# Patient Record
Sex: FEMALE | Race: WHITE | NOT HISPANIC OR LATINO | Employment: FULL TIME | URBAN - METROPOLITAN AREA
[De-identification: names, ages, dates, MRNs, and addresses within clinical notes are randomized per-mention and may not be internally consistent; named-entity substitution may affect disease eponyms.]

---

## 2017-12-15 ENCOUNTER — APPOINTMENT (EMERGENCY)
Dept: RADIOLOGY | Facility: HOSPITAL | Age: 27
End: 2017-12-15
Payer: COMMERCIAL

## 2017-12-15 ENCOUNTER — HOSPITAL ENCOUNTER (EMERGENCY)
Facility: HOSPITAL | Age: 27
Discharge: HOME | End: 2017-12-15
Payer: COMMERCIAL

## 2017-12-15 VITALS
BODY MASS INDEX: 40.48 KG/M2 | TEMPERATURE: 97.4 F | OXYGEN SATURATION: 99 % | HEIGHT: 62 IN | HEART RATE: 90 BPM | DIASTOLIC BLOOD PRESSURE: 70 MMHG | RESPIRATION RATE: 24 BRPM | SYSTOLIC BLOOD PRESSURE: 121 MMHG | WEIGHT: 220 LBS

## 2017-12-15 DIAGNOSIS — N39.0 UTI (URINARY TRACT INFECTION): ICD-10-CM

## 2017-12-15 DIAGNOSIS — N20.1 RIGHT URETERAL CALCULUS: Primary | ICD-10-CM

## 2017-12-15 LAB
ALBUMIN SERPL BCP-MCNC: 4.4 G/DL (ref 3.5–5)
ALP SERPL-CCNC: 65 U/L (ref 46–116)
ALT SERPL W P-5'-P-CCNC: 81 U/L (ref 12–78)
ANION GAP SERPL CALCULATED.3IONS-SCNC: 14 MMOL/L (ref 4–13)
AST SERPL W P-5'-P-CCNC: 41 U/L (ref 5–45)
BACTERIA UR QL AUTO: ABNORMAL /HPF
BASOPHILS # BLD AUTO: 0.1 THOUSANDS/ΜL (ref 0–0.1)
BASOPHILS NFR BLD AUTO: 1 % (ref 0–1)
BILIRUB SERPL-MCNC: 0.6 MG/DL (ref 0.2–1)
BILIRUB UR QL STRIP: ABNORMAL
BUN SERPL-MCNC: 12 MG/DL (ref 5–25)
CALCIUM SERPL-MCNC: 9.7 MG/DL (ref 8.3–10.1)
CHLORIDE SERPL-SCNC: 104 MMOL/L (ref 100–108)
CLARITY UR: ABNORMAL
CO2 SERPL-SCNC: 22 MMOL/L (ref 21–32)
COLOR UR: ABNORMAL
CREAT SERPL-MCNC: 1.08 MG/DL (ref 0.6–1.3)
EOSINOPHIL # BLD AUTO: 0 THOUSAND/ΜL (ref 0–0.61)
EOSINOPHIL NFR BLD AUTO: 0 % (ref 0–6)
ERYTHROCYTE [DISTWIDTH] IN BLOOD BY AUTOMATED COUNT: 12.6 % (ref 11.6–15.1)
EXT PREG TEST URINE: NEGATIVE
GFR SERPL CREATININE-BSD FRML MDRD: 71 ML/MIN/1.73SQ M
GLUCOSE SERPL-MCNC: 141 MG/DL (ref 65–140)
GLUCOSE UR STRIP-MCNC: NEGATIVE MG/DL
HCT VFR BLD AUTO: 44.5 % (ref 37–47)
HGB BLD-MCNC: 14.9 G/DL (ref 12–16)
HGB UR QL STRIP.AUTO: ABNORMAL
KETONES UR STRIP-MCNC: ABNORMAL MG/DL
LEUKOCYTE ESTERASE UR QL STRIP: ABNORMAL
LIPASE SERPL-CCNC: 139 U/L (ref 73–393)
LYMPHOCYTES # BLD AUTO: 2.6 THOUSANDS/ΜL (ref 0.6–4.47)
LYMPHOCYTES NFR BLD AUTO: 23 % (ref 14–44)
MCH RBC QN AUTO: 30.9 PG (ref 27–31)
MCHC RBC AUTO-ENTMCNC: 33.5 G/DL (ref 31.4–37.4)
MCV RBC AUTO: 92 FL (ref 82–98)
MONOCYTES # BLD AUTO: 0.3 THOUSAND/ΜL (ref 0.17–1.22)
MONOCYTES NFR BLD AUTO: 2 % (ref 4–12)
NEUTROPHILS # BLD AUTO: 8.7 THOUSANDS/ΜL (ref 1.85–7.62)
NEUTS SEG NFR BLD AUTO: 74 % (ref 43–75)
NITRITE UR QL STRIP: NEGATIVE
NON-SQ EPI CELLS URNS QL MICRO: ABNORMAL /HPF
NRBC BLD AUTO-RTO: 0 /100 WBCS
PH UR STRIP.AUTO: 7.5 [PH] (ref 5–9)
PLATELET # BLD AUTO: 375 THOUSANDS/UL (ref 130–400)
PMV BLD AUTO: 9.1 FL (ref 8.9–12.7)
POTASSIUM SERPL-SCNC: 3.8 MMOL/L (ref 3.5–5.3)
PROT SERPL-MCNC: 8.2 G/DL (ref 6.4–8.2)
PROT UR STRIP-MCNC: ABNORMAL MG/DL
RBC # BLD AUTO: 4.83 MILLION/UL (ref 4.2–5.4)
RBC #/AREA URNS AUTO: ABNORMAL /HPF
SODIUM SERPL-SCNC: 140 MMOL/L (ref 136–145)
SP GR UR STRIP.AUTO: 1.02 (ref 1–1.03)
UROBILINOGEN UR QL STRIP.AUTO: 0.2 E.U./DL
WBC # BLD AUTO: 11.7 THOUSAND/UL (ref 4.8–10.8)
WBC #/AREA URNS AUTO: ABNORMAL /HPF

## 2017-12-15 PROCEDURE — 87147 CULTURE TYPE IMMUNOLOGIC: CPT | Performed by: PHYSICIAN ASSISTANT

## 2017-12-15 PROCEDURE — 81001 URINALYSIS AUTO W/SCOPE: CPT | Performed by: PHYSICIAN ASSISTANT

## 2017-12-15 PROCEDURE — 36415 COLL VENOUS BLD VENIPUNCTURE: CPT | Performed by: PHYSICIAN ASSISTANT

## 2017-12-15 PROCEDURE — 96375 TX/PRO/DX INJ NEW DRUG ADDON: CPT

## 2017-12-15 PROCEDURE — 83690 ASSAY OF LIPASE: CPT | Performed by: PHYSICIAN ASSISTANT

## 2017-12-15 PROCEDURE — 96361 HYDRATE IV INFUSION ADD-ON: CPT

## 2017-12-15 PROCEDURE — 80053 COMPREHEN METABOLIC PANEL: CPT | Performed by: PHYSICIAN ASSISTANT

## 2017-12-15 PROCEDURE — 87086 URINE CULTURE/COLONY COUNT: CPT | Performed by: PHYSICIAN ASSISTANT

## 2017-12-15 PROCEDURE — 85025 COMPLETE CBC W/AUTO DIFF WBC: CPT | Performed by: PHYSICIAN ASSISTANT

## 2017-12-15 PROCEDURE — 81025 URINE PREGNANCY TEST: CPT | Performed by: PHYSICIAN ASSISTANT

## 2017-12-15 PROCEDURE — 96374 THER/PROPH/DIAG INJ IV PUSH: CPT

## 2017-12-15 PROCEDURE — 99284 EMERGENCY DEPT VISIT MOD MDM: CPT

## 2017-12-15 PROCEDURE — 74176 CT ABD & PELVIS W/O CONTRAST: CPT

## 2017-12-15 RX ORDER — ONDANSETRON 4 MG/1
4 TABLET, ORALLY DISINTEGRATING ORAL EVERY 6 HOURS PRN
COMMUNITY
End: 2018-03-14

## 2017-12-15 RX ORDER — KETOROLAC TROMETHAMINE 30 MG/ML
30 INJECTION, SOLUTION INTRAMUSCULAR; INTRAVENOUS ONCE
Status: COMPLETED | OUTPATIENT
Start: 2017-12-15 | End: 2017-12-15

## 2017-12-15 RX ORDER — CEPHALEXIN 250 MG/1
250 CAPSULE ORAL EVERY 6 HOURS SCHEDULED
Qty: 28 CAPSULE | Refills: 0 | Status: SHIPPED | OUTPATIENT
Start: 2017-12-15 | End: 2017-12-22

## 2017-12-15 RX ORDER — ONDANSETRON 4 MG/1
4 TABLET, ORALLY DISINTEGRATING ORAL EVERY 6 HOURS PRN
Qty: 20 TABLET | Refills: 0 | Status: SHIPPED | OUTPATIENT
Start: 2017-12-15 | End: 2018-07-07 | Stop reason: ALTCHOICE

## 2017-12-15 RX ORDER — KETOROLAC TROMETHAMINE 10 MG/1
10 TABLET, FILM COATED ORAL EVERY 6 HOURS PRN
Qty: 20 TABLET | Refills: 0 | Status: SHIPPED | OUTPATIENT
Start: 2017-12-15 | End: 2018-07-07 | Stop reason: ALTCHOICE

## 2017-12-15 RX ORDER — ONDANSETRON 2 MG/ML
4 INJECTION INTRAMUSCULAR; INTRAVENOUS ONCE
Status: COMPLETED | OUTPATIENT
Start: 2017-12-15 | End: 2017-12-15

## 2017-12-15 RX ORDER — CYCLOBENZAPRINE HCL 10 MG
10 TABLET ORAL 3 TIMES DAILY PRN
COMMUNITY
End: 2018-07-07 | Stop reason: ALTCHOICE

## 2017-12-15 RX ADMIN — KETOROLAC TROMETHAMINE 30 MG: 30 INJECTION, SOLUTION INTRAMUSCULAR at 09:52

## 2017-12-15 RX ADMIN — ONDANSETRON 4 MG: 2 INJECTION INTRAMUSCULAR; INTRAVENOUS at 09:52

## 2017-12-15 RX ADMIN — SODIUM CHLORIDE 1000 ML: 0.9 INJECTION, SOLUTION INTRAVENOUS at 09:49

## 2017-12-15 NOTE — ED PROVIDER NOTES
History  Chief Complaint   Patient presents with    Flank Pain     R, nausea/vom began early this morning       History provided by:  Patient   used: No    Flank Pain   Pain location:  R flank  Pain quality: aching and gnawing    Pain radiates to:  Does not radiate  Pain severity:  Severe  Onset quality:  Sudden  Duration:  3 hours  Timing:  Constant  Progression:  Worsening  Chronicity:  New  Context: not alcohol use, not awakening from sleep, not diet changes, not eating, not laxative use, not medication withdrawal, not previous surgeries, not recent illness, not recent sexual activity, not recent travel, not retching, not sick contacts, not suspicious food intake and not trauma    Associated symptoms: nausea and vomiting    Associated symptoms: no anorexia, no belching, no chest pain, no chills, no constipation, no cough, no diarrhea, no dysuria, no fatigue, no fever, no flatus, no hematemesis, no hematochezia, no hematuria, no melena, no shortness of breath, no sore throat, no vaginal bleeding and no vaginal discharge    Risk factors: obesity    Risk factors: no alcohol abuse, no aspirin use, not elderly, has not had multiple surgeries, no NSAID use, not pregnant and no recent hospitalization        Prior to Admission Medications   Prescriptions Last Dose Informant Patient Reported? Taking? cyclobenzaprine (FLEXERIL) 10 mg tablet   Yes Yes   Sig: Take 10 mg by mouth 3 (three) times a day as needed for muscle spasms   ondansetron (ZOFRAN-ODT) 4 mg disintegrating tablet   Yes Yes   Sig: Take 4 mg by mouth every 6 (six) hours as needed for nausea or vomiting      Facility-Administered Medications: None       History reviewed  No pertinent past medical history  History reviewed  No pertinent surgical history  History reviewed  No pertinent family history  I have reviewed and agree with the history as documented      Social History   Substance Use Topics    Smoking status: Current Every Day Smoker     Packs/day: 0 25     Types: Cigarettes    Smokeless tobacco: Former User     Quit date: 5/4/2016    Alcohol use Yes      Comment: occasional        Review of Systems   Constitutional: Positive for appetite change  Negative for chills, diaphoresis, fatigue and fever  HENT: Negative  Negative for sore throat  Eyes: Negative for photophobia and visual disturbance  Respiratory: Negative for cough, chest tightness, shortness of breath and wheezing  Cardiovascular: Negative for chest pain  Gastrointestinal: Positive for nausea and vomiting  Negative for anorexia, constipation, diarrhea, flatus, hematemesis, hematochezia and melena  Genitourinary: Positive for flank pain  Negative for dysuria, hematuria, vaginal bleeding and vaginal discharge  Musculoskeletal: Negative for myalgias and neck stiffness  Skin: Negative for color change, pallor and rash  Neurological: Negative for dizziness, weakness, light-headedness and headaches  Hematological: Negative for adenopathy  Physical Exam  ED Triage Vitals [12/15/17 0935]   Temperature Pulse Respirations Blood Pressure SpO2   97 5 °F (36 4 °C) 88 20 121/70 100 %      Temp Source Heart Rate Source Patient Position - Orthostatic VS BP Location FiO2 (%)   Oral Monitor Sitting Right arm --      Pain Score       Worst Possible Pain           Orthostatic Vital Signs  Vitals:    12/15/17 0935 12/15/17 0936   BP: 121/70 121/70   Pulse: 88 90   Patient Position - Orthostatic VS: Sitting        Physical Exam   Constitutional: She is oriented to person, place, and time  She appears well-developed and well-nourished  No distress  HENT:   Head: Normocephalic and atraumatic  Right Ear: External ear normal    Left Ear: External ear normal    Nose: Nose normal    Neck: Normal range of motion  Neck supple  Cardiovascular: Normal rate, regular rhythm, normal heart sounds and intact distal pulses  Exam reveals no friction rub      No murmur heard   Pulmonary/Chest: Effort normal and breath sounds normal  No respiratory distress  She has no wheezes  Abdominal: Soft  There is no tenderness  There is CVA tenderness (Mild right CVA tenderness)  There is no guarding  Lymphadenopathy:     She has no cervical adenopathy  Neurological: She is alert and oriented to person, place, and time  She exhibits normal muscle tone  Coordination normal    Skin: Skin is warm and dry  Capillary refill takes less than 2 seconds  No rash noted  She is not diaphoretic  No pallor  Nursing note and vitals reviewed  ED Medications  Medications   ondansetron (ZOFRAN) injection 4 mg (4 mg Intravenous Given 12/15/17 0952)   sodium chloride 0 9 % bolus 1,000 mL (0 mL Intravenous Stopped 12/15/17 1217)   ketorolac (TORADOL) injection 30 mg (30 mg Intravenous Given 12/15/17 0952)       Diagnostic Studies  Results Reviewed     Procedure Component Value Units Date/Time    Urine Microscopic [[id number]]  (Abnormal) Collected:  12/15/17 1044    Lab Status:  Final result Specimen:  Urine from Urine, Clean Catch Updated:  12/15/17 1132     RBC, UA Innumerable (A) /hpf      WBC, UA 20-30 (A) /hpf      Epithelial Cells Moderate (A) /hpf      Bacteria, UA Innumerable (A) /hpf     Urine culture [[id number]] Collected:  12/15/17 1044    Lab Status:   In process Specimen:  Urine from Urine, Clean Catch Updated:  12/15/17 1132    UA w Reflex to Microscopic w Reflex to Culture [[id number]]  (Abnormal) Collected:  12/15/17 1044    Lab Status:  Final result Specimen:  Urine from Urine, Clean Catch Updated:  12/15/17 1055     Color, UA Yelena     Clarity, UA Turbid     Specific [place], UA 1 020     pH, UA 7 5     Leukocytes, UA Moderate (A)     Nitrite, UA Negative     Protein, UA 30 (1+) (A) mg/dl      Glucose, UA Negative mg/dl      Ketones, UA 15 (1+) (A) mg/dl      Urobilinogen, UA 0 2 E U /dl      Bilirubin, UA Interference- unable to analyze (A)     Blood, UA Large (A)    POCT pregnancy, urine [[id number]]  (Normal) Resulted:  12/15/17 1052    Lab Status:  Final result Updated:  12/15/17 1052     EXT PREG TEST UR (Ref: Negative) negative    Comprehensive metabolic panel [[id number]]  (Abnormal) Collected:  12/15/17 0948    Lab Status:  Final result Specimen:  Blood from Arm, Right Updated:  12/15/17 1012     Sodium 140 mmol/L      Potassium 3 8 mmol/L      Chloride 104 mmol/L      CO2 22 mmol/L      Anion Gap 14 (H) mmol/L      BUN 12 mg/dL      Creatinine 1 08 mg/dL      Glucose 141 (H) mg/dL      Calcium 9 7 mg/dL      AST 41 U/L      ALT 81 (H) U/L      Alkaline Phosphatase 65 U/L      Total Protein 8 2 g/dL      Albumin 4 4 g/dL      Total Bilirubin 0 60 mg/dL      eGFR 71 ml/min/1 73sq m     Narrative:         National Kidney Disease Education Program recommendations are as follows:  GFR calculation is accurate only with a steady state creatinine  Chronic Kidney disease less than 60 ml/min/1 73 sq  meters  Kidney failure less than 15 ml/min/1 73 sq  meters      Lipase [[id number]]  (Normal) Collected:  12/15/17 0948    Lab Status:  Final result Specimen:  Blood from Arm, Right Updated:  12/15/17 1012     Lipase 139 u/L     CBC and differential [[id number]]  (Abnormal) Collected:  12/15/17 0948    Lab Status:  Final result Specimen:  Blood from Arm, Right Updated:  12/15/17 0957     WBC 11 70 (H) Thousand/uL      RBC 4 83 Million/uL      Hemoglobin 14 9 g/dL      Hematocrit 44 5 %      MCV 92 fL      MCH 30 9 pg      MCHC 33 5 g/dL      RDW 12 6 %      MPV 9 1 fL      Platelets 100 Thousands/uL      nRBC 0 /100 WBCs      Neutrophils Relative 74 %      Lymphocytes Relative 23 %      Monocytes Relative 2 (L) %      Eosinophils Relative 0 %      Basophils Relative 1 %      Neutrophils Absolute 8 70 (H) Thousands/µL      Lymphocytes Absolute 2 60 Thousands/µL      Monocytes Absolute 0 30 Thousand/µL      Eosinophils Absolute 0 00 Thousand/µL      Basophils Absolute 0 10 Thousands/µL                  CT renal stone study abdomen pelvis without contrast   Final Result by Kimberly Castano MD (12/15 1136)      There are bilateral kidney stones and there is also a 3-4 mm right ureter calculus in the midportion of the ureter producing mild hydroureteronephrosis  Extensive fatty infiltration of liver          Workstation performed: [id number]                    Procedures  Procedures       Phone Contacts  ED Phone Contact    ED Course  ED Course                                MDM  Number of Diagnoses or Management Options  Right ureteral calculus: new and requires workup  UTI (urinary tract infection): new and requires workup  Diagnosis management comments: CT stone study showed a 3 mm calculus in the right mid ureter with mild hydronephrosis  Her UA showed concurrent UTI  She was discharged in no acute distress with urine strainer, course of Keflex, and supportive therapy for the kidney stone  She was given referral to Dr Alea Chavez of Urology for further evaluation and/or treatment as necessary  She was additionally instructed to return to the ED immediately if worsening symptoms develop or inability to pass the stone  Amount and/or Complexity of Data Reviewed  Clinical lab tests: ordered and reviewed  Tests in the radiology section of CPT®: ordered and reviewed  Review and summarize past medical records: yes      CritCare Time    Disposition  Final diagnoses:   Right ureteral calculus   UTI (urinary tract infection)     Time reflects when diagnosis was documented in both MDM as applicable and the Disposition within this note     Time User Action Codes Description Comment    12/15/2017 12:04 PM William Demarco Add [N20 1] Right ureteral calculus     12/15/2017 12:04 PM William Demarco Add [N39 0] UTI (urinary tract infection)       ED Disposition     ED Disposition Condition Comment    Discharge  [facility] discharge to home/self care      Condition at discharge: Stable        Follow-up Information Follow up With Specialties Details Why Contact Info Additional [address] Emergency Department Emergency Medicine Go to If symptoms worsen [address] [facility]  [phone] [facility], [address] [place], [address]    Chuckie Shanks MD Urology Schedule an appointment as soon as possible for a visit For further evaluation and/or treatment as necessary  [address]  [phone]           Discharge Medication List as of 12/15/2017 12:07 PM      START taking these medications    Details   cephalexin (KEFLEX) 250 mg capsule Take 1 capsule by mouth every 6 (six) hours for 7 days, Starting Fri 12/15/2017, Until Fri 12/22/2017, Print      ketorolac (TORADOL) 10 mg tablet Take 1 tablet by mouth every 6 (six) hours as needed for moderate pain or severe pain for up to 5 days, Starting Fri 12/15/2017, Until Wed 12/20/2017, Print      !! ondansetron (ZOFRAN-ODT) 4 mg disintegrating tablet Take 1 tablet by mouth every 6 (six) hours as needed for nausea or vomiting for up to 5 days, Starting Fri 12/15/2017, Until Wed 12/20/2017, Print       !! - Potential duplicate medications found  Please discuss with provider  CONTINUE these medications which have NOT CHANGED    Details   cyclobenzaprine (FLEXERIL) 10 mg tablet Take 10 mg by mouth 3 (three) times a day as needed for muscle spasms, Historical Med      !! ondansetron (ZOFRAN-ODT) 4 mg disintegrating tablet Take 4 mg by mouth every 6 (six) hours as needed for nausea or vomiting, Historical Med       !! - Potential duplicate medications found  Please discuss with provider  No discharge procedures on file      ED Provider  Electronically Signed by           Moira Drake PA-C  12/15/17 4834

## 2017-12-15 NOTE — DISCHARGE INSTRUCTIONS
Ureteral Stones   WHAT YOU NEED TO KNOW:   A ureteral stone is a stone that forms in the kidney and moves down the ureter and gets stuck there  The ureter is the tube that takes urine from the kidney to the bladder  Stones can form in the urinary system when your urine has high levels of minerals and salts  Urinary stones can be made of uric acid, calcium, phosphate, or oxalate crystals  DISCHARGE INSTRUCTIONS:   Return to the emergency department if:   · You have severe pain that does not improve, even after you take medicine  · You have vomiting that is not relieved by medicine  · You develop a fever  Contact your healthcare provider if:   · You develop a fever  · You have any questions or concerns about your condition or care  Follow up with your healthcare provider as directed: You may need to return for more tests  Write down your questions so you remember to ask them during your visits  Medicines: You may need any of the following:  · NSAIDs , such as ibuprofen, help decrease swelling, pain, and fever  This medicine is available with or without a doctor's order  NSAIDs can cause stomach bleeding or kidney problems in certain people  If you take blood thinner medicine, always ask your healthcare provider if NSAIDs are safe for you  Always read the medicine label and follow directions  · Prescription pain medicine  may help decrease pain or help your ureteral stone pass  Do not wait until the pain is severe before you take pain medicine  · Nausea medicine  may help calm your stomach and prevent vomiting  · Take your medicine as directed  Contact your healthcare provider if you think your medicine is not helping or if you have side effects  Tell him or her if you are allergic to any medicine  Keep a list of the medicines, vitamins, and herbs you take  Include the amounts, and when and why you take them  Bring the list or the pill bottles to follow-up visits   Carry your medicine list with you in case of an emergency  Self-care:   · Drink plenty of liquids  Your healthcare provider may tell you to drink at least 8 to 12 (eight-ounce) cups of liquids each day  This helps flush out the ureteral stones when you urinate  Water is the best liquid to drink  · Strain your urine every time you go to the bathroom  Urinate through a strainer or a piece of thin cloth to catch the stones  Take the stones to your healthcare provider so they can be sent to the lab for tests  This will help your healthcare providers plan the best treatment for you  · Ask your healthcare provider about any nutrition changes you need to make  You may need to limit certain foods such as foods high in sodium (salt), certain protein foods, or foods high in oxalate  After you pass your ureteral stone: Once you have passed your ureteral stone, you may need to do a 24-hour urine test  You may need to save all of your urine for 24 hours  Each time you go to the bathroom, you will urinate into a container  Then you will pour your urine into a larger container that is kept cold  You may be told to write down the time and amount of urine you passed  At the end of 24 hours, the urine is sent to a lab for tests  Results from the test will help your healthcare provider plan ways to prevent more stones from forming  © 2017 [employer] Information is for End User's use only and may not be sold, redistributed or otherwise used for commercial purposes  All illustrations and images included in CareNotes® are the copyrighted property of A D A M [employer]  or Marv Cuellar  The above information is an  only  It is not intended as medical advice for individual conditions or treatments  Talk to your doctor, nurse or pharmacist before following any medical regimen to see if it is safe and effective for you        How to Strain Your Urine   WHAT YOU NEED TO KNOW:   Urinate into a strainer (funnel with a fine mesh on the bottom) or glass jar to collect kidney stones  DISCHARGE INSTRUCTIONS:   Medicines:   · Pain medicine: You may be given medicine to take away or decrease pain  Do not wait until the pain is severe before you take your medicine  · NSAIDs:  These medicines decrease swelling, pain, and fever  NSAIDs are available without a doctor's order  Ask which medicine is right for you  Ask how much to take and when to take it  Take as directed  NSAIDs can cause stomach bleeding and kidney problems if not taken correctly  · Nausea medicine: This medicine calms your stomach and prevents or controls vomiting  · Take your medicine as directed  Contact your healthcare provider if you think your medicine is not helping or if you have side effects  Tell him of her if you are allergic to any medicine  Keep a list of the medicines, vitamins, and herbs you take  Include the amounts, and when and why you take them  Bring the list or the pill bottles to follow-up visits  Carry your medicine list with you in case of an emergency  Drink liquids as directed:  Drink about 3 liters of liquids each day, or as directed  That equals about 12 glasses of water or fruit juice  Half of your total daily liquids should be water  Limit coffee, tea, and soda to 2 cups daily  Your urine will be pale and clear if you are drinking enough liquid  Self-care:   · Activity:  Exercise, such as walking, may help decrease your pain  · Avoid heat:  Heat may cause you to sweat, urinate less, and become dehydrated  Follow up with your healthcare provider or urologist as directed:  Write down your questions so you remember to ask them during your visits  Contact your healthcare provider or urologist if:   · You have a fever and chills  · Your urine looks cloudy or has a bad smell  · You have burning pain when you urinate  · You have trouble urinating      · You are vomiting and it does not get better, even after you take medicine  · You have questions or concerns about your condition or care  Return to the emergency department if:   · You are not able to urinate  · You have severe pain in your lower abdomen or side  · Your heart flutters or beats faster than usual   © 2017 [employer] Information is for End User's use only and may not be sold, redistributed or otherwise used for commercial purposes  All illustrations and images included in CareNotes® are the copyrighted property of [employer]  or Marv Cuellar  The above information is an  only  It is not intended as medical advice for individual conditions or treatments  Talk to your doctor, nurse or pharmacist before following any medical regimen to see if it is safe and effective for you

## 2017-12-17 LAB — BACTERIA UR CULT: ABNORMAL

## 2018-01-31 ENCOUNTER — TELEPHONE (OUTPATIENT)
Dept: OBGYN CLINIC | Facility: CLINIC | Age: 28
End: 2018-01-31

## 2018-02-06 ENCOUNTER — OFFICE VISIT (OUTPATIENT)
Dept: OBGYN CLINIC | Facility: CLINIC | Age: 28
End: 2018-02-06
Payer: COMMERCIAL

## 2018-02-06 VITALS
SYSTOLIC BLOOD PRESSURE: 118 MMHG | HEIGHT: 62 IN | DIASTOLIC BLOOD PRESSURE: 82 MMHG | WEIGHT: 226 LBS | BODY MASS INDEX: 41.59 KG/M2

## 2018-02-06 DIAGNOSIS — N92.6 IRREGULAR MENSES: ICD-10-CM

## 2018-02-06 DIAGNOSIS — R53.82 CHRONIC FATIGUE: Primary | ICD-10-CM

## 2018-02-06 PROCEDURE — 99203 OFFICE O/P NEW LOW 30 MIN: CPT | Performed by: NURSE PRACTITIONER

## 2018-02-06 RX ORDER — TRAMADOL HYDROCHLORIDE 50 MG/1
1 TABLET ORAL 4 TIMES DAILY
COMMUNITY
Start: 2015-07-15 | End: 2018-07-07 | Stop reason: ALTCHOICE

## 2018-02-06 NOTE — PROGRESS NOTES
Assessment/Plan:    Reviewed with patient it could be very normal to skip or have an irregular menstrual cycle for one month  Sometimes due to stress our cycle can become irregular  Reviewed those two days of bleeding could have been her menses just very short this month  Advised patient to continue to monitor menses  If she has gone 3 or more months with no menses to be reevaluated for amenorrhea but with only missing one or having one irregular cycle we cannot consider that at this time  Pt also spoke about trying to concieve for the past three years with no successful pregnancies  Reviewed with patient if she would like she can schedule a preconception counseling appointment with Jorge Sullivan  Advised patient to schedule yearly exam and have an up to date pap smear  Advised patient to see PCP for physical exam and routine blood work  Will send for Thyroid testing and qualitative HCG  Advised to take a prenatal vitamin with DHA since she is trying to conceive  RTO if misses menses for greater than 3 months for further follow-up/testing  RTO for annual exam and preconception counseling  Irregular menses  Pt presents with irregular bleeding over the last month  Pt states her menses are usually very regular, but this month she is 7 days late for her menses  She took many home pregnancy tests with a negative result  Pt states last week she did have light bleeding on Tuesday and regular bright red bleeding on Wednesday and stopped  - Will send for TSH, T4, and HCG blood work  - Pt to continue to monitor menses, return if it has been 3-6 months without a menses for further work up    Chronic fatigue  - Will send for TSH, T4, HCG blood work       Diagnoses and all orders for this visit:    Chronic fatigue  -     TSH, 3rd generation; Future  -     T4, free; Future  -     Pregnancy Test (HCG Qualitative); Future    Irregular menses    Other orders  -     traMADol (ULTRAM) 50 mg tablet;  Take 1 tablet by mouth 4 (four) times a day          Subjective:      Patient ID: Luciano Young is a 33 y o  female  Pt is a new patient to the office who presents today for missed/irregular menses  Pt states she is trying to conceive for three years so she is very aware of her menstrual cycle  Pt states she had one chemical pregnancy in the past but no other pregnancies to date  Her menses is usually every month lasts 5-7 days with first 2 days being very light then 3-5 days of heavier bleediningg with some clots, then her menses is gone  Denies any pain with menses  Pt denies ever having bleeding in between menses  Pt states this past month she missed her menses and is currently about 10 days late on her period  Pt states she took numerous pregnancy tests because they are trying to conceive, all of which were negative  Pt did state she had light pink spotting a week ago followed by a day of bright red bleeding, then nothing  Patients last menstrual period is recorded as January 14, 2018  Pt denies any recent change in medications, dietary habits, or stress levels within the last month or two  Pt in a mutually exclusive relationship and denies the need for STD testing today, no history of an STD in the past   Last pap is unknown, pt states many years ago  No history of abnormal paps in the past per patient  Pt not currently on birth control  The following portions of the patient's history were reviewed and updated as appropriate: allergies, current medications, past family history, past medical history, past social history, past surgical history and problem list     Review of Systems   Constitutional: Positive for fatigue  Genitourinary: Positive for menstrual problem  All other systems reviewed and are negative  Objective:     Physical Exam   Constitutional: She is oriented to person, place, and time  She appears well-developed and well-nourished  Neck: Normal range of motion  Neck supple   No thyromegaly present  Cardiovascular: Normal rate, regular rhythm and normal heart sounds  Pulmonary/Chest: Effort normal and breath sounds normal    Abdominal: Soft  Bowel sounds are normal    Musculoskeletal: Normal range of motion  Neurological: She is alert and oriented to person, place, and time  Skin: Skin is warm and dry  Psychiatric: She has a normal mood and affect   Her behavior is normal  Judgment and thought content normal

## 2018-02-06 NOTE — ASSESSMENT & PLAN NOTE
Pt presents with irregular bleeding over the last month  Pt states her menses are usually very regular, but this month she is 7 days late for her menses  She took many home pregnancy tests with a negative result  Pt states last week she did have light bleeding on Tuesday and regular bright red bleeding on Wednesday and stopped     - Will send for TSH, T4, and HCG blood work  - Pt to continue to monitor menses, return if it has been 3-6 months without a menses for further work up

## 2018-02-06 NOTE — PATIENT INSTRUCTIONS
Reviewed with patient it could be very normal to skip or have an irregular menstrual cycle for one month  Sometimes due to stress our cycle can become irregular  Reviewed those two days of bleeding could have been her menses just very short this month  Advised patient to continue to monitor menses  If she has gone 3 or more months with no menses to be reevaluated for amenorrhea but with only missing one or having one irregular cycle we cannot consider that at this time  Pt also spoke about trying to concieve for the past three years with no successful pregnancies  Reviewed with patient if she would like she can schedule a preconception counseling appointment with Rosa M Marte  Advised patient to schedule yearly exam and have an up to date pap smear  Advised patient to see PCP for physical exam and routine blood work  Will send for Thyroid testing and qualitative HCG  Advised to take a prenatal vitamin with DHA since she is trying to conceive  RTO if misses menses for greater than 3 months for further follow-up/testing  RTO for annual exam and preconception counseling

## 2018-02-11 LAB
B-HCG SERPL QL: NEGATIVE
T4 FREE SERPL-MCNC: 1.1 NG/DL (ref 0.8–1.8)
TSH SERPL-ACNC: 1.09 MIU/L

## 2018-02-12 ENCOUNTER — TELEPHONE (OUTPATIENT)
Dept: OBGYN CLINIC | Facility: CLINIC | Age: 28
End: 2018-02-12

## 2018-02-13 ENCOUNTER — TELEPHONE (OUTPATIENT)
Dept: OBGYN CLINIC | Facility: CLINIC | Age: 28
End: 2018-02-13

## 2018-03-14 ENCOUNTER — OFFICE VISIT (OUTPATIENT)
Dept: OBGYN CLINIC | Facility: CLINIC | Age: 28
End: 2018-03-14
Payer: COMMERCIAL

## 2018-03-14 VITALS
WEIGHT: 229 LBS | HEIGHT: 62 IN | BODY MASS INDEX: 42.14 KG/M2 | SYSTOLIC BLOOD PRESSURE: 116 MMHG | DIASTOLIC BLOOD PRESSURE: 78 MMHG

## 2018-03-14 DIAGNOSIS — N92.6 IRREGULAR MENSES: Primary | ICD-10-CM

## 2018-03-14 DIAGNOSIS — Z01.419 ENCOUNTER FOR CERVICAL PAP SMEAR WITH PELVIC EXAM: ICD-10-CM

## 2018-03-14 PROCEDURE — 99395 PREV VISIT EST AGE 18-39: CPT | Performed by: PHYSICIAN ASSISTANT

## 2018-03-14 NOTE — PROGRESS NOTES
Assessment/Plan   Diagnoses and all orders for this visit:    Irregular menses  -     Comprehensive metabolic panel; Future  -     DHEA-sulfate; Future  -     Insulin, fasting; Future  -     Testosterone, free, total; Future  -     Prolactin; Future  -     Antimullerian hormone (AMH); Future  -     Estradiol; Future  -     Progesterone; Future  -     Luteinizing hormone; Future  -     Follicle stimulating hormone; Future  -     US pelvis complete w transvaginal; Future  -     hCG, quantitative; Future    Encounter for cervical Pap smear with pelvic exam  -     GP PAP (RFLX HPV PLUS ASC-US or >)        Discussion  I have discussed the importance of monthly self-breast exams, exercise and healthy diet as well as adequate intake of calcium and vitamin D  Encourage MVI q day and r/milvia importance of folic acid; Encourage 30-40 min weight bearing exercise most days of week  Encourage safe sexual practices; STD testing -   Contraception -   The patient has/has not had the Gardasil vaccine series, which is recommended for patients from 526 years of age  Strongly encourage - handout given; pt to check with insurance for coverage and call if desires   Pap smears will start at age 25 per the ASCCP guidelines  All questions have been answered to her satisfaction  RTO for APE or sooner if needed    Subjective     Pt for annual GYN exam  Pt has also been  trying for pregnancy for approx 3 years  Had chemical pregnancy last year  Then this past Jan period was very light, only lasted 2 days then resolved  Then no menses in Feb at all, then got normal March  Pt has been following w edward for ovulation, has not used Opks at all  No BBT yet  Until recently w changes periods have been q 28 days, lasts 5 days, does have clotting and heaviness first few days, then tapers to more tolerable  Then last 3 mths have been irregular  Pt does c/o increased stress with family and work   Has not had any significant wt changes, no changes in diet or routine  Vicky Moore is a 33 y o  female who presents for annual well woman exam    Menarche -9 ; LMP -3/8/18 ; No vulvar itch/burn; No vaginal itch/burn; No abn discharge or odor; No urinary sx - burning/pain/frequency/hematuria  (+) SBEs - no breast masses, asymmetry, nipple discharge or bleeding, changes in skin of breast, or breast tenderness bilaterally  No abd/pelvic pain or HAs;   Pt is sexually active in a mutually monog sexual relationship; No issues with intercourse; She declines std/hiv/hep testing; Feels safe at home      Review of Systems   Constitutional: Negative  Respiratory: Negative  Gastrointestinal: Negative  Endocrine: Negative  Genitourinary: Negative  The following portions of the patient's history were reviewed and updated as appropriate: current medications, past family history, past medical history, past social history, past surgical history and problem list          Menstrual History:  OB History      Para Term  AB Living    1 0 0 0 1 0    SAB TAB Ectopic Multiple Live Births    0 0 0 0 0        Obstetric Comments    biochemical         Menarche age: 9  Patient's last menstrual period was 2018 (exact date)  Period Cycle (Days): 28  Period Duration (Days): 5  Menstrual Flow: Heavy    No past medical history on file  No past surgical history on file  Family History   Problem Relation Age of Onset    Cancer Mother     Diabetes Mother     Hypertension Mother     Thyroid disease Mother     Hyperlipidemia Father      High Triglycerides       Social History     Social History    Marital status: /Civil Union     Spouse name: N/A    Number of children: N/A    Years of education: N/A     Occupational History    Not on file       Social History Main Topics    Smoking status: Current Every Day Smoker     Packs/day: 0 25     Types: Cigarettes    Smokeless tobacco: Former User     Quit date: 2016    Alcohol use Yes Comment: occasional    Drug use: No    Sexual activity: Yes     Partners: Male     Other Topics Concern    Not on file     Social History Narrative    No narrative on file         No Known Allergies    Objective   There were no vitals filed for this visit  Physical Exam   Constitutional: She is oriented to person, place, and time  She appears well-developed and well-nourished  HENT:   Head: Normocephalic  Neck: Normal range of motion  Neck supple  No tracheal deviation present  No thyromegaly present  Cardiovascular: Normal rate, regular rhythm and normal heart sounds  Pulmonary/Chest: Effort normal  No stridor  No respiratory distress  She has no wheezes  She has no rales  She exhibits no tenderness  Abdominal: Soft  Bowel sounds are normal  She exhibits no distension and no mass  There is no rebound and no guarding  Genitourinary: Vagina normal and uterus normal    Musculoskeletal: She exhibits no edema  Neurological: She is alert and oriented to person, place, and time  Skin: Skin is warm  Psychiatric: She has a normal mood and affect  Her behavior is normal  Judgment and thought content normal      Patient Instructions   Will plan OPKs this month and then plan day 21 labs w PCOS labs 7 days after + ovulation  Will then plan day 3 labs  Partner to look into insurance coverage and plan Sa/count  He will need to call us and let us know where to send rx for him  Pt and partner will look into beenfits and decide how aggressive they plan to be  Will plan blood work and Sa  Will then plan HSG and consider options after that  Pap done today

## 2018-03-16 LAB — THIN PREP CVX: NORMAL

## 2018-07-07 ENCOUNTER — APPOINTMENT (EMERGENCY)
Dept: RADIOLOGY | Facility: HOSPITAL | Age: 28
End: 2018-07-07
Payer: COMMERCIAL

## 2018-07-07 ENCOUNTER — HOSPITAL ENCOUNTER (EMERGENCY)
Facility: HOSPITAL | Age: 28
Discharge: HOME | End: 2018-07-07
Attending: EMERGENCY MEDICINE | Admitting: EMERGENCY MEDICINE
Payer: COMMERCIAL

## 2018-07-07 VITALS
TEMPERATURE: 96.5 F | SYSTOLIC BLOOD PRESSURE: 124 MMHG | DIASTOLIC BLOOD PRESSURE: 76 MMHG | WEIGHT: 229.28 LBS | RESPIRATION RATE: 18 BRPM | OXYGEN SATURATION: 99 % | BODY MASS INDEX: 41.94 KG/M2 | HEART RATE: 75 BPM

## 2018-07-07 DIAGNOSIS — N20.0 KIDNEY STONE: Primary | ICD-10-CM

## 2018-07-07 LAB
ALBUMIN SERPL BCP-MCNC: 4 G/DL (ref 3.5–5)
ALP SERPL-CCNC: 62 U/L (ref 46–116)
ALT SERPL W P-5'-P-CCNC: 65 U/L (ref 12–78)
ANION GAP SERPL CALCULATED.3IONS-SCNC: 14 MMOL/L (ref 4–13)
APTT PPP: 25 SECONDS (ref 24–33)
AST SERPL W P-5'-P-CCNC: 39 U/L (ref 5–45)
B-HCG SERPL-ACNC: <2 MIU/ML
BACTERIA UR QL AUTO: ABNORMAL /HPF
BASOPHILS # BLD AUTO: 0.08 THOUSANDS/ΜL (ref 0–0.1)
BASOPHILS NFR BLD AUTO: 1 % (ref 0–1)
BILIRUB SERPL-MCNC: 0.4 MG/DL (ref 0.2–1)
BILIRUB UR QL STRIP: NEGATIVE
BUN SERPL-MCNC: 10 MG/DL (ref 5–25)
CALCIUM SERPL-MCNC: 9.6 MG/DL (ref 8.3–10.1)
CHLORIDE SERPL-SCNC: 104 MMOL/L (ref 100–108)
CLARITY UR: ABNORMAL
CO2 SERPL-SCNC: 21 MMOL/L (ref 21–32)
COLOR UR: ABNORMAL
CREAT SERPL-MCNC: 0.88 MG/DL (ref 0.6–1.3)
EOSINOPHIL # BLD AUTO: 0.16 THOUSAND/ΜL (ref 0–0.61)
EOSINOPHIL NFR BLD AUTO: 2 % (ref 0–6)
ERYTHROCYTE [DISTWIDTH] IN BLOOD BY AUTOMATED COUNT: 12.1 % (ref 11.6–15.1)
EXT PREG TEST URINE: NEGATIVE
GFR SERPL CREATININE-BSD FRML MDRD: 91 ML/MIN/1.73SQ M
GLUCOSE SERPL-MCNC: 144 MG/DL (ref 65–140)
GLUCOSE SERPL-MCNC: 148 MG/DL (ref 65–140)
GLUCOSE UR STRIP-MCNC: NEGATIVE MG/DL
HCT VFR BLD AUTO: 43.9 % (ref 34.8–46.1)
HGB BLD-MCNC: 14.8 G/DL (ref 11.5–15.4)
HGB UR QL STRIP.AUTO: ABNORMAL
IMM GRANULOCYTES # BLD AUTO: 0.04 THOUSAND/UL (ref 0–0.2)
IMM GRANULOCYTES NFR BLD AUTO: 0 % (ref 0–2)
INR PPP: 0.95 (ref 0.86–1.16)
KETONES UR STRIP-MCNC: ABNORMAL MG/DL
LEUKOCYTE ESTERASE UR QL STRIP: ABNORMAL
LIPASE SERPL-CCNC: 143 U/L (ref 73–393)
LYMPHOCYTES # BLD AUTO: 4.39 THOUSANDS/ΜL (ref 0.6–4.47)
LYMPHOCYTES NFR BLD AUTO: 43 % (ref 14–44)
MCH RBC QN AUTO: 30.7 PG (ref 26.8–34.3)
MCHC RBC AUTO-ENTMCNC: 33.7 G/DL (ref 31.4–37.4)
MCV RBC AUTO: 91 FL (ref 82–98)
MONOCYTES # BLD AUTO: 0.47 THOUSAND/ΜL (ref 0.17–1.22)
MONOCYTES NFR BLD AUTO: 5 % (ref 4–12)
MUCOUS THREADS UR QL AUTO: ABNORMAL
NEUTROPHILS # BLD AUTO: 5 THOUSANDS/ΜL (ref 1.85–7.62)
NEUTS SEG NFR BLD AUTO: 49 % (ref 43–75)
NITRITE UR QL STRIP: NEGATIVE
NON-SQ EPI CELLS URNS QL MICRO: ABNORMAL /HPF
NRBC BLD AUTO-RTO: 0 /100 WBCS
PH UR STRIP.AUTO: 6 [PH] (ref 5–9)
PLATELET # BLD AUTO: 399 THOUSANDS/UL (ref 149–390)
PMV BLD AUTO: 10.8 FL (ref 8.9–12.7)
POTASSIUM SERPL-SCNC: 4 MMOL/L (ref 3.5–5.3)
PROT SERPL-MCNC: 7.7 G/DL (ref 6.4–8.2)
PROT UR STRIP-MCNC: ABNORMAL MG/DL
PROTHROMBIN TIME: 10 SECONDS (ref 9.4–11.7)
RBC # BLD AUTO: 4.82 MILLION/UL (ref 3.81–5.12)
RBC #/AREA URNS AUTO: ABNORMAL /HPF
SODIUM SERPL-SCNC: 139 MMOL/L (ref 136–145)
SP GR UR STRIP.AUTO: 1.02 (ref 1–1.03)
UROBILINOGEN UR QL STRIP.AUTO: 0.2 E.U./DL
WBC # BLD AUTO: 10.14 THOUSAND/UL (ref 4.31–10.16)
WBC #/AREA URNS AUTO: ABNORMAL /HPF

## 2018-07-07 PROCEDURE — 81025 URINE PREGNANCY TEST: CPT | Performed by: EMERGENCY MEDICINE

## 2018-07-07 PROCEDURE — 80053 COMPREHEN METABOLIC PANEL: CPT | Performed by: EMERGENCY MEDICINE

## 2018-07-07 PROCEDURE — 74176 CT ABD & PELVIS W/O CONTRAST: CPT

## 2018-07-07 PROCEDURE — 83690 ASSAY OF LIPASE: CPT | Performed by: EMERGENCY MEDICINE

## 2018-07-07 PROCEDURE — 85025 COMPLETE CBC W/AUTO DIFF WBC: CPT | Performed by: EMERGENCY MEDICINE

## 2018-07-07 PROCEDURE — 84702 CHORIONIC GONADOTROPIN TEST: CPT | Performed by: EMERGENCY MEDICINE

## 2018-07-07 PROCEDURE — 96361 HYDRATE IV INFUSION ADD-ON: CPT

## 2018-07-07 PROCEDURE — 74018 RADEX ABDOMEN 1 VIEW: CPT

## 2018-07-07 PROCEDURE — 36415 COLL VENOUS BLD VENIPUNCTURE: CPT | Performed by: EMERGENCY MEDICINE

## 2018-07-07 PROCEDURE — 96375 TX/PRO/DX INJ NEW DRUG ADDON: CPT

## 2018-07-07 PROCEDURE — 96376 TX/PRO/DX INJ SAME DRUG ADON: CPT

## 2018-07-07 PROCEDURE — 82948 REAGENT STRIP/BLOOD GLUCOSE: CPT

## 2018-07-07 PROCEDURE — 99285 EMERGENCY DEPT VISIT HI MDM: CPT

## 2018-07-07 PROCEDURE — 85730 THROMBOPLASTIN TIME PARTIAL: CPT | Performed by: EMERGENCY MEDICINE

## 2018-07-07 PROCEDURE — 96374 THER/PROPH/DIAG INJ IV PUSH: CPT

## 2018-07-07 PROCEDURE — 85610 PROTHROMBIN TIME: CPT | Performed by: EMERGENCY MEDICINE

## 2018-07-07 PROCEDURE — 81001 URINALYSIS AUTO W/SCOPE: CPT | Performed by: EMERGENCY MEDICINE

## 2018-07-07 RX ORDER — NAPROXEN 500 MG/1
500 TABLET ORAL 2 TIMES DAILY WITH MEALS
Qty: 30 TABLET | Refills: 0 | Status: SHIPPED | OUTPATIENT
Start: 2018-07-07 | End: 2018-12-18 | Stop reason: HOSPADM

## 2018-07-07 RX ORDER — KETOROLAC TROMETHAMINE 30 MG/ML
30 INJECTION, SOLUTION INTRAMUSCULAR; INTRAVENOUS ONCE
Status: COMPLETED | OUTPATIENT
Start: 2018-07-07 | End: 2018-07-07

## 2018-07-07 RX ORDER — ONDANSETRON 4 MG/1
4 TABLET, FILM COATED ORAL EVERY 6 HOURS
Qty: 12 TABLET | Refills: 0 | Status: SHIPPED | OUTPATIENT
Start: 2018-07-07 | End: 2018-10-22

## 2018-07-07 RX ORDER — ONDANSETRON 2 MG/ML
4 INJECTION INTRAMUSCULAR; INTRAVENOUS ONCE
Status: COMPLETED | OUTPATIENT
Start: 2018-07-07 | End: 2018-07-07

## 2018-07-07 RX ORDER — TRAMADOL HYDROCHLORIDE 50 MG/1
50 TABLET ORAL EVERY 8 HOURS PRN
Qty: 30 TABLET | Refills: 0 | Status: SHIPPED | OUTPATIENT
Start: 2018-07-07 | End: 2018-07-19

## 2018-07-07 RX ORDER — CIPROFLOXACIN 500 MG/1
500 TABLET, FILM COATED ORAL 2 TIMES DAILY
Qty: 20 TABLET | Refills: 0 | Status: SHIPPED | OUTPATIENT
Start: 2018-07-07 | End: 2018-07-17

## 2018-07-07 RX ORDER — TRAMADOL HYDROCHLORIDE 50 MG/1
50 TABLET ORAL ONCE
Status: COMPLETED | OUTPATIENT
Start: 2018-07-07 | End: 2018-07-07

## 2018-07-07 RX ORDER — CIPROFLOXACIN 500 MG/1
500 TABLET, FILM COATED ORAL ONCE
Status: COMPLETED | OUTPATIENT
Start: 2018-07-07 | End: 2018-07-07

## 2018-07-07 RX ADMIN — KETOROLAC TROMETHAMINE 30 MG: 30 INJECTION, SOLUTION INTRAMUSCULAR at 09:11

## 2018-07-07 RX ADMIN — SODIUM CHLORIDE 1000 ML: 0.9 INJECTION, SOLUTION INTRAVENOUS at 08:39

## 2018-07-07 RX ADMIN — TRAMADOL HYDROCHLORIDE 50 MG: 50 TABLET, FILM COATED ORAL at 10:50

## 2018-07-07 RX ADMIN — CIPROFLOXACIN 500 MG: 500 TABLET, FILM COATED ORAL at 10:50

## 2018-07-07 RX ADMIN — ONDANSETRON 4 MG: 2 INJECTION INTRAMUSCULAR; INTRAVENOUS at 08:40

## 2018-07-07 RX ADMIN — ONDANSETRON 4 MG: 2 INJECTION INTRAMUSCULAR; INTRAVENOUS at 09:11

## 2018-07-07 NOTE — DISCHARGE INSTRUCTIONS
Kidney Stones   WHAT YOU NEED TO KNOW:   Kidney stones form in the urinary system when the water and waste in your urine are out of balance  When this happens, certain types of waste crystals separate from the urine  The crystals build up and form kidney stones  You may have 1 or more kidney stones  DISCHARGE INSTRUCTIONS:   Return to the emergency department if:   · You have vomiting that is not relieved by medicine  Contact your healthcare provider if:   · You have a fever  · You have trouble passing urine  · You see blood in your urine  · You have severe pain  · You have any questions or concerns about your condition or care  Medicines:   · NSAIDs , such as ibuprofen, help decrease swelling, pain, and fever  This medicine is available with or without a doctor's order  NSAIDs can cause stomach bleeding or kidney problems in certain people  If you take blood thinner medicine, always ask your healthcare provider if NSAIDs are safe for you  Always read the medicine label and follow directions  · Prescription medicine  may be given  Ask how to take this medicine safely  · Medicines  to balance your electrolytes may be needed  · Take your medicine as directed  Contact your healthcare provider if you think your medicine is not helping or if you have side effects  Tell him or her if you are allergic to any medicine  Keep a list of the medicines, vitamins, and herbs you take  Include the amounts, and when and why you take them  Bring the list or the pill bottles to follow-up visits  Carry your medicine list with you in case of an emergency  Follow up with your healthcare provider as directed: You may need to return for more tests  Write down your questions so you remember to ask them during your visits  Self-care:   · Drink plenty of liquids  Your healthcare provider may tell you to drink at least 8 to 12 (eight-ounce) cups of liquids each day   This helps flush out the kidney stones when you urinate  Water is the best liquid to drink  · Strain your urine every time you go to the bathroom  Urinate through a strainer or a piece of thin cloth to catch the stones  Take the stones to your healthcare provider so they can be sent to the lab for tests  This will help your healthcare providers plan the best treatment for you  · Eat a variety of healthy foods  Healthy foods include fruits, vegetables, whole-grain breads, low-fat dairy products, beans, and fish  You may need to limit how much sodium (salt) or protein you eat  Ask for information about the best foods for you  · Stay active  Your stones may pass more easily by if you stay active  Ask about the best activities for you  After you pass your kidney stones:  Once you have passed your kidney stones, your healthcare provider may  order a 24-hour urine test  Results from a 24-hour urine test will help your healthcare provider plan ways to prevent more stones from forming  If you are told to do a 24-hour test, your healthcare provider will give you more instructions  © 2017 [address] [facility] Information is for End User's use only and may not be sold, redistributed or otherwise used for commercial purposes  All illustrations and images included in CareNotes® are the copyrighted property of A D A M , Inc  or Marv Cuellar  The above information is an  only  It is not intended as medical advice for individual conditions or treatments  Talk to your doctor, nurse or pharmacist before following any medical regimen to see if it is safe and effective for you

## 2018-07-08 NOTE — ED PROVIDER NOTES
History  Chief Complaint   Patient presents with    Flank Pain     pt woke up with r flank pain around 0600 today  pt vomiting upon arrival        Flank Pain   Pain location:  R flank  Pain quality: aching, dull and sharp    Pain radiates to:  Does not radiate  Pain severity:  Severe  Onset quality:  Sudden  Duration:  2 hours  Timing:  Constant  Progression:  Worsening  Chronicity:  New  Context: retching    Context: not alcohol use, not awakening from sleep, not diet changes, not eating, not laxative use, not medication withdrawal, not previous surgeries, not recent illness, not recent sexual activity, not recent travel, not sick contacts, not suspicious food intake and not trauma    Context comment:  H/o kidney stones, feels like when she passed one 3 mm  some time ago, but this is worse  Relieved by:  Nothing  Worsened by:  Nothing  Ineffective treatments:  None tried  Associated symptoms: anorexia, nausea and vomiting    Associated symptoms: no belching, no chest pain, no chills, no constipation, no cough, no diarrhea, no dysuria, no fatigue, no fever, no flatus, no hematemesis, no hematochezia, no hematuria, no melena, no shortness of breath, no sore throat, no vaginal bleeding and no vaginal discharge    Risk factors: obesity    Risk factors: no alcohol abuse, no aspirin use, not elderly, has not had multiple surgeries, no NSAID use, not pregnant and no recent hospitalization    Risk factors comment:  H/o kidney stone      None       No past medical history on file  No past surgical history on file  Family History   Problem Relation Age of Onset    Cancer Mother     Diabetes Mother     Hypertension Mother     Thyroid disease Mother     Hyperlipidemia Father         High Triglycerides     I have reviewed and agree with the history as documented      Social History   Substance Use Topics    Smoking status: Former Smoker     Packs/day: 0 00     Types: Cigarettes    Smokeless tobacco: Former User Quit date: 5/4/2016    Alcohol use Yes      Comment: occasional        Review of Systems   Constitutional: Negative for chills, diaphoresis, fatigue and fever  HENT: Negative for congestion, sore throat and trouble swallowing  Eyes: Negative for pain and visual disturbance  Respiratory: Negative for cough, chest tightness and shortness of breath  Cardiovascular: Negative for chest pain  Gastrointestinal: Positive for anorexia, nausea and vomiting  Negative for abdominal distention, abdominal pain, constipation, diarrhea, flatus, hematemesis, hematochezia and melena  Endocrine: Negative for polydipsia, polyphagia and polyuria  Genitourinary: Positive for flank pain  Negative for difficulty urinating, dysuria, frequency, hematuria, menstrual problem, pelvic pain, vaginal bleeding and vaginal discharge  Musculoskeletal: Negative for back pain, neck pain and neck stiffness  Skin: Negative for pallor, rash and wound  Allergic/Immunologic: Negative for immunocompromised state  Neurological: Negative for dizziness, syncope, weakness, light-headedness and headaches  Psychiatric/Behavioral: The patient is nervous/anxious  Physical Exam  Physical Exam   Constitutional: She is oriented to person, place, and time  She appears well-developed and well-nourished  Obese F in mod distress s/t pain/vomiting   HENT:   Head: Normocephalic and atraumatic  Mouth/Throat: Oropharynx is clear and moist    Eyes: Conjunctivae and EOM are normal  Pupils are equal, round, and reactive to light  Neck: Normal range of motion  Neck supple  Cardiovascular: Normal rate, regular rhythm and intact distal pulses  Pulmonary/Chest: Effort normal and breath sounds normal    Abdominal: Soft  She exhibits no distension  There is no tenderness  Genitourinary:   Genitourinary Comments: RCVAT   Musculoskeletal: Normal range of motion  Neurological: She is alert and oriented to person, place, and time  Skin: Skin is warm and dry  She is not diaphoretic  Psychiatric: Her behavior is normal    Mod anxious   Nursing note and vitals reviewed        Vital Signs  ED Triage Vitals   Temperature Pulse Respirations Blood Pressure SpO2   07/07/18 0831 07/07/18 0831 07/07/18 0831 07/07/18 0831 07/07/18 0831   (!) 96 5 °F (35 8 °C) 60 18 117/68 99 %      Temp Source Heart Rate Source Patient Position - Orthostatic VS BP Location FiO2 (%)   07/07/18 0831 07/07/18 0831 07/07/18 0831 07/07/18 0831 --   Tympanic Monitor Sitting Right arm       Pain Score       07/07/18 1050       4           Vitals:    07/07/18 0831 07/07/18 1020   BP: 117/68 124/76   Pulse: 60 75   Patient Position - Orthostatic VS: Sitting Lying       Visual Acuity      ED Medications  Medications   sodium chloride 0 9 % bolus 1,000 mL (0 mL Intravenous Stopped 7/7/18 1000)   ondansetron (ZOFRAN) injection 4 mg (4 mg Intravenous Given 7/7/18 0840)   ketorolac (TORADOL) injection 30 mg (30 mg Intravenous Given 7/7/18 0911)   ondansetron (ZOFRAN) injection 4 mg (4 mg Intravenous Given 7/7/18 0911)   ciprofloxacin (CIPRO) tablet 500 mg (500 mg Oral Given 7/7/18 1050)   traMADol (ULTRAM) tablet 50 mg (50 mg Oral Given 7/7/18 1050)       Diagnostic Studies  Results Reviewed     Procedure Component Value Units Date/Time    Urine Microscopic [[id number]]  (Abnormal) Collected:  07/07/18 0942    Lab Status:  Final result Specimen:  Urine from Urine, Other Updated:  07/07/18 0957     RBC, UA 20-30 (A) /hpf      WBC, UA 4-10 (A) /hpf      Epithelial Cells Occasional /hpf      Bacteria, UA Moderate (A) /hpf      MUCOUS THREADS Occasional (A)    UA w Reflex to Microscopic w Reflex to Culture [[id number]]  (Abnormal) Collected:  07/07/18 0942    Lab Status:  Final result Specimen:  Urine from Urine, Other Updated:  07/07/18 0947     Color, UA Light Yellow     Clarity, UA Slightly Cloudy     Specific Gravity, UA 1 025     pH, UA 6 0     Leukocytes, UA Trace (A)     Nitrite, UA Negative     Protein, UA Trace (A) mg/dl      Glucose, UA Negative mg/dl      Ketones, UA Trace (A) mg/dl      Urobilinogen, UA 0 2 E U /dl      Bilirubin, UA Negative     Blood, UA Large (A)    POCT pregnancy, urine [[id number]]  (Normal) Resulted:  07/07/18 0910    Lab Status:  Final result Updated:  07/07/18 0944     EXT PREG TEST UR (Ref: Negative) Negative    hCG, quantitative [[id number]]  (Normal) Collected:  07/07/18 0838    Lab Status:  Final result Specimen:  Blood from Arm, Left Updated:  07/07/18 0922     HCG, Quant <2 mIU/mL     Narrative:          Expected Ranges:     Approximate               Approximate HCG  Gestation age          Concentration ( mIU/mL)  _____________          ______________________   Radha Valles                      HCG values  0 2-1                       5-50  1-2                           2-3                         100-5000  3-4                         500-50365  4-5                         1000-37765  5-6                         [id number]  6-8                         [id number]  8-12                        [id number]    Protime-INR [[id number]]  (Normal) Collected:  07/07/18 0838    Lab Status:  Final result Specimen:  Blood from Arm, Left Updated:  07/07/18 0918     Protime 10 0 seconds      INR 0 95    APTT [[id number]]  (Normal) Collected:  07/07/18 0838    Lab Status:  Final result Specimen:  Blood from Arm, Left Updated:  07/07/18 0918     PTT 25 seconds     Comprehensive metabolic panel [[id number]]  (Abnormal) Collected:  07/07/18 0838    Lab Status:  Final result Specimen:  Blood from Arm, Left Updated:  07/07/18 0909     Sodium 139 mmol/L      Potassium 4 0 mmol/L      Chloride 104 mmol/L      CO2 21 mmol/L      Anion Gap 14 (H) mmol/L      BUN 10 mg/dL      Creatinine 0 88 mg/dL      Glucose 148 (H) mg/dL      Calcium 9 6 mg/dL      AST 39 U/L      ALT 65 U/L      Alkaline Phosphatase 62 U/L      Total Protein 7 7 g/dL      Albumin 4 0 g/dL      Total Bilirubin 0 40 mg/dL      eGFR 91 ml/min/1 73sq m     Narrative:         National Kidney Disease Education Program recommendations are as follows:  GFR calculation is accurate only with a steady state creatinine  Chronic Kidney disease less than 60 ml/min/1 73 sq  meters  Kidney failure less than 15 ml/min/1 73 sq  meters  Lipase [[id number]]  (Normal) Collected:  07/07/18 0838    Lab Status:  Final result Specimen:  Blood from Arm, Left Updated:  07/07/18 0909     Lipase 143 u/L     CBC and differential [[id number]]  (Abnormal) Collected:  07/07/18 0838    Lab Status:  Final result Specimen:  Blood from Arm, Left Updated:  07/07/18 0853     WBC 10 14 Thousand/uL      RBC 4 82 Million/uL      Hemoglobin 14 8 g/dL      Hematocrit 43 9 %      MCV 91 fL      MCH 30 7 pg      MCHC 33 7 g/dL      RDW 12 1 %      MPV 10 8 fL      Platelets 526 (H) Thousands/uL      nRBC 0 /100 WBCs      Neutrophils Relative 49 %      Immat GRANS % 0 %      Lymphocytes Relative 43 %      Monocytes Relative 5 %      Eosinophils Relative 2 %      Basophils Relative 1 %      Neutrophils Absolute 5 00 Thousands/µL      Immature Grans Absolute 0 04 Thousand/uL      Lymphocytes Absolute 4 39 Thousands/µL      Monocytes Absolute 0 47 Thousand/µL      Eosinophils Absolute 0 16 Thousand/µL      Basophils Absolute 0 08 Thousands/µL     Fingerstick Glucose (POCT) [[id number]]  (Abnormal) Collected:  07/07/18 0842    Lab Status:  Final result Updated:  07/07/18 0843     POC Glucose 144 (H) mg/dl                  CT renal stone study abdomen pelvis wo contrast   Final Result by Claudia Cardenas MD (07/07 0926)      8 mm calculus at the right ureteropelvic junction causing mild to moderate hydronephrosis  Nonobstructing left intrarenal calculus  Fatty liver               Workstation performed: [id number]         XR abdomen 1 view kub    (Results Pending)              Procedures  Procedures       Phone Contacts  ED Phone Contact    ED Course MDM  Number of Diagnoses or Management Options  Kidney stone: new and requires workup  Diagnosis management comments: R/o obstructive uropathy/nephropathy, uti/cystitis, pyelo, infected stone, pregnancy/complication  - ucg  - UA, UCG  - Labs  - CT a/p  - IVF  - PRN analgesia, antiinflammatories  - Abx as needed  -  consult  - Re-eval, dispo       Amount and/or Complexity of Data Reviewed  Clinical lab tests: ordered and reviewed  Tests in the radiology section of CPT®: ordered and reviewed  Tests in the medicine section of CPT®: reviewed and ordered  Decide to obtain previous medical records or to obtain history from someone other than the patient: yes  Review and summarize past medical records: yes  Independent visualization of images, tracings, or specimens: yes    Risk of Complications, Morbidity, and/or Mortality  Presenting problems: moderate  Diagnostic procedures: low  Management options: moderate    Patient Progress  Patient progress: improved (Stable at discharge  Pain controlled  No signs of sepsis  Tolerating PO   - KUB  - f/u   - will start PO abx, prn analgesia  - urine strainer provided)    CritCare Time    Disposition  Final diagnoses:   Kidney stone - Right UPJ - 8 mm     Time reflects when diagnosis was documented in both MDM as applicable and the Disposition within this note     Time User Action Codes Description Comment    7/7/2018 10:39 AM Lacy Hiral Add [N20 0] Kidney stone     7/7/2018 10:39 AM Lacy Hiral Modify [N20 0] Kidney stone Right UPJ - 8 mm      ED Disposition     ED Disposition Condition Comment    Discharge  [facility] discharge to home/self care      Condition at discharge: Good        Follow-up Information     Follow up With Specialties Details Why Contact Lexy Diaz DO Family Medicine Schedule an appointment as soon as possible for a visit  [address] [place], [place] [address]  [phone]            Discharge Medication List as of 7/7/2018 10:40 AM      START taking these medications    Details   ciprofloxacin (CIPRO) 500 mg tablet Take 1 tablet (500 mg total) by mouth 2 (two) times a day for 10 days, Starting Sat 7/7/2018, Until Tue 7/17/2018, Print      naproxen (NAPROSYN) 500 mg tablet Take 1 tablet (500 mg total) by mouth 2 (two) times a day with meals, Starting Sat 7/7/2018, Print      ondansetron (ZOFRAN) 4 mg tablet Take 1 tablet (4 mg total) by mouth every 6 (six) hours, Starting Sat 7/7/2018, Print      traMADol (ULTRAM) 50 mg tablet Take 1 tablet (50 mg total) by mouth every 8 (eight) hours as needed for moderate pain for up to 12 days, Starting Sat 7/7/2018, Until Thu 7/19/2018, Print           No discharge procedures on file      ED Provider  Electronically Signed by           Emeterio Stern MD  07/07/18 6216

## 2018-07-09 ENCOUNTER — VBI (OUTPATIENT)
Dept: ADMINISTRATIVE | Facility: OTHER | Age: 28
End: 2018-07-09

## 2018-07-09 NOTE — TELEPHONE ENCOUNTER
I LMOM for patient to call; she needs to follow up with urology for [facility]  ED visit documented in ED log

## 2018-10-13 ENCOUNTER — APPOINTMENT (EMERGENCY)
Dept: RADIOLOGY | Facility: HOSPITAL | Age: 28
End: 2018-10-13
Payer: COMMERCIAL

## 2018-10-13 ENCOUNTER — HOSPITAL ENCOUNTER (EMERGENCY)
Facility: HOSPITAL | Age: 28
Discharge: HOME | End: 2018-10-13
Attending: EMERGENCY MEDICINE | Admitting: EMERGENCY MEDICINE
Payer: COMMERCIAL

## 2018-10-13 VITALS
SYSTOLIC BLOOD PRESSURE: 155 MMHG | HEART RATE: 100 BPM | HEIGHT: 62 IN | WEIGHT: 234 LBS | TEMPERATURE: 99.2 F | OXYGEN SATURATION: 100 % | DIASTOLIC BLOOD PRESSURE: 96 MMHG | BODY MASS INDEX: 43.06 KG/M2 | RESPIRATION RATE: 28 BRPM

## 2018-10-13 DIAGNOSIS — N39.0 UTI (URINARY TRACT INFECTION): ICD-10-CM

## 2018-10-13 DIAGNOSIS — N20.0 RENAL CALCULUS: Primary | ICD-10-CM

## 2018-10-13 LAB
ALBUMIN SERPL BCP-MCNC: 4.2 G/DL (ref 3.5–5)
ALP SERPL-CCNC: 63 U/L (ref 46–116)
ALT SERPL W P-5'-P-CCNC: 64 U/L (ref 12–78)
ANION GAP SERPL CALCULATED.3IONS-SCNC: 14 MMOL/L (ref 4–13)
AST SERPL W P-5'-P-CCNC: 45 U/L (ref 5–45)
B-HCG SERPL-ACNC: <2 MIU/ML
BACTERIA UR QL AUTO: ABNORMAL /HPF
BASOPHILS # BLD AUTO: 0.06 THOUSANDS/ΜL (ref 0–0.1)
BASOPHILS NFR BLD AUTO: 1 % (ref 0–1)
BILIRUB DIRECT SERPL-MCNC: 0.2 MG/DL (ref 0–0.2)
BILIRUB SERPL-MCNC: 0.6 MG/DL (ref 0.2–1)
BILIRUB UR QL STRIP: ABNORMAL
BUN SERPL-MCNC: 11 MG/DL (ref 5–25)
CALCIUM SERPL-MCNC: 9.3 MG/DL (ref 8.3–10.1)
CHLORIDE SERPL-SCNC: 100 MMOL/L (ref 100–108)
CLARITY UR: ABNORMAL
CO2 SERPL-SCNC: 25 MMOL/L (ref 21–32)
COLOR UR: YELLOW
CREAT SERPL-MCNC: 0.9 MG/DL (ref 0.6–1.3)
EOSINOPHIL # BLD AUTO: 0.18 THOUSAND/ΜL (ref 0–0.61)
EOSINOPHIL NFR BLD AUTO: 2 % (ref 0–6)
ERYTHROCYTE [DISTWIDTH] IN BLOOD BY AUTOMATED COUNT: 12.1 % (ref 11.6–15.1)
EXT PREG TEST URINE: NEGATIVE
GFR SERPL CREATININE-BSD FRML MDRD: 89 ML/MIN/1.73SQ M
GLUCOSE SERPL-MCNC: 108 MG/DL (ref 65–140)
GLUCOSE UR STRIP-MCNC: NEGATIVE MG/DL
HCT VFR BLD AUTO: 44.4 % (ref 34.8–46.1)
HGB BLD-MCNC: 14.8 G/DL (ref 11.5–15.4)
HGB UR QL STRIP.AUTO: ABNORMAL
IMM GRANULOCYTES # BLD AUTO: 0.03 THOUSAND/UL (ref 0–0.2)
IMM GRANULOCYTES NFR BLD AUTO: 0 % (ref 0–2)
KETONES UR STRIP-MCNC: ABNORMAL MG/DL
LEUKOCYTE ESTERASE UR QL STRIP: ABNORMAL
LIPASE SERPL-CCNC: 121 U/L (ref 73–393)
LYMPHOCYTES # BLD AUTO: 5.08 THOUSANDS/ΜL (ref 0.6–4.47)
LYMPHOCYTES NFR BLD AUTO: 43 % (ref 14–44)
MAGNESIUM SERPL-MCNC: 1.3 MG/DL (ref 1.6–2.6)
MCH RBC QN AUTO: 30.8 PG (ref 26.8–34.3)
MCHC RBC AUTO-ENTMCNC: 33.3 G/DL (ref 31.4–37.4)
MCV RBC AUTO: 93 FL (ref 82–98)
MONOCYTES # BLD AUTO: 0.55 THOUSAND/ΜL (ref 0.17–1.22)
MONOCYTES NFR BLD AUTO: 5 % (ref 4–12)
MUCOUS THREADS UR QL AUTO: ABNORMAL
NEUTROPHILS # BLD AUTO: 5.94 THOUSANDS/ΜL (ref 1.85–7.62)
NEUTS SEG NFR BLD AUTO: 49 % (ref 43–75)
NITRITE UR QL STRIP: NEGATIVE
NON-SQ EPI CELLS URNS QL MICRO: ABNORMAL /HPF
NRBC BLD AUTO-RTO: 0 /100 WBCS
PH UR STRIP.AUTO: 6.5 [PH] (ref 5–9)
PLATELET # BLD AUTO: 395 THOUSANDS/UL (ref 149–390)
PMV BLD AUTO: 10.6 FL (ref 8.9–12.7)
POTASSIUM SERPL-SCNC: 3.3 MMOL/L (ref 3.5–5.3)
PROT SERPL-MCNC: 7.8 G/DL (ref 6.4–8.2)
PROT UR STRIP-MCNC: ABNORMAL MG/DL
RBC # BLD AUTO: 4.8 MILLION/UL (ref 3.81–5.12)
RBC #/AREA URNS AUTO: ABNORMAL /HPF
SODIUM SERPL-SCNC: 139 MMOL/L (ref 136–145)
SP GR UR STRIP.AUTO: 1.02 (ref 1–1.03)
UROBILINOGEN UR QL STRIP.AUTO: 0.2 E.U./DL
WBC # BLD AUTO: 11.84 THOUSAND/UL (ref 4.31–10.16)
WBC #/AREA URNS AUTO: ABNORMAL /HPF

## 2018-10-13 PROCEDURE — 83735 ASSAY OF MAGNESIUM: CPT | Performed by: EMERGENCY MEDICINE

## 2018-10-13 PROCEDURE — 74176 CT ABD & PELVIS W/O CONTRAST: CPT

## 2018-10-13 PROCEDURE — 87086 URINE CULTURE/COLONY COUNT: CPT | Performed by: EMERGENCY MEDICINE

## 2018-10-13 PROCEDURE — 96374 THER/PROPH/DIAG INJ IV PUSH: CPT

## 2018-10-13 PROCEDURE — 81001 URINALYSIS AUTO W/SCOPE: CPT | Performed by: EMERGENCY MEDICINE

## 2018-10-13 PROCEDURE — 96361 HYDRATE IV INFUSION ADD-ON: CPT

## 2018-10-13 PROCEDURE — 81025 URINE PREGNANCY TEST: CPT | Performed by: EMERGENCY MEDICINE

## 2018-10-13 PROCEDURE — 36415 COLL VENOUS BLD VENIPUNCTURE: CPT | Performed by: EMERGENCY MEDICINE

## 2018-10-13 PROCEDURE — 80048 BASIC METABOLIC PNL TOTAL CA: CPT | Performed by: EMERGENCY MEDICINE

## 2018-10-13 PROCEDURE — 99284 EMERGENCY DEPT VISIT MOD MDM: CPT

## 2018-10-13 PROCEDURE — 85025 COMPLETE CBC W/AUTO DIFF WBC: CPT | Performed by: EMERGENCY MEDICINE

## 2018-10-13 PROCEDURE — 80076 HEPATIC FUNCTION PANEL: CPT | Performed by: EMERGENCY MEDICINE

## 2018-10-13 PROCEDURE — 96375 TX/PRO/DX INJ NEW DRUG ADDON: CPT

## 2018-10-13 PROCEDURE — 83690 ASSAY OF LIPASE: CPT | Performed by: EMERGENCY MEDICINE

## 2018-10-13 PROCEDURE — 74018 RADEX ABDOMEN 1 VIEW: CPT

## 2018-10-13 PROCEDURE — 84702 CHORIONIC GONADOTROPIN TEST: CPT | Performed by: EMERGENCY MEDICINE

## 2018-10-13 RX ORDER — MORPHINE SULFATE 4 MG/ML
4 INJECTION, SOLUTION INTRAMUSCULAR; INTRAVENOUS ONCE
Status: COMPLETED | OUTPATIENT
Start: 2018-10-13 | End: 2018-10-13

## 2018-10-13 RX ORDER — HYDROCODONE BITARTRATE AND ACETAMINOPHEN 5; 325 MG/1; MG/1
1 TABLET ORAL EVERY 8 HOURS PRN
Qty: 10 TABLET | Refills: 0 | Status: SHIPPED | OUTPATIENT
Start: 2018-10-13 | End: 2018-10-16

## 2018-10-13 RX ORDER — KETOROLAC TROMETHAMINE 30 MG/ML
15 INJECTION, SOLUTION INTRAMUSCULAR; INTRAVENOUS ONCE
Status: COMPLETED | OUTPATIENT
Start: 2018-10-13 | End: 2018-10-13

## 2018-10-13 RX ORDER — NAPROXEN 500 MG/1
500 TABLET ORAL 2 TIMES DAILY WITH MEALS
Qty: 10 TABLET | Refills: 0 | Status: SHIPPED | OUTPATIENT
Start: 2018-10-13 | End: 2018-10-24 | Stop reason: HOSPADM

## 2018-10-13 RX ORDER — ONDANSETRON 2 MG/ML
4 INJECTION INTRAMUSCULAR; INTRAVENOUS ONCE
Status: COMPLETED | OUTPATIENT
Start: 2018-10-13 | End: 2018-10-13

## 2018-10-13 RX ORDER — SULFAMETHOXAZOLE AND TRIMETHOPRIM 800; 160 MG/1; MG/1
1 TABLET ORAL 2 TIMES DAILY
Qty: 14 TABLET | Refills: 0 | Status: SHIPPED | OUTPATIENT
Start: 2018-10-13 | End: 2018-10-20

## 2018-10-13 RX ADMIN — SODIUM CHLORIDE 1000 ML: 0.9 INJECTION, SOLUTION INTRAVENOUS at 14:23

## 2018-10-13 RX ADMIN — MORPHINE SULFATE 4 MG: 4 INJECTION INTRAVENOUS at 14:20

## 2018-10-13 RX ADMIN — KETOROLAC TROMETHAMINE 15 MG: 30 INJECTION, SOLUTION INTRAMUSCULAR at 14:16

## 2018-10-13 RX ADMIN — ONDANSETRON 4 MG: 2 INJECTION INTRAMUSCULAR; INTRAVENOUS at 14:18

## 2018-10-13 NOTE — DISCHARGE INSTRUCTIONS
Kidney Stones   WHAT YOU NEED TO KNOW:   Kidney stones form in the urinary system when the water and waste in your urine are out of balance  When this happens, certain types of waste crystals separate from the urine  The crystals build up and form kidney stones  You may have 1 or more kidney stones  DISCHARGE INSTRUCTIONS:   Return to the emergency department if:   · You have vomiting that is not relieved by medicine  Contact your healthcare provider if:   · You have a fever  · You have trouble passing urine  · You see blood in your urine  · You have severe pain  · You have any questions or concerns about your condition or care  Medicines:   · NSAIDs , such as ibuprofen, help decrease swelling, pain, and fever  This medicine is available with or without a doctor's order  NSAIDs can cause stomach bleeding or kidney problems in certain people  If you take blood thinner medicine, always ask your healthcare provider if NSAIDs are safe for you  Always read the medicine label and follow directions  · Prescription medicine  may be given  Ask how to take this medicine safely  · Medicines  to balance your electrolytes may be needed  · Take your medicine as directed  Contact your healthcare provider if you think your medicine is not helping or if you have side effects  Tell him or her if you are allergic to any medicine  Keep a list of the medicines, vitamins, and herbs you take  Include the amounts, and when and why you take them  Bring the list or the pill bottles to follow-up visits  Carry your medicine list with you in case of an emergency  Follow up with your healthcare provider as directed: You may need to return for more tests  Write down your questions so you remember to ask them during your visits  Self-care:   · Drink plenty of liquids  Your healthcare provider may tell you to drink at least 8 to 12 (eight-ounce) cups of liquids each day   This helps flush out the kidney stones when you urinate  Water is the best liquid to drink  · Strain your urine every time you go to the bathroom  Urinate through a strainer or a piece of thin cloth to catch the stones  Take the stones to your healthcare provider so they can be sent to the lab for tests  This will help your healthcare providers plan the best treatment for you  · Eat a variety of healthy foods  Healthy foods include fruits, vegetables, whole-grain breads, low-fat dairy products, beans, and fish  You may need to limit how much sodium (salt) or protein you eat  Ask for information about the best foods for you  · Stay active  Your stones may pass more easily by if you stay active  Ask about the best activities for you  After you pass your kidney stones:  Once you have passed your kidney stones, your healthcare provider may  order a 24-hour urine test  Results from a 24-hour urine test will help your healthcare provider plan ways to prevent more stones from forming  If you are told to do a 24-hour test, your healthcare provider will give you more instructions  © 2017 [facility] Information is for End User's use only and may not be sold, redistributed or otherwise used for commercial purposes  All illustrations and images included in CareNotes® are the copyrighted property of A D A M , Inc  or Mavr Cuellar  The above information is an  only  It is not intended as medical advice for individual conditions or treatments  Talk to your doctor, nurse or pharmacist before following any medical regimen to see if it is safe and effective for you

## 2018-10-13 NOTE — ED PROVIDER NOTES
History  Chief Complaint   Patient presents with    Back Pain     rt abdomen and flank pain starting 1 1/2 hrs ago,took Naproxen and zofran, did not help,hx kidney stones     71-year-old female presents with acute onset right-sided flank pain radiating to her right lower abdomen that started 2 hours ago associated with nausea and 1 episode of nonbilious nonbloody emesis  Denies any dysuria urgency frequency hematuria diarrhea constipation fevers chills dyspareunia vaginal bleeding or any other symptoms  Her last menstrual cycle was 3 weeks ago  She has a history of kidney stones but never had any intervention surgically  The pain currently is 10/10 nothing makes it better or worse and it is sharp and continuous  History provided by:  Patient   used: No    Back Pain       Prior to Admission Medications   Prescriptions Last Dose Informant Patient Reported? Taking?   naproxen (NAPROSYN) 500 mg tablet   No No   Sig: Take 1 tablet (500 mg total) by mouth 2 (two) times a day with meals   ondansetron (ZOFRAN) 4 mg tablet   No No   Sig: Take 1 tablet (4 mg total) by mouth every 6 (six) hours      Facility-Administered Medications: None       Past Medical History:   Diagnosis Date    Renal disorder     kidney stones       History reviewed  No pertinent surgical history  Family History   Problem Relation Age of Onset    Cancer Mother     Diabetes Mother     Hypertension Mother     Thyroid disease Mother     Hyperlipidemia Father         High Triglycerides     I have reviewed and agree with the history as documented  Social History   Substance Use Topics    Smoking status: Former Smoker     Packs/day: 0 00     Types: Cigarettes    Smokeless tobacco: Former User     Quit date: 5/4/2016    Alcohol use Yes      Comment: occasional        Review of Systems   Musculoskeletal: Positive for back pain  All other systems reviewed and are negative        Physical Exam  Physical Exam Constitutional: She is oriented to person, place, and time  She appears well-developed and well-nourished  HENT:   Head: Normocephalic and atraumatic  Eyes: Pupils are equal, round, and reactive to light  EOM are normal    Neck: Normal range of motion  Neck supple  Cardiovascular: Normal rate and regular rhythm  Pulmonary/Chest: Effort normal and breath sounds normal    Abdominal: Soft  Bowel sounds are normal    Right-sided CVA tenderness and right lower abdominal tenderness noted with no guarding rigidity or rebound tenderness  Musculoskeletal: Normal range of motion  Neurological: She is alert and oriented to person, place, and time  Skin: Skin is warm and dry  Psychiatric: She has a normal mood and affect  Nursing note and vitals reviewed        Vital Signs  ED Triage Vitals [10/13/18 1356]   Temperature Pulse Respirations Blood Pressure SpO2   99 2 °F (37 3 °C) 100 (!) 28 155/96 100 %      Temp Source Heart Rate Source Patient Position - Orthostatic VS BP Location FiO2 (%)   Tympanic -- Sitting Right arm --      Pain Score       Worst Possible Pain           Vitals:    10/13/18 1356   BP: 155/96   Pulse: 100   Patient Position - Orthostatic VS: Sitting       Visual Acuity      ED Medications  Medications   sodium chloride 0 9 % bolus 1,000 mL (0 mL Intravenous Stopped 10/13/18 1620)   morphine (PF) 4 mg/mL injection 4 mg (4 mg Intravenous Given 10/13/18 1420)   ketorolac (TORADOL) injection 15 mg (15 mg Intravenous Given 10/13/18 1416)   ondansetron (ZOFRAN) injection 4 mg (4 mg Intravenous Given 10/13/18 1418)       Diagnostic Studies  Results Reviewed     Procedure Component Value Units Date/Time    Urine Microscopic [[id number]]  (Abnormal) Collected:  10/13/18 1529    Lab Status:  Final result Specimen:  Urine from Urine, Clean Catch Updated:  10/13/18 1549     RBC, UA 20-30 (A) /hpf      WBC, UA 10-20 (A) /hpf      Epithelial Cells Moderate (A) /hpf      Bacteria, UA Moderate (A) /hpf MUCOUS THREADS Innumerable (A)    UA w Reflex to Microscopic [[id number]]  (Abnormal) Collected:  10/13/18 1529    Lab Status:  Final result Specimen:  Urine from Urine, Clean Catch Updated:  10/13/18 1536     Color, UA Yellow     Clarity, UA Slightly Cloudy     Specific Seffner, UA 1 020     pH, UA 6 5     Leukocytes, UA Small (A)     Nitrite, UA Negative     Protein, UA 30 (1+) (A) mg/dl      Glucose, UA Negative mg/dl      Ketones, UA 15 (1+) (A) mg/dl      Urobilinogen, UA 0 2 E U /dl      Bilirubin, UA Interference- unable to analyze (A)     Blood, UA Moderate (A)    Urine culture [[id number]] Collected:  10/13/18 1529    Lab Status:   In process Specimen:  Urine from Urine, Clean Catch Updated:  10/13/18 1533    POCT pregnancy, urine [[id number]]  (Normal) Resulted:  10/13/18 1531    Lab Status:  Final result Updated:  10/13/18 1532     EXT PREG TEST UR (Ref: Negative) negative    hCG, quantitative [[id number]]  (Normal) Collected:  10/13/18 1424    Lab Status:  Final result Specimen:  Blood from Arm, Left Updated:  10/13/18 1521     HCG, Quant <2 mIU/mL     Narrative:          Expected Ranges:     Approximate               Approximate HCG  Gestation age          Concentration ( mIU/mL)  _____________          ______________________   Foster Forget                      HCG values  0 2-1                       5-50  1-2                           2-3                         100-5000  3-4                         500-15966  4-5                         1000-47466  5-6                         [id number]  6-8                         89021-279044  8-12                        [id number]    Basic metabolic panel [[id number]]  (Abnormal) Collected:  10/13/18 1424    Lab Status:  Final result Specimen:  Blood from Arm, Left Updated:  10/13/18 1449     Sodium 139 mmol/L      Potassium 3 3 (L) mmol/L      Chloride 100 mmol/L      CO2 25 mmol/L      ANION GAP 14 (H) mmol/L      BUN 11 mg/dL      Creatinine 0 90 mg/dL      Glucose 108 mg/dL      Calcium 9 3 mg/dL      eGFR 89 ml/min/1 73sq m     Narrative:         National Kidney Disease Education Program recommendations are as follows:  GFR calculation is accurate only with a steady state creatinine  Chronic Kidney disease less than 60 ml/min/1 73 sq  meters  Kidney failure less than 15 ml/min/1 73 sq  meters      Hepatic function panel [[id number]]  (Normal) Collected:  10/13/18 1424    Lab Status:  Final result Specimen:  Blood from Arm, Left Updated:  10/13/18 1449     Total Bilirubin 0 60 mg/dL      Bilirubin, Direct 0 20 mg/dL      Alkaline Phosphatase 63 U/L      AST 45 U/L      ALT 64 U/L      Total Protein 7 8 g/dL      Albumin 4 2 g/dL     Magnesium [[id number]]  (Abnormal) Collected:  10/13/18 1424    Lab Status:  Final result Specimen:  Blood from Arm, Left Updated:  10/13/18 1449     Magnesium 1 3 (L) mg/dL     Lipase [[id number]]  (Normal) Collected:  10/13/18 1424    Lab Status:  Final result Specimen:  Blood from Arm, Left Updated:  10/13/18 1449     Lipase 121 u/L     CBC and differential [[id number]]  (Abnormal) Collected:  10/13/18 1424    Lab Status:  Final result Specimen:  Blood from Arm, Left Updated:  10/13/18 1431     WBC 11 84 (H) Thousand/uL      RBC 4 80 Million/uL      Hemoglobin 14 8 g/dL      Hematocrit 44 4 %      MCV 93 fL      MCH 30 8 pg      MCHC 33 3 g/dL      RDW 12 1 %      MPV 10 6 fL      Platelets 759 (H) Thousands/uL      nRBC 0 /100 WBCs      Neutrophils Relative 49 %      Immat GRANS % 0 %      Lymphocytes Relative 43 %      Monocytes Relative 5 %      Eosinophils Relative 2 %      Basophils Relative 1 %      Neutrophils Absolute 5 94 Thousands/µL      Immature Grans Absolute 0 03 Thousand/uL      Lymphocytes Absolute 5 08 (H) Thousands/µL      Monocytes Absolute 0 55 Thousand/µL      Eosinophils Absolute 0 18 Thousand/µL      Basophils Absolute 0 06 Thousands/µL                  XR abdomen 1 view kub   Final Result by Bakari Ca DO (10/14 1124) Unremarkable examination  Workstation performed: [id number]         CT renal stone study abdomen pelvis without contrast   Final Result by Bozena Weber MD (10/13 1542)      Bilateral nonobstructing renal calculi  No evidence of obstructive uropathy  Hepatic steatosis  Workstation performed: [id number]                    Procedures  Procedures       Phone Contacts  ED Phone Contact    ED Course                               MDM  Number of Diagnoses or Management Options  Renal calculus:   UTI (urinary tract infection):   Diagnosis management comments: Patient evaluated with labs, Imaging, UA and I reviewed the results and discussed with the patient  Given IV fluids, pain and nausea medications and patient improved with symptoms  Patient gave follow up referral information to urology  Discharged with follow up, appropriate instructions and medications and patient verbalized understanding and had no further questions at the time of discharge  Patient well appearing and had stable vital signs at discharge  Amount and/or Complexity of Data Reviewed  Clinical lab tests: ordered and reviewed  Tests in the radiology section of CPT®: ordered and reviewed  Tests in the medicine section of CPT®: ordered and reviewed    Patient Progress  Patient progress: stable    CritCare Time    Disposition  Final diagnoses:   Renal calculus   UTI (urinary tract infection)     Time reflects when diagnosis was documented in both MDM as applicable and the Disposition within this note     Time User Action Codes Description Comment    10/13/2018  3:59 PM Sherry Bright Add [N20 0] Renal calculus     10/13/2018  4:00 PM Sherry Bright Add [N39 0] UTI (urinary tract infection)       ED Disposition     ED Disposition Condition Comment    Discharge  [facility] discharge to home/self care      Condition at discharge: Stable        Follow-up Information     Follow up With Specialties Details Why Contact Info Additional Information    Sudeep Hendrickson MD Urology Schedule an appointment as soon as possible for a visit  Polly Keller 55  [address] [facility] [address] [facility], DO Family Medicine Schedule an appointment as soon as possible for a visit  [address] [facility] [address]       [address] [facility] Emergency Department Emergency Medicine  If symptoms worsen [address] [facility] ED[facility] [address]          Discharge Medication List as of 10/13/2018  4:00 PM      START taking these medications    Details   HYDROcodone-acetaminophen (NORCO) 5-325 mg per tablet Take 1 tablet by mouth every 8 (eight) hours as needed for pain for up to 3 days Max Daily Amount: 3 tablets, Starting Sat 10/13/2018, Until Tue 10/16/2018, Print      !! naproxen (NAPROSYN) 500 mg tablet Take 1 tablet (500 mg total) by mouth 2 (two) times a day with meals for 5 days, Starting Sat 10/13/2018, Until u 10/18/2018, Print      sulfamethoxazole-trimethoprim (BACTRIM DS) 800-160 mg per tablet Take 1 tablet by mouth 2 (two) times a day for 7 days smx-tmp DS (BACTRIM) 800-160 mg tabs (1tab q12 D10), Starting Sat 10/13/2018, Until Sat 10/20/2018, Print       !! - Potential duplicate medications found  Please discuss with provider  CONTINUE these medications which have NOT CHANGED    Details   !! naproxen (NAPROSYN) 500 mg tablet Take 1 tablet (500 mg total) by mouth 2 (two) times a day with meals, Starting Sat 7/7/2018, Print      ondansetron (ZOFRAN) 4 mg tablet Take 1 tablet (4 mg total) by mouth every 6 (six) hours, Starting Sat 7/7/2018, Print       !! - Potential duplicate medications found  Please discuss with provider  No discharge procedures on file      ED Provider  Electronically Signed by           Neymar Chacko DO  10/15/18 6088

## 2018-10-15 ENCOUNTER — VBI (OUTPATIENT)
Dept: ADMINISTRATIVE | Facility: OTHER | Age: 28
End: 2018-10-15

## 2018-10-15 LAB — BACTERIA UR CULT: NORMAL

## 2018-10-15 NOTE — TELEPHONE ENCOUNTER
I LUISOM for patient to call; she was instructed by ED to follow up with urology  ED visit documented in ED log

## 2018-10-22 ENCOUNTER — OFFICE VISIT (OUTPATIENT)
Dept: FAMILY MEDICINE CLINIC | Facility: CLINIC | Age: 28
End: 2018-10-22
Payer: COMMERCIAL

## 2018-10-22 VITALS
SYSTOLIC BLOOD PRESSURE: 118 MMHG | HEART RATE: 72 BPM | BODY MASS INDEX: 43.24 KG/M2 | HEIGHT: 62 IN | RESPIRATION RATE: 18 BRPM | TEMPERATURE: 98.4 F | WEIGHT: 235 LBS | DIASTOLIC BLOOD PRESSURE: 78 MMHG

## 2018-10-22 DIAGNOSIS — N20.0 NEPHROLITHIASIS: Primary | ICD-10-CM

## 2018-10-22 DIAGNOSIS — R10.9 RIGHT FLANK PAIN: ICD-10-CM

## 2018-10-22 PROCEDURE — 99213 OFFICE O/P EST LOW 20 MIN: CPT | Performed by: NURSE PRACTITIONER

## 2018-10-22 RX ORDER — ONDANSETRON HYDROCHLORIDE 8 MG/1
8 TABLET, FILM COATED ORAL EVERY 8 HOURS PRN
Qty: 20 TABLET | Refills: 0 | Status: SHIPPED | OUTPATIENT
Start: 2018-10-22 | End: 2018-10-24 | Stop reason: HOSPADM

## 2018-10-22 RX ORDER — SULFAMETHOXAZOLE AND TRIMETHOPRIM 400; 80 MG/1; MG/1
2 TABLET ORAL EVERY 12 HOURS SCHEDULED
COMMUNITY
End: 2018-10-22

## 2018-10-22 RX ORDER — HYDROCODONE BITARTRATE AND ACETAMINOPHEN 10; 325 MG/1; MG/1
1 TABLET ORAL EVERY 6 HOURS PRN
COMMUNITY
End: 2018-10-24 | Stop reason: HOSPADM

## 2018-10-22 RX ORDER — TRAMADOL HYDROCHLORIDE 100 MG/1
CAPSULE ORAL
COMMUNITY
End: 2018-10-24 | Stop reason: HOSPADM

## 2018-10-22 NOTE — PROGRESS NOTES
Assessment/Plan:    Urine culture negative  CT demonstrated 11mm nonobstructing stone on the right and a 3mm stone on the left  Will DC the Bactrim DS  She is requesting a prescription for Zofran that is not dissolvable  Advised on supportive care  She will call to schedule an appt with urology  Problem List Items Addressed This Visit     None      Visit Diagnoses     Nephrolithiasis    -  Primary    Relevant Medications    TraMADol HCl  MG CP24    HYDROcodone-acetaminophen (NORCO)  mg per tablet    ondansetron (ZOFRAN) 8 mg tablet    Right flank pain              There are no Patient Instructions on file for this visit  No Follow-up on file  Subjective:      Patient ID: Bobbi Ley is a 33 y o  female  Chief Complaint   Patient presents with    Follow-up     on ER visit ambar        Here today for ER f/u  She has an 11mm stone on the right  She has not yet scheduled an appt for Dr Shena Morales  Not able to eat because the pain makes her vomit  She is taking Vicodin which dulls the pain  Unable to take the Naproxen because it upsets her stomach  History of kidney stones, last one in June of this year  Believes this was a 9mm stone  No prior surgical intervention for this  Pain had been improving since her ER discharge but became worse again overnight  The following portions of the patient's history were reviewed and updated as appropriate: allergies, current medications, past family history, past medical history, past social history, past surgical history and problem list     Review of Systems   Constitutional: Negative for chills, fatigue and fever  Respiratory: Negative for cough and shortness of breath  Cardiovascular: Negative for chest pain  Gastrointestinal: Positive for nausea  Negative for abdominal pain, diarrhea and vomiting  Genitourinary: Positive for flank pain  Negative for dysuria, frequency, hematuria, pelvic pain, urgency and vaginal discharge  Musculoskeletal: Negative for arthralgias and back pain  Current Outpatient Prescriptions   Medication Sig Dispense Refill    HYDROcodone-acetaminophen (NORCO)  mg per tablet Take 1 tablet by mouth every 6 (six) hours as needed for moderate pain      naproxen (NAPROSYN) 500 mg tablet Take 1 tablet (500 mg total) by mouth 2 (two) times a day with meals 30 tablet 0    TraMADol HCl  MG CP24 Take by mouth      naproxen (NAPROSYN) 500 mg tablet Take 1 tablet (500 mg total) by mouth 2 (two) times a day with meals for 5 days 10 tablet 0    ondansetron (ZOFRAN) 8 mg tablet Take 1 tablet (8 mg total) by mouth every 8 (eight) hours as needed for nausea or vomiting 20 tablet 0     No current facility-administered medications for this visit  Objective:    /78   Pulse 72   Temp 98 4 °F (36 9 °C)   Resp 18   Ht 5' 2" (1 575 m)   Wt 107 kg (235 lb)   LMP 09/18/2018   BMI 42 98 kg/m²        Physical Exam   Constitutional: She appears well-developed and well-nourished  Cardiovascular: Normal rate, regular rhythm, S1 normal and S2 normal     Pulmonary/Chest: Effort normal and breath sounds normal    Abdominal: Bowel sounds are normal  She exhibits no distension  There is no hepatosplenomegaly  There is no tenderness  There is CVA tenderness (right)  Skin: Skin is warm, dry and intact  Psychiatric: She has a normal mood and affect  Nursing note and vitals reviewed               Carlee Parra

## 2018-10-22 NOTE — LETTER
October 22, 2018     Patient: Finn Castellanos   YOB: 1990   Date of Visit: 10/22/2018       To Whom it May Concern:    Finn Castellanos is under my professional care  She was seen in my office on 10/22/2018  Please excuse from work 10/22/18  If you have any questions or concerns, please don't hesitate to call           Sincerely,          AJAY Banks        CC: No Recipients

## 2018-10-23 ENCOUNTER — APPOINTMENT (EMERGENCY)
Dept: RADIOLOGY | Facility: HOSPITAL | Age: 28
End: 2018-10-23
Payer: COMMERCIAL

## 2018-10-23 ENCOUNTER — HOSPITAL ENCOUNTER (OUTPATIENT)
Facility: HOSPITAL | Age: 28
Setting detail: OBSERVATION
Discharge: HOME | End: 2018-10-24
Attending: EMERGENCY MEDICINE | Admitting: FAMILY MEDICINE
Payer: COMMERCIAL

## 2018-10-23 DIAGNOSIS — N13.2 HYDRONEPHROSIS WITH URINARY OBSTRUCTION DUE TO RENAL CALCULUS: ICD-10-CM

## 2018-10-23 DIAGNOSIS — R31.9 URINARY TRACT INFECTION WITH HEMATURIA, SITE UNSPECIFIED: ICD-10-CM

## 2018-10-23 DIAGNOSIS — N39.0 URINARY TRACT INFECTION WITH HEMATURIA, SITE UNSPECIFIED: ICD-10-CM

## 2018-10-23 DIAGNOSIS — N20.0 KIDNEY STONE: Primary | ICD-10-CM

## 2018-10-23 DIAGNOSIS — N20.0 NEPHROLITHIASIS: ICD-10-CM

## 2018-10-23 PROBLEM — E87.1 HYPONATREMIA: Status: ACTIVE | Noted: 2018-10-23

## 2018-10-23 LAB
ANION GAP SERPL CALCULATED.3IONS-SCNC: 12 MMOL/L (ref 4–13)
BACTERIA UR QL AUTO: ABNORMAL /HPF
BASOPHILS # BLD AUTO: 0.05 THOUSANDS/ΜL (ref 0–0.1)
BASOPHILS NFR BLD AUTO: 0 % (ref 0–1)
BILIRUB UR QL STRIP: ABNORMAL
BUN SERPL-MCNC: 16 MG/DL (ref 5–25)
CALCIUM SERPL-MCNC: 9.4 MG/DL (ref 8.3–10.1)
CHLORIDE SERPL-SCNC: 98 MMOL/L (ref 100–108)
CLARITY UR: ABNORMAL
CO2 SERPL-SCNC: 25 MMOL/L (ref 21–32)
COLOR UR: YELLOW
CREAT SERPL-MCNC: 1.1 MG/DL (ref 0.6–1.3)
EOSINOPHIL # BLD AUTO: 0.05 THOUSAND/ΜL (ref 0–0.61)
EOSINOPHIL NFR BLD AUTO: 0 % (ref 0–6)
ERYTHROCYTE [DISTWIDTH] IN BLOOD BY AUTOMATED COUNT: 12.5 % (ref 11.6–15.1)
EXT PREG TEST URINE: NEGATIVE
GFR SERPL CREATININE-BSD FRML MDRD: 69 ML/MIN/1.73SQ M
GLUCOSE SERPL-MCNC: 89 MG/DL (ref 65–140)
GLUCOSE UR STRIP-MCNC: NEGATIVE MG/DL
HCT VFR BLD AUTO: 43 % (ref 34.8–46.1)
HGB BLD-MCNC: 14.3 G/DL (ref 11.5–15.4)
HGB UR QL STRIP.AUTO: ABNORMAL
IMM GRANULOCYTES # BLD AUTO: 0.04 THOUSAND/UL (ref 0–0.2)
IMM GRANULOCYTES NFR BLD AUTO: 0 % (ref 0–2)
KETONES UR STRIP-MCNC: ABNORMAL MG/DL
LEUKOCYTE ESTERASE UR QL STRIP: ABNORMAL
LYMPHOCYTES # BLD AUTO: 2.1 THOUSANDS/ΜL (ref 0.6–4.47)
LYMPHOCYTES NFR BLD AUTO: 13 % (ref 14–44)
MCH RBC QN AUTO: 31.1 PG (ref 26.8–34.3)
MCHC RBC AUTO-ENTMCNC: 33.3 G/DL (ref 31.4–37.4)
MCV RBC AUTO: 94 FL (ref 82–98)
MONOCYTES # BLD AUTO: 0.86 THOUSAND/ΜL (ref 0.17–1.22)
MONOCYTES NFR BLD AUTO: 5 % (ref 4–12)
NEUTROPHILS # BLD AUTO: 13.04 THOUSANDS/ΜL (ref 1.85–7.62)
NEUTS SEG NFR BLD AUTO: 82 % (ref 43–75)
NITRITE UR QL STRIP: NEGATIVE
NON-SQ EPI CELLS URNS QL MICRO: ABNORMAL /HPF
NRBC BLD AUTO-RTO: 0 /100 WBCS
OTHER STN SPEC: ABNORMAL
PH UR STRIP.AUTO: 6 [PH] (ref 5–9)
PLATELET # BLD AUTO: 363 THOUSANDS/UL (ref 149–390)
PMV BLD AUTO: 10.6 FL (ref 8.9–12.7)
POTASSIUM SERPL-SCNC: 4.5 MMOL/L (ref 3.5–5.3)
PROT UR STRIP-MCNC: NEGATIVE MG/DL
RBC # BLD AUTO: 4.6 MILLION/UL (ref 3.81–5.12)
RBC #/AREA URNS AUTO: ABNORMAL /HPF
SODIUM SERPL-SCNC: 135 MMOL/L (ref 136–145)
SP GR UR STRIP.AUTO: 1.02 (ref 1–1.03)
UROBILINOGEN UR QL STRIP.AUTO: 0.2 E.U./DL
WBC # BLD AUTO: 16.14 THOUSAND/UL (ref 4.31–10.16)
WBC #/AREA URNS AUTO: ABNORMAL /HPF

## 2018-10-23 PROCEDURE — 99219 PR INITIAL OBSERVATION CARE/DAY 50 MINUTES: CPT | Performed by: STUDENT IN AN ORGANIZED HEALTH CARE EDUCATION/TRAINING PROGRAM

## 2018-10-23 PROCEDURE — 96375 TX/PRO/DX INJ NEW DRUG ADDON: CPT

## 2018-10-23 PROCEDURE — 85025 COMPLETE CBC W/AUTO DIFF WBC: CPT | Performed by: EMERGENCY MEDICINE

## 2018-10-23 PROCEDURE — 96361 HYDRATE IV INFUSION ADD-ON: CPT

## 2018-10-23 PROCEDURE — 36415 COLL VENOUS BLD VENIPUNCTURE: CPT | Performed by: EMERGENCY MEDICINE

## 2018-10-23 PROCEDURE — 80048 BASIC METABOLIC PNL TOTAL CA: CPT | Performed by: EMERGENCY MEDICINE

## 2018-10-23 PROCEDURE — 74176 CT ABD & PELVIS W/O CONTRAST: CPT

## 2018-10-23 PROCEDURE — 81025 URINE PREGNANCY TEST: CPT | Performed by: EMERGENCY MEDICINE

## 2018-10-23 PROCEDURE — 99285 EMERGENCY DEPT VISIT HI MDM: CPT

## 2018-10-23 PROCEDURE — 87081 CULTURE SCREEN ONLY: CPT | Performed by: FAMILY MEDICINE

## 2018-10-23 PROCEDURE — 81001 URINALYSIS AUTO W/SCOPE: CPT | Performed by: EMERGENCY MEDICINE

## 2018-10-23 PROCEDURE — 96374 THER/PROPH/DIAG INJ IV PUSH: CPT

## 2018-10-23 RX ORDER — ONDANSETRON 2 MG/ML
4 INJECTION INTRAMUSCULAR; INTRAVENOUS ONCE
Status: COMPLETED | OUTPATIENT
Start: 2018-10-23 | End: 2018-10-23

## 2018-10-23 RX ORDER — SODIUM CHLORIDE 9 MG/ML
100 INJECTION, SOLUTION INTRAVENOUS CONTINUOUS
Status: DISCONTINUED | OUTPATIENT
Start: 2018-10-23 | End: 2018-10-24 | Stop reason: HOSPADM

## 2018-10-23 RX ORDER — ONDANSETRON 2 MG/ML
4 INJECTION INTRAMUSCULAR; INTRAVENOUS EVERY 6 HOURS PRN
Status: DISCONTINUED | OUTPATIENT
Start: 2018-10-23 | End: 2018-10-24

## 2018-10-23 RX ORDER — TAMSULOSIN HYDROCHLORIDE 0.4 MG/1
0.4 CAPSULE ORAL
Status: DISCONTINUED | OUTPATIENT
Start: 2018-10-24 | End: 2018-10-24 | Stop reason: HOSPADM

## 2018-10-23 RX ORDER — KETOROLAC TROMETHAMINE 30 MG/ML
30 INJECTION, SOLUTION INTRAMUSCULAR; INTRAVENOUS ONCE
Status: COMPLETED | OUTPATIENT
Start: 2018-10-23 | End: 2018-10-23

## 2018-10-23 RX ORDER — HEPARIN SODIUM 5000 [USP'U]/ML
5000 INJECTION, SOLUTION INTRAVENOUS; SUBCUTANEOUS EVERY 8 HOURS SCHEDULED
Status: DISCONTINUED | OUTPATIENT
Start: 2018-10-23 | End: 2018-10-24 | Stop reason: HOSPADM

## 2018-10-23 RX ORDER — MORPHINE SULFATE 4 MG/ML
4 INJECTION, SOLUTION INTRAMUSCULAR; INTRAVENOUS ONCE
Status: COMPLETED | OUTPATIENT
Start: 2018-10-23 | End: 2018-10-23

## 2018-10-23 RX ADMIN — SODIUM CHLORIDE 1000 ML: 0.9 INJECTION, SOLUTION INTRAVENOUS at 16:54

## 2018-10-23 RX ADMIN — ONDANSETRON 4 MG: 2 INJECTION INTRAMUSCULAR; INTRAVENOUS at 17:15

## 2018-10-23 RX ADMIN — MORPHINE SULFATE 4 MG: 4 INJECTION INTRAVENOUS at 17:15

## 2018-10-23 RX ADMIN — SODIUM CHLORIDE 100 ML/HR: 0.9 INJECTION, SOLUTION INTRAVENOUS at 21:22

## 2018-10-23 RX ADMIN — CEFTRIAXONE 1000 MG: 1 INJECTION, SOLUTION INTRAVENOUS at 19:33

## 2018-10-23 RX ADMIN — MORPHINE SULFATE 2 MG: 2 INJECTION, SOLUTION INTRAMUSCULAR; INTRAVENOUS at 21:19

## 2018-10-23 RX ADMIN — HEPARIN SODIUM 5000 UNITS: 5000 INJECTION, SOLUTION INTRAVENOUS; SUBCUTANEOUS at 21:22

## 2018-10-23 RX ADMIN — KETOROLAC TROMETHAMINE 30 MG: 30 INJECTION, SOLUTION INTRAMUSCULAR at 17:14

## 2018-10-23 NOTE — H&P
History and Physical - [facility] Internal Medicine    Patient Information: Trisha Harris 33 y o  female MRN: [id number]  Unit/Bed#: ED 09 Encounter: [id number]  Admitting Physician: Mirna Kilgore MD  PCP: Frank Chavarria DO  Date of Admission:  10/23/18    Chief Complaint:     Right flank pain, vomiting   of Present Illness:    Trisha Harris is a 33 y o  female with a PMH of Nephrolithiasis who presents with right flank pain and vomiting  She has been dealing with right flank pain secondary to a kidney stone for several months  Two nights ago she woke up with severe right flank pain along with nausea and vomiting  This pain has been persistent despite narcotics  She was evaluated by her Urologist today who recommended she go to the ER  Currently she reports 3/10 right flank pain  She reports occasional chills but no fever  She reports difficulty urinating but no hematuria or dysuria  No other complaints or concerns  Review of Systems:    Review of Systems   Constitutional: Positive for chills  Negative for fever  Respiratory: Negative for shortness of breath  Cardiovascular: Negative for chest pain  Gastrointestinal: Positive for nausea and vomiting  Negative for abdominal pain and blood in stool  Genitourinary: Positive for difficulty urinating and flank pain  Negative for hematuria  Musculoskeletal: Negative for arthralgias  Neurological: Negative for syncope  Psychiatric/Behavioral: Negative for confusion  Past Medical and Surgical History:     Past Medical History:   Diagnosis Date    Renal disorder     kidney stones       History reviewed  No pertinent surgical history  Meds/Allergies:    PTA meds:   Prior to Admission Medications   Prescriptions Last Dose Informant Patient Reported? Taking?    HYDROcodone-acetaminophen (NORCO)  mg per tablet  Self Yes No   Sig: Take 1 tablet by mouth every 6 (six) hours as needed for moderate pain   TraMADol HCl  MG CP24  Self Yes No   Sig: Take by mouth   naproxen (NAPROSYN) 500 mg tablet   No No   Sig: Take 1 tablet (500 mg total) by mouth 2 (two) times a day with meals   naproxen (NAPROSYN) 500 mg tablet   No No   Sig: Take 1 tablet (500 mg total) by mouth 2 (two) times a day with meals for 5 days   ondansetron (ZOFRAN) 8 mg tablet   No No   Sig: Take 1 tablet (8 mg total) by mouth every 8 (eight) hours as needed for nausea or vomiting      Facility-Administered Medications: None       Allergies: No Known Allergies  History:     Marital Status: /Civil Union   History   Alcohol Use    Yes     Comment: occasional     History   Smoking Status    Former Smoker    Packs/day: 0 00    Types: Cigarettes   Smokeless Tobacco    Former User    Quit date: 5/4/2016     History   Drug Use No       Family History: Mother: DM, HTN  Father: HLD    Physical Exam:     Vitals:   Blood Pressure: 106/58 (10/23/18 1846)  Pulse: 91 (10/23/18 1846)  Temperature: 98 4 °F (36 9 °C) (10/23/18 1615)  Temp Source: Tympanic (10/23/18 1615)  Respirations: 18 (10/23/18 1846)  SpO2: 98 % (10/23/18 1846)    Physical Exam:   General: in no acute distress  HEENT: atraumatic, normocephalic  Skin: no jaundice  CVS: RRR, no murmurs appreciated  Lungs: CTAL, no wheezing or rales appreciated  Abdomen: soft, nondistended, bowel sounds normal, nontender upon palpation, no guarding or rebound tenderness, moderate right CVA tenderness    Extremities: no edema, no calf swelling or tenderness  Neuro: alert and oriented x3  Psych: calm, cooperative      Lab Results: I have personally reviewed pertinent reports          Results from last 7 days  Lab Units 10/23/18  1653   WBC Thousand/uL 16 14*   HEMOGLOBIN g/dL 14 3   HEMATOCRIT % 43 0   PLATELETS Thousands/uL 363   NEUTROS PCT % 82*   LYMPHS PCT % 13*   MONOS PCT % 5   EOS PCT % 0       Results from last 7 days  Lab Units 10/23/18  1653   SODIUM mmol/L 135*   POTASSIUM mmol/L 4 5   CHLORIDE mmol/L 98*   CO2 mmol/L 25 BUN mg/dL 16   CREATININE mg/dL 1 10   CALCIUM mg/dL 9 4           Imaging:     Xr Abdomen 1 View Kub    Result Date: 10/14/2018  Narrative: ABDOMEN INDICATION:   pain  COMPARISON:  7/7/2018 VIEWS:  AP supine Images: 2 FINDINGS: There is a nonobstructive bowel gas pattern  No discernible free air on this supine study  Upright or left lateral decubitus imaging is more sensitive to detect subtle free air in the appropriate setting  No pathologic calcifications or soft tissue masses  Visualized lung bases are clear  Visualized osseous structures are unremarkable for the patient's age  Impression: Unremarkable examination  Workstation performed: [id number]     Ct Renal Stone Study Abdomen Pelvis Without Contrast    Result Date: 10/23/2018  Narrative: CT ABDOMEN AND PELVIS WITHOUT IV CONTRAST - LOW DOSE RENAL STONE INDICATION:   rt flank pain, known stones  COMPARISON:  None  TECHNIQUE:  Low dose thin section CT examination of the abdomen and pelvis was performed without intravenous or oral contrast according to a protocol specifically designed to evaluate for urinary tract calculus  Axial, sagittal, and coronal 2D reformatted images were created from the source data and submitted for interpretation  Evaluation for pathology in the abdomen and pelvis that is unrelated to urinary tract calculi is limited  Radiation dose length product (DLP) for this visit:  686 14 mGy-cm   This examination, like all CT scans performed in the [facility], was performed utilizing techniques to minimize radiation dose exposure, including the use of iterative  reconstruction and automated exposure control  FINDINGS: RIGHT KIDNEY AND URETER: Obstructive proximal right ureteral calculus measuring 12 x 9 mm located in the proximal right ureter immediately distal to the right UPJ causes moderate upstream hydronephrosis  No other right ureteral calculi  No perinephric collection   LEFT KIDNEY AND URETER: Nonobstructive 5 mm lower pole calculus  No hydronephrosis  URINARY BLADDER: Unremarkable  No significant abnormality in the visualized lung bases  Limited low radiation dose noncontrast CT evaluation demonstrates no clinically significant abnormality of the spleen, pancreas, or adrenal glands  Fatty infiltration of the liver  No calcified gallstones or gallbladder wall thickening noted  No ascites or bulky lymphadenopathy on this limited noncontrast study  Bowel loops appear unremarkable  Noninflamed appendix  38 mm right adnexal cyst is in keeping with a physiologic follicle  No acute fracture or destructive osseous lesion is identified  Impression: Obstructive 12 x 9 mm proximal right ureteral calculus located just distal to the right UPJ causes moderate right hydronephrosis  Fatty infiltration of the liver  The study was marked in [facility] for immediate notification  Workstation performed: [id number]     Ct Renal Stone Study Abdomen Pelvis Without Contrast    Result Date: 10/13/2018  Narrative: CT ABDOMEN AND PELVIS WITHOUT IV CONTRAST - LOW DOSE RENAL STONE INDICATION:   abdominal pain  COMPARISON:  None  TECHNIQUE:  Low dose thin section CT examination of the abdomen and pelvis was performed without intravenous or oral contrast according to a protocol specifically designed to evaluate for urinary tract calculus  Axial, sagittal, and coronal 2D reformatted images were created from the source data and submitted for interpretation  Evaluation for pathology in the abdomen and pelvis that is unrelated to urinary tract calculi is limited  Radiation dose length product (DLP) for this visit:  660 07 mGy-cm   This examination, like all CT scans performed in the [facility], was performed utilizing techniques to minimize radiation dose exposure, including the use of iterative  reconstruction and automated exposure control  FINDINGS: RIGHT KIDNEY AND URETER: Nonobstructing 11 mm right renal calculus is noted    No hydronephrosis or hydroureter  LEFT KIDNEY AND URETER: Faint 3 mm nonobstructing right renal calculus is present  No hydronephrosis or hydroureter  URINARY BLADDER: Unremarkable  No significant abnormality in the visualized lung bases  Limited low radiation dose noncontrast CT evaluation demonstrates no clinically significant abnormality of spleen, pancreas, or adrenal glands  Liver is hypodense consistent with fatty infiltration  No calcified gallstones or gallbladder wall thickening noted  No ascites or bulky lymphadenopathy on this limited noncontrast study  Bowel loops appear unremarkable  Limited evaluation demonstrates no evidence to suggest acute appendicitis  No acute fracture or destructive osseous lesion is identified  Impression: Bilateral nonobstructing renal calculi  No evidence of obstructive uropathy  Hepatic steatosis  Workstation performed: [id number]         Assessment/Plan    * Nephrolithiasis with right hydronephrosis   Assessment & Plan    CT noted  Urology service is aware of the pt and recommend admitting to Medicine  Will start on IV NS, order Morphine prn, Zofran prn, Flomax, make NPO at midnight and await Urology evaluation      UTI (urinary tract infection)   Assessment & Plan    Will start on Ceftriaxone      Hyponatremia   Assessment & Plan    Very mild  Likely secondary to dehydration from vomiting  Will be started on IV NS  Repeat BMP tomorrow          Hospital Problem List:     Principal Problem:    Nephrolithiasis with right hydronephrosis  Active Problems:    UTI (urinary tract infection)    Hyponatremia        VTE Prophylaxis: Heparin   Code Status: No Order    Anticipated Length of Stay:  Patient will be admitted on an Observation basis with an anticipated length of stay of atleast 1 midnights  Total Time for Visit, including Counseling / Coordination of Care: 30 minutes    Greater than 50% of this total time spent on direct patient counseling and coordination of care

## 2018-10-23 NOTE — LETTER
[address] [facility] [place] [address] [id number]  Dept: [phone]    October 24, 2018     Patient: Jenna Fleming   YOB: 1990   Date of Visit: 10/23/2018       To Whom it May Concern:    Jenna Fleming was seen in the hospital from 10/23/2018 to 10/24/18  She Is excused from work from 10/23/2018 to 10/26/2018  If you have any questions or concerns, please don't hesitate to call           Sincerely,          Margarita Luna MD

## 2018-10-23 NOTE — CONSULTS
H&P Exam - Urology       Patient: Deanna Quintana   : 1990 Sex: female   MRN: [id number]     CSN: [id number]      History of Present Illness   HPI:  Deanna Quintana is a 33 y o  who presented to my office today with severe right flank pain starting yesterday patient seen at [address] [place] [address] ER on  found to have 12 mm right renal pelvic stone with pain clearing with analgesics and sent home patient presents to my office today in severe pain noting increasing discomfort over the last 24 hours sent directly into the ER on follow-up CT scan now found to have a 12 mm stone in the right proximal ureter with moderate hydronephrosis        Review of Systems:   Constitutional:  Negative for activity change, fever, chills and diaphoresis  HENT: Negative for hearing loss and trouble swallowing  Eyes: Negative for itching and visual disturbance  Respiratory: Negative for chest tightness and shortness of breath  Cardiovascular: Negative for chest pain, edema  Gastrointestinal: Negative for abdominal distention, na abdominal pain, constipation, diarrhea, Nausea and vomiting  Genitourinary: Negative for decreased urine volume, difficulty urinating, dysuria, enuresis, frequency, hematuria and urgency  Musculoskeletal: Negative for gait problem and myalgias  Neurological: Negative for dizziness and headaches  Hematological: Does not bruise/bleed easily  Historical Information   Past Medical History:   Diagnosis Date    Renal disorder     kidney stones     History reviewed  No pertinent surgical history    Social History   History   Alcohol Use    Yes     Comment: occasional     History   Drug Use No     History   Smoking Status    Former Smoker    Packs/day: 0 00    Types: Cigarettes   Smokeless Tobacco    Former User    Quit date: 2016     Family History:   Family History   Problem Relation Age of Onset    Cancer Mother     Diabetes Mother     Hypertension Mother    Nitish Franklin Thyroid disease Mother     Hyperlipidemia Father         High Triglycerides       Meds/Allergies     (Not in a hospital admission)  No Known Allergies    Objective   Vitals: /70   Pulse 94   Temp 98 4 °F (36 9 °C) (Tympanic)   Resp 18   SpO2 99%     Physical Exam:  General Alert awake   Normocephalic atraumatic PERRLA  Lungs clear bilaterally  Cardiac normal S1 normal S2  Abdomen soft, flank pain  Extremities no edema    No intake/output data recorded      Invasive Devices     Peripheral Intravenous Line            Peripheral IV 10/23/18 Left Antecubital less than 1 day                    Lab Results: CBC: Lab Results   Component Value Date    WBC 16 14 (H) 10/23/2018    HGB 14 3 10/23/2018    HCT 43 0 10/23/2018    MCV 94 10/23/2018     10/23/2018    ADJUSTEDWBC 9 30 05/06/2016    MCH 31 1 10/23/2018    MCHC 33 3 10/23/2018    RDW 12 5 10/23/2018    MPV 10 6 10/23/2018    NRBC 0 10/23/2018     CMP: Lab Results   Component Value Date     (L) 10/23/2018     10/13/2015    CL 98 (L) 10/23/2018     10/13/2015    CO2 25 10/23/2018    CO2 27 10/13/2015    ANIONGAP 14 0 10/13/2015    BUN 16 10/23/2018    BUN 12 10/13/2015    CREATININE 1 10 10/23/2018    CREATININE 0 8 10/13/2015    GLUCOSE 95 10/13/2015    CALCIUM 9 4 10/23/2018    CALCIUM 8 7 10/13/2015    AST 45 10/13/2018    AST 52 (H) 10/13/2015    ALT 64 10/13/2018     (H) 10/13/2015    ALKPHOS 63 10/13/2018    ALKPHOS 78 10/13/2015    PROT 7 7 10/13/2015    BILITOT 0 7 10/13/2015    EGFR 69 10/23/2018     Urinalysis: Lab Results   Component Value Date    COLORU Yellow 10/23/2018    CLARITYU Slightly Cloudy 10/23/2018    SPECGRAV 1 025 10/23/2018    PHUR 6 0 10/23/2018    LEUKOCYTESUR Trace (A) 10/23/2018    NITRITE Negative 10/23/2018    PROTEINUA Negative 10/23/2018    GLUCOSEU Negative 10/23/2018    KETONESU 15 (1+) (A) 10/23/2018    BILIRUBINUR Interference- unable to analyze (A) 10/23/2018    BLOODU Trace-Intact (A) 10/23/2018     Urine Culture:   Lab Results   Component Value Date    URINECX No Growth <1000 cfu/mL 10/13/2018     PSA: No results found for: PSA        Assessment/ Plan:  Right 12 mm proximal ureteral stone  Severe flank pain  Nausea  NPO after midnight  Cysto right stent possible right ureteroscopy laser lithotripsy stent placement    Vicki Powers MD

## 2018-10-23 NOTE — ED PROVIDER NOTES
History  Chief Complaint   Patient presents with    Flank Pain     pt presents to the ed with right sided flank pain x 1 day  pt has a hx of kidney stones      Patient was recently diagnosed 10 days ago with a kidney stone  She states the pain had resolved and had been well but then the pain suddenly returned and felt like a knife going on her flank  It is associated with nausea vomiting  She also had some chills  Patient was seen by her urologist today who sent her in for evaluation  Patient denies dysuria  Prior to Admission Medications   Prescriptions Last Dose Informant Patient Reported? Taking? HYDROcodone-acetaminophen (NORCO)  mg per tablet  Self Yes No   Sig: Take 1 tablet by mouth every 6 (six) hours as needed for moderate pain   TraMADol HCl  MG CP24  Self Yes No   Sig: Take by mouth   naproxen (NAPROSYN) 500 mg tablet   No No   Sig: Take 1 tablet (500 mg total) by mouth 2 (two) times a day with meals   naproxen (NAPROSYN) 500 mg tablet   No No   Sig: Take 1 tablet (500 mg total) by mouth 2 (two) times a day with meals for 5 days   ondansetron (ZOFRAN) 8 mg tablet   No No   Sig: Take 1 tablet (8 mg total) by mouth every 8 (eight) hours as needed for nausea or vomiting      Facility-Administered Medications: None       Past Medical History:   Diagnosis Date    Renal disorder     kidney stones       History reviewed  No pertinent surgical history  Family History   Problem Relation Age of Onset    Cancer Mother     Diabetes Mother     Hypertension Mother     Thyroid disease Mother     Hyperlipidemia Father         High Triglycerides     I have reviewed and agree with the history as documented      Social History   Substance Use Topics    Smoking status: Former Smoker     Packs/day: 0 00     Types: Cigarettes    Smokeless tobacco: Former User     Quit date: 5/4/2016    Alcohol use Yes      Comment: occasional        Review of Systems   Constitutional: Positive for chills  Negative for fever  HENT: Negative for sore throat  Respiratory: Negative for shortness of breath and stridor  Cardiovascular: Negative for chest pain and leg swelling  Gastrointestinal: Positive for abdominal pain, nausea and vomiting  Genitourinary: Positive for flank pain  Negative for decreased urine volume, dysuria, hematuria and menstrual problem  Musculoskeletal: Positive for arthralgias and back pain  Skin: Negative for rash  Neurological: Negative for syncope and weakness  Psychiatric/Behavioral: Negative for dysphoric mood  All other systems reviewed and are negative  Physical Exam  Physical Exam   Constitutional: She is oriented to person, place, and time  She appears well-developed and well-nourished  HENT:   Head: Normocephalic and atraumatic  Eyes: Conjunctivae are normal    Neck: Normal range of motion  Neck supple  Cardiovascular: Normal rate, regular rhythm and normal heart sounds  Pulmonary/Chest: Effort normal and breath sounds normal    Abdominal: Soft  Bowel sounds are normal  There is no tenderness  Musculoskeletal: Normal range of motion  She exhibits no edema  Right CVA tenderness present   Neurological: She is alert and oriented to person, place, and time  Skin: Skin is warm and dry  Psychiatric: She has a normal mood and affect  Her behavior is normal    Nursing note and vitals reviewed        Vital Signs  ED Triage Vitals   Temperature Pulse Respirations Blood Pressure SpO2   10/23/18 1615 10/23/18 1615 10/23/18 1615 10/23/18 1615 10/23/18 1615   98 4 °F (36 9 °C) 94 18 140/70 99 %      Temp Source Heart Rate Source Patient Position - Orthostatic VS BP Location FiO2 (%)   10/23/18 1615 -- -- -- --   Tympanic          Pain Score       10/23/18 1714       Worst Possible Pain           Vitals:    10/23/18 1615   BP: 140/70   Pulse: 94       Visual Acuity      ED Medications  Medications   sodium chloride 0 9 % bolus 1,000 mL (1,000 mL Intravenous New Bag 10/23/18 1654)   morphine (PF) 4 mg/mL injection 4 mg (4 mg Intravenous Given 10/23/18 1715)   ketorolac (TORADOL) injection 30 mg (30 mg Intravenous Given 10/23/18 1714)   ondansetron (ZOFRAN) injection 4 mg (4 mg Intravenous Given 10/23/18 1715)       Diagnostic Studies  Results Reviewed     Procedure Component Value Units Date/Time    Urine Microscopic [[id number]]  (Abnormal) Collected:  10/23/18 1705    Lab Status:  Final result Specimen:  Urine from Urine, Clean Catch Updated:  10/23/18 1721     RBC, UA None Seen /hpf      WBC, UA 10-20 (A) /hpf      Epithelial Cells Moderate (A) /hpf      Bacteria, UA Moderate (A) /hpf      OTHER OBSERVATIONS Transitional Epithelial Cells    UA w Reflex to Microscopic [[id number]]  (Abnormal) Collected:  10/23/18 1705    Lab Status:  Final result Specimen:  Urine from Urine, Clean Catch Updated:  10/23/18 1713     Color, UA Yellow     Clarity, UA Slightly Cloudy     Specific Gravity, UA 1 025     pH, UA 6 0     Leukocytes, UA Trace (A)     Nitrite, UA Negative     Protein, UA Negative mg/dl      Glucose, UA Negative mg/dl      Ketones, UA 15 (1+) (A) mg/dl      Urobilinogen, UA 0 2 E U /dl      Bilirubin, UA Interference- unable to analyze (A)     Blood, UA Trace-Intact (A)    POCT pregnancy, urine [[id number]]  (Normal) Resulted:  10/23/18 1711    Lab Status:  Final result Updated:  10/23/18 1711     EXT PREG TEST UR (Ref: Negative) negative    Basic metabolic panel [[id number]]  (Abnormal) Collected:  10/23/18 1653    Lab Status:  Final result Specimen:  Blood from Arm, Left Updated:  10/23/18 1711     Sodium 135 (L) mmol/L      Potassium 4 5 mmol/L      Chloride 98 (L) mmol/L      CO2 25 mmol/L      ANION GAP 12 mmol/L      BUN 16 mg/dL      Creatinine 1 10 mg/dL      Glucose 89 mg/dL      Calcium 9 4 mg/dL      eGFR 69 ml/min/1 73sq m     Narrative:         National Kidney Disease Education Program recommendations are as follows:  GFR calculation is accurate only with a steady state creatinine  Chronic Kidney disease less than 60 ml/min/1 73 sq  meters  Kidney failure less than 15 ml/min/1 73 sq  meters  CBC and differential [[id number]]  (Abnormal) Collected:  10/23/18 1653    Lab Status:  Final result Specimen:  Blood from Arm, Left Updated:  10/23/18 1700     WBC 16 14 (H) Thousand/uL      RBC 4 60 Million/uL      Hemoglobin 14 3 g/dL      Hematocrit 43 0 %      MCV 94 fL      MCH 31 1 pg      MCHC 33 3 g/dL      RDW 12 5 %      MPV 10 6 fL      Platelets 320 Thousands/uL      nRBC 0 /100 WBCs      Neutrophils Relative 82 (H) %      Immat GRANS % 0 %      Lymphocytes Relative 13 (L) %      Monocytes Relative 5 %      Eosinophils Relative 0 %      Basophils Relative 0 %      Neutrophils Absolute 13 04 (H) Thousands/µL      Immature Grans Absolute 0 04 Thousand/uL      Lymphocytes Absolute 2 10 Thousands/µL      Monocytes Absolute 0 86 Thousand/µL      Eosinophils Absolute 0 05 Thousand/µL      Basophils Absolute 0 05 Thousands/µL                  CT renal stone study abdomen pelvis without contrast   Final Result by Glenn Stiles MD (10/23 1751)      Obstructive 12 x 9 mm proximal right ureteral calculus located just distal to the right UPJ causes moderate right hydronephrosis  Fatty infiltration of the liver  The study was marked in [facility] for immediate notification  Workstation performed: [id number]                    Procedures  Procedures       Phone Contacts  ED Phone Contact    ED Course                               MDM  Number of Diagnoses or Management Options  Diagnosis management comments: Will image patient for possible hydro    Hydrate and provide pain medication    CritCare Time    Disposition  Final diagnoses:   Kidney stone   Hydronephrosis with urinary obstruction due to renal calculus     Time reflects when diagnosis was documented in both MDM as applicable and the Disposition within this note     Time User Action Codes Description Comment    10/23/2018  6:02 PM Romana Borders Add [N20 0] Kidney stone     10/23/2018  6:02 PM Romana Borders Add [N13 30] Hydronephrosis     10/23/2018  6:02 PM Todd ORTIZ Remove [N13 30] Hydronephrosis     10/23/2018  6:03 PM Preston Cullen Add [N13 2] Hydronephrosis with urinary obstruction due to renal calculus       ED Disposition     ED Disposition Condition Comment    Admit  Case was discussed with Dr Venkata Horne and the patient's admission status was agreed to be Admission Status: observation status to the service of Dr Bobo Moraes   Follow-up Information    None         Patient's Medications   Discharge Prescriptions    No medications on file     No discharge procedures on file      ED Provider  Electronically Signed by           Orville Vivar MD  10/23/18 5615

## 2018-10-23 NOTE — ASSESSMENT & PLAN NOTE
Suspected based on leukocytosis, UA with pyuria and bacteriuria   Remains afebrile and without significant dysuria  Urology input noted, continue Levaquin for 3 days postprocedure

## 2018-10-23 NOTE — ASSESSMENT & PLAN NOTE
CT scan with right-sided obstructive stone, 12 mm at UPJ with hydronephrosis  Status post stent placement, unable to extract stone    Was pushed back to pelvis  Continue postoperative care with hydration with pain control  Follow up with Urology as outpatient for lithotripsy

## 2018-10-24 ENCOUNTER — ANESTHESIA (OUTPATIENT)
Dept: PERIOP | Facility: HOSPITAL | Age: 28
End: 2018-10-24
Payer: COMMERCIAL

## 2018-10-24 ENCOUNTER — HOSPITAL ENCOUNTER (OUTPATIENT)
Dept: RADIOLOGY | Facility: HOSPITAL | Age: 28
Setting detail: OBSERVATION
Discharge: HOME | End: 2018-10-24
Payer: COMMERCIAL

## 2018-10-24 ENCOUNTER — ANESTHESIA EVENT (OUTPATIENT)
Dept: PERIOP | Facility: HOSPITAL | Age: 28
End: 2018-10-24
Payer: COMMERCIAL

## 2018-10-24 VITALS
HEART RATE: 87 BPM | HEIGHT: 62 IN | SYSTOLIC BLOOD PRESSURE: 118 MMHG | WEIGHT: 233.69 LBS | RESPIRATION RATE: 18 BRPM | DIASTOLIC BLOOD PRESSURE: 74 MMHG | TEMPERATURE: 97 F | BODY MASS INDEX: 43 KG/M2 | OXYGEN SATURATION: 96 %

## 2018-10-24 PROBLEM — E87.6 HYPOKALEMIA: Status: ACTIVE | Noted: 2018-10-24

## 2018-10-24 PROBLEM — E87.1 HYPONATREMIA: Status: RESOLVED | Noted: 2018-10-23 | Resolved: 2018-10-24

## 2018-10-24 PROBLEM — E87.6 HYPOKALEMIA: Status: RESOLVED | Noted: 2018-10-24 | Resolved: 2018-10-24

## 2018-10-24 PROBLEM — N39.0 UTI (URINARY TRACT INFECTION): Status: RESOLVED | Noted: 2018-10-23 | Resolved: 2018-10-24

## 2018-10-24 LAB
ANION GAP SERPL CALCULATED.3IONS-SCNC: 13 MMOL/L (ref 4–13)
BASOPHILS # BLD AUTO: 0.04 THOUSANDS/ΜL (ref 0–0.1)
BASOPHILS NFR BLD AUTO: 0 % (ref 0–1)
BUN SERPL-MCNC: 12 MG/DL (ref 5–25)
CALCIUM SERPL-MCNC: 8 MG/DL (ref 8.3–10.1)
CHLORIDE SERPL-SCNC: 101 MMOL/L (ref 100–108)
CO2 SERPL-SCNC: 23 MMOL/L (ref 21–32)
CREAT SERPL-MCNC: 0.82 MG/DL (ref 0.6–1.3)
EOSINOPHIL # BLD AUTO: 0.06 THOUSAND/ΜL (ref 0–0.61)
EOSINOPHIL NFR BLD AUTO: 0 % (ref 0–6)
ERYTHROCYTE [DISTWIDTH] IN BLOOD BY AUTOMATED COUNT: 12.3 % (ref 11.6–15.1)
GFR SERPL CREATININE-BSD FRML MDRD: 99 ML/MIN/1.73SQ M
GLUCOSE P FAST SERPL-MCNC: 79 MG/DL (ref 65–99)
GLUCOSE SERPL-MCNC: 79 MG/DL (ref 65–140)
HCT VFR BLD AUTO: 36.3 % (ref 34.8–46.1)
HGB BLD-MCNC: 11.7 G/DL (ref 11.5–15.4)
IMM GRANULOCYTES # BLD AUTO: 0.06 THOUSAND/UL (ref 0–0.2)
IMM GRANULOCYTES NFR BLD AUTO: 0 % (ref 0–2)
LYMPHOCYTES # BLD AUTO: 2.36 THOUSANDS/ΜL (ref 0.6–4.47)
LYMPHOCYTES NFR BLD AUTO: 16 % (ref 14–44)
MCH RBC QN AUTO: 30.5 PG (ref 26.8–34.3)
MCHC RBC AUTO-ENTMCNC: 32.2 G/DL (ref 31.4–37.4)
MCV RBC AUTO: 95 FL (ref 82–98)
MONOCYTES # BLD AUTO: 1.19 THOUSAND/ΜL (ref 0.17–1.22)
MONOCYTES NFR BLD AUTO: 8 % (ref 4–12)
NEUTROPHILS # BLD AUTO: 11.12 THOUSANDS/ΜL (ref 1.85–7.62)
NEUTS SEG NFR BLD AUTO: 76 % (ref 43–75)
NRBC BLD AUTO-RTO: 0 /100 WBCS
PLATELET # BLD AUTO: 303 THOUSANDS/UL (ref 149–390)
PMV BLD AUTO: 11 FL (ref 8.9–12.7)
POTASSIUM SERPL-SCNC: 3.3 MMOL/L (ref 3.5–5.3)
RBC # BLD AUTO: 3.83 MILLION/UL (ref 3.81–5.12)
SODIUM SERPL-SCNC: 137 MMOL/L (ref 136–145)
WBC # BLD AUTO: 14.83 THOUSAND/UL (ref 4.31–10.16)

## 2018-10-24 PROCEDURE — 99217 PR OBSERVATION CARE DISCHARGE MANAGEMENT: CPT | Performed by: INTERNAL MEDICINE

## 2018-10-24 PROCEDURE — C2617 STENT, NON-COR, TEM W/O DEL: HCPCS | Performed by: SPECIALIST

## 2018-10-24 PROCEDURE — 85025 COMPLETE CBC W/AUTO DIFF WBC: CPT | Performed by: STUDENT IN AN ORGANIZED HEALTH CARE EDUCATION/TRAINING PROGRAM

## 2018-10-24 PROCEDURE — 74450 X-RAY URETHRA/BLADDER: CPT

## 2018-10-24 PROCEDURE — C1769 GUIDE WIRE: HCPCS | Performed by: SPECIALIST

## 2018-10-24 PROCEDURE — 80048 BASIC METABOLIC PNL TOTAL CA: CPT | Performed by: STUDENT IN AN ORGANIZED HEALTH CARE EDUCATION/TRAINING PROGRAM

## 2018-10-24 DEVICE — INLAY URETERAL STENT W/O GUIDEWIRE
Type: IMPLANTABLE DEVICE | Site: URETER | Status: FUNCTIONAL
Brand: BARD® INLAY® URETERAL STENT

## 2018-10-24 RX ORDER — MIDAZOLAM HYDROCHLORIDE 1 MG/ML
INJECTION INTRAMUSCULAR; INTRAVENOUS AS NEEDED
Status: DISCONTINUED | OUTPATIENT
Start: 2018-10-24 | End: 2018-10-24 | Stop reason: SURG

## 2018-10-24 RX ORDER — FENTANYL CITRATE 50 UG/ML
INJECTION, SOLUTION INTRAMUSCULAR; INTRAVENOUS AS NEEDED
Status: DISCONTINUED | OUTPATIENT
Start: 2018-10-24 | End: 2018-10-24 | Stop reason: SURG

## 2018-10-24 RX ORDER — SODIUM CHLORIDE, SODIUM LACTATE, POTASSIUM CHLORIDE, CALCIUM CHLORIDE 600; 310; 30; 20 MG/100ML; MG/100ML; MG/100ML; MG/100ML
INJECTION, SOLUTION INTRAVENOUS CONTINUOUS PRN
Status: DISCONTINUED | OUTPATIENT
Start: 2018-10-24 | End: 2018-10-24 | Stop reason: SURG

## 2018-10-24 RX ORDER — OXYCODONE HYDROCHLORIDE AND ACETAMINOPHEN 5; 325 MG/1; MG/1
1 TABLET ORAL EVERY 8 HOURS PRN
Refills: 0
Start: 2018-10-24 | End: 2018-11-03

## 2018-10-24 RX ORDER — LIDOCAINE HYDROCHLORIDE 10 MG/ML
INJECTION, SOLUTION INFILTRATION; PERINEURAL AS NEEDED
Status: DISCONTINUED | OUTPATIENT
Start: 2018-10-24 | End: 2018-10-24 | Stop reason: SURG

## 2018-10-24 RX ORDER — POTASSIUM CHLORIDE 20 MEQ/1
20 TABLET, EXTENDED RELEASE ORAL ONCE
Status: COMPLETED | OUTPATIENT
Start: 2018-10-24 | End: 2018-10-24

## 2018-10-24 RX ORDER — ONDANSETRON 2 MG/ML
INJECTION INTRAMUSCULAR; INTRAVENOUS AS NEEDED
Status: DISCONTINUED | OUTPATIENT
Start: 2018-10-24 | End: 2018-10-24 | Stop reason: SURG

## 2018-10-24 RX ORDER — OXYCODONE HYDROCHLORIDE AND ACETAMINOPHEN 5; 325 MG/1; MG/1
1 TABLET ORAL EVERY 4 HOURS PRN
Status: DISCONTINUED | OUTPATIENT
Start: 2018-10-24 | End: 2018-10-24 | Stop reason: HOSPADM

## 2018-10-24 RX ORDER — LEVOFLOXACIN 5 MG/ML
500 INJECTION, SOLUTION INTRAVENOUS ONCE
Status: COMPLETED | OUTPATIENT
Start: 2018-10-24 | End: 2018-10-24

## 2018-10-24 RX ORDER — ONDANSETRON 2 MG/ML
4 INJECTION INTRAMUSCULAR; INTRAVENOUS ONCE AS NEEDED
Status: DISCONTINUED | OUTPATIENT
Start: 2018-10-24 | End: 2018-10-24 | Stop reason: HOSPADM

## 2018-10-24 RX ORDER — LEVOFLOXACIN 500 MG/1
500 TABLET, FILM COATED ORAL EVERY 24 HOURS
Refills: 0
Start: 2018-10-25 | End: 2018-10-28

## 2018-10-24 RX ORDER — PROMETHAZINE HYDROCHLORIDE 25 MG/ML
12.5 INJECTION, SOLUTION INTRAMUSCULAR; INTRAVENOUS ONCE AS NEEDED
Status: COMPLETED | OUTPATIENT
Start: 2018-10-24 | End: 2018-10-24

## 2018-10-24 RX ORDER — PROPOFOL 10 MG/ML
INJECTION, EMULSION INTRAVENOUS AS NEEDED
Status: DISCONTINUED | OUTPATIENT
Start: 2018-10-24 | End: 2018-10-24 | Stop reason: SURG

## 2018-10-24 RX ORDER — MAGNESIUM HYDROXIDE 1200 MG/15ML
LIQUID ORAL AS NEEDED
Status: DISCONTINUED | OUTPATIENT
Start: 2018-10-24 | End: 2018-10-24 | Stop reason: HOSPADM

## 2018-10-24 RX ORDER — KETOROLAC TROMETHAMINE 30 MG/ML
30 INJECTION, SOLUTION INTRAMUSCULAR; INTRAVENOUS EVERY 6 HOURS PRN
Status: DISCONTINUED | OUTPATIENT
Start: 2018-10-24 | End: 2018-10-24 | Stop reason: HOSPADM

## 2018-10-24 RX ORDER — FENTANYL CITRATE/PF 50 MCG/ML
25 SYRINGE (ML) INJECTION
Status: DISCONTINUED | OUTPATIENT
Start: 2018-10-24 | End: 2018-10-24 | Stop reason: HOSPADM

## 2018-10-24 RX ADMIN — ONDANSETRON 4 MG: 2 INJECTION INTRAMUSCULAR; INTRAVENOUS at 03:38

## 2018-10-24 RX ADMIN — FENTANYL CITRATE 50 MCG: 50 INJECTION, SOLUTION INTRAMUSCULAR; INTRAVENOUS at 10:50

## 2018-10-24 RX ADMIN — MIDAZOLAM HYDROCHLORIDE 2 MG: 1 INJECTION, SOLUTION INTRAMUSCULAR; INTRAVENOUS at 10:47

## 2018-10-24 RX ADMIN — POTASSIUM CHLORIDE 20 MEQ: 1500 TABLET, EXTENDED RELEASE ORAL at 15:36

## 2018-10-24 RX ADMIN — PROMETHAZINE HYDROCHLORIDE 12.5 MG: 25 INJECTION INTRAMUSCULAR; INTRAVENOUS at 06:00

## 2018-10-24 RX ADMIN — ONDANSETRON 4 MG: 2 INJECTION INTRAMUSCULAR; INTRAVENOUS at 10:59

## 2018-10-24 RX ADMIN — KETOROLAC TROMETHAMINE 30 MG: 30 INJECTION, SOLUTION INTRAMUSCULAR at 09:14

## 2018-10-24 RX ADMIN — LIDOCAINE HYDROCHLORIDE 50 MG: 10 INJECTION, SOLUTION INFILTRATION; PERINEURAL at 10:50

## 2018-10-24 RX ADMIN — SODIUM CHLORIDE 100 ML/HR: 0.9 INJECTION, SOLUTION INTRAVENOUS at 07:52

## 2018-10-24 RX ADMIN — LEVOFLOXACIN: 5 INJECTION, SOLUTION INTRAVENOUS at 10:47

## 2018-10-24 RX ADMIN — SODIUM CHLORIDE, SODIUM LACTATE, POTASSIUM CHLORIDE, AND CALCIUM CHLORIDE: .6; .31; .03; .02 INJECTION, SOLUTION INTRAVENOUS at 10:47

## 2018-10-24 RX ADMIN — MORPHINE SULFATE 2 MG: 2 INJECTION, SOLUTION INTRAMUSCULAR; INTRAVENOUS at 01:13

## 2018-10-24 RX ADMIN — FENTANYL CITRATE 50 MCG: 50 INJECTION, SOLUTION INTRAMUSCULAR; INTRAVENOUS at 11:05

## 2018-10-24 RX ADMIN — DEXAMETHASONE SODIUM PHOSPHATE 4 MG: 10 INJECTION INTRAMUSCULAR; INTRAVENOUS at 10:59

## 2018-10-24 RX ADMIN — OXYCODONE HYDROCHLORIDE AND ACETAMINOPHEN 1 TABLET: 5; 325 TABLET ORAL at 12:58

## 2018-10-24 RX ADMIN — MORPHINE SULFATE 2 MG: 2 INJECTION, SOLUTION INTRAMUSCULAR; INTRAVENOUS at 05:29

## 2018-10-24 RX ADMIN — PROPOFOL 200 MG: 10 INJECTION, EMULSION INTRAVENOUS at 10:50

## 2018-10-24 NOTE — ANESTHESIA PREPROCEDURE EVALUATION
Review of Systems/Medical History  Patient summary reviewed  Chart reviewed      Cardiovascular  Exercise tolerance (METS): >4,     Pulmonary       GI/Hepatic    Liver disease ,        Kidney stones,        Endo/Other    Obesity  super morbid obesity   GYN       Hematology   Musculoskeletal       Neurology    Headaches,   Comment: Migraines last one today  Psychology           Physical Exam    Airway    Mallampati score: II  TM Distance: >3 FB  Neck ROM: full     Dental       Cardiovascular  Rhythm: regular, Rate: normal,     Pulmonary  Breath sounds clear to auscultation,     Other Findings        Anesthesia Plan  ASA Score- 3     Anesthesia Type- general with ASA Monitors  Additional Monitors:   Airway Plan: LMA  Plan Factors-    Induction- intravenous and inhalational     Postoperative Plan-     Informed Consent- Anesthetic plan and risks discussed with patient  I personally reviewed this patient with the CRNA  Discussed and agreed on the Anesthesia Plan with the CRNA  Kwesi Dyer

## 2018-10-24 NOTE — NURSING NOTE
Discharge instructions reviewed with patient, ,  and mother  Patient verbalized understanding  Flu and pneumo vaccines refused and refusal signed  IV access removed prior to discharge home  Prescriptions given to patient  Patient left ambulatory  Gait steady  K + replaced prior to leaving

## 2018-10-24 NOTE — UTILIZATION REVIEW
Initial Clinical Review    Admission: Date/Time/Statement: 10/23/18 1811    Orders Placed This Encounter   Procedures    Place in Observation (expected length of stay for this patient is less than two midnights)     Standing Status:   Standing     Number of Occurrences:   1     Order Specific Question:   Admitting Physician     Answer:   Susan Caicedo [[id number]]     Order Specific Question:   Level of Care     Answer:   Med Surg [16]         ED: Date/Time/Mode of Arrival:   ED Arrival Information     Expected Arrival Acuity Means of Arrival Escorted By Service Admission Type    - 10/23/2018 15:59 Urgent Walk-In Self General Medicine Urgent    Arrival Complaint    kindney stone          Chief Complaint:   Chief Complaint   Patient presents with    Flank Pain     pt presents to the ed with right sided flank pain x 1 day  pt has a hx of kidney stones        History of Illness: Alex Orlando is a 33 y o  female with a PMH of Nephrolithiasis who presents with right flank pain and vomiting  She has been dealing with right flank pain secondary to a kidney stone for several months  Two nights ago she woke up with severe right flank pain along with nausea and vomiting  This pain has been persistent despite narcotics  She was evaluated by her Urologist today who recommended she go to the ER  Currently she reports 3/10 right flank pain  She reports occasional chills but no fever  She reports difficulty urinating but no hematuria or dysuria    Abdomen: soft, nondistended, bowel sounds normal, nontender upon palpation, no guarding or rebound tenderness, moderate right CVA tenderness    ED Vital Signs:   ED Triage Vitals   Temperature Pulse Respirations Blood Pressure SpO2   10/23/18 1615 10/23/18 1615 10/23/18 1615 10/23/18 1615 10/23/18 1615   98 4 °F (36 9 °C) 94 18 140/70 99 %      Temp Source Heart Rate Source Patient Position - Orthostatic VS BP Location FiO2 (%)   10/23/18 1615 10/23/18 1846 10/23/18 1846 10/23/18 1846 --   Tympanic Monitor Lying Right arm       Pain Score       10/23/18 1714       Worst Possible Pain        Wt Readings from Last 1 Encounters:   10/24/18 106 kg (233 lb 11 oz)       Abnormal Labs/Diagnostic Test Results: K 3 3, CA 8 0 WBC 14 83 , UA KETONES TRACE BLOOD TRACE LEUKOCYTES WBC 10-20 MODERATE BACTERIA  CT RENAL  Obstructive 12 x 9 mm proximal right ureteral calculus located just distal to the right UPJ causes moderate right hydronephrosis  Fatty infiltration of the liver      ED Treatment:   Medication Administration from 10/23/2018 1559 to 10/23/2018 2027       Date/Time Order Dose Route Action Action by Comments     10/23/2018 1654 sodium chloride 0 9 % bolus 1,000 mL 1,000 mL Intravenous New Bag Jacky Porras RN      10/23/2018 1715 morphine (PF) 4 mg/mL injection 4 mg 4 mg Intravenous Given 202 Braulio Lamar RN      10/23/2018 1714 ketorolac (TORADOL) injection 30 mg 30 mg Intravenous Given 202 Braulio Lamar RN      10/23/2018 1715 ondansetron (ZOFRAN) injection 4 mg 4 mg Intravenous Given 202 Braulio Lamar RN      10/23/2018 2019 cefTRIAXone (ROCEPHIN) IVPB (premix) 1,000 mg 0 mg Intravenous Stopped Kai Forte RN      10/23/2018 1933 cefTRIAXone (ROCEPHIN) IVPB (premix) 1,000 mg 1,000 mg Intravenous New Bag Kai Forte RN           Past Medical/Surgical History:    Active Ambulatory Problems     Diagnosis Date Noted    Abdominal pain, right upper quadrant 07/15/2015    Back pain 07/15/2015    Benign neoplasm of skin of trunk 09/04/2012    Hepatic steatosis 10/20/2015    Joint pain, knee 09/04/2012    Low back pain 09/04/2012    Lumbar radiculopathy 01/02/2014    Mastodynia 09/04/2012    Morbid obesity ([facility] [address] ) 09/04/2012    Nicotine dependence 09/04/2012    Tension type headache 09/04/2012    Irregular menses 02/06/2018    Chronic fatigue 02/06/2018    Encounter for cervical Pap smear with pelvic exam 03/14/2018 Resolved Ambulatory Problems     Diagnosis Date Noted    No Resolved Ambulatory Problems     Past Medical History:   Diagnosis Date    Renal disorder        Admitting Diagnosis: Kidney stone [N20 0]  Flank pain [R10 9]  Hydronephrosis with urinary obstruction due to renal calculus [N13 2]    Age/Sex: 33 y o  female    Assessment/Plan:   * Nephrolithiasis with right hydronephrosis   Assessment & Plan     CT noted  Urology service is aware of the pt and recommend admitting to Medicine  Will start on IV NS, order Morphine prn, Zofran prn, Flomax, make NPO at midnight and await Urology evaluation    UTI (urinary tract infection)   Assessment & Plan     Will start on Ceftriaxone    Hyponatremia   Assessment & Plan     Very mild  Likely secondary to dehydration from vomiting  Will be started on IV NS    Repeat BMP tomorrow      UROLOGY  Assessment/ Plan:  Right 12 mm proximal ureteral stone  Severe flank pain  Nausea  NPO after midnight  Cysto right stent possible right ureteroscopy laser lithotripsy stent placement    Admission Orders:  OBSERVATION  CONSULT UROLOGY  NPO  CYSTO IN AM  Scheduled Meds:   Current Facility-Administered Medications:  heparin (porcine) 5,000 Units Subcutaneous Q8H [facility] Ginna Galo MD    morphine injection 2 mg Intravenous Q4H PRN Author Doug MD    ondansetron 4 mg Intravenous Q6H PRN Author Doug MD    sodium chloride 100 mL/hr Intravenous Continuous Author Doug MD Last Rate: 100 mL/hr (10/24/18 0752)   tamsulosin 0 4 mg Oral Daily With Krystin Campos MD      Continuous Infusions:   sodium chloride 100 mL/hr Last Rate: 100 mL/hr (10/24/18 0752)     PRN Meds: morphine injection    ondansetron

## 2018-10-24 NOTE — OP NOTE
OPERATIVE REPORT  PATIENT NAME: Cuate Longoria    :  1990  MRN: [id number]  Pt Location: [facility] OR ROOM 04    SURGERY DATE: 10/24/2018    Surgeon(s) and Role:     * Vianey Raymundo MD - Primary    Preop Diagnosis:   right 12 mm proximal ureteral stone    Post-Op Diagnosis Codes:   right proximal 12 mm ureteral stone     procedure cysto right retrograde pyelogram right ureteroscopy stone  Manipulation placement of a  26 cm 6  Moroccan stent    Specimen(s):  * No specimens in log *    Estimated Blood Loss:   Minimal    Drains: 26 cm 6 [place]       Anesthesia Type:   Epidural w/ IV Sedation    Operative Indications:  Kidney stone [N20 0]       indication   this 20-year-old female seen in the office yesterday with severe right flank pain initially presented to [address] [place] [address] on  for severe pain found on CT scan to have 12 mm right ureteral pelvic junction stone patient stated in the office that severe pain started some 24 hours before office visit in light of that sent into the ER repeat CT scan confirms 12 mm stone in the right proximal ureter patient was watched overnight with severe pain now to undergo cysto stent possible ureteroscopy laser lithotripsy     findings   12 mm stone right proximal ureter with significant ureteral kink distal to stone   initially unable to pass wire with ureteroscopy wire passed with difficulty attempts at getting to stone in light of kink aborted stone pushed up into the pelvis stent placed   will need lithotripsy in 2-3 weeks    Complications:   None    Procedure and Technique:   after patient identified in the holding area consent signed right side marked placed in the op suite after anesthesia induced placed in the thigh position draped prep standard fashion time-out performed 22 Moroccan cystoscope passed through through the bladder no abnormalities noted right orifice cannulated with a 5 Moroccan open stent right retrograde pyelogram confirmed 12 mm stone in the proximal ureter with a distal kink attempts at passing a 0 038 wire by the stone were unsuccessful coiling under the kink attempts at using the 5 [place] catheter to bypass the stone and/or kink unsuccessful the wire was left in proximal ureter as a safety scope removed ureteral scope placed as the scope was passed over the iliac vessels a 0 038 wire was then finally placed by the stone and up into the right renal pelvis the ureteral scope was backloaded off replaced 1st wire removed ureteroscopy performed up to the proximal ureter attempts at passing the scope by the kink with no success 0 038 wire passed up into the renal pelvis and again unable to manipulate the kink with the scope stone then retrograded back into the pelvis    In light of that the scope was removed the cystoscope was replaced and a  26 cm stent was placed and removed wire patient of procedure was awakened taken recovery room   A qualified resident physician was not available    Patient Disposition:  PACU     SIGNATURE: Cruz Teixeira MD  DATE: October 24, 2018  TIME: 11:28 AM

## 2018-10-24 NOTE — DISCHARGE SUMMARY
Discharge Summary - [facility]    Patient Information: Birgit Burris 33 y o  female MRN: [id number]  Unit/Bed#: [address] [place] [address] Encounter: [id number]    Discharging Physician / Practitioner: Jamin Cavazos MD  PCP: Patrice Bliss DO  Admission Date: 10/23/2018  Discharge Date: 10/24/18    Reason for Admission: Flank Pain (pt presents to the ed with right sided flank pain x 1 day  pt has a hx of kidney stones )      Discharge Diagnoses:     Principal Problem:    Nephrolithiasis with right hydronephrosis  Active Problems:    UTI (urinary tract infection)    Hypokalemia  Resolved Problems:    Hyponatremia        * Nephrolithiasis with right hydronephrosis   Assessment & Plan    CT scan with right-sided obstructive stone, 12 mm at UPJ with hydronephrosis  Status post stent placement, unable to extract stone    Was pushed back to pelvis  Continue postoperative care with hydration with pain control  Follow up with Urology as outpatient for lithotripsy     Hypokalemia   Assessment & Plan    Replete     UTI (urinary tract infection)   Assessment & Plan    Suspected based on leukocytosis, UA with pyuria and bacteriuria   Remains afebrile and without significant dysuria  Urology input noted, continue Levaquin for 3 days postprocedure       Hyponatremia-resolved as of 10/24/2018   Assessment & Plan    Resolved with hydration         Consultations During Hospital Stay:  IP CONSULT TO UROLOGY    Procedures Performed:     · Procedure(s):  · CYSTOSCOPY URETEROSCOPY WITH RETROGRADE PYELOGRAM AND INSERTION STENT URETERAL  Findings   12 mm stone right proximal ureter with significant ureteral kink distal to stone   initially unable to pass wire with ureteroscopy wire passed with difficulty attempts at getting to stone in light of kink aborted stone pushed up into the pelvis stent placed   will need lithotripsy in 2-3 weeks    Significant Findings:     · As above    Imaging while in hospital:    Xr Abdomen 1 View Kub    Result Date: 10/14/2018  Narrative: ABDOMEN INDICATION:   pain  COMPARISON:  7/7/2018 VIEWS:  AP supine Images: 2 FINDINGS: There is a nonobstructive bowel gas pattern  No discernible free air on this supine study  Upright or left lateral decubitus imaging is more sensitive to detect subtle free air in the appropriate setting  No pathologic calcifications or soft tissue masses  Visualized lung bases are clear  Visualized osseous structures are unremarkable for the patient's age  Impression: Unremarkable examination  Workstation performed: [id number]     Ct Renal Stone Study Abdomen Pelvis Without Contrast    Result Date: 10/23/2018  Narrative: CT ABDOMEN AND PELVIS WITHOUT IV CONTRAST - LOW DOSE RENAL STONE INDICATION:   rt flank pain, known stones  COMPARISON:  None  TECHNIQUE:  Low dose thin section CT examination of the abdomen and pelvis was performed without intravenous or oral contrast according to a protocol specifically designed to evaluate for urinary tract calculus  Axial, sagittal, and coronal 2D reformatted images were created from the source data and submitted for interpretation  Evaluation for pathology in the abdomen and pelvis that is unrelated to urinary tract calculi is limited  Radiation dose length product (DLP) for this visit:  686 14 mGy-cm   This examination, like all CT scans performed in the [facility], was performed utilizing techniques to minimize radiation dose exposure, including the use of iterative  reconstruction and automated exposure control  FINDINGS: RIGHT KIDNEY AND URETER: Obstructive proximal right ureteral calculus measuring 12 x 9 mm located in the proximal right ureter immediately distal to the right UPJ causes moderate upstream hydronephrosis  No other right ureteral calculi  No perinephric collection  LEFT KIDNEY AND URETER: Nonobstructive 5 mm lower pole calculus  No hydronephrosis  URINARY BLADDER: Unremarkable   No significant abnormality in the visualized lung bases  Limited low radiation dose noncontrast CT evaluation demonstrates no clinically significant abnormality of the spleen, pancreas, or adrenal glands  Fatty infiltration of the liver  No calcified gallstones or gallbladder wall thickening noted  No ascites or bulky lymphadenopathy on this limited noncontrast study  Bowel loops appear unremarkable  Noninflamed appendix  38 mm right adnexal cyst is in keeping with a physiologic follicle  No acute fracture or destructive osseous lesion is identified  Impression: Obstructive 12 x 9 mm proximal right ureteral calculus located just distal to the right UPJ causes moderate right hydronephrosis  Fatty infiltration of the liver  The study was marked in [facility] for immediate notification  Workstation performed: [id number]     Ct Renal Stone Study Abdomen Pelvis Without Contrast    Result Date: 10/13/2018  Narrative: CT ABDOMEN AND PELVIS WITHOUT IV CONTRAST - LOW DOSE RENAL STONE INDICATION:   abdominal pain  COMPARISON:  None  TECHNIQUE:  Low dose thin section CT examination of the abdomen and pelvis was performed without intravenous or oral contrast according to a protocol specifically designed to evaluate for urinary tract calculus  Axial, sagittal, and coronal 2D reformatted images were created from the source data and submitted for interpretation  Evaluation for pathology in the abdomen and pelvis that is unrelated to urinary tract calculi is limited  Radiation dose length product (DLP) for this visit:  660 07 mGy-cm   This examination, like all CT scans performed in the [facility], was performed utilizing techniques to minimize radiation dose exposure, including the use of iterative  reconstruction and automated exposure control  FINDINGS: RIGHT KIDNEY AND URETER: Nonobstructing 11 mm right renal calculus is noted  No hydronephrosis or hydroureter   LEFT KIDNEY AND URETER: Faint 3 mm nonobstructing right renal calculus is present  No hydronephrosis or hydroureter  URINARY BLADDER: Unremarkable  No significant abnormality in the visualized lung bases  Limited low radiation dose noncontrast CT evaluation demonstrates no clinically significant abnormality of spleen, pancreas, or adrenal glands  Liver is hypodense consistent with fatty infiltration  No calcified gallstones or gallbladder wall thickening noted  No ascites or bulky lymphadenopathy on this limited noncontrast study  Bowel loops appear unremarkable  Limited evaluation demonstrates no evidence to suggest acute appendicitis  No acute fracture or destructive osseous lesion is identified  Impression: Bilateral nonobstructing renal calculi  No evidence of obstructive uropathy  Hepatic steatosis  Workstation performed: [id number]       Incidental Findings:   · None    Test Results Pending at Discharge (will require follow up):   · As per After Visit Summary     Outpatient Tests Requested:  · Follow up Urology    Complications:  None    Hospital Course:     Angela Fernandez is a 33 y o  female patient history of nephrolithiasis who originally presented to the hospital on 10/23/2018 due to worsening of right flank pain associated with nausea vomiting for 2 days  Patient had similar episode in about 7-10 days ago, patient was evaluated emergency department at that time and CT scan did not reveal any obstructive uropathy and patient was subsequently discharged with symptomatic treatment  Yesterday patient followed up with Urology due to worsening of symptoms and referred for hospitalization  Patient underwent CT scan which showed [id number] to 9 mm proximal right ureteral calculus located just distal to right UPJ with moderate right hydronephrosis  Patient denied any fever but reported occasional chills  She denies any dysuria  Patient was subsequently admitted for further evaluation workup      Please see above list of diagnoses and related plan for additional information  Condition at Discharge: stable     Discharge Day Visit / Exam:     Subjective:  Status post stent placement  Tolerating diet  Pain is controlled  Voiding without difficulty    Vitals: Blood Pressure: 118/74 (10/24/18 1310)  Pulse: 87 (10/24/18 1310)  Temperature: (!) 97 °F (36 1 °C) (10/24/18 1310)  Temp Source: Temporal (10/24/18 1310)  Respirations: 18 (10/24/18 1310)  Height: 5' 2" (157 5 cm) (10/23/18 2035)  Weight - Scale: 106 kg (233 lb 11 oz) (10/24/18 0539)  SpO2: 96 % (10/24/18 1310)  Exam:   Physical Exam   Constitutional: She is oriented to person, place, and time  She appears well-developed  No distress  Obese   HENT:   Head: Normocephalic and atraumatic  Nose: Nose normal    Eyes: Pupils are equal, round, and reactive to light  Conjunctivae are normal    Neck: Normal range of motion  Neck supple  Cardiovascular: Normal rate, regular rhythm and normal heart sounds  Pulmonary/Chest: Effort normal and breath sounds normal  No respiratory distress  She has no wheezes  She has no rales  Abdominal: Soft  Bowel sounds are normal  She exhibits no distension  There is no tenderness  There is no rebound and no guarding  Musculoskeletal: Normal range of motion  She exhibits no edema  Neurological: She is alert and oriented to person, place, and time  No cranial nerve deficit  Skin: Skin is warm and dry  No rash noted  Discharge instructions/Information to patient and family:(Discharge Medications and Follow up):   See after visit summary for information provided to patient and family  Provisions for Follow-Up Care:  See after visit summary for information related to follow-up care and any pertinent home health orders  Disposition: Home    Planned Readmission:  No     Discharge Statement:  I spent 35 minutes discharging the patient  This time was spent on the day of discharge  I had direct contact with the patient on the day of discharge   Greater than 50% of the total time was spent examining patient, answering all patient questions, arranging and discussing plan of care with patient as well as directly providing post-discharge instructions  Additional time then spent on discharge activities  Discharge Medications:  See after visit summary for reconciled discharge medications provided to patient and family  ** Please Note: "This note has been constructed using a voice recognition system  Therefore there may be syntax, spelling, and/or grammatical errors   Please call if you have any questions  "**

## 2018-10-24 NOTE — SOCIAL WORK
SW met with pt to assess needs and assist with planning  Discussed goals of making sure pt's needs are met upon discharge, pt's preferences are taken into account, pt understands her health condition, medications and symptoms to watch for after returning home and pt is aware of any follow up appointments recommended by hospital physician  Pt lives at home with her   Pt is independent  No DME or HHC needs  Pt's PCP is Dr Azul Eli DO  Preferred pharmacy is [facility]  Per pt she has prescription coverage through her spouse's insurance and no difficulty getting her medication as prescribed  Pt's plan is to return home when discharged  No discharge needs expressed or anticipated by pt at this time  Offered support in future if needed

## 2018-10-24 NOTE — PLAN OF CARE
DISCHARGE PLANNING     Discharge to home or other facility with appropriate resources Progressing        GASTROINTESTINAL - ADULT     Minimal or absence of nausea and/or vomiting Progressing     Maintains adequate nutritional intake Progressing        GENITOURINARY - ADULT     Maintains or returns to baseline urinary function Progressing        INFECTION - ADULT     Absence or prevention of progression during hospitalization Progressing        Knowledge Deficit     Patient/family/caregiver demonstrates understanding of disease process, treatment plan, medications, and discharge instructions Progressing        METABOLIC, FLUID AND ELECTROLYTES - ADULT     Electrolytes maintained within normal limits Progressing        PAIN - ADULT     Verbalizes/displays adequate comfort level or baseline comfort level Progressing

## 2018-10-24 NOTE — PLAN OF CARE
Problem: DISCHARGE PLANNING - CARE MANAGEMENT  Goal: Discharge to post-acute care or home with appropriate resources  INTERVENTIONS:  - Conduct assessment to determine patient/family and health care team treatment goals, and need for post-acute services based on payer coverage, community resources, and patient preferences, and barriers to discharge  - Address psychosocial, clinical, and financial barriers to discharge as identified in assessment in conjunction with the patient/family and health care team  - Arrange appropriate level of post-acute services according to patient's   needs and preference and payer coverage in collaboration with the physician and health care team  - Communicate with and update the patient/family, physician, and health care team regarding progress on the discharge plan  - Arrange appropriate transportation to post-acute venues    RETURN HOME WITH STABLE  Outcome: Progressing  Pt's plan is to return home when discharged  No discharge needs expressed or anticipated by pt at this time

## 2018-10-25 ENCOUNTER — TRANSITIONAL CARE MANAGEMENT (OUTPATIENT)
Dept: FAMILY MEDICINE CLINIC | Facility: CLINIC | Age: 28
End: 2018-10-25

## 2018-10-25 LAB — MRSA NOSE QL CULT: NORMAL

## 2018-10-26 ENCOUNTER — OFFICE VISIT (OUTPATIENT)
Dept: FAMILY MEDICINE CLINIC | Facility: CLINIC | Age: 28
End: 2018-10-26
Payer: COMMERCIAL

## 2018-10-26 VITALS
HEIGHT: 62 IN | TEMPERATURE: 98.4 F | DIASTOLIC BLOOD PRESSURE: 78 MMHG | SYSTOLIC BLOOD PRESSURE: 122 MMHG | WEIGHT: 231.4 LBS | HEART RATE: 92 BPM | RESPIRATION RATE: 16 BRPM | BODY MASS INDEX: 42.58 KG/M2

## 2018-10-26 DIAGNOSIS — N39.0 URINARY TRACT INFECTION WITH HEMATURIA, SITE UNSPECIFIED: ICD-10-CM

## 2018-10-26 DIAGNOSIS — R31.9 URINARY TRACT INFECTION WITH HEMATURIA, SITE UNSPECIFIED: ICD-10-CM

## 2018-10-26 DIAGNOSIS — E87.6 HYPOKALEMIA: ICD-10-CM

## 2018-10-26 DIAGNOSIS — N20.0 NEPHROLITHIASIS: Primary | ICD-10-CM

## 2018-10-26 PROBLEM — R53.82 CHRONIC FATIGUE: Status: RESOLVED | Noted: 2018-02-06 | Resolved: 2018-10-26

## 2018-10-26 PROBLEM — Z01.419 ENCOUNTER FOR CERVICAL PAP SMEAR WITH PELVIC EXAM: Status: RESOLVED | Noted: 2018-03-14 | Resolved: 2018-10-26

## 2018-10-26 PROCEDURE — 99496 TRANSJ CARE MGMT HIGH F2F 7D: CPT | Performed by: FAMILY MEDICINE

## 2018-10-26 NOTE — PROGRESS NOTES
Assessment/Plan:    Problem List Items Addressed This Visit     Nephrolithiasis with right hydronephrosis - Primary    Relevant Orders    CBC and differential      Other Visit Diagnoses     Urinary tract infection with hematuria, site unspecified        Relevant Orders    CBC and differential    Hypokalemia        Relevant Orders    Comprehensive metabolic panel          There are no Patient Instructions on file for this visit  No Follow-up on file  Subjective:      Patient ID: Deanna Quintana is a 33 y o  female  Chief Complaint   Patient presents with    Transition of Care Management     [facility]       Pt is here for [facility] follow up nurses Note appreciated    Pt states she grows kidneys stones like its her job, pt states she has a 12 mm stone on the rt  Pt states urology put a stent in  Pt states thety could not get it out  Pt states she will be seeing urology next week, she states they will discuss lithotripsy    Pt states she was actually at the ed ten days prior to her admission, was seen here for follow up of the stone, was advised needed to see urology, which she did and then as above took place    Pt states she was but is no longer urinating blood  She does have some "piching " when she urinates  Pt has a little back pain - states its a level 7/10 - she does take the pain meds or the nasprosyn and that helps here to sleep    Pt was given levaquin for three days on discharge she has one day left        The following portions of the patient's history were reviewed and updated as appropriate: allergies, current medications, past family history, past medical history, past social history, past surgical history and problem list     Review of Systems   Constitutional: Negative  Negative for activity change, appetite change, chills, diaphoresis and fatigue  HENT: Negative  Negative for dental problem, ear pain, sinus pressure and sore throat  Eyes: Negative    Negative for photophobia, pain, discharge, redness, itching and visual disturbance  Respiratory: Negative for apnea and chest tightness  Cardiovascular: Negative  Negative for chest pain, palpitations and leg swelling  Gastrointestinal: Negative  Negative for abdominal distention, abdominal pain, constipation and diarrhea  Endocrine: Negative  Negative for cold intolerance and heat intolerance  Genitourinary: Negative  Negative for difficulty urinating and dyspareunia  Musculoskeletal: Negative  Negative for arthralgias and back pain  Skin: Negative  Allergic/Immunologic: Negative for environmental allergies  Neurological: Negative  Negative for dizziness  Psychiatric/Behavioral: Negative  Negative for agitation  Current Outpatient Prescriptions   Medication Sig Dispense Refill    levofloxacin (LEVAQUIN) 500 mg tablet Take 1 tablet (500 mg total) by mouth every 24 hours for 3 days  0    naproxen (NAPROSYN) 500 mg tablet Take 1 tablet (500 mg total) by mouth 2 (two) times a day with meals 30 tablet 0    oxyCODONE-acetaminophen (PERCOCET) 5-325 mg per tablet Take 1 tablet by mouth every 8 (eight) hours as needed for severe pain for up to 10 days Max Daily Amount: 3 tablets  0     No current facility-administered medications for this visit  Objective:    /78   Pulse 92   Temp 98 4 °F (36 9 °C)   Resp 16   Ht 5' 2" (1 575 m)   Wt 105 kg (231 lb 6 4 oz)   LMP 10/25/2018 (Exact Date)   BMI 42 32 kg/m²        Physical Exam   Constitutional: She appears well-developed and well-nourished  No distress  HENT:   Head: Normocephalic and atraumatic  Right Ear: External ear normal    Left Ear: External ear normal    Nose: Nose normal    Mouth/Throat: Oropharynx is clear and moist  No oropharyngeal exudate  Eyes: Pupils are equal, round, and reactive to light  EOM are normal  Right eye exhibits no discharge  Left eye exhibits no discharge  No scleral icterus  Neck: No thyromegaly present  Cardiovascular: Normal rate and normal heart sounds  No murmur heard  Pulmonary/Chest: Effort normal and breath sounds normal  No respiratory distress  She has no wheezes  Abdominal: Soft  Bowel sounds are normal  She exhibits no distension and no mass  There is no tenderness  There is no rebound and no guarding  Musculoskeletal: Normal range of motion  Neurological: She is alert  She displays normal reflexes  Coordination normal    Skin: Skin is warm and dry  No rash noted  She is not diaphoretic  No erythema  Psychiatric: She has a normal mood and affect  Her behavior is normal    Nursing note and vitals reviewed             Date and time hospital follow up call was made:  10/25/2018  9:49 AM  Hospital care reviewed:  Records reviewed  [facility]  Date of admission:  10/23/18  Date of discharge:  10/24/18  Diagnosis:  Nephrolithiasis with right hydronephrosis  Disposition:  Home  Were the patients medicaitons reviewed and updated:  Yes  Current symptoms:  Back pain - right side (Comment: Pain is controlled Stent intact-seeing Dr Domo Molina)  Back pain, right side, severity:  Mild (Comment: Right side)  Post hospital issues:  None  Should patient be enrolled in anticoag monitoring?:  No  Scheduled for follow up?:  Yes  Patients specialists:  Urologist  Urologist's Name:  Dr Domo Molina- she agreed to call his office this am to make a f/u appt  Did you obtain your prescribed medications:  Yes  Do you need help managing your perscriptions or medications:  No  Is transportation to your appointments needed:  No  I have advised the patient to call PCP with any new or worsening symptoms (please type in name along with any credentials):  Jessica Watters LPN  Living Arrangements:  Spouse or Significiant other  Support System:  Friends, Family  The type of support provided:  Physical, Emotional  Do you have social support:  Yes, as much as I need  Are you recieving outpatient services: No  Are you recieving home care services:  No  Interperter language line required?:  No  Counseling:  Patient  Counseling topics:  Activities of daily living, Importance of RX compliance, instructions for management, patient and family education, Risk factor reduction  Comments:  I spoke with Sarah Hayward who states her back pain is controlled with Percocet  She does have some chills but did not check her temp  I told her to check her temp and call Dr Beatrice Bender office if any fever  She agreed  She said she was not told to follow up with his office but has a stent in place  She agreed to call him when we hang up and inquire when he would like to see her for f/u   She knows to go to the ER if any severe pain, vomiting, chest pain, dypsnea, etc Mindy Villa Come, DO

## 2018-11-05 ENCOUNTER — HOSPITAL ENCOUNTER (EMERGENCY)
Facility: HOSPITAL | Age: 28
Discharge: HOME | End: 2018-11-05
Attending: EMERGENCY MEDICINE | Admitting: EMERGENCY MEDICINE
Payer: COMMERCIAL

## 2018-11-05 ENCOUNTER — APPOINTMENT (EMERGENCY)
Dept: RADIOLOGY | Facility: HOSPITAL | Age: 28
End: 2018-11-05
Payer: COMMERCIAL

## 2018-11-05 VITALS
OXYGEN SATURATION: 100 % | TEMPERATURE: 98.6 F | BODY MASS INDEX: 42.33 KG/M2 | WEIGHT: 230 LBS | RESPIRATION RATE: 18 BRPM | HEART RATE: 70 BPM | HEIGHT: 62 IN | SYSTOLIC BLOOD PRESSURE: 120 MMHG | DIASTOLIC BLOOD PRESSURE: 65 MMHG

## 2018-11-05 DIAGNOSIS — N20.0 NEPHROLITHIASIS: ICD-10-CM

## 2018-11-05 DIAGNOSIS — N39.0 UTI (URINARY TRACT INFECTION): Primary | ICD-10-CM

## 2018-11-05 DIAGNOSIS — R10.9 RIGHT FLANK PAIN: ICD-10-CM

## 2018-11-05 LAB
ALBUMIN SERPL BCP-MCNC: 4 G/DL (ref 3.5–5)
ALP SERPL-CCNC: 66 U/L (ref 46–116)
ALT SERPL W P-5'-P-CCNC: 67 U/L (ref 12–78)
ANION GAP SERPL CALCULATED.3IONS-SCNC: 11 MMOL/L (ref 4–13)
AST SERPL W P-5'-P-CCNC: 45 U/L (ref 5–45)
BACTERIA UR QL AUTO: ABNORMAL /HPF
BASOPHILS # BLD AUTO: 0.09 THOUSANDS/ΜL (ref 0–0.1)
BASOPHILS NFR BLD AUTO: 1 % (ref 0–1)
BILIRUB SERPL-MCNC: 0.6 MG/DL (ref 0.2–1)
BILIRUB UR QL STRIP: NEGATIVE
BUN SERPL-MCNC: 9 MG/DL (ref 5–25)
CALCIUM SERPL-MCNC: 9.4 MG/DL (ref 8.3–10.1)
CHLORIDE SERPL-SCNC: 101 MMOL/L (ref 100–108)
CLARITY UR: ABNORMAL
CO2 SERPL-SCNC: 27 MMOL/L (ref 21–32)
COLOR UR: ABNORMAL
CREAT SERPL-MCNC: 0.77 MG/DL (ref 0.6–1.3)
EOSINOPHIL # BLD AUTO: 0.13 THOUSAND/ΜL (ref 0–0.61)
EOSINOPHIL NFR BLD AUTO: 1 % (ref 0–6)
ERYTHROCYTE [DISTWIDTH] IN BLOOD BY AUTOMATED COUNT: 12 % (ref 11.6–15.1)
EXT PREG TEST URINE: NEGATIVE
GFR SERPL CREATININE-BSD FRML MDRD: 107 ML/MIN/1.73SQ M
GLUCOSE SERPL-MCNC: 103 MG/DL (ref 65–140)
GLUCOSE UR STRIP-MCNC: NEGATIVE MG/DL
HCT VFR BLD AUTO: 43.9 % (ref 34.8–46.1)
HGB BLD-MCNC: 14.7 G/DL (ref 11.5–15.4)
HGB UR QL STRIP.AUTO: ABNORMAL
IMM GRANULOCYTES # BLD AUTO: 0.02 THOUSAND/UL (ref 0–0.2)
IMM GRANULOCYTES NFR BLD AUTO: 0 % (ref 0–2)
KETONES UR STRIP-MCNC: NEGATIVE MG/DL
LEUKOCYTE ESTERASE UR QL STRIP: ABNORMAL
LYMPHOCYTES # BLD AUTO: 3.24 THOUSANDS/ΜL (ref 0.6–4.47)
LYMPHOCYTES NFR BLD AUTO: 30 % (ref 14–44)
MCH RBC QN AUTO: 31 PG (ref 26.8–34.3)
MCHC RBC AUTO-ENTMCNC: 33.5 G/DL (ref 31.4–37.4)
MCV RBC AUTO: 93 FL (ref 82–98)
MONOCYTES # BLD AUTO: 0.38 THOUSAND/ΜL (ref 0.17–1.22)
MONOCYTES NFR BLD AUTO: 4 % (ref 4–12)
NEUTROPHILS # BLD AUTO: 6.92 THOUSANDS/ΜL (ref 1.85–7.62)
NEUTS SEG NFR BLD AUTO: 64 % (ref 43–75)
NITRITE UR QL STRIP: NEGATIVE
NON-SQ EPI CELLS URNS QL MICRO: ABNORMAL /HPF
NRBC BLD AUTO-RTO: 0 /100 WBCS
PH UR STRIP.AUTO: 7.5 [PH] (ref 5–9)
PLATELET # BLD AUTO: 429 THOUSANDS/UL (ref 149–390)
PMV BLD AUTO: 10.4 FL (ref 8.9–12.7)
POTASSIUM SERPL-SCNC: 3.8 MMOL/L (ref 3.5–5.3)
PROT SERPL-MCNC: 7.8 G/DL (ref 6.4–8.2)
PROT UR STRIP-MCNC: ABNORMAL MG/DL
RBC # BLD AUTO: 4.74 MILLION/UL (ref 3.81–5.12)
RBC #/AREA URNS AUTO: ABNORMAL /HPF
SODIUM SERPL-SCNC: 139 MMOL/L (ref 136–145)
SP GR UR STRIP.AUTO: 1.02 (ref 1–1.03)
UROBILINOGEN UR QL STRIP.AUTO: 0.2 E.U./DL
WBC # BLD AUTO: 10.78 THOUSAND/UL (ref 4.31–10.16)
WBC #/AREA URNS AUTO: ABNORMAL /HPF

## 2018-11-05 PROCEDURE — 96361 HYDRATE IV INFUSION ADD-ON: CPT

## 2018-11-05 PROCEDURE — 80053 COMPREHEN METABOLIC PANEL: CPT | Performed by: EMERGENCY MEDICINE

## 2018-11-05 PROCEDURE — 81025 URINE PREGNANCY TEST: CPT | Performed by: EMERGENCY MEDICINE

## 2018-11-05 PROCEDURE — 81001 URINALYSIS AUTO W/SCOPE: CPT | Performed by: EMERGENCY MEDICINE

## 2018-11-05 PROCEDURE — 87086 URINE CULTURE/COLONY COUNT: CPT | Performed by: EMERGENCY MEDICINE

## 2018-11-05 PROCEDURE — 74018 RADEX ABDOMEN 1 VIEW: CPT

## 2018-11-05 PROCEDURE — 85025 COMPLETE CBC W/AUTO DIFF WBC: CPT | Performed by: EMERGENCY MEDICINE

## 2018-11-05 PROCEDURE — 36415 COLL VENOUS BLD VENIPUNCTURE: CPT | Performed by: EMERGENCY MEDICINE

## 2018-11-05 PROCEDURE — 99284 EMERGENCY DEPT VISIT MOD MDM: CPT

## 2018-11-05 PROCEDURE — 96374 THER/PROPH/DIAG INJ IV PUSH: CPT

## 2018-11-05 RX ORDER — KETOROLAC TROMETHAMINE 10 MG/1
10 TABLET, FILM COATED ORAL EVERY 6 HOURS PRN
Qty: 20 TABLET | Refills: 0 | Status: SHIPPED | OUTPATIENT
Start: 2018-11-05 | End: 2018-12-18 | Stop reason: HOSPADM

## 2018-11-05 RX ORDER — CEPHALEXIN 500 MG/1
500 CAPSULE ORAL EVERY 8 HOURS SCHEDULED
Qty: 21 CAPSULE | Refills: 0 | Status: SHIPPED | OUTPATIENT
Start: 2018-11-05 | End: 2018-11-12

## 2018-11-05 RX ORDER — CEPHALEXIN 500 MG/1
500 CAPSULE ORAL ONCE
Status: COMPLETED | OUTPATIENT
Start: 2018-11-05 | End: 2018-11-05

## 2018-11-05 RX ORDER — KETOROLAC TROMETHAMINE 30 MG/ML
15 INJECTION, SOLUTION INTRAMUSCULAR; INTRAVENOUS ONCE
Status: COMPLETED | OUTPATIENT
Start: 2018-11-05 | End: 2018-11-05

## 2018-11-05 RX ORDER — ACETAMINOPHEN 500 MG
500 TABLET ORAL EVERY 6 HOURS PRN
COMMUNITY
End: 2020-11-17

## 2018-11-05 RX ADMIN — CEPHALEXIN 500 MG: 500 CAPSULE ORAL at 13:11

## 2018-11-05 RX ADMIN — SODIUM CHLORIDE 1000 ML: 0.9 INJECTION, SOLUTION INTRAVENOUS at 10:51

## 2018-11-05 RX ADMIN — KETOROLAC TROMETHAMINE 15 MG: 30 INJECTION, SOLUTION INTRAMUSCULAR at 10:57

## 2018-11-05 NOTE — CONSULTS
H&P Exam - Urology       Patient: Marquis Tse   : 1990 Sex: female   MRN: [id number]     CSN: [id number]      History of Present Illness   HPI:  Marquis Tse is a 33 y o  female who presents with worsening right flank discomfort status post cysto right stent placement some 2 weeks ago for 14 mm right renal pelvic stone  Patient seen in the office  last week and is scheduled for elective right ESWL to be done at [address] [place] [address] on         Review of Systems:   Constitutional:  Negative for activity change, fever, chills and diaphoresis  HENT: Negative for hearing loss and trouble swallowing  Eyes: Negative for itching and visual disturbance  Respiratory: Negative for chest tightness and shortness of breath  Cardiovascular: Negative for chest pain, edema  Gastrointestinal: Negative for abdominal distention, na abdominal pain, constipation, diarrhea, Nausea and vomiting  Genitourinary: Negative for decreased urine volume, difficulty urinating, dysuria, enuresis, frequency, hematuria and urgency  Musculoskeletal: Negative for gait problem and myalgias  Neurological: Negative for dizziness and headaches  Hematological: Does not bruise/bleed easily         Historical Information   Past Medical History:   Diagnosis Date    Renal disorder     kidney stones     Past Surgical History:   Procedure Laterality Date    IN CYSTO/URETERO W/LITHOTRIPSY &INDWELL STENT INSRT Right 10/24/2018    Procedure: CYSTOSCOPY URETEROSCOPY WITH RETROGRADE PYELOGRAM AND INSERTION STENT URETERAL;  Surgeon: Akanksha Luna MD;  Location[facility];  Service: Urology     Social History   History   Alcohol Use    Yes     Comment: occasional     History   Drug Use No     History   Smoking Status    Former Smoker    Packs/day: 0 00    Types: Cigarettes   Smokeless Tobacco    Former User    Quit date: 2016     Family History:   Family History   Problem Relation Age of Onset    Cancer Mother    Miriam Denney Diabetes Mother     Hypertension Mother     Thyroid disease Mother     Hyperlipidemia Father         High Triglycerides       Meds/Allergies     (Not in a hospital admission)  No Known Allergies    Objective   Vitals: /65 (BP Location: Right arm)   Pulse 75   Temp 98 6 °F (37 °C) (Tympanic)   Resp 20   Ht 5' 2" (1 575 m)   Wt 104 kg (230 lb)   LMP 10/25/2018 (Exact Date)   SpO2 100%   BMI 42 07 kg/m²     Physical Exam:  General Alert awake   Normocephalic atraumatic PERRLA  Lungs clear bilaterally  Cardiac normal S1 normal S2  Abdomen soft, flank pain right  Vaginal exam deferred  Extremities no edema    No intake/output data recorded      Invasive Devices          No matching active lines, drains, or airways              Lab Results: CBC: Lab Results   Component Value Date    WBC 10 78 (H) 11/05/2018    HGB 14 7 11/05/2018    HCT 43 9 11/05/2018    MCV 93 11/05/2018     (H) 11/05/2018    ADJUSTEDWBC 9 30 05/06/2016    MCH 31 0 11/05/2018    MCHC 33 5 11/05/2018    RDW 12 0 11/05/2018    MPV 10 4 11/05/2018    NRBC 0 11/05/2018     CMP: Lab Results   Component Value Date     10/13/2015     11/05/2018     10/13/2015    CO2 27 11/05/2018    CO2 27 10/13/2015    ANIONGAP 14 0 10/13/2015    BUN 9 11/05/2018    BUN 12 10/13/2015    CREATININE 0 77 11/05/2018    CREATININE 0 8 10/13/2015    GLUCOSE 95 10/13/2015    CALCIUM 9 4 11/05/2018    CALCIUM 8 7 10/13/2015    AST 45 11/05/2018    AST 52 (H) 10/13/2015    ALT 67 11/05/2018     (H) 10/13/2015    ALKPHOS 66 11/05/2018    ALKPHOS 78 10/13/2015    PROT 7 7 10/13/2015    BILITOT 0 7 10/13/2015    EGFR 107 11/05/2018     Urinalysis: Lab Results   Component Value Date    COLORU Orange 11/05/2018    CLARITYU Cloudy 11/05/2018    SPECGRAV 1 025 11/05/2018    PHUR 7 5 11/05/2018    LEUKOCYTESUR Small (A) 11/05/2018    NITRITE Negative 11/05/2018    GLUCOSEU Negative 11/05/2018    KETONESU Negative 11/05/2018    BILIRUBINUR Negative 11/05/2018    BLOODU Large (A) 11/05/2018     Urine Culture:   Lab Results   Component Value Date    URINECX No Growth <1000 cfu/mL 10/13/2018     PSA: No results found for: PSA        Assessment/ Plan:  Right flank pain status post cysto right stent placement for 14 mm right renal pelvic stone  Patient apparently going to be admitted for pain control  Urine cultures pending  Continue Levaquin  KUB renal ultrasound      Raisa Mcgrath MD

## 2018-11-05 NOTE — DISCHARGE INSTRUCTIONS
Please take a list of all of your medications and discharge paperwork with you to all of your follow-up medical visits  Please take all of your medications as directed  Please call your family doctor or return to the ER if you have increased shortness of breath, chest pain, fevers, chills, nausea, vomiting, diarrhea, or any other worsening symptoms  Urinary Tract Infection in Women   WHAT YOU NEED TO KNOW:   A urinary tract infection (UTI) is caused by bacteria that get inside your urinary tract  Most bacteria that enter your urinary tract come out when you urinate  If the bacteria stay in your urinary tract, you may get an infection  Your urinary tract includes your kidneys, ureters, bladder, and urethra  Urine is made in your kidneys, and it flows from the ureters to the bladder  Urine leaves the bladder through the urethra  A UTI is more common in your lower urinary tract, which includes your bladder and urethra  DISCHARGE INSTRUCTIONS:   Return to the emergency department if:   · You are urinating very little or not at all  · You have a high fever with shaking chills  · You have side or back pain that gets worse  Contact your healthcare provider if:   · You have a fever  · You do not feel better after 2 days of taking antibiotics  · You are vomiting  · You have questions or concerns about your condition or care  Medicines:   · Antibiotics  help fight a bacterial infection  · Medicines  may be given to decrease pain and burning when you urinate  They will also help decrease the feeling that you need to urinate often  These medicines will make your urine orange or red  · Take your medicine as directed  Contact your healthcare provider if you think your medicine is not helping or if you have side effects  Tell him or her if you are allergic to any medicine  Keep a list of the medicines, vitamins, and herbs you take  Include the amounts, and when and why you take them  Bring the list or the pill bottles to follow-up visits  Carry your medicine list with you in case of an emergency  Follow up with your healthcare provider as directed:  Write down your questions so you remember to ask them during your visits  Prevent another UTI:   · Empty your bladder often  Urinate and empty your bladder as soon as you feel the need  Do not hold your urine for long periods of time  · Wipe from front to back after you urinate or have a bowel movement  This will help prevent germs from getting into your urinary tract through your urethra  · Drink liquids as directed  Ask how much liquid to drink each day and which liquids are best for you  You may need to drink more liquids than usual to help flush out the bacteria  Do not drink alcohol, caffeine, or citrus juices  These can irritate your bladder and increase your symptoms  Your healthcare provider may recommend cranberry juice to help prevent a UTI  · Urinate after you have sex  This can help flush out bacteria passed during sex  · Do not douche or use feminine deodorants  These can change the chemical balance in your vagina  · Change sanitary pads or tampons often  This will help prevent germs from getting into your urinary tract  · Do pelvic muscle exercises often  Pelvic muscle exercises may help you start and stop urinating  Strong pelvic muscles may help you empty your bladder easier  Squeeze these muscles tightly for 5 seconds like you are trying to hold back urine  Then relax for 5 seconds  Gradually work up to squeezing for 10 seconds  Do 3 sets of 15 repetitions a day, or as directed  © 2017 [employer] Information is for End User's use only and may not be sold, redistributed or otherwise used for commercial purposes  All illustrations and images included in CareNotes® are the copyrighted property of [employer]  or [employer]  The above information is an  only  It is not intended as medical advice for individual conditions or treatments  Talk to your doctor, nurse or pharmacist before following any medical regimen to see if it is safe and effective for you  Flank Pain   WHAT YOU NEED TO KNOW:   Flank pain is felt in the area below your ribcage and above your hip bones, often in the lower back  Your pain may be dull or so severe that you cannot get comfortable  The pain may stay in one area or radiate to another area  It may worsen and lighten in waves  Flank pain is often a sign of problems with your urinary tract, such as a kidney stone or infection  DISCHARGE INSTRUCTIONS:   Return to the emergency department if:   · You have a fever  · Your heart is fluttering or jumping  · You see blood in your urine  · Your pain radiates into your lower abdomen and genital area  · You have intense pain in your low back next to your spine  · You are much more tired than usual and have no desire to eat  · You have a headache and your muscles jerk  Contact your healthcare provider if:   · You have an upset stomach and are vomiting  · You have to urinate more often, and with urgency  · Your pain worsens or does not improve, and you cannot get comfortable  · You pass a stone when you urinate  · You have questions or concerns about your condition or care  Medicines: The following medicines may be ordered for you:  · Pain medicine  may help decrease or relieve your pain  Do not wait until the pain is severe before you take your medicine  · Antibiotics  may help treat a urinary tract infection caused by bacteria  · Take your medicine as directed  Contact your healthcare provider if you think your medicine is not helping or if you have side effects  Tell him of her if you are allergic to any medicine  Keep a list of the medicines, vitamins, and herbs you take  Include the amounts, and when and why you take them   Bring the list or the pill bottles to follow-up visits  Carry your medicine list with you in case of an emergency  Follow up with your healthcare provider in 1 to 2 days or as directed:  Write down your questions so you remember to ask them during your visits  © 2017 [employer] Information is for End User's use only and may not be sold, redistributed or otherwise used for commercial purposes  All illustrations and images included in CareNotes® are the copyrighted property of [employer]  or [employer]  The above information is an  only  It is not intended as medical advice for individual conditions or treatments  Talk to your doctor, nurse or pharmacist before following any medical regimen to see if it is safe and effective for you

## 2018-11-05 NOTE — ED PROVIDER NOTES
History  Chief Complaint   Patient presents with    Flank Pain     R sided flank pain , hx of stent placement 10 days ago, increased pain today,Dr Eric Box sent to ER for eval     23-year-old female with past medical history of right-sided nephrolithiasis presents to the ER with increased right-sided flank pain since last night  Patient was found to have obstructive 12 x 9 mm proximal right ureteral calculus on 10/23/2018  Patient was subsequently followed by urologist and had a right ureteral stent placement  Patient is scheduled for lithotripsy on 11/14/2018  Patient called her urologist and was told to come to the ER for further evaluation  Patient denies any fevers, chills, urgency, frequency, dysuria  Patient did note hematuria at home  History provided by:  Patient  Flank Pain   Associated symptoms: hematuria    Associated symptoms: no chest pain, no chills, no constipation, no cough, no diarrhea, no dysuria, no fever, no nausea and no shortness of breath        Prior to Admission Medications   Prescriptions Last Dose Informant Patient Reported?  Taking?   acetaminophen (TYLENOL) 500 mg tablet 11/5/2018 at Unknown time  Yes Yes   Sig: Take 500 mg by mouth every 6 (six) hours as needed for mild pain   naproxen (NAPROSYN) 500 mg tablet   No No   Sig: Take 1 tablet (500 mg total) by mouth 2 (two) times a day with meals      Facility-Administered Medications: None       Past Medical History:   Diagnosis Date    Renal disorder     kidney stones       Past Surgical History:   Procedure Laterality Date    [place] CYSTO/URETERO W/LITHOTRIPSY &INDWELL STENT INSRT Right 10/24/2018    Procedure: CYSTOSCOPY URETEROSCOPY WITH RETROGRADE PYELOGRAM AND INSERTION STENT URETERAL;  Surgeon: Ramiro Dia MD;  Location: [address] [place] [address];  Service: Urology       Family History   Problem Relation Age of Onset    Cancer Mother     Diabetes Mother     Hypertension Mother     Thyroid disease Mother     Hyperlipidemia Father         High Triglycerides     I have reviewed and agree with the history as documented  Social History   Substance Use Topics    Smoking status: Former Smoker     Packs/day: 0 00     Types: Cigarettes    Smokeless tobacco: Former User     Quit date: 5/4/2016    Alcohol use Yes      Comment: occasional        Review of Systems   Constitutional: Negative for activity change, appetite change, chills and fever  HENT: Negative for congestion and ear pain  Eyes: Negative for pain and discharge  Respiratory: Negative for cough, chest tightness, shortness of breath, wheezing and stridor  Cardiovascular: Negative for chest pain and palpitations  Gastrointestinal: Negative for abdominal distention, abdominal pain, constipation, diarrhea and nausea  Endocrine: Negative for cold intolerance  Genitourinary: Positive for flank pain and hematuria  Negative for dysuria, frequency and urgency  Musculoskeletal: Negative for arthralgias and back pain  Skin: Negative for color change and rash  Allergic/Immunologic: Negative for environmental allergies and food allergies  Neurological: Negative for dizziness, weakness, numbness and headaches  Hematological: Negative for adenopathy  Psychiatric/Behavioral: Negative for agitation, behavioral problems and confusion  The patient is not nervous/anxious  All other systems reviewed and are negative  Physical Exam  Physical Exam   Constitutional: She is oriented to person, place, and time  She appears well-developed and well-nourished  HENT:   Head: Normocephalic and atraumatic  Mouth/Throat: Oropharynx is clear and moist    Eyes: Conjunctivae and EOM are normal    Neck: Normal range of motion  Neck supple  Cardiovascular: Normal rate, regular rhythm, normal heart sounds and intact distal pulses  Pulmonary/Chest: Effort normal and breath sounds normal    Abdominal: Soft  Bowel sounds are normal  She exhibits no distension   There is no tenderness  Musculoskeletal: Normal range of motion  Right CVA tenderness noted to palpation  Neurological: She is alert and oriented to person, place, and time  Skin: Skin is warm and dry  Psychiatric: She has a normal mood and affect  Her behavior is normal  Judgment and thought content normal    Nursing note and vitals reviewed  Vital Signs  ED Triage Vitals   Temperature Pulse Respirations Blood Pressure SpO2   11/05/18 1007 11/05/18 1007 11/05/18 1007 11/05/18 1017 11/05/18 1007   98 6 °F (37 °C) 103 (!) 24 148/90 98 %      Temp Source Heart Rate Source Patient Position - Orthostatic VS BP Location FiO2 (%)   11/05/18 1007 11/05/18 1007 11/05/18 1017 11/05/18 1017 --   Tympanic Monitor Standing Right arm       Pain Score       11/05/18 1007       Worst Possible Pain           Vitals:    11/05/18 1007 11/05/18 1017 11/05/18 1258 11/05/18 1300   BP:  148/90 120/65 120/65   Pulse: 103  75 70   Patient Position - Orthostatic VS:  Standing Sitting        Visual Acuity      ED Medications  Medications   sodium chloride 0 9 % bolus 1,000 mL (0 mL Intravenous Stopped 11/5/18 1309)   ketorolac (TORADOL) injection 15 mg (15 mg Intravenous Given 11/5/18 1057)   cephalexin (KEFLEX) capsule 500 mg (500 mg Oral Given 11/5/18 1311)       Diagnostic Studies  Results Reviewed     Procedure Component Value Units Date/Time    Urine Microscopic [[id number]]  (Abnormal) Collected:  11/05/18 1045    Lab Status:  Final result Specimen:  Urine from Urine, Clean Catch Updated:  11/05/18 1221     RBC, UA Innumerable (A) /hpf      WBC, UA 10-20 (A) /hpf      Epithelial Cells Occasional /hpf      Bacteria, UA Moderate (A) /hpf     Urine culture [[id number]] Collected:  11/05/18 1045    Lab Status:   In process Specimen:  Urine from Urine, Clean Catch Updated:  11/05/18 1221    Comprehensive metabolic panel [[id number]] Collected:  11/05/18 1051    Lab Status:  Final result Specimen:  Blood from Arm, Left Updated:  11/05/18 1119 Sodium 139 mmol/L      Potassium 3 8 mmol/L      Chloride 101 mmol/L      CO2 27 mmol/L      ANION GAP 11 mmol/L      BUN 9 mg/dL      Creatinine 0 77 mg/dL      Glucose 103 mg/dL      Calcium 9 4 mg/dL      AST 45 U/L      ALT 67 U/L      Alkaline Phosphatase 66 U/L      Total Protein 7 8 g/dL      Albumin 4 0 g/dL      Total Bilirubin 0 60 mg/dL      eGFR 107 ml/min/1 73sq m     Narrative:         National Kidney Disease Education Program recommendations are as follows:  GFR calculation is accurate only with a steady state creatinine  Chronic Kidney disease less than 60 ml/min/1 73 sq  meters  Kidney failure less than 15 ml/min/1 73 sq  meters      UA w Reflex to Microscopic w Reflex to Culture [[id number]]  (Abnormal) Collected:  11/05/18 1045    Lab Status:  Final result Specimen:  Urine from Urine, Clean Catch Updated:  11/05/18 1110     Color, UA Orange     Clarity, UA Cloudy     Specific Gravity, UA 1 025     pH, UA 7 5     Leukocytes, UA Small (A)     Nitrite, UA Negative     Protein,  (2+) (A) mg/dl      Glucose, UA Negative mg/dl      Ketones, UA Negative mg/dl      Urobilinogen, UA 0 2 E U /dl      Bilirubin, UA Negative     Blood, UA Large (A)    CBC and differential [[id number]]  (Abnormal) Collected:  11/05/18 1051    Lab Status:  Final result Specimen:  Blood from Arm, Left Updated:  11/05/18 1059     WBC 10 78 (H) Thousand/uL      RBC 4 74 Million/uL      Hemoglobin 14 7 g/dL      Hematocrit 43 9 %      MCV 93 fL      MCH 31 0 pg      MCHC 33 5 g/dL      RDW 12 0 %      MPV 10 4 fL      Platelets 531 (H) Thousands/uL      nRBC 0 /100 WBCs      Neutrophils Relative 64 %      Immat GRANS % 0 %      Lymphocytes Relative 30 %      Monocytes Relative 4 %      Eosinophils Relative 1 %      Basophils Relative 1 %      Neutrophils Absolute 6 92 Thousands/µL      Immature Grans Absolute 0 02 Thousand/uL      Lymphocytes Absolute 3 24 Thousands/µL      Monocytes Absolute 0 38 Thousand/µL      Eosinophils Absolute 0 13 Thousand/µL      Basophils Absolute 0 09 Thousands/µL     POCT pregnancy, urine [[id number]]  (Normal) Resulted:  11/05/18 1054    Lab Status:  Final result Updated:  11/05/18 1054     EXT PREG TEST UR (Ref: Negative) negative                 XR abdomen 1 view kub   Final Result by Boris Mooney MD (11/05 1127)      14 mm right renal calculus  Right renal stent  Workstation performed: [id number]                    Procedures  Procedures       Phone Contacts  ED Phone Contact    ED Course                               MDM  Number of Diagnoses or Management Options  Nephrolithiasis: new and requires workup  Right flank pain: new and requires workup  UTI (urinary tract infection): new and requires workup  Diagnosis management comments: Obtain blood work, UA, KUB  Give IV fluids, Toradol and reassess symptoms  Amount and/or Complexity of Data Reviewed  Clinical lab tests: ordered and reviewed  Tests in the radiology section of CPT®: ordered and reviewed  Tests in the medicine section of CPT®: ordered and reviewed  Review and summarize past medical records: yes  Discuss the patient with other providers: yes  Independent visualization of images, tracings, or specimens: yes    Risk of Complications, Morbidity, and/or Mortality  General comments: KUB shows stent in good position  Proximal right ureteral kidney stone is again visualized  UA shows concern for developing UTI  Patient's pain improved with IV fluids and Toradol in the ED  At this point case was discussed with patient's urologist, Dr Berenice Mccall, who agrees discharging patient home on Toradol, Keflex, and follow-up to his office as needed  Patient has lithotripsy schedule on 11/14/2018  Close return instructions given to return to the ER for any worsening symptoms  Patient agrees with discharge plan  Patient well appearing at time of discharge        Patient Progress  Patient progress: improved    CritCare Time    Disposition  Final diagnoses:   UTI (urinary tract infection)   Nephrolithiasis   Right flank pain     Time reflects when diagnosis was documented in both MDM as applicable and the Disposition within this note     Time User Action Codes Description Comment    11/5/2018 12:50 PM Tacos Stoll Add [N39 0] UTI (urinary tract infection)     11/5/2018 12:50 PM Heribertoalex Kevinriel Denver Add [N20 0] Nephrolithiasis     11/5/2018 12:50 PM Ferdous, Gabriel Denver Add [R10 9] Right flank pain       ED Disposition     ED Disposition Condition Comment    Discharge  [facility] discharge to home/self care  Condition at discharge: Good        Follow-up Information     Follow up With Specialties Details Why Nasir Lucia MD Urology  As needed [address]  [phone]            Discharge Medication List as of 11/5/2018 12:52 PM      START taking these medications    Details   cephalexin (KEFLEX) 500 mg capsule Take 1 capsule (500 mg total) by mouth every 8 (eight) hours for 7 days, Starting Mon 11/5/2018, Until Mon 11/12/2018, Print      ketorolac (TORADOL) 10 mg tablet Take 1 tablet (10 mg total) by mouth every 6 (six) hours as needed for moderate pain, Starting Mon 11/5/2018, Print         CONTINUE these medications which have NOT CHANGED    Details   acetaminophen (TYLENOL) 500 mg tablet Take 500 mg by mouth every 6 (six) hours as needed for mild pain, Historical Med      naproxen (NAPROSYN) 500 mg tablet Take 1 tablet (500 mg total) by mouth 2 (two) times a day with meals, Starting Sat 7/7/2018, Print           No discharge procedures on file      ED Provider  Electronically Signed by           Josiah Faria DO  11/05/18 6948

## 2018-11-05 NOTE — ED NOTES
Pt sitting up in bed  Mother at bedside  Pt reports feeling much better         [address]  11/05/18 5327

## 2018-11-06 LAB — BACTERIA UR CULT: NORMAL

## 2018-11-08 NOTE — PRE-PROCEDURE INSTRUCTIONS
My Surgical Experience    The following information was developed to assist you to prepare for your operation  What do I need to do before coming to the hospital?   Arrange for a responsible person to drive you to and from the hospital    Arrange care for your children at home  Children are not allowed in the recovery areas of the hospital   Plan to wear clothing that is easy to put on and take off  If you are having shoulder surgery, wear a shirt that buttons or zippers in the front  Bathing  o Shower the evening before and the morning of your surgery with an antibacterial soap  Please refer to the Pre Op Showering Instructions for Surgery Patients Sheet   o Remove nail polish and all body piercing jewelry  o Do not shave any body part for at least 24 hours before surgery-this includes face, arms, legs and upper body  Food  o Nothing to eat or drink after midnight the night before your surgery  This includes candy and chewing gum  o Exception: If your surgery is after 12:00pm (noon), you may have clear liquids such as 7-Up®, ginger ale, apple or cranberry juice, Jell-O®, water, or clear broth until 8:00 am  o Do not drink milk or juice with pulp on the morning before surgery  o Do not drink alcohol 24 hours before surgery  Medicine  o Follow instructions you received from your surgeon about which medicines you may take on the day of surgery  o If instructed to take medicine on the morning of surgery, take pills with just a small sip of water  Call your prescribing doctor for specific infroamtion on what to do if you take insulin    What should I bring to the hospital?    Bring:  Camille Amaro or a walker, if you have them, for foot or knee surgery   A list of the daily medicines, vitamins, minerals, herbals and nutritional supplements you take   Include the dosages of medicines and the time you take them each day   Glasses, dentures or hearing aids   Minimal clothing; you will be wearing hospital sleepwear   Photo ID; required to verify your identity   If you have a Living Will or Power of , bring a copy of the documents   If you have an ostomy, bring an extra pouch and any supplies you use    Do not bring   Medicines or inhalers   Money, valuables or jewelry    What other information should I know about the day of surgery?  Notify your surgeons if you develop a cold, sore throat, cough, fever, rash or any other illness   Report to the Ambulatory Surgical/Same Day Surgery Unit   You will be instructed to stop at Registration only if you have not been pre-registered   Inform your  fi they do not stay that they will be asked by the staff to leave a phone number where they can be reached   Be available to be reached before surgery  In the event the operating room schedule changes, you may be asked to come in earlier or later than expected    *It is important to tell your doctor and others involved in your health care if you are taking or have been taking any non-prescription drugs, vitamins, minerals, herbals or other nutritional supplements  Any of these may interact with some food or medicines and cause a reaction      Pre-Surgery Instructions:   Medication Instructions    acetaminophen (TYLENOL) 500 mg tablet Instructed patient per Anesthesia Guidelines   cephalexin (KEFLEX) 500 mg capsule Instructed patient per Anesthesia Guidelines   ketorolac (TORADOL) 10 mg tablet Instructed patient per Anesthesia Guidelines   naproxen (NAPROSYN) 500 mg tablet Instructed patient per Anesthesia Guidelines

## 2018-11-13 NOTE — H&P
H&P Exam - Urology       Patient: Chen Ellis   : 1990 Sex: female   MRN: [id number]     CSN: [id number]      History of Present Illness   HPI:  Chen Ellis is a 33 y o  female who presents with right 10-12mm stone/stent        Review of Systems:   Constitutional:  Negative for activity change, fever, chills and diaphoresis  HENT: Negative for hearing loss and trouble swallowing  Eyes: Negative for itching and visual disturbance  Respiratory: Negative for chest tightness and shortness of breath  Cardiovascular: Negative for chest pain, edema  Gastrointestinal: Negative for abdominal distention, na abdominal pain, constipation, diarrhea, Nausea and vomiting  Genitourinary: Negative for decreased urine volume, difficulty urinating, dysuria, enuresis, frequency, hematuria and urgency  Musculoskeletal: Negative for gait problem and myalgias  Neurological: Negative for dizziness and headaches  Hematological: Does not bruise/bleed easily  Historical Information   Past Medical History:   Diagnosis Date    Kidney stone     Renal disorder     kidney stones     Past Surgical History:   Procedure Laterality Date    [place] CYSTO/URETERO W/LITHOTRIPSY &INDWELL STENT INSRT Right 10/24/2018    Procedure: CYSTOSCOPY URETEROSCOPY WITH RETROGRADE PYELOGRAM AND INSERTION STENT URETERAL;  Surgeon: Cruz Teixeira MD;  Location: [facility];  Service: Urology     Social History   History   Alcohol Use    Yes     Comment: occasional     History   Drug Use No     History   Smoking Status    Former Smoker    Packs/day: 0 00    Years: 4 00    Types: Cigarettes   Smokeless Tobacco    Former User    Quit date: 2016     Family History:   Family History   Problem Relation Age of Onset    Cancer Mother     Diabetes Mother     Hypertension Mother     Thyroid disease Mother     Hyperlipidemia Father         High Triglycerides       Meds/Allergies   No prescriptions prior to admission       No Known Allergies    Objective   Vitals: LMP 10/25/2018 (Exact Date)     Physical Exam:  General Alert awake   Normocephalic atraumatic PERRLA  Lungs clear bilaterally  Cardiac normal S1 normal S2  Abdomen soft, flank pain  Extremities no edema    No intake/output data recorded      Invasive Devices          No matching active lines, drains, or airways              Lab Results: CBC:   Lab Results   Component Value Date    WBC 10 78 (H) 11/05/2018    HGB 14 7 11/05/2018    HCT 43 9 11/05/2018    MCV 93 11/05/2018     (H) 11/05/2018    ADJUSTEDWBC 9 30 05/06/2016    MCH 31 0 11/05/2018    MCHC 33 5 11/05/2018    RDW 12 0 11/05/2018    MPV 10 4 11/05/2018    NRBC 0 11/05/2018     CMP:   Lab Results   Component Value Date     10/13/2015     11/05/2018     10/13/2015    CO2 27 11/05/2018    CO2 27 10/13/2015    ANIONGAP 14 0 10/13/2015    BUN 9 11/05/2018    BUN 12 10/13/2015    CREATININE 0 77 11/05/2018    CREATININE 0 8 10/13/2015    GLUCOSE 95 10/13/2015    CALCIUM 9 4 11/05/2018    CALCIUM 8 7 10/13/2015    AST 45 11/05/2018    AST 52 (H) 10/13/2015    ALT 67 11/05/2018     (H) 10/13/2015    ALKPHOS 66 11/05/2018    ALKPHOS 78 10/13/2015    PROT 7 7 10/13/2015    BILITOT 0 7 10/13/2015    EGFR 107 11/05/2018     Urinalysis:   Lab Results   Component Value Date    COLORU Orange 11/05/2018    CLARITYU Cloudy 11/05/2018    SPECGRAV 1 025 11/05/2018    PHUR 7 5 11/05/2018    LEUKOCYTESUR Small (A) 11/05/2018    NITRITE Negative 11/05/2018    GLUCOSEU Negative 11/05/2018    KETONESU Negative 11/05/2018    BILIRUBINUR Negative 11/05/2018    BLOODU Large (A) 11/05/2018     Urine Culture:   Lab Results   Component Value Date    URINECX 40,000-49,000 cfu/ml  11/05/2018     PSA: No results found for: PSA        Assessment/ Plan:  Right eswl      Amanuel Razo MD

## 2018-11-14 ENCOUNTER — HOSPITAL ENCOUNTER (OUTPATIENT)
Facility: HOSPITAL | Age: 28
Setting detail: OUTPATIENT SURGERY
Discharge: HOME | End: 2018-11-14
Attending: SPECIALIST | Admitting: SPECIALIST
Payer: COMMERCIAL

## 2018-11-14 ENCOUNTER — ANESTHESIA EVENT (OUTPATIENT)
Dept: PERIOP | Facility: HOSPITAL | Age: 28
End: 2018-11-14
Payer: COMMERCIAL

## 2018-11-14 ENCOUNTER — ANESTHESIA (OUTPATIENT)
Dept: PERIOP | Facility: HOSPITAL | Age: 28
End: 2018-11-14
Payer: COMMERCIAL

## 2018-11-14 VITALS
RESPIRATION RATE: 16 BRPM | DIASTOLIC BLOOD PRESSURE: 78 MMHG | HEIGHT: 62 IN | OXYGEN SATURATION: 95 % | SYSTOLIC BLOOD PRESSURE: 116 MMHG | HEART RATE: 75 BPM | TEMPERATURE: 97 F | BODY MASS INDEX: 41.77 KG/M2 | WEIGHT: 227 LBS

## 2018-11-14 LAB — EXT PREGNANCY TEST URINE: NEGATIVE

## 2018-11-14 PROCEDURE — 81025 URINE PREGNANCY TEST: CPT | Performed by: ANESTHESIOLOGY

## 2018-11-14 RX ORDER — HYDROMORPHONE HCL/PF 1 MG/ML
0.5 SYRINGE (ML) INJECTION
Status: DISCONTINUED | OUTPATIENT
Start: 2018-11-14 | End: 2018-11-14 | Stop reason: HOSPADM

## 2018-11-14 RX ORDER — SODIUM CHLORIDE, SODIUM LACTATE, POTASSIUM CHLORIDE, CALCIUM CHLORIDE 600; 310; 30; 20 MG/100ML; MG/100ML; MG/100ML; MG/100ML
100 INJECTION, SOLUTION INTRAVENOUS CONTINUOUS
Status: DISCONTINUED | OUTPATIENT
Start: 2018-11-14 | End: 2018-11-14 | Stop reason: HOSPADM

## 2018-11-14 RX ORDER — OXYCODONE HYDROCHLORIDE AND ACETAMINOPHEN 5; 325 MG/1; MG/1
1 TABLET ORAL EVERY 4 HOURS PRN
Status: DISCONTINUED | OUTPATIENT
Start: 2018-11-14 | End: 2018-11-14 | Stop reason: HOSPADM

## 2018-11-14 RX ORDER — DEXAMETHASONE SODIUM PHOSPHATE 4 MG/ML
INJECTION, SOLUTION INTRA-ARTICULAR; INTRALESIONAL; INTRAMUSCULAR; INTRAVENOUS; SOFT TISSUE AS NEEDED
Status: DISCONTINUED | OUTPATIENT
Start: 2018-11-14 | End: 2018-11-14 | Stop reason: SURG

## 2018-11-14 RX ORDER — LEVOFLOXACIN 5 MG/ML
500 INJECTION, SOLUTION INTRAVENOUS ONCE
Status: COMPLETED | OUTPATIENT
Start: 2018-11-14 | End: 2018-11-14

## 2018-11-14 RX ORDER — DIPHENHYDRAMINE HYDROCHLORIDE 50 MG/ML
12.5 INJECTION INTRAMUSCULAR; INTRAVENOUS ONCE AS NEEDED
Status: DISCONTINUED | OUTPATIENT
Start: 2018-11-14 | End: 2018-11-14 | Stop reason: HOSPADM

## 2018-11-14 RX ORDER — FENTANYL CITRATE 50 UG/ML
INJECTION, SOLUTION INTRAMUSCULAR; INTRAVENOUS AS NEEDED
Status: DISCONTINUED | OUTPATIENT
Start: 2018-11-14 | End: 2018-11-14 | Stop reason: SURG

## 2018-11-14 RX ORDER — PROPOFOL 10 MG/ML
INJECTION, EMULSION INTRAVENOUS AS NEEDED
Status: DISCONTINUED | OUTPATIENT
Start: 2018-11-14 | End: 2018-11-14 | Stop reason: SURG

## 2018-11-14 RX ORDER — MIDAZOLAM HYDROCHLORIDE 1 MG/ML
INJECTION INTRAMUSCULAR; INTRAVENOUS AS NEEDED
Status: DISCONTINUED | OUTPATIENT
Start: 2018-11-14 | End: 2018-11-14 | Stop reason: SURG

## 2018-11-14 RX ORDER — ONDANSETRON 2 MG/ML
INJECTION INTRAMUSCULAR; INTRAVENOUS AS NEEDED
Status: DISCONTINUED | OUTPATIENT
Start: 2018-11-14 | End: 2018-11-14 | Stop reason: SURG

## 2018-11-14 RX ORDER — ONDANSETRON 2 MG/ML
4 INJECTION INTRAMUSCULAR; INTRAVENOUS ONCE AS NEEDED
Status: COMPLETED | OUTPATIENT
Start: 2018-11-14 | End: 2018-11-14

## 2018-11-14 RX ORDER — LIDOCAINE HYDROCHLORIDE 10 MG/ML
0.5 INJECTION, SOLUTION EPIDURAL; INFILTRATION; INTRACAUDAL; PERINEURAL ONCE AS NEEDED
Status: DISCONTINUED | OUTPATIENT
Start: 2018-11-14 | End: 2018-11-14 | Stop reason: HOSPADM

## 2018-11-14 RX ADMIN — LEVOFLOXACIN 500 MG: 5 INJECTION, SOLUTION INTRAVENOUS at 10:00

## 2018-11-14 RX ADMIN — FENTANYL CITRATE 50 MCG: 50 INJECTION, SOLUTION INTRAMUSCULAR; INTRAVENOUS at 10:42

## 2018-11-14 RX ADMIN — MIDAZOLAM HYDROCHLORIDE 2 MG: 1 INJECTION, SOLUTION INTRAMUSCULAR; INTRAVENOUS at 10:07

## 2018-11-14 RX ADMIN — FENTANYL CITRATE 50 MCG: 50 INJECTION, SOLUTION INTRAMUSCULAR; INTRAVENOUS at 10:23

## 2018-11-14 RX ADMIN — HYDROMORPHONE HYDROCHLORIDE 0.5 MG: 1 INJECTION, SOLUTION INTRAMUSCULAR; INTRAVENOUS; SUBCUTANEOUS at 11:32

## 2018-11-14 RX ADMIN — DEXAMETHASONE SODIUM PHOSPHATE 4 MG: 4 INJECTION, SOLUTION INTRA-ARTICULAR; INTRALESIONAL; INTRAMUSCULAR; INTRAVENOUS; SOFT TISSUE at 10:27

## 2018-11-14 RX ADMIN — PROPOFOL 300 MG: 10 INJECTION, EMULSION INTRAVENOUS at 10:18

## 2018-11-14 RX ADMIN — ONDANSETRON 4 MG: 2 INJECTION INTRAMUSCULAR; INTRAVENOUS at 10:27

## 2018-11-14 RX ADMIN — HYDROMORPHONE HYDROCHLORIDE 0.5 MG: 1 INJECTION, SOLUTION INTRAMUSCULAR; INTRAVENOUS; SUBCUTANEOUS at 11:16

## 2018-11-14 RX ADMIN — ONDANSETRON 4 MG: 2 INJECTION INTRAMUSCULAR; INTRAVENOUS at 11:20

## 2018-11-14 RX ADMIN — LEVOFLOXACIN 500 MG: 5 INJECTION, SOLUTION INTRAVENOUS at 09:18

## 2018-11-14 RX ADMIN — SODIUM CHLORIDE, SODIUM LACTATE, POTASSIUM CHLORIDE, AND CALCIUM CHLORIDE 100 ML/HR: .6; .31; .03; .02 INJECTION, SOLUTION INTRAVENOUS at 08:02

## 2018-11-14 NOTE — ANESTHESIA PREPROCEDURE EVALUATION
Review of Systems/Medical History  Patient summary reviewed  Chart reviewed      Cardiovascular  EKG reviewed,    Pulmonary       GI/Hepatic    Liver disease ,        Kidney stones,        Endo/Other    Obesity  morbid obesity   GYN       Hematology   Musculoskeletal       Neurology    Headaches,    Psychology           Physical Exam    Airway    Mallampati score: I  TM Distance: >3 FB  Neck ROM: full     Dental       Cardiovascular  Rhythm: regular, Rate: normal,     Pulmonary  Breath sounds clear to auscultation,     Other Findings        Anesthesia Plan  ASA Score- 3     Anesthesia Type- general with ASA Monitors  Additional Monitors:   Airway Plan: LMA  Plan Factors-    Induction- intravenous  Postoperative Plan-     Informed Consent- Anesthetic plan and risks discussed with patient

## 2018-11-14 NOTE — OP NOTE
OPERATIVE REPORT  PATIENT NAME: Zandra Rich    :  1990  MRN: [id number]  Pt Location: [facility] OR ROOM 03    SURGERY DATE: 2018    Surgeon(s) and Role:     * Ramiro Dia MD - Primary    Preop Diagnosis:  Calculus of kidney [N20 0]    Post-Op Diagnosis Codes:     * Calculus of kidney [N20 0]    Procedure(s) (LRB):  LITHROTRIPSY EXTRACORPORAL SHOCKWAVE (ESWL) (Right)    Specimen(s):  * No specimens in log *    Estimated Blood Loss:   Minimal    Drains:       Anesthesia Type:   IV Sedation with Anesthesia    Operative Indications:  Calculus of kidney [N20 0]  This 73-year-old female seen in the hospital on consultation some 4 weeks ago with 12 mm right renal stone underwent emergency cysto right stent placement patient was recently seen in the ER again for evaluation of right flank pain with no significant change in KUB patient now to undergo right ESWL    Operative Findings:  12 mm right renal stone receiving 2500 pulses fragmentation noted    Complications:   None    Procedure and Technique:  After the patient identified in the holding area consent signed right side marked placed into the op suite    After the stone was localized anesthesia was induced time-out performed patient received a total of 2500 pulses mostly bar pressure of 7 stone appeared to break patient to have BG well was awakened taken to recovery room stable condition   I was present for the entire procedure    Patient Disposition:  PACU     SIGNATURE: Ramiro Dia MD  DATE: 2018  TIME: 10:28 AM

## 2018-11-14 NOTE — DISCHARGE INSTRUCTIONS
LITHOTRIPSY DISCHARGE INSTRUCTIONS    · No driving or operating machinery for 24 hours or while taking pain medication  · No alcoholic beverages for 24 hours or while taking pain medication  · DO NOT make any important personal or business decisions for 24 hours  Diet:  · Begin with liquids and light food and progress to your normal diet unless you are nauseated or otherwise instructed by your physician  · INCREASE FLUIDS    Medication:  · IF NEEDED: Begin taking pain medication as directed and remember that narcotic medications may be constipating  Consider starting over the counter stool softeners  If constipation persists begin taking over the counter laxative    · MAY TAKE OVER THE COUNTER MEDICATION FOR MILD PAIN AS NEEDED  · MAY USE ICE PACK TO BACK AREA IF NEEDED FOR DISCOMFORT      · MAY SHOWER TODAY  · IT IS NORMAL TO HAVE HAVE BLOOD IN YOUR URINE  · SCHEDULE AN X-RAY AS ORDERED BY YOUR DOCTOR IN ____ 3 weeks  · IF DIRECTED STRAIN URINE AND SAVE PIECES OF STONE TO BRING TO DOCTOR AT NEXT APPOINTMENT    CALL YOUR DOCTOR IF YOU HAVE FEVER, CHILLS, EXCESSIVE BLEEDING OR PASSING LARGE CLOTS

## 2018-11-14 NOTE — ANESTHESIA POSTPROCEDURE EVALUATION
Post-Op Assessment Note      CV Status:  Stable    Mental Status:  Alert and awake    Hydration Status:  Euvolemic    PONV Controlled:  Controlled    Airway Patency:  Patent    Post Op Vitals Reviewed: Yes          Staff: Anesthesiologist           /79 (11/14/18 1115)    Temp     Pulse 84 (11/14/18 1115)   Resp 16 (11/14/18 1115)    SpO2 99 % (11/14/18 1115)

## 2018-11-14 NOTE — PROGRESS NOTES
Pt History & Physical  Dictated 19 hr ago  Pt seen at bedside now  No change in heart and lung  Agree with procedure    Ash Lam MD  11/14/2018

## 2018-12-15 ENCOUNTER — HOSPITAL ENCOUNTER (OUTPATIENT)
Dept: RADIOLOGY | Facility: HOSPITAL | Age: 28
Discharge: HOME | End: 2018-12-15
Attending: SPECIALIST
Payer: COMMERCIAL

## 2018-12-15 ENCOUNTER — TRANSCRIBE ORDERS (OUTPATIENT)
Dept: ADMINISTRATIVE | Facility: HOSPITAL | Age: 28
End: 2018-12-15

## 2018-12-15 DIAGNOSIS — N20.0 RENAL CALCULUS: ICD-10-CM

## 2018-12-15 DIAGNOSIS — N20.0 RENAL CALCULUS: Primary | ICD-10-CM

## 2018-12-15 PROCEDURE — 74018 RADEX ABDOMEN 1 VIEW: CPT

## 2018-12-17 ENCOUNTER — HOSPITAL ENCOUNTER (OUTPATIENT)
Facility: HOSPITAL | Age: 28
Setting detail: OBSERVATION
Discharge: HOME | End: 2018-12-18
Attending: EMERGENCY MEDICINE | Admitting: FAMILY MEDICINE
Payer: COMMERCIAL

## 2018-12-17 ENCOUNTER — APPOINTMENT (EMERGENCY)
Dept: RADIOLOGY | Facility: HOSPITAL | Age: 28
End: 2018-12-17
Payer: COMMERCIAL

## 2018-12-17 DIAGNOSIS — R10.9 ACUTE RIGHT FLANK PAIN: ICD-10-CM

## 2018-12-17 DIAGNOSIS — K76.0 HEPATIC STEATOSIS: ICD-10-CM

## 2018-12-17 DIAGNOSIS — N30.90 CYSTITIS: Primary | ICD-10-CM

## 2018-12-17 PROBLEM — R19.7 DIARRHEA: Status: ACTIVE | Noted: 2018-12-17

## 2018-12-17 PROBLEM — N30.01 ACUTE CYSTITIS WITH HEMATURIA: Status: ACTIVE | Noted: 2018-12-17

## 2018-12-17 PROBLEM — R11.2 NAUSEA AND VOMITING: Status: ACTIVE | Noted: 2018-12-17

## 2018-12-17 LAB
ALBUMIN SERPL BCP-MCNC: 4.1 G/DL (ref 3.5–5)
ALP SERPL-CCNC: 71 U/L (ref 46–116)
ALT SERPL W P-5'-P-CCNC: 56 U/L (ref 12–78)
ANION GAP SERPL CALCULATED.3IONS-SCNC: 14 MMOL/L (ref 4–13)
AST SERPL W P-5'-P-CCNC: 28 U/L (ref 5–45)
BACTERIA UR QL AUTO: ABNORMAL /HPF
BASOPHILS # BLD AUTO: 0.03 THOUSANDS/ΜL (ref 0–0.1)
BASOPHILS NFR BLD AUTO: 0 % (ref 0–1)
BILIRUB SERPL-MCNC: 0.8 MG/DL (ref 0.2–1)
BILIRUB UR QL STRIP: ABNORMAL
BUN SERPL-MCNC: 13 MG/DL (ref 5–25)
CALCIUM SERPL-MCNC: 9.3 MG/DL (ref 8.3–10.1)
CHLORIDE SERPL-SCNC: 102 MMOL/L (ref 100–108)
CLARITY UR: ABNORMAL
CO2 SERPL-SCNC: 22 MMOL/L (ref 21–32)
COLOR UR: ABNORMAL
CREAT SERPL-MCNC: 0.88 MG/DL (ref 0.6–1.3)
EOSINOPHIL # BLD AUTO: 0.08 THOUSAND/ΜL (ref 0–0.61)
EOSINOPHIL NFR BLD AUTO: 1 % (ref 0–6)
ERYTHROCYTE [DISTWIDTH] IN BLOOD BY AUTOMATED COUNT: 11.9 % (ref 11.6–15.1)
EXT PREG TEST URINE: NEGATIVE
GFR SERPL CREATININE-BSD FRML MDRD: 90 ML/MIN/1.73SQ M
GLUCOSE SERPL-MCNC: 161 MG/DL (ref 65–140)
GLUCOSE UR STRIP-MCNC: NEGATIVE MG/DL
HCT VFR BLD AUTO: 43.6 % (ref 34.8–46.1)
HGB BLD-MCNC: 14.6 G/DL (ref 11.5–15.4)
HGB UR QL STRIP.AUTO: ABNORMAL
HOLD SPECIMEN: NORMAL
IMM GRANULOCYTES # BLD AUTO: 0.03 THOUSAND/UL (ref 0–0.2)
IMM GRANULOCYTES NFR BLD AUTO: 0 % (ref 0–2)
KETONES UR STRIP-MCNC: ABNORMAL MG/DL
LEUKOCYTE ESTERASE UR QL STRIP: ABNORMAL
LIPASE SERPL-CCNC: 122 U/L (ref 73–393)
LYMPHOCYTES # BLD AUTO: 1.16 THOUSANDS/ΜL (ref 0.6–4.47)
LYMPHOCYTES NFR BLD AUTO: 9 % (ref 14–44)
MCH RBC QN AUTO: 30.8 PG (ref 26.8–34.3)
MCHC RBC AUTO-ENTMCNC: 33.5 G/DL (ref 31.4–37.4)
MCV RBC AUTO: 92 FL (ref 82–98)
MONOCYTES # BLD AUTO: 0.34 THOUSAND/ΜL (ref 0.17–1.22)
MONOCYTES NFR BLD AUTO: 3 % (ref 4–12)
NEUTROPHILS # BLD AUTO: 10.92 THOUSANDS/ΜL (ref 1.85–7.62)
NEUTS SEG NFR BLD AUTO: 87 % (ref 43–75)
NITRITE UR QL STRIP: NEGATIVE
NON-SQ EPI CELLS URNS QL MICRO: ABNORMAL /HPF
NRBC BLD AUTO-RTO: 0 /100 WBCS
PH UR STRIP.AUTO: 8.5 [PH] (ref 5–9)
PLATELET # BLD AUTO: 375 THOUSANDS/UL (ref 149–390)
PMV BLD AUTO: 10.7 FL (ref 8.9–12.7)
POTASSIUM SERPL-SCNC: 3.9 MMOL/L (ref 3.5–5.3)
PROT SERPL-MCNC: 7.7 G/DL (ref 6.4–8.2)
PROT UR STRIP-MCNC: ABNORMAL MG/DL
RBC # BLD AUTO: 4.74 MILLION/UL (ref 3.81–5.12)
RBC #/AREA URNS AUTO: ABNORMAL /HPF
SODIUM SERPL-SCNC: 138 MMOL/L (ref 136–145)
SP GR UR STRIP.AUTO: 1.01 (ref 1–1.03)
UROBILINOGEN UR QL STRIP.AUTO: 0.2 E.U./DL
WBC # BLD AUTO: 12.56 THOUSAND/UL (ref 4.31–10.16)
WBC #/AREA URNS AUTO: ABNORMAL /HPF

## 2018-12-17 PROCEDURE — 85025 COMPLETE CBC W/AUTO DIFF WBC: CPT | Performed by: EMERGENCY MEDICINE

## 2018-12-17 PROCEDURE — 87077 CULTURE AEROBIC IDENTIFY: CPT | Performed by: EMERGENCY MEDICINE

## 2018-12-17 PROCEDURE — 87086 URINE CULTURE/COLONY COUNT: CPT | Performed by: EMERGENCY MEDICINE

## 2018-12-17 PROCEDURE — 81025 URINE PREGNANCY TEST: CPT | Performed by: EMERGENCY MEDICINE

## 2018-12-17 PROCEDURE — 87081 CULTURE SCREEN ONLY: CPT | Performed by: SPECIALIST

## 2018-12-17 PROCEDURE — 96374 THER/PROPH/DIAG INJ IV PUSH: CPT

## 2018-12-17 PROCEDURE — 96375 TX/PRO/DX INJ NEW DRUG ADDON: CPT

## 2018-12-17 PROCEDURE — 36415 COLL VENOUS BLD VENIPUNCTURE: CPT | Performed by: EMERGENCY MEDICINE

## 2018-12-17 PROCEDURE — 83690 ASSAY OF LIPASE: CPT | Performed by: EMERGENCY MEDICINE

## 2018-12-17 PROCEDURE — 96376 TX/PRO/DX INJ SAME DRUG ADON: CPT

## 2018-12-17 PROCEDURE — 80053 COMPREHEN METABOLIC PANEL: CPT | Performed by: EMERGENCY MEDICINE

## 2018-12-17 PROCEDURE — 81001 URINALYSIS AUTO W/SCOPE: CPT | Performed by: EMERGENCY MEDICINE

## 2018-12-17 PROCEDURE — 99220 PR INITIAL OBSERVATION CARE/DAY 70 MINUTES: CPT | Performed by: FAMILY MEDICINE

## 2018-12-17 PROCEDURE — 74176 CT ABD & PELVIS W/O CONTRAST: CPT

## 2018-12-17 PROCEDURE — 80076 HEPATIC FUNCTION PANEL: CPT | Performed by: INTERNAL MEDICINE

## 2018-12-17 PROCEDURE — 99285 EMERGENCY DEPT VISIT HI MDM: CPT

## 2018-12-17 PROCEDURE — 96361 HYDRATE IV INFUSION ADD-ON: CPT

## 2018-12-17 RX ORDER — CEFTRIAXONE 1 G/50ML
1000 INJECTION, SOLUTION INTRAVENOUS ONCE
Status: COMPLETED | OUTPATIENT
Start: 2018-12-17 | End: 2018-12-17

## 2018-12-17 RX ORDER — ONDANSETRON 2 MG/ML
4 INJECTION INTRAMUSCULAR; INTRAVENOUS EVERY 6 HOURS PRN
Status: DISCONTINUED | OUTPATIENT
Start: 2018-12-17 | End: 2018-12-18 | Stop reason: HOSPADM

## 2018-12-17 RX ORDER — ONDANSETRON 2 MG/ML
4 INJECTION INTRAMUSCULAR; INTRAVENOUS ONCE
Status: COMPLETED | OUTPATIENT
Start: 2018-12-17 | End: 2018-12-17

## 2018-12-17 RX ORDER — MORPHINE SULFATE 4 MG/ML
4 INJECTION, SOLUTION INTRAMUSCULAR; INTRAVENOUS ONCE
Status: DISCONTINUED | OUTPATIENT
Start: 2018-12-17 | End: 2018-12-17

## 2018-12-17 RX ORDER — SODIUM CHLORIDE 9 MG/ML
125 INJECTION, SOLUTION INTRAVENOUS CONTINUOUS
Status: DISCONTINUED | OUTPATIENT
Start: 2018-12-17 | End: 2018-12-18 | Stop reason: HOSPADM

## 2018-12-17 RX ORDER — MORPHINE SULFATE 4 MG/ML
4 INJECTION, SOLUTION INTRAMUSCULAR; INTRAVENOUS ONCE
Status: COMPLETED | OUTPATIENT
Start: 2018-12-17 | End: 2018-12-17

## 2018-12-17 RX ORDER — CEFTRIAXONE 1 G/50ML
1000 INJECTION, SOLUTION INTRAVENOUS EVERY 24 HOURS
Status: DISCONTINUED | OUTPATIENT
Start: 2018-12-18 | End: 2018-12-18 | Stop reason: HOSPADM

## 2018-12-17 RX ORDER — ACETAMINOPHEN 325 MG/1
650 TABLET ORAL EVERY 6 HOURS PRN
Status: DISCONTINUED | OUTPATIENT
Start: 2018-12-17 | End: 2018-12-18 | Stop reason: HOSPADM

## 2018-12-17 RX ORDER — KETOROLAC TROMETHAMINE 30 MG/ML
15 INJECTION, SOLUTION INTRAMUSCULAR; INTRAVENOUS ONCE
Status: COMPLETED | OUTPATIENT
Start: 2018-12-17 | End: 2018-12-17

## 2018-12-17 RX ORDER — KETOROLAC TROMETHAMINE 30 MG/ML
15 INJECTION, SOLUTION INTRAMUSCULAR; INTRAVENOUS EVERY 6 HOURS PRN
Status: DISCONTINUED | OUTPATIENT
Start: 2018-12-17 | End: 2018-12-18 | Stop reason: HOSPADM

## 2018-12-17 RX ADMIN — MORPHINE SULFATE 4 MG: 4 INJECTION INTRAVENOUS at 06:39

## 2018-12-17 RX ADMIN — SODIUM CHLORIDE 125 ML/HR: 0.9 INJECTION, SOLUTION INTRAVENOUS at 16:52

## 2018-12-17 RX ADMIN — ONDANSETRON 4 MG: 2 INJECTION INTRAMUSCULAR; INTRAVENOUS at 02:00

## 2018-12-17 RX ADMIN — KETOROLAC TROMETHAMINE 15 MG: 30 INJECTION, SOLUTION INTRAMUSCULAR at 12:01

## 2018-12-17 RX ADMIN — SODIUM CHLORIDE 125 ML/HR: 0.9 INJECTION, SOLUTION INTRAVENOUS at 08:28

## 2018-12-17 RX ADMIN — CEFTRIAXONE 1000 MG: 1 INJECTION, SOLUTION INTRAVENOUS at 04:43

## 2018-12-17 RX ADMIN — ONDANSETRON 4 MG: 2 INJECTION INTRAMUSCULAR; INTRAVENOUS at 04:05

## 2018-12-17 RX ADMIN — KETOROLAC TROMETHAMINE 15 MG: 30 INJECTION, SOLUTION INTRAMUSCULAR at 19:10

## 2018-12-17 RX ADMIN — MORPHINE SULFATE 4 MG: 4 INJECTION INTRAVENOUS at 04:12

## 2018-12-17 RX ADMIN — KETOROLAC TROMETHAMINE 15 MG: 30 INJECTION, SOLUTION INTRAMUSCULAR at 02:14

## 2018-12-17 RX ADMIN — ONDANSETRON 4 MG: 2 INJECTION INTRAMUSCULAR; INTRAVENOUS at 16:52

## 2018-12-17 RX ADMIN — SODIUM CHLORIDE 1000 ML: 0.9 INJECTION, SOLUTION INTRAVENOUS at 02:02

## 2018-12-17 NOTE — PLAN OF CARE
DISCHARGE PLANNING     Discharge to home or other facility with appropriate resources Progressing        GASTROINTESTINAL - ADULT     Minimal or absence of nausea and/or vomiting Progressing     Maintains or returns to baseline bowel function Progressing     Maintains adequate nutritional intake Progressing        Knowledge Deficit     Patient/family/caregiver demonstrates understanding of disease process, treatment plan, medications, and discharge instructions Progressing        PAIN - ADULT     Verbalizes/displays adequate comfort level or baseline comfort level Progressing

## 2018-12-17 NOTE — ED PROVIDER NOTES
History  Chief Complaint   Patient presents with    Abdominal Pain     had stent placed in October for kidney stones, severe pain for x3     Pt in ER with c/o worsening right flank pain and dysuria x 3 days  Pt has a hx of a ureteral stone and had a stent placed in Nov by Dr Rob Beltran  Pt also with c/o nausea and vomiting  She denies fevers/chills  Prior to Admission Medications   Prescriptions Last Dose Informant Patient Reported? Taking?   acetaminophen (TYLENOL) 500 mg tablet   Yes No   Sig: Take 500 mg by mouth every 6 (six) hours as needed for mild pain   ketorolac (TORADOL) 10 mg tablet   No No   Sig: Take 1 tablet (10 mg total) by mouth every 6 (six) hours as needed for moderate pain   naproxen (NAPROSYN) 500 mg tablet   No No   Sig: Take 1 tablet (500 mg total) by mouth 2 (two) times a day with meals      Facility-Administered Medications: None       Past Medical History:   Diagnosis Date    Kidney stone     Renal disorder     kidney stones       Past Surgical History:   Procedure Laterality Date    [place] CYSTO/URETERO W/LITHOTRIPSY &INDWELL STENT INSRT Right 10/24/2018    Procedure: CYSTOSCOPY URETEROSCOPY WITH RETROGRADE PYELOGRAM AND INSERTION STENT URETERAL;  Surgeon: Vicki Powers MD;  Location: [address] [place] [address];  Service: Urology    [facility] ESWL Right 11/14/2018    Procedure: Jona Mathew SHOCKWAVE (ESWL); Surgeon: Vicki Powers MD;  Location: [facility];  Service: Urology       Family History   Problem Relation Age of Onset    Cancer Mother     Diabetes Mother     Hypertension Mother     Thyroid disease Mother     Hyperlipidemia Father         High Triglycerides     I have reviewed and agree with the history as documented      Social History   Substance Use Topics    Smoking status: Former Smoker     Packs/day: 0 00     Years: 4 00     Types: Cigarettes    Smokeless tobacco: Former User     Quit date: 5/4/2016    Alcohol use Yes      Comment: occasional        Review of Systems   Constitutional: Negative for chills and fever  Respiratory: Negative for cough, chest tightness and shortness of breath  Gastrointestinal: Positive for abdominal pain, nausea and vomiting  Negative for diarrhea  Genitourinary: Positive for dysuria and flank pain  Negative for frequency, hematuria and urgency  Musculoskeletal: Negative for back pain, neck pain and neck stiffness  All other systems reviewed and are negative  Physical Exam  Physical Exam   Constitutional: She is oriented to person, place, and time  She appears well-developed and well-nourished  No distress  HENT:   Head: Normocephalic and atraumatic  Eyes: Pupils are equal, round, and reactive to light  Conjunctivae are normal    Neck: Normal range of motion  Neck supple  Cardiovascular: Normal rate, regular rhythm and normal heart sounds  No murmur heard  Pulmonary/Chest: Effort normal and breath sounds normal  No respiratory distress  Abdominal: Soft  Bowel sounds are normal  She exhibits no distension  There is tenderness  Musculoskeletal: Normal range of motion  She exhibits tenderness  She exhibits no edema or deformity  +right CVA tenderness   Neurological: She is alert and oriented to person, place, and time  No cranial nerve deficit  Skin: Skin is warm and dry  No rash noted  She is not diaphoretic  No pallor  Psychiatric: She has a normal mood and affect  Her behavior is normal    Nursing note and vitals reviewed        Vital Signs  ED Triage Vitals [12/17/18 0132]   Temperature Pulse Respirations Blood Pressure SpO2   98 °F (36 7 °C) (!) 124 20 144/81 99 %      Temp Source Heart Rate Source Patient Position - Orthostatic VS BP Location FiO2 (%)   Oral Monitor Sitting Right arm --      Pain Score       Worst Possible Pain           Vitals:    12/17/18 0132 12/17/18 0405 12/17/18 0800   BP: 144/81 127/58 (P) 105/55   Pulse: (!) 124 92 (P) 99   Patient Position - Orthostatic VS: Sitting  (P) Lying       Visual Acuity      ED Medications  Medications   ondansetron (ZOFRAN) injection 4 mg (4 mg Intravenous Given 12/17/18 0200)   sodium chloride 0 9 % bolus 1,000 mL (0 mL Intravenous Stopped 12/17/18 0608)   ketorolac (TORADOL) injection 15 mg (15 mg Intravenous Given 12/17/18 0214)   ondansetron (ZOFRAN) injection 4 mg (4 mg Intravenous Given 12/17/18 0405)   morphine (PF) 4 mg/mL injection 4 mg (4 mg Intravenous Given 12/17/18 0412)   cefTRIAXone (ROCEPHIN) IVPB (premix) 1,000 mg (0 mg Intravenous Stopped 12/17/18 0608)   morphine (PF) 4 mg/mL injection 4 mg (4 mg Intravenous Given 12/17/18 0639)       Diagnostic Studies  Results Reviewed     Procedure Component Value Units Date/Time    Stool Enteric Bacterial Panel by PCR [[id number]]     Lab Status:  No result Specimen:  Stool     Urine culture [[id number]] Collected:  12/17/18 0300    Lab Status:   In process Specimen:  Urine Updated:  12/17/18 0507    Urine Microscopic [[id number]]  (Abnormal) Collected:  12/17/18 0300    Lab Status:  Final result Specimen:  Urine from Urine, Clean Catch Updated:  12/17/18 0337     RBC, UA 30-50 (A) /hpf      WBC, UA 5-55 /hpf      Epithelial Cells Occasional /hpf      Bacteria, UA Moderate (A) /hpf     UA w Reflex to Microscopic [[id number]]  (Abnormal) Collected:  12/17/18 0300    Lab Status:  Final result Specimen:  Urine from Urine, Clean Catch Updated:  12/17/18 0312     Color, UA Yelena     Clarity, UA Slightly Cloudy     Specific Gravity, UA 1 015     pH, UA 8 5     Leukocytes, UA Large (A)     Nitrite, UA Negative     Protein,  (2+) (A) mg/dl      Glucose, UA Negative mg/dl      Ketones, UA 40 (2+) (A) mg/dl      Urobilinogen, UA 0 2 E U /dl      Bilirubin, UA Interference- unable to analyze (A)     Blood, UA Large (A)    Comprehensive metabolic panel [[id number]]  (Abnormal) Collected:  12/17/18 0147    Lab Status:  Final result Specimen:  Blood from Arm, Left Updated:  12/17/18 0119     Sodium 138 mmol/L      Potassium 3 9 mmol/L      Chloride 102 mmol/L      CO2 22 mmol/L      ANION GAP 14 (H) mmol/L      BUN 13 mg/dL      Creatinine 0 88 mg/dL      Glucose 161 (H) mg/dL      Calcium 9 3 mg/dL      AST 28 U/L      ALT 56 U/L      Alkaline Phosphatase 71 U/L      Total Protein 7 7 g/dL      Albumin 4 1 g/dL      Total Bilirubin 0 80 mg/dL      eGFR 90 ml/min/1 73sq m     Narrative:         National Kidney Disease Education Program recommendations are as follows:  GFR calculation is accurate only with a steady state creatinine  Chronic Kidney disease less than 60 ml/min/1 73 sq  meters  Kidney failure less than 15 ml/min/1 73 sq  meters      Lipase [[id number]]  (Normal) Collected:  12/17/18 0147    Lab Status:  Final result Specimen:  Blood from Arm, Left Updated:  12/17/18 0212     Lipase 122 u/L     CBC and differential [[id number]]  (Abnormal) Collected:  12/17/18 0147    Lab Status:  Final result Specimen:  Blood from Arm, Left Updated:  12/17/18 0154     WBC 12 56 (H) Thousand/uL      RBC 4 74 Million/uL      Hemoglobin 14 6 g/dL      Hematocrit 43 6 %      MCV 92 fL      MCH 30 8 pg      MCHC 33 5 g/dL      RDW 11 9 %      MPV 10 7 fL      Platelets 915 Thousands/uL      nRBC 0 /100 WBCs      Neutrophils Relative 87 (H) %      Immat GRANS % 0 %      Lymphocytes Relative 9 (L) %      Monocytes Relative 3 (L) %      Eosinophils Relative 1 %      Basophils Relative 0 %      Neutrophils Absolute 10 92 (H) Thousands/µL      Immature Grans Absolute 0 03 Thousand/uL      Lymphocytes Absolute 1 16 Thousands/µL      Monocytes Absolute 0 34 Thousand/µL      Eosinophils Absolute 0 08 Thousand/µL      Basophils Absolute 0 03 Thousands/µL     POCT pregnancy, urine [[id number]]  (Normal) Resulted:  12/17/18 0153    Lab Status:  Final result Specimen:  Urine Updated:  12/17/18 0153     EXT PREG TEST UR (Ref: Negative) negative                 CT renal stone study abdomen pelvis without contrast   Final Result by Nehemias Schmidt DO (12/17 0374)      Bilateral nephrolithiasis  Right-sided nephroureteral stent in place  Mild right hydronephrosis  Mild circumferential bladder wall thickening is noted, probably exaggerated by underdistention; consider a cystitis in the appropriate clinical setting  Fatty infiltration of the liver  In the setting of abdominal pain and/or elevated liver function tests, consider steatohepatitis  Suspected dominant follicle in the right ovary measuring approximately 2 cm in size  Other findings as above  Workstation performed: [id number]                    Procedures  Procedures       Phone Contacts  ED Phone Contact    ED Course                               MDM  Number of Diagnoses or Management Options  Acute right flank pain:   Cystitis:   Diagnosis management comments: Will start pt on Rocephin for cystitis  Pt with persistent pain and vomiting  Will obs to hosp          Amount and/or Complexity of Data Reviewed  Clinical lab tests: ordered and reviewed  Tests in the radiology section of CPT®: ordered and reviewed    Risk of Complications, Morbidity, and/or Mortality  Presenting problems: moderate  Diagnostic procedures: moderate  Management options: moderate    Patient Progress  Patient progress: stable    CritCare Time    Disposition  Final diagnoses:   Cystitis   Acute right flank pain     Time reflects when diagnosis was documented in both MDM as applicable and the Disposition within this note     Time User Action Codes Description Comment    12/17/2018  4:53 AM Eliel Sullivan Add [N30 90] Cystitis     12/17/2018  4:53 AM Eliel PEREZ Add [R10 9] Acute right flank pain     12/17/2018  5:23 AM Lou Smith Modify [R10 9] Acute right flank pain       ED Disposition     ED Disposition Condition Comment    Admit  Case was discussed with Dr Alberto Tovar and the patient's admission status was agreed to be Admission Status: observation status to the service of Dr Truong Zheng   Follow-up Information    None         Current Discharge Medication List      CONTINUE these medications which have NOT CHANGED    Details   acetaminophen (TYLENOL) 500 mg tablet Take 500 mg by mouth every 6 (six) hours as needed for mild pain      ketorolac (TORADOL) 10 mg tablet Take 1 tablet (10 mg total) by mouth every 6 (six) hours as needed for moderate pain  Qty: 20 tablet, Refills: 0    Associated Diagnoses: Right flank pain      naproxen (NAPROSYN) 500 mg tablet Take 1 tablet (500 mg total) by mouth 2 (two) times a day with meals  Qty: 30 tablet, Refills: 0    Associated Diagnoses: Kidney stone           No discharge procedures on file      ED Provider  Electronically Signed by           Governor Gerry DO  12/17/18 5467

## 2018-12-17 NOTE — CONSULTS
Consult- Urology       Patient: Chen Ellis   : 1990 Sex: female   MRN: [id number]     CSN: [id number]      History of Present Illness   HPI:  Chen Ellis is a 33 y o  female who presents worsening flank pain some suprapubic discomfort status post right ESWL [facility]  for a 9 mm right renal stone admitted for flank pain spiral CT scan confirms excellent fragmentation with small stones in the right renal pelvis stent position patient has had some mild suprapubic discomfort        Review of Systems:   Constitutional:  Negative for activity change, fever, chills and diaphoresis  HENT: Negative for hearing loss and trouble swallowing  Eyes: Negative for itching and visual disturbance  Respiratory: Negative for chest tightness and shortness of breath  Cardiovascular: Negative for chest pain, edema  Gastrointestinal: Negative for abdominal distention, na abdominal pain, constipation, diarrhea, Nausea and vomiting  Genitourinary: Negative for decreased urine volume, difficulty urinating, dysuria, enuresis, frequency, hematuria and urgency  Musculoskeletal: Negative for gait problem and myalgias  Neurological: Negative for dizziness and headaches  Hematological: Does not bruise/bleed easily  Historical Information   Past Medical History:   Diagnosis Date    Kidney stone     Renal disorder     kidney stones     Past Surgical History:   Procedure Laterality Date    [place] CYSTO/URETERO W/LITHOTRIPSY &INDWELL STENT INSRT Right 10/24/2018    Procedure: CYSTOSCOPY URETEROSCOPY WITH RETROGRADE PYELOGRAM AND INSERTION STENT URETERAL;  Surgeon: Cruz Teixeira MD;  Location: [address] [place] [address];  Service: Urology    [facility] ESWL Right 2018    Procedure: Isaiah Harrell SHOCKWAVE (ESWL);   Surgeon: Cruz Teixeira MD;  Location: [facility];  Service: Urology     Social History   History   Alcohol Use    Yes     Comment: occasional     History   Drug Use No     History   Smoking Status    Former Smoker    Packs/day: 0 00    Years: 4 00    Types: Cigarettes   Smokeless Tobacco    Former User    Quit date: 5/4/2016     Family History:   Family History   Problem Relation Age of Onset    Cancer Mother     Diabetes Mother     Hypertension Mother     Thyroid disease Mother     Hyperlipidemia Father         High Triglycerides       Meds/Allergies   Prescriptions Prior to Admission   Medication    acetaminophen (TYLENOL) 500 mg tablet    ketorolac (TORADOL) 10 mg tablet    naproxen (NAPROSYN) 500 mg tablet     No Known Allergies    Objective   Vitals: /54 (BP Location: Right arm)   Pulse 97   Temp 98 8 °F (37 1 °C) (Oral)   Resp 17   Ht 5' 2" (1 575 m)   Wt 103 kg (227 lb)   LMP 12/03/2018   SpO2 98%   BMI 41 52 kg/m²     Physical Exam:  General Alert awake   Normocephalic atraumatic PERRLA  Lungs clear bilaterally  Cardiac normal S1 normal S2  Abdomen soft, flank pain  Extremities no edema    I/O last 24 hours:   In: 2100 [I V :1050; IV Piggyback:1050]  Out: -     Invasive Devices     Peripheral Intravenous Line            Peripheral IV 12/17/18 Left Antecubital less than 1 day                    Lab Results: CBC:   Lab Results   Component Value Date    WBC 12 56 (H) 12/17/2018    HGB 14 6 12/17/2018    HCT 43 6 12/17/2018    MCV 92 12/17/2018     12/17/2018    ADJUSTEDWBC 9 30 05/06/2016    MCH 30 8 12/17/2018    MCHC 33 5 12/17/2018    RDW 11 9 12/17/2018    MPV 10 7 12/17/2018    NRBC 0 12/17/2018     CMP:   Lab Results   Component Value Date     10/13/2015     12/17/2018     10/13/2015    CO2 22 12/17/2018    CO2 27 10/13/2015    ANIONGAP 14 0 10/13/2015    BUN 13 12/17/2018    BUN 12 10/13/2015    CREATININE 0 88 12/17/2018    CREATININE 0 8 10/13/2015    GLUCOSE 95 10/13/2015    CALCIUM 9 3 12/17/2018    CALCIUM 8 7 10/13/2015    AST 28 12/17/2018    AST 52 (H) 10/13/2015    ALT 56 12/17/2018     (H) 10/13/2015    ALKPHOS 71 12/17/2018    ALKPHOS 78 10/13/2015    PROT 7 7 10/13/2015    BILITOT 0 7 10/13/2015    EGFR 90 12/17/2018     Urinalysis:   Lab Results   Component Value Date    COLORU Yelena 12/17/2018    CLARITYU Slightly Cloudy 12/17/2018    SPECGRAV 1 015 12/17/2018    PHUR 8 5 12/17/2018    LEUKOCYTESUR Large (A) 12/17/2018    NITRITE Negative 12/17/2018    GLUCOSEU Negative 12/17/2018    KETONESU 40 (2+) (A) 12/17/2018    BILIRUBINUR Interference- unable to analyze (A) 12/17/2018    BLOODU Large (A) 12/17/2018     Urine Culture:   Lab Results   Component Value Date    URINECX 40,000-49,000 cfu/ml  11/05/2018     PSA: No results found for: PSA        Assessment/ Plan:  Status post right ESWL November 14th with excellent fragmentation noted on admitting CT scan possible cystitis  Plan continue antibiotics  Awaiting you   Patient's pain has subsided greatly  If urine culture negative can be discharged home  Patient has office visit scheduled for December 28th for cysto right stent removal      Chrissie Al MD

## 2018-12-17 NOTE — ASSESSMENT & PLAN NOTE
Patient with history of nephrolithiasis with right-sided hydronephrosis and had stent placed during her admission in October  She then underwent extracorporeal shockwave lithotripsy on 11/14/18  CT scan done in the ER showed bilateral nephrolithiasis with right-sided stent in place and mild right hydronephrosis    Fatty infiltration of the liver and right ovarian follicle noted  Improved with treatment of UTI  Urology input appreciated, no urological intervention, recommended antimicrobial treatment  Follow up with Urology next week for stent removal

## 2018-12-17 NOTE — LETTER
[address] [facility] [address]  Dept: [phone]    December 18, 2018     Patient: Lisa Georges   YOB: 1990   Date of Visit: 12/17/2018       To Whom it May Concern:    Lisa Georges  was seen in the hospital from 12/17/2018  to 12/18/18  If you have any questions or concerns, please don't hesitate to call           Sincerely,          Arcadio Alejandro MD

## 2018-12-17 NOTE — H&P
History and Physical - [facility] Internal Medicine    Patient Information: Deanna Quintana 33 y o  female MRN: [id number]  Unit/Bed#: 2669 [facility] Encounter: [id number]  Admitting Physician: Clint Darby DO  PCP: Neli Mejia DO  Date of Admission:  12/17/18        Hospital Problem List:     Principal Problem:    Acute right flank pain  Active Problems:    Acute cystitis with hematuria    Nausea and vomiting    Diarrhea      Assessment/Plan:    * Acute right flank pain   Assessment & Plan    Admit patient for further management  Patient with history of nephrolithiasis with right-sided hydronephrosis and had stent placed during her admission in October  She then underwent extracorporeal shockwave lithotripsy on 11/14/18  CT scan done in the ER showed bilateral nephrolithiasis with right-sided stent in place and mild right hydronephrosis  Fatty infiltration of the liver and right ovarian follicle noted  Will keep patient NPO for now  Urology consult  Place patient on IV fluids  Pain control with Tylenol and IV Toradol p r n  Acute cystitis with hematuria   Assessment & Plan    Based on UA  Patient also with leukocytosis on lab work  She received a dose of IV Rocephin in the ER which will be continued for now till urine culture result is available  Repeat lab work tomorrow     Diarrhea   Assessment & Plan    Patient denies any recent antibiotic use but did receive antibiotics on her admission October  If patient has further diarrhea, will send stool for bacterial enteric panel   Likely not due to C diff but will monitor     Nausea and vomiting   Assessment & Plan    Symptomatic treatment with IV Zofran p r n   IV fluids as noted above             VTE Prophylaxis: Pharmacologic VTE Prophylaxis contraindicated due to Patient low risk, ambulatory  / sequential compression device   Code Status: full code    Anticipated Length of Stay:  Patient will be admitted on an Observation basis with an anticipated length of stay of  1 midnights  Justification for Hospital Stay:  Right flank pain, acute cystitis    Total Time for Visit, including Counseling / Coordination of Care: 45 minutes  Greater than 50% of this total time spent on direct patient counseling and coordination of care  Chief Complaint:     Abdominal Pain (had stent placed in October for kidney stones, severe pain for x3)    of Present Illness:    Dee Jessica is a 33 y o  female who presents with complaints of right-sided back/flank pain which started about 3 nights ago but then worsened tonight  Patient states that the pain level was 10/10 in intensity at home currently pain has improved  she also reports hematuria and vaginal stabbing pain    States her last LMP was 12/3/18 and lasted about 3 days  patient reports difficulty urinating and states that she has to push to pee"  She also reports nausea, vomiting, abdominal discomfort as well  She reports 2-3 episodes of diarrhea today  Denies any recent antibiotic use, sick contacts  Patient does have history of chronic low back pain but states that this pain is different  In the ER CT scan showed bilateral nephrolithiasis with right-sided stent in place and mild right hydronephrosis    Review of Systems:    Review of Systems   Constitutional: Negative for appetite change, chills and fever  HENT: Negative for congestion and trouble swallowing  Eyes: Negative for photophobia and visual disturbance  Respiratory: Negative for cough, chest tightness and shortness of breath  Cardiovascular: Negative for chest pain and leg swelling  Gastrointestinal: Positive for abdominal pain, diarrhea, nausea and vomiting  Negative for blood in stool  Genitourinary: Positive for difficulty urinating and hematuria  Negative for frequency  Musculoskeletal: Positive for back pain  Skin: Negative for wound     Neurological: Negative for dizziness, syncope, speech difficulty, light-headedness and headaches  Hematological: Does not bruise/bleed easily  Psychiatric/Behavioral: Negative for agitation  Past Medical and Surgical History:     Past Medical History:   Diagnosis Date    Kidney stone     Renal disorder     kidney stones       Past Surgical History:   Procedure Laterality Date    NC CYSTO/URETERO W/LITHOTRIPSY &INDWELL STENT INSRT Right 10/24/2018    Procedure: CYSTOSCOPY URETEROSCOPY WITH RETROGRADE PYELOGRAM AND INSERTION STENT URETERAL;  Surgeon: Juana Tomlinson MD;  Location: [address] [place] [address];  Service: Urology    [facility] 11/14/2018    Procedure: Yvrose Siemens SHOCKWAVE (ESWL); Surgeon: Juana Tomlinson MD;  Location: [facility];  Service: Urology       Meds/Allergies:    PTA meds:   Prior to Admission Medications   Prescriptions Last Dose Informant Patient Reported?  Taking?   acetaminophen (TYLENOL) 500 mg tablet   Yes No   Sig: Take 500 mg by mouth every 6 (six) hours as needed for mild pain   ketorolac (TORADOL) 10 mg tablet   No No   Sig: Take 1 tablet (10 mg total) by mouth every 6 (six) hours as needed for moderate pain   naproxen (NAPROSYN) 500 mg tablet   No No   Sig: Take 1 tablet (500 mg total) by mouth 2 (two) times a day with meals      Facility-Administered Medications: None       Allergies: No Known Allergies  History:     Marital Status: /Civil Union     Substance Use History:   History   Alcohol Use    Yes     Comment: occasional     History   Smoking Status    Former Smoker    Packs/day: 0 00    Years: 4 00    Types: Cigarettes   Smokeless Tobacco    Former User    Quit date: 5/4/2016     History   Drug Use No       Family History:    Family History   Problem Relation Age of Onset    Cancer Mother     Diabetes Mother     Hypertension Mother     Thyroid disease Mother     Hyperlipidemia Father         High Triglycerides       Physical Exam:     Vitals:   Blood Pressure: 127/58 (12/17/18 0405)  Pulse: 92 (12/17/18 0405)  Temperature: 98 1 °F (36 7 °C) (12/17/18 0405)  Temp Source: Oral (12/17/18 0405)  Respirations: 20 (12/17/18 0405)  SpO2: 100 % (12/17/18 0405)    Physical Exam   Constitutional: She is oriented to person, place, and time  She appears well-developed and well-nourished  HENT:   Head: Normocephalic and atraumatic  Dry mucous membranes   Eyes: EOM are normal  Right eye exhibits no discharge  Left eye exhibits no discharge  No scleral icterus  Neck: Neck supple  No tracheal deviation present  Cardiovascular: Normal rate and regular rhythm  Pulmonary/Chest: Effort normal and breath sounds normal  No respiratory distress  She has no wheezes  She has no rales  Abdominal: Soft  Bowel sounds are normal  She exhibits no distension  There is tenderness (mild generalized)  There is no guarding  Musculoskeletal: She exhibits no edema    (+) Right CVA tenderness noted   Neurological: She is alert and oriented to person, place, and time  No cranial nerve deficit  Skin: Skin is dry  She is not diaphoretic  Psychiatric: She has a normal mood and affect  Lab Results: I have personally reviewed pertinent reports  Results from last 7 days  Lab Units 12/17/18  0147   WBC Thousand/uL 12 56*   HEMOGLOBIN g/dL 14 6   HEMATOCRIT % 43 6   PLATELETS Thousands/uL 375   NEUTROS PCT % 87*   LYMPHS PCT % 9*   MONOS PCT % 3*   EOS PCT % 1       Results from last 7 days  Lab Units 12/17/18  0147   POTASSIUM mmol/L 3 9   CHLORIDE mmol/L 102   CO2 mmol/L 22   BUN mg/dL 13   CREATININE mg/dL 0 88   CALCIUM mg/dL 9 3   ALK PHOS U/L 71   ALT U/L 56   AST U/L 28           Imaging: I have personally reviewed pertinent reports  Ct Renal Stone Study Abdomen Pelvis Without Contrast    Result Date: 12/17/2018  Narrative: CT ABDOMEN AND PELVIS WITHOUT IV CONTRAST - LOW DOSE RENAL STONE INDICATION:   right flank pain   COMPARISON:  CT abdomen and pelvis dated 10/23/2018 TECHNIQUE:  Low dose thin section CT examination of the abdomen and pelvis was performed without intravenous or oral contrast according to a protocol specifically designed to evaluate for urinary tract calculus  Axial, sagittal, and coronal 2D reformatted images were created from the source data and submitted for interpretation  Evaluation for pathology in the abdomen and pelvis that is unrelated to urinary tract calculi is limited  Radiation dose length product (DLP) for this visit:  1030 79 mGy-cm   This examination, like all CT scans performed in the [facility], was performed utilizing techniques to minimize radiation dose exposure, including the use of iterative reconstruction and automated exposure control  FINDINGS: RIGHT KIDNEY AND URETER: There has been interval placement of a right-sided nephroureteral stent which appears appropriately positioned  Several tiny nonobstructing stones are seen in the lower pole of the right kidney  Mild hydronephrosis  Otherwise grossly unremarkable  LEFT KIDNEY AND URETER: Several subcentimeter nonobstructing stones are seen in the lower pole of the left kidney  The left kidney otherwise appears grossly unremarkable; no hydronephrosis  URINARY BLADDER: Bladder is partially distended  Some mild circumferential bladder wall thickening is noted, probably exaggerated by underdistention  Otherwise grossly unremarkable  No significant abnormality in the visualized lung bases  Diffuse fatty infiltration of the liver with some focal sparing in the gallbladder fossa is noted  The liver otherwise appears unremarkable  Normal appearance of the gallbladder, spleen, pancreas, and bilateral adrenal glands  No intraperitoneal free air, ascites or bulky lymphadenopathy on this limited noncontrast study  Suspected dominant follicle in the right ovary measures approximately 2 0 cm in size  Bowel loops appear grossly unremarkable   The appendix is well seen and there is no evidence of acute appendicitis  No aortic aneurysm  No acute fracture or destructive osseous lesion is identified  Impression: Bilateral nephrolithiasis  Right-sided nephroureteral stent in place  Mild right hydronephrosis  Mild circumferential bladder wall thickening is noted, probably exaggerated by underdistention; consider a cystitis in the appropriate clinical setting  Fatty infiltration of the liver  In the setting of abdominal pain and/or elevated liver function tests, consider steatohepatitis  Suspected dominant follicle in the right ovary measuring approximately 2 cm in size  Other findings as above  Workstation performed: [id number]       CT renal stone study abdomen pelvis without contrast   Final Result      Bilateral nephrolithiasis  Right-sided nephroureteral stent in place  Mild right hydronephrosis  Mild circumferential bladder wall thickening is noted, probably exaggerated by underdistention; consider a cystitis in the appropriate clinical setting  Fatty infiltration of the liver  In the setting of abdominal pain and/or elevated liver function tests, consider steatohepatitis  Suspected dominant follicle in the right ovary measuring approximately 2 cm in size  Other findings as above  Workstation performed: [id number]             EKG, Pathology, and Other Studies Reviewed on Admission:   · No ekg done    [facility] Records Reviewed: Yes     ** Please Note: "This note has been constructed using a voice recognition system  Therefore there may be syntax, spelling, and/or grammatical errors   Please call if you have any questions  "**

## 2018-12-17 NOTE — ASSESSMENT & PLAN NOTE
Based on UA    Patient also with leukocytosis on lab work  Urine culture with growth of Staphylococcus saprophyticus  Treated with IV Rocephin with improvement in symptoms and resolution of leukocytosis  Remains afebrile  Will transition to Keflex for 1 more week  Follow up with Urology

## 2018-12-17 NOTE — ASSESSMENT & PLAN NOTE
Patient denies any recent antibiotic use but did receive antibiotics on her admission October  No further episodes

## 2018-12-18 VITALS
DIASTOLIC BLOOD PRESSURE: 60 MMHG | HEIGHT: 62 IN | OXYGEN SATURATION: 98 % | SYSTOLIC BLOOD PRESSURE: 101 MMHG | BODY MASS INDEX: 41.77 KG/M2 | TEMPERATURE: 98.2 F | RESPIRATION RATE: 18 BRPM | WEIGHT: 227 LBS | HEART RATE: 86 BPM

## 2018-12-18 PROBLEM — R19.7 DIARRHEA: Status: RESOLVED | Noted: 2018-12-17 | Resolved: 2018-12-18

## 2018-12-18 PROBLEM — R10.9 ACUTE RIGHT FLANK PAIN: Status: RESOLVED | Noted: 2018-12-17 | Resolved: 2018-12-18

## 2018-12-18 PROBLEM — R11.2 NAUSEA AND VOMITING: Status: RESOLVED | Noted: 2018-12-17 | Resolved: 2018-12-18

## 2018-12-18 LAB
ALBUMIN SERPL BCP-MCNC: 4.2 G/DL (ref 3.5–5)
ALP SERPL-CCNC: 63 U/L (ref 46–116)
ALT SERPL W P-5'-P-CCNC: 57 U/L (ref 12–78)
ANION GAP SERPL CALCULATED.3IONS-SCNC: 9 MMOL/L (ref 4–13)
AST SERPL W P-5'-P-CCNC: 28 U/L (ref 5–45)
BACTERIA UR CULT: ABNORMAL
BASOPHILS # BLD AUTO: 0.03 THOUSANDS/ΜL (ref 0–0.1)
BASOPHILS NFR BLD AUTO: 1 % (ref 0–1)
BILIRUB DIRECT SERPL-MCNC: 0.2 MG/DL (ref 0–0.2)
BILIRUB SERPL-MCNC: 0.7 MG/DL (ref 0.2–1)
BUN SERPL-MCNC: 9 MG/DL (ref 5–25)
CALCIUM SERPL-MCNC: 7.8 MG/DL (ref 8.3–10.1)
CHLORIDE SERPL-SCNC: 108 MMOL/L (ref 100–108)
CO2 SERPL-SCNC: 24 MMOL/L (ref 21–32)
CREAT SERPL-MCNC: 0.58 MG/DL (ref 0.6–1.3)
EOSINOPHIL # BLD AUTO: 0.21 THOUSAND/ΜL (ref 0–0.61)
EOSINOPHIL NFR BLD AUTO: 3 % (ref 0–6)
ERYTHROCYTE [DISTWIDTH] IN BLOOD BY AUTOMATED COUNT: 12.2 % (ref 11.6–15.1)
GFR SERPL CREATININE-BSD FRML MDRD: 127 ML/MIN/1.73SQ M
GLUCOSE P FAST SERPL-MCNC: 91 MG/DL (ref 65–99)
GLUCOSE SERPL-MCNC: 91 MG/DL (ref 65–140)
HCT VFR BLD AUTO: 37.3 % (ref 34.8–46.1)
HGB BLD-MCNC: 11.9 G/DL (ref 11.5–15.4)
IMM GRANULOCYTES # BLD AUTO: 0.02 THOUSAND/UL (ref 0–0.2)
IMM GRANULOCYTES NFR BLD AUTO: 0 % (ref 0–2)
LYMPHOCYTES # BLD AUTO: 2.76 THOUSANDS/ΜL (ref 0.6–4.47)
LYMPHOCYTES NFR BLD AUTO: 43 % (ref 14–44)
MCH RBC QN AUTO: 30.6 PG (ref 26.8–34.3)
MCHC RBC AUTO-ENTMCNC: 31.9 G/DL (ref 31.4–37.4)
MCV RBC AUTO: 96 FL (ref 82–98)
MONOCYTES # BLD AUTO: 0.44 THOUSAND/ΜL (ref 0.17–1.22)
MONOCYTES NFR BLD AUTO: 7 % (ref 4–12)
MRSA NOSE QL CULT: NORMAL
NEUTROPHILS # BLD AUTO: 2.92 THOUSANDS/ΜL (ref 1.85–7.62)
NEUTS SEG NFR BLD AUTO: 46 % (ref 43–75)
NRBC BLD AUTO-RTO: 0 /100 WBCS
PLATELET # BLD AUTO: 288 THOUSANDS/UL (ref 149–390)
PMV BLD AUTO: 10.8 FL (ref 8.9–12.7)
POTASSIUM SERPL-SCNC: 3.4 MMOL/L (ref 3.5–5.3)
PROT SERPL-MCNC: 7.6 G/DL (ref 6.4–8.2)
RBC # BLD AUTO: 3.89 MILLION/UL (ref 3.81–5.12)
SODIUM SERPL-SCNC: 141 MMOL/L (ref 136–145)
WBC # BLD AUTO: 6.38 THOUSAND/UL (ref 4.31–10.16)

## 2018-12-18 PROCEDURE — 85025 COMPLETE CBC W/AUTO DIFF WBC: CPT | Performed by: INTERNAL MEDICINE

## 2018-12-18 PROCEDURE — 99217 PR OBSERVATION CARE DISCHARGE MANAGEMENT: CPT | Performed by: INTERNAL MEDICINE

## 2018-12-18 PROCEDURE — 80048 BASIC METABOLIC PNL TOTAL CA: CPT | Performed by: FAMILY MEDICINE

## 2018-12-18 RX ORDER — MAGNESIUM HYDROXIDE/ALUMINUM HYDROXICE/SIMETHICONE 120; 1200; 1200 MG/30ML; MG/30ML; MG/30ML
30 SUSPENSION ORAL EVERY 4 HOURS PRN
Status: DISCONTINUED | OUTPATIENT
Start: 2018-12-18 | End: 2018-12-18 | Stop reason: HOSPADM

## 2018-12-18 RX ORDER — SACCHAROMYCES BOULARDII 250 MG
250 CAPSULE ORAL 2 TIMES DAILY
Qty: 14 CAPSULE | Refills: 0 | Status: SHIPPED | OUTPATIENT
Start: 2018-12-18 | End: 2018-12-25

## 2018-12-18 RX ORDER — POTASSIUM CHLORIDE 20 MEQ/1
20 TABLET, EXTENDED RELEASE ORAL ONCE
Status: COMPLETED | OUTPATIENT
Start: 2018-12-18 | End: 2018-12-18

## 2018-12-18 RX ORDER — FAMOTIDINE 20 MG/1
20 TABLET, FILM COATED ORAL 2 TIMES DAILY
Status: DISCONTINUED | OUTPATIENT
Start: 2018-12-18 | End: 2018-12-18 | Stop reason: HOSPADM

## 2018-12-18 RX ORDER — CEFADROXIL 500 MG/1
500 CAPSULE ORAL EVERY 12 HOURS SCHEDULED
Qty: 14 CAPSULE | Refills: 0 | Status: SHIPPED | OUTPATIENT
Start: 2018-12-19 | End: 2018-12-26

## 2018-12-18 RX ADMIN — POTASSIUM CHLORIDE 20 MEQ: 1500 TABLET, EXTENDED RELEASE ORAL at 11:17

## 2018-12-18 RX ADMIN — KETOROLAC TROMETHAMINE 15 MG: 30 INJECTION, SOLUTION INTRAMUSCULAR at 05:16

## 2018-12-18 RX ADMIN — CEFTRIAXONE 1000 MG: 1 INJECTION, SOLUTION INTRAVENOUS at 05:00

## 2018-12-18 RX ADMIN — ONDANSETRON 4 MG: 2 INJECTION INTRAMUSCULAR; INTRAVENOUS at 07:44

## 2018-12-18 RX ADMIN — SODIUM CHLORIDE 125 ML/HR: 0.9 INJECTION, SOLUTION INTRAVENOUS at 01:19

## 2018-12-18 RX ADMIN — FAMOTIDINE 20 MG: 20 TABLET ORAL at 11:17

## 2018-12-18 NOTE — SOCIAL WORK
Explained role of CM to pt  Pt states she lives with her spouse, was Triston Murguia, uses [facility] pharmacy in [facility], no discharge needs identified  CM reviewed d/c planning process including the following: patient preference for d/c planning needs, regarding understanding medications and recognizing signs and symptoms once discharged  CM also encouraged patient to follow up with all recommended appointments after discharge

## 2018-12-18 NOTE — UTILIZATION REVIEW
Initial Clinical Review    Admission: Date/Time/Statement: 12/17/18 0455    Orders Placed This Encounter   Procedures    Place in Observation (expected length of stay for this patient is less than two midnights)     Standing Status:   Standing     Number of Occurrences:   1     Order Specific Question:   Admitting Physician     Answer:   Mahalia Aase [[id number]]     Order Specific Question:   Level of Care     Answer:   Med Surg [16]         ED: Date/Time/Mode of Arrival:   ED Arrival Information     Expected Arrival Acuity Means of Arrival Escorted By Service Admission Type    - 12/17/2018 01:26 Urgent Walk-In Family Member Hospitalist Urgent    Arrival Complaint    blood in urine/vomiting          Chief Complaint:   Chief Complaint   Patient presents with    Abdominal Pain     had stent placed in October for kidney stones, severe pain for x3       History of Illness: Remi Montana is a 33 y o  female who presents with complaints of right-sided back/flank pain which started about 3 nights ago but then worsened tonight  Patient states that the pain level was 10/10 in intensity at home currently pain has improved  she also reports hematuria and vaginal stabbing pain    States her last LMP was 12/3/18 and lasted about 3 days  patient reports difficulty urinating and states that she has to push to pee"  She also reports nausea, vomiting, abdominal discomfort as well  She reports 2-3 episodes of diarrhea today  Denies any recent antibiotic use, sick contacts  Patient does have history of chronic low back pain but states that this pain is different    In the ER CT scan showed bilateral nephrolithiasis with right-sided stent in place and mild right hydronephrosis    ED Vital Signs:   ED Triage Vitals [12/17/18 0132]   Temperature Pulse Respirations Blood Pressure SpO2   98 °F (36 7 °C) (!) 124 20 144/81 99 %      Temp Source Heart Rate Source Patient Position - Orthostatic VS BP Location FiO2 (%)   Oral Monitor Sitting Right arm --      Pain Score       Worst Possible Pain        Wt Readings from Last 1 Encounters:   12/17/18 103 kg (227 lb)       Vital Signs (abnormal): Abnormal Labs/Diagnostic Test Results: k 3 4 , cr 0 58 ca 7 8 wbc 12 56>6 38 gfr 127   UA LARGE BLOOD 2+ KETONES LARGE LEUKOCYTES WBC 5-55 MODERATE BACTERIA   Ct renal  Bilateral nephrolithiasis  Right-sided nephroureteral stent in place  Mild right hydronephrosis  Mild circumferential bladder wall thickening is noted, probably exaggerated by underdistention; consider a cystitis in the appropriate clinical setting  Fatty infiltration of the liver  In the setting of abdominal pain and/or elevated liver function tests, consider steatohepatitis        Suspected dominant follicle in the right ovary measuring approximately 2 cm in size      ED Treatment:   Medication Administration from 12/17/2018 0125 to 12/17/2018 0630       Date/Time Order Dose Route Action Action by Comments     12/17/2018 0200 ondansetron (ZOFRAN) injection 4 mg 4 mg Intravenous Given Elisa Mckeon RN      12/17/2018 3881 sodium chloride 0 9 % bolus 1,000 mL 0 mL Intravenous Stopped Elisa Mckeon RN      12/17/2018 0202 sodium chloride 0 9 % bolus 1,000 mL 1,000 mL Intravenous [address] Elisa Mckeon RN      12/17/2018 0214 ketorolac (TORADOL) injection 15 mg 15 mg Intravenous Given Elisa Mckeon RN      12/17/2018 0405 ondansetron (ZOFRAN) injection 4 mg 4 mg Intravenous Given Elisa Mckeon RN      12/17/2018 0412 morphine (PF) 4 mg/mL injection 4 mg 4 mg Intravenous Given Benitez Jaeger RN      12/17/2018 1982 cefTRIAXone (ROCEPHIN) IVPB (premix) 1,000 mg 0 mg Intravenous Stopped Elisa Mckeon RN      12/17/2018 5086 cefTRIAXone (ROCEPHIN) IVPB (premix) 1,000 mg 1,000 mg Intravenous New Bag Elisa Mckeon RN           Past Medical/Surgical History:    Active Ambulatory Problems     Diagnosis Date Noted    Benign neoplasm of skin of trunk 09/04/2012    Hepatic steatosis 10/20/2015    Lumbar radiculopathy 01/02/2014    Mastodynia 09/04/2012    Nicotine dependence 09/04/2012    Tension type headache 09/04/2012    Irregular menses 02/06/2018    Nephrolithiasis with right hydronephrosis 10/23/2018     Resolved Ambulatory Problems     Diagnosis Date Noted    Abdominal pain, right upper quadrant 07/15/2015    Back pain 07/15/2015    Joint pain, knee 09/04/2012    Low back pain 09/04/2012    Morbid obesity ([address] ) 09/04/2012    Chronic fatigue 02/06/2018    Encounter for cervical Pap smear with pelvic exam 03/14/2018    UTI (urinary tract infection) 10/23/2018    Hyponatremia 10/23/2018    Hypokalemia 10/24/2018    Hydronephrosis with urinary obstruction due to renal calculus      Past Medical History:   Diagnosis Date    Kidney stone     Renal disorder        Admitting Diagnosis: Blood in urine [R31 9]  Cystitis [N30 90]  Acute right flank pain [R10 9]    Age/Sex: 33 y o  female    Assessment/Plan:   Acute right flank pain   Assessment & Plan     Admit patient for further management  Patient with history of nephrolithiasis with right-sided hydronephrosis and had stent placed during her admission in October  She then underwent extracorporeal shockwave lithotripsy on 11/14/18  CT scan done in the ER showed bilateral nephrolithiasis with right-sided stent in place and mild right hydronephrosis  Fatty infiltration of the liver and right ovarian follicle noted  Will keep patient NPO for now  Urology consult  Place patient on IV fluids  Pain control with Tylenol and IV Toradol p r n       Acute cystitis with hematuria   Assessment & Plan     Based on UA    Patient also with leukocytosis on lab work  She received a dose of IV Rocephin in the ER which will be continued for now till urine culture result is available  Repeat lab work tomorrow      Diarrhea   Assessment & Plan     Patient denies any recent antibiotic use but did receive antibiotics on her admission October  If patient has further diarrhea, will send stool for bacterial enteric panel  Likely not due to C diff but will monitor      Nausea and vomiting   Assessment & Plan     Symptomatic treatment with IV Zofran p r n   IV fluids as noted above       VTE Prophylaxis: Pharmacologic VTE Prophylaxis contraindicated due to Patient low risk, ambulatory  / sequential compression device   Code Status: full code   Anticipated Length of Stay:  Patient will be admitted on an Observation basis with an anticipated length of stay of  1 midnights     Justification for Hospital Stay:  Right flank pain, acute cystitis    UROLOGY CONSULT  Assessment/ Plan:  Status post right ESWL November 14th with excellent fragmentation noted on admitting CT scan possible cystitis  Plan continue antibiotics  Awaiting you CS  Patient's pain has subsided greatly  If urine culture negative can be discharged home  Patient has office visit scheduled for December 28th for cysto right stent removal     Admission Orders:  OBSERVATION  CBC DIFF  STOOL ENTERIC BACTERIAL PANEL  CONSULT UROLOGY  Scheduled Meds:   Current Facility-Administered Medications:  acetaminophen 650 mg Oral Q6H PRN Lou Revankar, DO    cefTRIAXone 1,000 mg Intravenous Q24H Lou Revankar, DO Last Rate: 1,000 mg (12/18/18 0500)   ketorolac 15 mg Intravenous Q6H PRN Lou Revankar, DO    ondansetron 4 mg Intravenous Q6H PRN Lou Revankar, DO    sodium chloride 125 mL/hr Intravenous Continuous Lou Revankar, DO Last Rate: 125 mL/hr (12/18/18 0119)     Continuous Infusions:   sodium chloride 125 mL/hr Last Rate: 125 mL/hr (12/18/18 0119)     PRN Meds:   acetaminophen    ketorolac    ondansetron

## 2018-12-18 NOTE — NURSING NOTE
Patient education and after visit summary was reviewed with patient   IV removed-tolerated well, U/s prescription and instructions reviewed with patient

## 2018-12-18 NOTE — UTILIZATION REVIEW
Notification of Inpatient Admission/Inpatient Authorization Request  This is a Notification of Inpatient Admission/Request for Inpatient Authorization for our facility [address] [place] [address]  Be advised that this patient was admitted to our facility under Inpatient Status  Please contact the Utilization Review Department where the patient is receiving care services for additional admission information  Place of Service Code: 24   Place of Service Name: Inpatient Hospital  Presentation Date & Time: 12/17/2018  1:28 AM  Inpatient Admission Date & Time: N/A N/A  Discharge Date & Time: No discharge date for patient encounter  Discharge Disposition (if discharged): Home/Self Care  Attending Physician: Attending Physician:  Dr Cruz Turner  NPI: [id number]   Address: [place] [address]   Phone Number: [phone]   Fax Number: [phone]   Admission Orders     Ordered        12/17/18 0454  Place in Observation (expected length of stay for this patient is less than two midnights)  Once               Facility: [address] [place] [address]  Address: [address] [place] [address] [place] [address]  Phone: [phone]  Tax ID: [id number]  NPI: [id number]  Medicare ID: [id number]    [address]  Utilization Review Department  Phone: [phone]; Fax [phone]  ATTENTION: Please call with any questions or concerns to [phone]  and carefully listen to the prompts so that you are directed to the right person  Send all requests for admission clinical reviews, approved or denied determinations and any other requests to fax [phone]   All voicemails are confidential

## 2018-12-18 NOTE — DISCHARGE SUMMARY
Discharge Summary - [address] [facility]    Patient Information: Mica Ayoub 33 y o  female MRN: [id number]  Unit/Bed#: 2669 [facility] Encounter: [id number]    Discharging Physician / Practitioner: Mike Dai MD  PCP: Allen Stockton DO  Admission Date: 12/17/2018  Discharge Date: 12/18/18    Reason for Admission: Abdominal Pain (had stent placed in October for kidney stones, severe pain for x3)      Discharge Diagnoses:     Principal Problem (Resolved):    Acute right flank pain  Active Problems:    Acute cystitis with hematuria    Hepatic steatosis  Resolved Problems:    Nausea and vomiting    Diarrhea        Acute cystitis with hematuria   Assessment & Plan    Based on UA  Patient also with leukocytosis on lab work  Urine culture with growth of Staphylococcus saprophyticus  Treated with IV Rocephin with improvement in symptoms and resolution of leukocytosis  Remains afebrile  Will transition to Keflex for 1 more week  Follow up with Urology     * Acute right flank pain-resolved as of 12/18/2018   Assessment & Plan    Patient with history of nephrolithiasis with right-sided hydronephrosis and had stent placed during her admission in October  She then underwent extracorporeal shockwave lithotripsy on 11/14/18  CT scan done in the ER showed bilateral nephrolithiasis with right-sided stent in place and mild right hydronephrosis    Fatty infiltration of the liver and right ovarian follicle noted  Improved with treatment of UTI  Urology input appreciated, no urological intervention, recommended antimicrobial treatment  Follow up with Urology next week for stent removal     Hepatic steatosis   Assessment & Plan    Noted on CT scan  Patient did complain of some epigastric and right upper quadrant pain  LFT within normal limit  Recommended outpatient ultrasound      Diarrhea-resolved as of 12/18/2018   Assessment & Plan    Patient denies any recent antibiotic use but did receive antibiotics on her admission October  No further episodes     Nausea and vomiting-resolved as of 12/18/2018   Assessment & Plan    Symptomatic treatment with IV Zofran p r n   IV fluids as noted above         Consultations During Hospital Stay:  [employer]    Procedures Performed:     · None    Significant Findings:     · Urine culture with growth of Staphylococcus saprophyticus  · Pregnancy test negative    Imaging while in hospital:    Xr Abdomen 1 View Kub    Result Date: 12/17/2018  Narrative: ABDOMEN INDICATION:   N20 0: Calculus of kidney  COMPARISON:  Abdominal radiographs 11/5/2018 VIEWS:  AP supine FINDINGS: Right ureteral stent remains in place  Previously seen right renal calculus is no longer identified  No radiopaque renal calculi seen  No radiopaque ureteral calculi identified  Nonobstructive bowel gas pattern  No acute osseous abnormality is seen  Impression: Previously seen 14 mm right renal calculus is no longer identified  Please see follow-up abdominal CT report  Workstation performed: [id number]     Ct Renal Stone Study Abdomen Pelvis Without Contrast    Result Date: 12/17/2018  Narrative: CT ABDOMEN AND PELVIS WITHOUT IV CONTRAST - LOW DOSE RENAL STONE INDICATION:   right flank pain  COMPARISON:  CT abdomen and pelvis dated 10/23/2018 TECHNIQUE:  Low dose thin section CT examination of the abdomen and pelvis was performed without intravenous or oral contrast according to a protocol specifically designed to evaluate for urinary tract calculus  Axial, sagittal, and coronal 2D reformatted images were created from the source data and submitted for interpretation  Evaluation for pathology in the abdomen and pelvis that is unrelated to urinary tract calculi is limited  Radiation dose length product (DLP) for this visit:  1030 79 mGy-cm     This examination, like all CT scans performed in the [facility], was performed utilizing techniques to minimize radiation dose exposure, including the use of iterative reconstruction and automated exposure control  FINDINGS: RIGHT KIDNEY AND URETER: There has been interval placement of a right-sided nephroureteral stent which appears appropriately positioned  Several tiny nonobstructing stones are seen in the lower pole of the right kidney  Mild hydronephrosis  Otherwise grossly unremarkable  LEFT KIDNEY AND URETER: Several subcentimeter nonobstructing stones are seen in the lower pole of the left kidney  The left kidney otherwise appears grossly unremarkable; no hydronephrosis  URINARY BLADDER: Bladder is partially distended  Some mild circumferential bladder wall thickening is noted, probably exaggerated by underdistention  Otherwise grossly unremarkable  No significant abnormality in the visualized lung bases  Diffuse fatty infiltration of the liver with some focal sparing in the gallbladder fossa is noted  The liver otherwise appears unremarkable  Normal appearance of the gallbladder, spleen, pancreas, and bilateral adrenal glands  No intraperitoneal free air, ascites or bulky lymphadenopathy on this limited noncontrast study  Suspected dominant follicle in the right ovary measures approximately 2 0 cm in size  Bowel loops appear grossly unremarkable  The appendix is well seen and there is no evidence of acute appendicitis  No aortic aneurysm  No acute fracture or destructive osseous lesion is identified  Impression: Bilateral nephrolithiasis  Right-sided nephroureteral stent in place  Mild right hydronephrosis  Mild circumferential bladder wall thickening is noted, probably exaggerated by underdistention; consider a cystitis in the appropriate clinical setting  Fatty infiltration of the liver  In the setting of abdominal pain and/or elevated liver function tests, consider steatohepatitis  Suspected dominant follicle in the right ovary measuring approximately 2 cm in size  Other findings as above   Workstation performed: [id number] Incidental Findings:   · Imaging as above    Test Results Pending at Discharge (will require follow up):   · As per After Visit Summary     Outpatient Tests Requested:  · Ultrasound of the liver    Complications:  None    Hospital Course:     Radha Dalton is a 33 y o  female patient who originally presented to the hospital on 12/17/2018 due to  complaints of right-sided back/flank pain which started about 3 nights ago but then worsened tonight  Patient states that the pain level was 10/10 in intensity at home currently pain has improved  she also reports hematuria and vaginal stabbing pain    States her last LMP was 12/3/18 and lasted about 3 days  patient reports difficulty urinating and states that she has to push to pee"  She also reports nausea, vomiting, abdominal discomfort as well  She reports 2-3 episodes of diarrhea today  Denies any recent antibiotic use, sick contacts  Patient does have history of chronic low back pain but states that this pain is different  In the ER CT scan showed bilateral nephrolithiasis with right-sided stent in place and mild right hydronephrosis  Patient was noted to have pyuria bacteriuria and leukocytosis  Patient was started on IV Rocephin with concerns of UTI  Patient remained afebrile  Patient was seen by Urology, recommended treatment for UTI and outpatient follow-up for next week for stent removal   Patient's symptomatic improved  Leukocytosis resolved  Flank pain improved  Patient did not have any nausea vomiting or diarrhea but patient did have dyspepsia, epigastric/right upper quadrant pain which improved with symptomatic management  Urine culture showed growth of coagulase-negative Staph and antibiotics were transitioned to oral for 1 more week  Plan was communicated for Urology for follow-up and stent removal   Patient was also advised to monitor GI symptoms and have ultrasound of right lower quadrant as outpatient          Please see above list of diagnoses and related plan for additional information  Condition at Discharge: stable     Discharge Day Visit / Exam:     Subjective:  Reports that her pain is resolved  Improvement with urination  No further nausea vomiting or diarrhea      Vitals: Blood Pressure: 101/60 (12/18/18 1346)  Pulse: 86 (12/18/18 1346)  Temperature: 98 2 °F (36 8 °C) (12/18/18 1346)  Temp Source: Oral (12/18/18 1346)  Respirations: 18 (12/18/18 1346)  Height: 5' 2" (157 5 cm) (12/17/18 0800)  Weight - Scale: 103 kg (227 lb) (12/17/18 0800)  SpO2: 98 % (12/18/18 1346)  Exam:   Physical Exam   Constitutional: She is oriented to person, place, and time  She appears well-developed  No distress  Obese   HENT:   Head: Normocephalic and atraumatic  Nose: Nose normal    Eyes: Pupils are equal, round, and reactive to light  Conjunctivae are normal    Neck: Normal range of motion  Neck supple  Cardiovascular: Normal rate, regular rhythm and normal heart sounds  Pulmonary/Chest: Effort normal and breath sounds normal  No respiratory distress  She has no wheezes  She has no rales  Abdominal: Soft  Bowel sounds are normal  She exhibits no distension  There is no tenderness  There is no rebound and no guarding  Musculoskeletal: Normal range of motion  She exhibits no edema  Neurological: She is alert and oriented to person, place, and time  No cranial nerve deficit  Skin: Skin is warm and dry  No rash noted  Discharge instructions/Information to patient and family:(Discharge Medications and Follow up):   See after visit summary for information provided to patient and family  Provisions for Follow-Up Care:  See after visit summary for information related to follow-up care and any pertinent home health orders  Disposition: Home    Planned Readmission:  No     Discharge Statement:  I spent 35 minutes discharging the patient  This time was spent on the day of discharge   I had direct contact with the patient on the day of discharge  Greater than 50% of the total time was spent examining patient, answering all patient questions, arranging and discussing plan of care with patient as well as directly providing post-discharge instructions  Additional time then spent on discharge activities  Coordinated with Dr Wendi Richter over the phone at the time of discharge  Discharge Medications:  See after visit summary for reconciled discharge medications provided to patient and family  ** Please Note: "This note has been constructed using a voice recognition system  Therefore there may be syntax, spelling, and/or grammatical errors   Please call if you have any questions  "**

## 2018-12-18 NOTE — ASSESSMENT & PLAN NOTE
Noted on CT scan  Patient did complain of some epigastric and right upper quadrant pain  LFT within normal limit  Recommended outpatient ultrasound

## 2018-12-18 NOTE — PLAN OF CARE
Problem: DISCHARGE PLANNING - CARE MANAGEMENT  Goal: Discharge to post-acute care or home with appropriate resources  INTERVENTIONS:  - Conduct assessment to determine patient/family and health care team treatment goals, and need for post-acute services based on payer coverage, community resources, and patient preferences, and barriers to discharge  - Address psychosocial, clinical, and financial barriers to discharge as identified in assessment in conjunction with the patient/family and health care team  - Arrange appropriate level of post-acute services according to patient's   needs and preference and payer coverage in collaboration with the physician and health care team  - Communicate with and update the patient/family, physician, and health care team regarding progress on the discharge plan  - Arrange appropriate transportation to post-acute venues  Patient will discharge home with spouse independently when cleared for discharge     Outcome: Adequate for Discharge

## 2018-12-19 ENCOUNTER — TRANSITIONAL CARE MANAGEMENT (OUTPATIENT)
Dept: FAMILY MEDICINE CLINIC | Facility: CLINIC | Age: 28
End: 2018-12-19

## 2019-05-29 ENCOUNTER — APPOINTMENT (EMERGENCY)
Dept: RADIOLOGY | Facility: HOSPITAL | Age: 29
End: 2019-05-29
Payer: COMMERCIAL

## 2019-05-29 ENCOUNTER — HOSPITAL ENCOUNTER (EMERGENCY)
Facility: HOSPITAL | Age: 29
Discharge: HOME | End: 2019-05-29
Attending: EMERGENCY MEDICINE | Admitting: EMERGENCY MEDICINE
Payer: COMMERCIAL

## 2019-05-29 VITALS
SYSTOLIC BLOOD PRESSURE: 128 MMHG | DIASTOLIC BLOOD PRESSURE: 74 MMHG | HEART RATE: 86 BPM | TEMPERATURE: 99.3 F | OXYGEN SATURATION: 99 % | RESPIRATION RATE: 16 BRPM

## 2019-05-29 DIAGNOSIS — N12 PYELONEPHRITIS: Primary | ICD-10-CM

## 2019-05-29 LAB
AMORPH URATE CRY URNS QL MICRO: ABNORMAL /HPF
ANION GAP SERPL CALCULATED.3IONS-SCNC: 9 MMOL/L (ref 4–13)
BACTERIA UR QL AUTO: ABNORMAL /HPF
BASOPHILS # BLD AUTO: 0.06 THOUSANDS/ΜL (ref 0–0.1)
BASOPHILS NFR BLD AUTO: 1 % (ref 0–1)
BILIRUB UR QL STRIP: NEGATIVE
BUN SERPL-MCNC: 13 MG/DL (ref 5–25)
CALCIUM SERPL-MCNC: 9.3 MG/DL (ref 8.3–10.1)
CHLORIDE SERPL-SCNC: 101 MMOL/L (ref 100–108)
CLARITY UR: ABNORMAL
CO2 SERPL-SCNC: 28 MMOL/L (ref 21–32)
COLOR UR: YELLOW
CREAT SERPL-MCNC: 0.71 MG/DL (ref 0.6–1.3)
EOSINOPHIL # BLD AUTO: 0.14 THOUSAND/ΜL (ref 0–0.61)
EOSINOPHIL NFR BLD AUTO: 1 % (ref 0–6)
ERYTHROCYTE [DISTWIDTH] IN BLOOD BY AUTOMATED COUNT: 12.1 % (ref 11.6–15.1)
EXT PREG TEST URINE: NEGATIVE
GFR SERPL CREATININE-BSD FRML MDRD: 117 ML/MIN/1.73SQ M
GLUCOSE SERPL-MCNC: 98 MG/DL (ref 65–140)
GLUCOSE UR STRIP-MCNC: NEGATIVE MG/DL
HCT VFR BLD AUTO: 45.8 % (ref 34.8–46.1)
HGB BLD-MCNC: 15 G/DL (ref 11.5–15.4)
HGB UR QL STRIP.AUTO: ABNORMAL
IMM GRANULOCYTES # BLD AUTO: 0.02 THOUSAND/UL (ref 0–0.2)
IMM GRANULOCYTES NFR BLD AUTO: 0 % (ref 0–2)
KETONES UR STRIP-MCNC: NEGATIVE MG/DL
LEUKOCYTE ESTERASE UR QL STRIP: ABNORMAL
LYMPHOCYTES # BLD AUTO: 3.68 THOUSANDS/ΜL (ref 0.6–4.47)
LYMPHOCYTES NFR BLD AUTO: 37 % (ref 14–44)
MCH RBC QN AUTO: 30.4 PG (ref 26.8–34.3)
MCHC RBC AUTO-ENTMCNC: 32.8 G/DL (ref 31.4–37.4)
MCV RBC AUTO: 93 FL (ref 82–98)
MONOCYTES # BLD AUTO: 0.37 THOUSAND/ΜL (ref 0.17–1.22)
MONOCYTES NFR BLD AUTO: 4 % (ref 4–12)
NEUTROPHILS # BLD AUTO: 5.58 THOUSANDS/ΜL (ref 1.85–7.62)
NEUTS SEG NFR BLD AUTO: 57 % (ref 43–75)
NITRITE UR QL STRIP: POSITIVE
NON-SQ EPI CELLS URNS QL MICRO: ABNORMAL /HPF
NRBC BLD AUTO-RTO: 0 /100 WBCS
PH UR STRIP.AUTO: 7 [PH]
PLATELET # BLD AUTO: 428 THOUSANDS/UL (ref 149–390)
PMV BLD AUTO: 10.7 FL (ref 8.9–12.7)
POTASSIUM SERPL-SCNC: 4 MMOL/L (ref 3.5–5.3)
PROT UR STRIP-MCNC: NEGATIVE MG/DL
RBC # BLD AUTO: 4.93 MILLION/UL (ref 3.81–5.12)
RBC #/AREA URNS AUTO: ABNORMAL /HPF
SODIUM SERPL-SCNC: 138 MMOL/L (ref 136–145)
SP GR UR STRIP.AUTO: 1.01 (ref 1–1.03)
UROBILINOGEN UR QL STRIP.AUTO: 0.2 E.U./DL
WBC # BLD AUTO: 9.85 THOUSAND/UL (ref 4.31–10.16)
WBC #/AREA URNS AUTO: ABNORMAL /HPF

## 2019-05-29 PROCEDURE — 74176 CT ABD & PELVIS W/O CONTRAST: CPT

## 2019-05-29 PROCEDURE — 96375 TX/PRO/DX INJ NEW DRUG ADDON: CPT

## 2019-05-29 PROCEDURE — 85025 COMPLETE CBC W/AUTO DIFF WBC: CPT | Performed by: PHYSICIAN ASSISTANT

## 2019-05-29 PROCEDURE — 99284 EMERGENCY DEPT VISIT MOD MDM: CPT

## 2019-05-29 PROCEDURE — 87186 SC STD MICRODIL/AGAR DIL: CPT | Performed by: PHYSICIAN ASSISTANT

## 2019-05-29 PROCEDURE — 80048 BASIC METABOLIC PNL TOTAL CA: CPT | Performed by: PHYSICIAN ASSISTANT

## 2019-05-29 PROCEDURE — 81001 URINALYSIS AUTO W/SCOPE: CPT | Performed by: PHYSICIAN ASSISTANT

## 2019-05-29 PROCEDURE — 87086 URINE CULTURE/COLONY COUNT: CPT | Performed by: PHYSICIAN ASSISTANT

## 2019-05-29 PROCEDURE — 96374 THER/PROPH/DIAG INJ IV PUSH: CPT

## 2019-05-29 PROCEDURE — 87077 CULTURE AEROBIC IDENTIFY: CPT | Performed by: PHYSICIAN ASSISTANT

## 2019-05-29 PROCEDURE — 81025 URINE PREGNANCY TEST: CPT | Performed by: PHYSICIAN ASSISTANT

## 2019-05-29 PROCEDURE — 96361 HYDRATE IV INFUSION ADD-ON: CPT

## 2019-05-29 PROCEDURE — 36415 COLL VENOUS BLD VENIPUNCTURE: CPT | Performed by: PHYSICIAN ASSISTANT

## 2019-05-29 RX ORDER — CEPHALEXIN 500 MG/1
500 CAPSULE ORAL 2 TIMES DAILY
Qty: 14 CAPSULE | Refills: 0 | Status: SHIPPED | OUTPATIENT
Start: 2019-05-29 | End: 2019-06-05

## 2019-05-29 RX ORDER — NAPROXEN 500 MG/1
500 TABLET ORAL 2 TIMES DAILY WITH MEALS
Qty: 10 TABLET | Refills: 0 | Status: SHIPPED | OUTPATIENT
Start: 2019-05-29 | End: 2020-11-17

## 2019-05-29 RX ORDER — KETOROLAC TROMETHAMINE 30 MG/ML
15 INJECTION, SOLUTION INTRAMUSCULAR; INTRAVENOUS ONCE
Status: COMPLETED | OUTPATIENT
Start: 2019-05-29 | End: 2019-05-29

## 2019-05-29 RX ORDER — ONDANSETRON 2 MG/ML
4 INJECTION INTRAMUSCULAR; INTRAVENOUS ONCE
Status: COMPLETED | OUTPATIENT
Start: 2019-05-29 | End: 2019-05-29

## 2019-05-29 RX ADMIN — ONDANSETRON 4 MG: 2 INJECTION INTRAMUSCULAR; INTRAVENOUS at 11:41

## 2019-05-29 RX ADMIN — KETOROLAC TROMETHAMINE 15 MG: 30 INJECTION, SOLUTION INTRAMUSCULAR at 11:43

## 2019-05-29 RX ADMIN — SODIUM CHLORIDE 1000 ML: 0.9 INJECTION, SOLUTION INTRAVENOUS at 11:40

## 2019-05-30 ENCOUNTER — VBI (OUTPATIENT)
Dept: FAMILY MEDICINE CLINIC | Facility: CLINIC | Age: 29
End: 2019-05-30

## 2019-05-31 LAB — BACTERIA UR CULT: ABNORMAL

## 2020-11-16 ENCOUNTER — APPOINTMENT (EMERGENCY)
Dept: RADIOLOGY | Facility: HOSPITAL | Age: 30
End: 2020-11-16
Payer: COMMERCIAL

## 2020-11-16 ENCOUNTER — HOSPITAL ENCOUNTER (EMERGENCY)
Facility: HOSPITAL | Age: 30
Discharge: HOME | End: 2020-11-17
Attending: EMERGENCY MEDICINE | Admitting: EMERGENCY MEDICINE
Payer: COMMERCIAL

## 2020-11-16 DIAGNOSIS — R10.2 PELVIC PAIN: Primary | ICD-10-CM

## 2020-11-16 DIAGNOSIS — N83.209 OVARIAN CYST: ICD-10-CM

## 2020-11-16 LAB
ABO GROUP BLD: NORMAL
ALBUMIN SERPL BCP-MCNC: 3.5 G/DL (ref 3.5–5)
ALP SERPL-CCNC: 73 U/L (ref 46–116)
ALT SERPL W P-5'-P-CCNC: 73 U/L (ref 12–78)
ANION GAP SERPL CALCULATED.3IONS-SCNC: 11 MMOL/L (ref 4–13)
APTT PPP: 25 SECONDS (ref 23–37)
AST SERPL W P-5'-P-CCNC: 41 U/L (ref 5–45)
B-HCG SERPL-ACNC: <2 MIU/ML
BASOPHILS # BLD MANUAL: 0.14 THOUSAND/UL (ref 0–0.1)
BASOPHILS NFR MAR MANUAL: 1 % (ref 0–1)
BILIRUB SERPL-MCNC: 0.3 MG/DL (ref 0.2–1)
BILIRUB UR QL STRIP: NEGATIVE
BLD GP AB SCN SERPL QL: NEGATIVE
BUN SERPL-MCNC: 12 MG/DL (ref 5–25)
CALCIUM SERPL-MCNC: 9.1 MG/DL (ref 8.3–10.1)
CHLORIDE SERPL-SCNC: 101 MMOL/L (ref 100–108)
CLARITY UR: NORMAL
CO2 SERPL-SCNC: 26 MMOL/L (ref 21–32)
COLOR UR: NORMAL
CREAT SERPL-MCNC: 0.86 MG/DL (ref 0.6–1.3)
EOSINOPHIL # BLD MANUAL: 0.41 THOUSAND/UL (ref 0–0.4)
EOSINOPHIL NFR BLD MANUAL: 3 % (ref 0–6)
ERYTHROCYTE [DISTWIDTH] IN BLOOD BY AUTOMATED COUNT: 11.9 % (ref 11.6–15.1)
EXT PREG TEST URINE: NEGATIVE
EXT. CONTROL ED NAV: NORMAL
GFR SERPL CREATININE-BSD FRML MDRD: 92 ML/MIN/1.73SQ M
GLUCOSE SERPL-MCNC: 169 MG/DL (ref 65–140)
GLUCOSE UR STRIP-MCNC: NEGATIVE MG/DL
HCT VFR BLD AUTO: 44 % (ref 34.8–46.1)
HGB BLD-MCNC: 14.4 G/DL (ref 11.5–15.4)
HGB UR QL STRIP.AUTO: NEGATIVE
INR PPP: 0.95 (ref 0.84–1.19)
KETONES UR STRIP-MCNC: NEGATIVE MG/DL
LACTATE SERPL-SCNC: 1.8 MMOL/L (ref 0.5–2)
LEUKOCYTE ESTERASE UR QL STRIP: NEGATIVE
LG PLATELETS BLD QL SMEAR: PRESENT
LIPASE SERPL-CCNC: 155 U/L (ref 73–393)
LYMPHOCYTES # BLD AUTO: 49 % (ref 14–44)
LYMPHOCYTES # BLD AUTO: 6.75 THOUSAND/UL (ref 0.6–4.47)
MCH RBC QN AUTO: 30.5 PG (ref 26.8–34.3)
MCHC RBC AUTO-ENTMCNC: 32.7 G/DL (ref 31.4–37.4)
MCV RBC AUTO: 93 FL (ref 82–98)
MONOCYTES # BLD AUTO: 0.14 THOUSAND/UL (ref 0–1.22)
MONOCYTES NFR BLD: 1 % (ref 4–12)
NEUTROPHILS # BLD MANUAL: 6.34 THOUSAND/UL (ref 1.85–7.62)
NEUTS BAND NFR BLD MANUAL: 1 % (ref 0–8)
NEUTS SEG NFR BLD AUTO: 45 % (ref 43–75)
NITRITE UR QL STRIP: NEGATIVE
NRBC BLD AUTO-RTO: 0 /100 WBCS
PH UR STRIP.AUTO: 8.5 [PH]
PLATELET # BLD AUTO: 349 THOUSANDS/UL (ref 149–390)
PLATELET BLD QL SMEAR: ADEQUATE
PLATELET CLUMP BLD QL SMEAR: PRESENT
PMV BLD AUTO: 10.7 FL (ref 8.9–12.7)
POTASSIUM SERPL-SCNC: 3.6 MMOL/L (ref 3.5–5.3)
PROT SERPL-MCNC: 6.9 G/DL (ref 6.4–8.2)
PROT UR STRIP-MCNC: NEGATIVE MG/DL
PROTHROMBIN TIME: 12.6 SECONDS (ref 11.6–14.5)
RBC # BLD AUTO: 4.72 MILLION/UL (ref 3.81–5.12)
RBC MORPH BLD: PRESENT
RH BLD: POSITIVE
SMUDGE CELLS BLD QL SMEAR: PRESENT
SODIUM SERPL-SCNC: 138 MMOL/L (ref 136–145)
SP GR UR STRIP.AUTO: 1.01 (ref 1–1.03)
SPECIMEN EXPIRATION DATE: NORMAL
TOTAL CELLS COUNTED SPEC: 100
UROBILINOGEN UR QL STRIP.AUTO: 1 E.U./DL
WBC # BLD AUTO: 13.78 THOUSAND/UL (ref 4.31–10.16)

## 2020-11-16 PROCEDURE — 96374 THER/PROPH/DIAG INJ IV PUSH: CPT

## 2020-11-16 PROCEDURE — 86900 BLOOD TYPING SEROLOGIC ABO: CPT | Performed by: EMERGENCY MEDICINE

## 2020-11-16 PROCEDURE — 96361 HYDRATE IV INFUSION ADD-ON: CPT

## 2020-11-16 PROCEDURE — 76830 TRANSVAGINAL US NON-OB: CPT

## 2020-11-16 PROCEDURE — 99284 EMERGENCY DEPT VISIT MOD MDM: CPT

## 2020-11-16 PROCEDURE — 81003 URINALYSIS AUTO W/O SCOPE: CPT | Performed by: EMERGENCY MEDICINE

## 2020-11-16 PROCEDURE — 85730 THROMBOPLASTIN TIME PARTIAL: CPT | Performed by: EMERGENCY MEDICINE

## 2020-11-16 PROCEDURE — 85027 COMPLETE CBC AUTOMATED: CPT | Performed by: EMERGENCY MEDICINE

## 2020-11-16 PROCEDURE — 86901 BLOOD TYPING SEROLOGIC RH(D): CPT | Performed by: EMERGENCY MEDICINE

## 2020-11-16 PROCEDURE — 81025 URINE PREGNANCY TEST: CPT | Performed by: EMERGENCY MEDICINE

## 2020-11-16 PROCEDURE — 96375 TX/PRO/DX INJ NEW DRUG ADDON: CPT

## 2020-11-16 PROCEDURE — 84702 CHORIONIC GONADOTROPIN TEST: CPT | Performed by: EMERGENCY MEDICINE

## 2020-11-16 PROCEDURE — 36415 COLL VENOUS BLD VENIPUNCTURE: CPT | Performed by: EMERGENCY MEDICINE

## 2020-11-16 PROCEDURE — 86850 RBC ANTIBODY SCREEN: CPT | Performed by: EMERGENCY MEDICINE

## 2020-11-16 PROCEDURE — 85007 BL SMEAR W/DIFF WBC COUNT: CPT | Performed by: EMERGENCY MEDICINE

## 2020-11-16 PROCEDURE — 76856 US EXAM PELVIC COMPLETE: CPT

## 2020-11-16 PROCEDURE — 83605 ASSAY OF LACTIC ACID: CPT | Performed by: EMERGENCY MEDICINE

## 2020-11-16 PROCEDURE — 80053 COMPREHEN METABOLIC PANEL: CPT | Performed by: EMERGENCY MEDICINE

## 2020-11-16 PROCEDURE — 85610 PROTHROMBIN TIME: CPT | Performed by: EMERGENCY MEDICINE

## 2020-11-16 PROCEDURE — 83690 ASSAY OF LIPASE: CPT | Performed by: EMERGENCY MEDICINE

## 2020-11-16 RX ORDER — ONDANSETRON 2 MG/ML
4 INJECTION INTRAMUSCULAR; INTRAVENOUS ONCE
Status: COMPLETED | OUTPATIENT
Start: 2020-11-16 | End: 2020-11-16

## 2020-11-16 RX ORDER — ONDANSETRON 2 MG/ML
INJECTION INTRAMUSCULAR; INTRAVENOUS
Status: COMPLETED
Start: 2020-11-16 | End: 2020-11-16

## 2020-11-16 RX ORDER — KETOROLAC TROMETHAMINE 30 MG/ML
15 INJECTION, SOLUTION INTRAMUSCULAR; INTRAVENOUS ONCE
Status: COMPLETED | OUTPATIENT
Start: 2020-11-16 | End: 2020-11-16

## 2020-11-16 RX ORDER — MORPHINE SULFATE 4 MG/ML
4 INJECTION, SOLUTION INTRAMUSCULAR; INTRAVENOUS ONCE
Status: COMPLETED | OUTPATIENT
Start: 2020-11-16 | End: 2020-11-16

## 2020-11-16 RX ADMIN — MORPHINE SULFATE 4 MG: 4 INJECTION INTRAVENOUS at 22:44

## 2020-11-16 RX ADMIN — ONDANSETRON 4 MG: 2 INJECTION INTRAMUSCULAR; INTRAVENOUS at 22:44

## 2020-11-16 RX ADMIN — SODIUM CHLORIDE 1000 ML: 0.9 INJECTION, SOLUTION INTRAVENOUS at 22:40

## 2020-11-16 RX ADMIN — KETOROLAC TROMETHAMINE 15 MG: 30 INJECTION, SOLUTION INTRAMUSCULAR at 22:46

## 2020-11-17 ENCOUNTER — APPOINTMENT (EMERGENCY)
Dept: RADIOLOGY | Facility: HOSPITAL | Age: 30
End: 2020-11-17
Payer: COMMERCIAL

## 2020-11-17 VITALS
HEART RATE: 80 BPM | SYSTOLIC BLOOD PRESSURE: 125 MMHG | RESPIRATION RATE: 17 BRPM | DIASTOLIC BLOOD PRESSURE: 65 MMHG | OXYGEN SATURATION: 99 % | TEMPERATURE: 98.1 F

## 2020-11-17 PROCEDURE — 96361 HYDRATE IV INFUSION ADD-ON: CPT

## 2020-11-17 PROCEDURE — 96376 TX/PRO/DX INJ SAME DRUG ADON: CPT

## 2020-11-17 PROCEDURE — 74176 CT ABD & PELVIS W/O CONTRAST: CPT

## 2020-11-17 PROCEDURE — 99285 EMERGENCY DEPT VISIT HI MDM: CPT | Performed by: EMERGENCY MEDICINE

## 2020-11-17 PROCEDURE — G1004 CDSM NDSC: HCPCS

## 2020-11-17 RX ORDER — MORPHINE SULFATE 4 MG/ML
4 INJECTION, SOLUTION INTRAMUSCULAR; INTRAVENOUS ONCE
Status: COMPLETED | OUTPATIENT
Start: 2020-11-17 | End: 2020-11-17

## 2020-11-17 RX ORDER — ONDANSETRON 2 MG/ML
4 INJECTION INTRAMUSCULAR; INTRAVENOUS ONCE
Status: COMPLETED | OUTPATIENT
Start: 2020-11-17 | End: 2020-11-17

## 2020-11-17 RX ADMIN — MORPHINE SULFATE 4 MG: 4 INJECTION, SOLUTION INTRAMUSCULAR; INTRAVENOUS at 01:18

## 2020-11-17 RX ADMIN — ONDANSETRON 4 MG: 2 INJECTION INTRAMUSCULAR; INTRAVENOUS at 01:16

## 2020-11-18 ENCOUNTER — VBI (OUTPATIENT)
Dept: FAMILY MEDICINE CLINIC | Facility: CLINIC | Age: 30
End: 2020-11-18

## 2020-11-18 ENCOUNTER — TELEPHONE (OUTPATIENT)
Dept: OBGYN CLINIC | Facility: CLINIC | Age: 30
End: 2020-11-18

## 2020-11-23 ENCOUNTER — OFFICE VISIT (OUTPATIENT)
Dept: OBGYN CLINIC | Facility: CLINIC | Age: 30
End: 2020-11-23
Payer: COMMERCIAL

## 2020-11-23 VITALS
DIASTOLIC BLOOD PRESSURE: 78 MMHG | TEMPERATURE: 97.5 F | HEIGHT: 62 IN | BODY MASS INDEX: 44.16 KG/M2 | WEIGHT: 240 LBS | SYSTOLIC BLOOD PRESSURE: 122 MMHG

## 2020-11-23 DIAGNOSIS — N83.209 RUPTURED OVARIAN CYST: Primary | ICD-10-CM

## 2020-11-23 DIAGNOSIS — Z31.9 INFERTILITY MANAGEMENT: ICD-10-CM

## 2020-11-23 PROCEDURE — 99213 OFFICE O/P EST LOW 20 MIN: CPT | Performed by: OBSTETRICS & GYNECOLOGY

## 2020-12-03 LAB
ESTRADIOL SERPL-MCNC: 35.1 PG/ML
FSH SERPL-ACNC: 5.9 MIU/ML
LH SERPL-ACNC: 4 MIU/ML
PROGEST SERPL-MCNC: 0.2 NG/ML

## 2020-12-22 LAB
ESTRADIOL SERPL-MCNC: 141 PG/ML
PROGEST SERPL-MCNC: 10 NG/ML

## 2020-12-29 PROBLEM — N97.9 INFERTILITY, FEMALE: Status: ACTIVE | Noted: 2020-12-29

## 2020-12-30 ENCOUNTER — OFFICE VISIT (OUTPATIENT)
Dept: OBGYN CLINIC | Facility: CLINIC | Age: 30
End: 2020-12-30
Payer: COMMERCIAL

## 2020-12-30 VITALS
SYSTOLIC BLOOD PRESSURE: 120 MMHG | WEIGHT: 243 LBS | BODY MASS INDEX: 44.45 KG/M2 | DIASTOLIC BLOOD PRESSURE: 70 MMHG | TEMPERATURE: 98.7 F

## 2020-12-30 DIAGNOSIS — N97.9 INFERTILITY, FEMALE: Primary | ICD-10-CM

## 2020-12-30 PROCEDURE — 99213 OFFICE O/P EST LOW 20 MIN: CPT | Performed by: PHYSICIAN ASSISTANT

## 2020-12-31 ENCOUNTER — TELEPHONE (OUTPATIENT)
Dept: OBGYN CLINIC | Facility: CLINIC | Age: 30
End: 2020-12-31

## 2021-02-12 ENCOUNTER — TRANSCRIBE ORDERS (OUTPATIENT)
Dept: ADMINISTRATIVE | Facility: HOSPITAL | Age: 31
End: 2021-02-12

## 2021-02-12 ENCOUNTER — OFFICE VISIT (OUTPATIENT)
Dept: OBGYN CLINIC | Facility: CLINIC | Age: 31
End: 2021-02-12
Payer: COMMERCIAL

## 2021-02-12 ENCOUNTER — LAB (OUTPATIENT)
Dept: LAB | Facility: HOSPITAL | Age: 31
End: 2021-02-12
Payer: COMMERCIAL

## 2021-02-12 VITALS
WEIGHT: 248 LBS | SYSTOLIC BLOOD PRESSURE: 120 MMHG | TEMPERATURE: 97.8 F | BODY MASS INDEX: 45.36 KG/M2 | DIASTOLIC BLOOD PRESSURE: 80 MMHG

## 2021-02-12 DIAGNOSIS — N92.6 MISSED MENSES: Primary | ICD-10-CM

## 2021-02-12 DIAGNOSIS — N92.6 MISSED MENSES: ICD-10-CM

## 2021-02-12 LAB — B-HCG SERPL-ACNC: <2 MIU/ML

## 2021-02-12 PROCEDURE — 36415 COLL VENOUS BLD VENIPUNCTURE: CPT

## 2021-02-12 PROCEDURE — 84702 CHORIONIC GONADOTROPIN TEST: CPT

## 2021-02-12 PROCEDURE — 99212 OFFICE O/P EST SF 10 MIN: CPT | Performed by: NURSE PRACTITIONER

## 2021-02-12 NOTE — PROGRESS NOTES
Assessment/Plan:    Missed menses  Discussed with patient due to being 2 weeks past missed menses she is likely NOT pregnant  Will send for Hcg Quant to verify  Discussed awaiting menses to naturally occur vs provera is quant is negative  Had long discussion with patient about fertility struggles  She was very sad and tearful, as hoping and wishing for pregnancy  Advised  has semen analysis  Will call with results and follow up accordingly  Diagnoses and all orders for this visit:    Missed menses  -     hCG, quantitative; Future          Subjective:      Patient ID: Severa Israel is a 35 y o  female  Pt has been trying for conception for 5 years  Is on round two of clomid and has not had a menses yet  Saw Laura Pillow after first round of clomid  Second round of clomid    No period, no spotting, cramps, breast tenderness, abdominal pain  Feels like more of an abdominal pressure, sharp  Has all symptoms of pregnancy, but all negative tests  Has been testing for pregnancy like two times a day  Menses started December 30th, lasted about 4 days  Took Clomid day 5 of cycle, takes for 5 days  She is on day 42 no menses  Normally has a 34 day cycle  Has never missed a menses that she can recall  Did time intercourse with expected ovulation  Started testing for pregnancy two days after missed menses  Has tested several times a day and all tests are negative, but still no menses  The following portions of the patient's history were reviewed and updated as appropriate: allergies, current medications, past family history, past medical history, past social history, past surgical history and problem list     Review of Systems   Genitourinary: Positive for menstrual problem  All other systems reviewed and are negative          Objective:    /80   Temp 97 8 °F (36 6 °C)   Wt 112 kg (248 lb)   BMI 45 36 kg/m²      Physical Exam  Constitutional:       Appearance: Normal appearance  Cardiovascular:      Rate and Rhythm: Normal rate and regular rhythm  Pulmonary:      Effort: Pulmonary effort is normal       Breath sounds: Normal breath sounds  Abdominal:      General: Bowel sounds are normal       Palpations: Abdomen is soft  Neurological:      Mental Status: She is alert and oriented to person, place, and time  Psychiatric:         Mood and Affect: Mood normal  Affect is tearful (pt sad and tearful as hoping for pregnancy)  Behavior: Behavior normal          Thought Content:  Thought content normal          Judgment: Judgment normal

## 2021-02-12 NOTE — ASSESSMENT & PLAN NOTE
Discussed with patient due to being 2 weeks past missed menses she is likely NOT pregnant  Will send for Hcg Quant to verify  Discussed awaiting menses to naturally occur vs provera is quant is negative  Had long discussion with patient about fertility struggles  She was very sad and tearful, as hoping and wishing for pregnancy  Advised  has semen analysis  Will call with results and follow up accordingly

## 2021-02-19 ENCOUNTER — TELEPHONE (OUTPATIENT)
Dept: OBGYN CLINIC | Facility: CLINIC | Age: 31
End: 2021-02-19

## 2021-02-19 NOTE — TELEPHONE ENCOUNTER
Pt states was able to see her results on her mychart and wanted to discuss with you what the next step would be

## 2021-02-23 NOTE — TELEPHONE ENCOUNTER
We have slips out here, preprinted ones for the one near us  They are super old though and have the name of the one doc who left over 2 years ago, lol!!  I would have one of our girls call the main office in [facility] and see if there is a standard form they can send you  You would just enter his info and check off which test you want  Or someone would have to est him in Epic and you can enter it that way, although I think it would be easier to just have the preprinted ones on hand

## 2021-02-23 NOTE — TELEPHONE ENCOUNTER
I tried to order her husbands semen analysis in Epic, but he is not in the system  Is there another way to order?

## 2021-06-06 NOTE — PATIENT INSTRUCTIONS
Will plan OPKs this month and then plan day 21 labs w PCOS labs 7 days after + ovulation  Will then plan day 3 labs  Partner to look into insurance coverage and plan Sa/count  He will need to call us and let us know where to send rx for him  Pt and partner will look into beenfits and decide how aggressive they plan to be  Will plan blood work and Sa  Will then plan HSG and consider options after that  Pap done today 
Negative

## 2021-11-09 ENCOUNTER — TELEPHONE (OUTPATIENT)
Dept: FAMILY MEDICINE CLINIC | Facility: CLINIC | Age: 31
End: 2021-11-09

## 2021-12-08 ENCOUNTER — OFFICE VISIT (OUTPATIENT)
Dept: FAMILY MEDICINE CLINIC | Facility: CLINIC | Age: 31
End: 2021-12-08
Payer: COMMERCIAL

## 2021-12-08 VITALS
HEART RATE: 121 BPM | DIASTOLIC BLOOD PRESSURE: 90 MMHG | SYSTOLIC BLOOD PRESSURE: 130 MMHG | BODY MASS INDEX: 41.96 KG/M2 | RESPIRATION RATE: 18 BRPM | HEIGHT: 64 IN | TEMPERATURE: 96.7 F | OXYGEN SATURATION: 99 % | WEIGHT: 245.8 LBS

## 2021-12-08 DIAGNOSIS — Z00.00 WELL ADULT EXAM: Primary | ICD-10-CM

## 2021-12-08 DIAGNOSIS — N97.9 INFERTILITY, FEMALE: ICD-10-CM

## 2021-12-08 DIAGNOSIS — Z13.6 SCREENING FOR CARDIOVASCULAR CONDITION: ICD-10-CM

## 2021-12-08 DIAGNOSIS — Z11.59 NEED FOR HEPATITIS C SCREENING TEST: ICD-10-CM

## 2021-12-08 DIAGNOSIS — E66.01 MORBID OBESITY WITH BMI OF 40.0-44.9, ADULT (HCC): ICD-10-CM

## 2021-12-08 PROCEDURE — 99385 PREV VISIT NEW AGE 18-39: CPT | Performed by: FAMILY MEDICINE

## 2021-12-08 PROCEDURE — 3725F SCREEN DEPRESSION PERFORMED: CPT | Performed by: FAMILY MEDICINE

## 2021-12-08 PROCEDURE — 3008F BODY MASS INDEX DOCD: CPT | Performed by: FAMILY MEDICINE

## 2021-12-08 PROCEDURE — 1036F TOBACCO NON-USER: CPT | Performed by: FAMILY MEDICINE

## 2021-12-08 RX ORDER — PROGESTERONE 200 MG/1
CAPSULE ORAL
COMMUNITY
Start: 2021-11-18

## 2023-10-04 ENCOUNTER — APPOINTMENT (EMERGENCY)
Dept: RADIOLOGY | Facility: HOSPITAL | Age: 33
End: 2023-10-04
Payer: COMMERCIAL

## 2023-10-04 ENCOUNTER — HOSPITAL ENCOUNTER (EMERGENCY)
Facility: HOSPITAL | Age: 33
Discharge: HOME | End: 2023-10-04
Attending: STUDENT IN AN ORGANIZED HEALTH CARE EDUCATION/TRAINING PROGRAM
Payer: COMMERCIAL

## 2023-10-04 VITALS
WEIGHT: 235 LBS | HEART RATE: 105 BPM | RESPIRATION RATE: 20 BRPM | DIASTOLIC BLOOD PRESSURE: 102 MMHG | SYSTOLIC BLOOD PRESSURE: 155 MMHG | TEMPERATURE: 97.8 F | OXYGEN SATURATION: 97 % | BODY MASS INDEX: 40.12 KG/M2 | HEIGHT: 64 IN

## 2023-10-04 DIAGNOSIS — N20.0 KIDNEY STONE: Primary | ICD-10-CM

## 2023-10-04 DIAGNOSIS — N39.0 UTI (URINARY TRACT INFECTION): ICD-10-CM

## 2023-10-04 LAB
ALBUMIN SERPL BCP-MCNC: 4.4 G/DL (ref 3.5–5)
ALP SERPL-CCNC: 62 U/L (ref 34–104)
ALT SERPL W P-5'-P-CCNC: 36 U/L (ref 7–52)
ANION GAP SERPL CALCULATED.3IONS-SCNC: 11 MMOL/L
AST SERPL W P-5'-P-CCNC: 29 U/L (ref 13–39)
BACTERIA UR QL AUTO: ABNORMAL /HPF
BASOPHILS # BLD AUTO: 0.09 THOUSANDS/ÂΜL (ref 0–0.1)
BASOPHILS NFR BLD AUTO: 1 % (ref 0–1)
BILIRUB SERPL-MCNC: 0.62 MG/DL (ref 0.2–1)
BILIRUB UR QL STRIP: NEGATIVE
BUN SERPL-MCNC: 8 MG/DL (ref 5–25)
CALCIUM SERPL-MCNC: 9.4 MG/DL (ref 8.4–10.2)
CHLORIDE SERPL-SCNC: 104 MMOL/L (ref 96–108)
CLARITY UR: ABNORMAL
CO2 SERPL-SCNC: 23 MMOL/L (ref 21–32)
COLOR UR: ABNORMAL
CREAT SERPL-MCNC: 0.65 MG/DL (ref 0.6–1.3)
EOSINOPHIL # BLD AUTO: 0.17 THOUSAND/ÂΜL (ref 0–0.61)
EOSINOPHIL NFR BLD AUTO: 2 % (ref 0–6)
ERYTHROCYTE [DISTWIDTH] IN BLOOD BY AUTOMATED COUNT: 12.3 % (ref 11.6–15.1)
EXT PREGNANCY TEST URINE: NEGATIVE
EXT. CONTROL: NORMAL
GFR SERPL CREATININE-BSD FRML MDRD: 118 ML/MIN/1.73SQ M
GLUCOSE SERPL-MCNC: 186 MG/DL (ref 65–140)
GLUCOSE UR STRIP-MCNC: ABNORMAL MG/DL
HCG SERPL QL: NEGATIVE
HCT VFR BLD AUTO: 45.2 % (ref 34.8–46.1)
HGB BLD-MCNC: 15.6 G/DL (ref 11.5–15.4)
HGB UR QL STRIP.AUTO: ABNORMAL
IMM GRANULOCYTES # BLD AUTO: 0.03 THOUSAND/UL (ref 0–0.2)
IMM GRANULOCYTES NFR BLD AUTO: 0 % (ref 0–2)
KETONES UR STRIP-MCNC: ABNORMAL MG/DL
LEUKOCYTE ESTERASE UR QL STRIP: ABNORMAL
LIPASE SERPL-CCNC: 33 U/L (ref 11–82)
LYMPHOCYTES # BLD AUTO: 3.63 THOUSANDS/ÂΜL (ref 0.6–4.47)
LYMPHOCYTES NFR BLD AUTO: 40 % (ref 14–44)
MCH RBC QN AUTO: 31.6 PG (ref 26.8–34.3)
MCHC RBC AUTO-ENTMCNC: 34.5 G/DL (ref 31.4–37.4)
MCV RBC AUTO: 92 FL (ref 82–98)
MONOCYTES # BLD AUTO: 0.33 THOUSAND/ÂΜL (ref 0.17–1.22)
MONOCYTES NFR BLD AUTO: 4 % (ref 4–12)
NEUTROPHILS # BLD AUTO: 4.81 THOUSANDS/ÂΜL (ref 1.85–7.62)
NEUTS SEG NFR BLD AUTO: 53 % (ref 43–75)
NITRITE UR QL STRIP: POSITIVE
NON-SQ EPI CELLS URNS QL MICRO: ABNORMAL /HPF
NRBC BLD AUTO-RTO: 0 /100 WBCS
PH UR STRIP.AUTO: 6.5 [PH]
PLATELET # BLD AUTO: 385 THOUSANDS/UL (ref 149–390)
PMV BLD AUTO: 10.7 FL (ref 8.9–12.7)
POTASSIUM SERPL-SCNC: 4.1 MMOL/L (ref 3.5–5.3)
PROT SERPL-MCNC: 7.3 G/DL (ref 6.4–8.4)
PROT UR STRIP-MCNC: ABNORMAL MG/DL
RBC # BLD AUTO: 4.93 MILLION/UL (ref 3.81–5.12)
RBC #/AREA URNS AUTO: ABNORMAL /HPF
SODIUM SERPL-SCNC: 138 MMOL/L (ref 135–147)
SP GR UR STRIP.AUTO: >=1.03 (ref 1–1.03)
UROBILINOGEN UR QL STRIP.AUTO: 1 E.U./DL
WBC # BLD AUTO: 9.06 THOUSAND/UL (ref 4.31–10.16)
WBC #/AREA URNS AUTO: ABNORMAL /HPF

## 2023-10-04 PROCEDURE — 81001 URINALYSIS AUTO W/SCOPE: CPT | Performed by: STUDENT IN AN ORGANIZED HEALTH CARE EDUCATION/TRAINING PROGRAM

## 2023-10-04 PROCEDURE — 96365 THER/PROPH/DIAG IV INF INIT: CPT

## 2023-10-04 PROCEDURE — 87186 SC STD MICRODIL/AGAR DIL: CPT | Performed by: STUDENT IN AN ORGANIZED HEALTH CARE EDUCATION/TRAINING PROGRAM

## 2023-10-04 PROCEDURE — 85025 COMPLETE CBC W/AUTO DIFF WBC: CPT | Performed by: STUDENT IN AN ORGANIZED HEALTH CARE EDUCATION/TRAINING PROGRAM

## 2023-10-04 PROCEDURE — 96366 THER/PROPH/DIAG IV INF ADDON: CPT

## 2023-10-04 PROCEDURE — 81025 URINE PREGNANCY TEST: CPT | Performed by: STUDENT IN AN ORGANIZED HEALTH CARE EDUCATION/TRAINING PROGRAM

## 2023-10-04 PROCEDURE — 83690 ASSAY OF LIPASE: CPT | Performed by: STUDENT IN AN ORGANIZED HEALTH CARE EDUCATION/TRAINING PROGRAM

## 2023-10-04 PROCEDURE — 87086 URINE CULTURE/COLONY COUNT: CPT | Performed by: STUDENT IN AN ORGANIZED HEALTH CARE EDUCATION/TRAINING PROGRAM

## 2023-10-04 PROCEDURE — 99285 EMERGENCY DEPT VISIT HI MDM: CPT | Performed by: STUDENT IN AN ORGANIZED HEALTH CARE EDUCATION/TRAINING PROGRAM

## 2023-10-04 PROCEDURE — 96361 HYDRATE IV INFUSION ADD-ON: CPT

## 2023-10-04 PROCEDURE — 96375 TX/PRO/DX INJ NEW DRUG ADDON: CPT

## 2023-10-04 PROCEDURE — G1004 CDSM NDSC: HCPCS

## 2023-10-04 PROCEDURE — 80053 COMPREHEN METABOLIC PANEL: CPT | Performed by: STUDENT IN AN ORGANIZED HEALTH CARE EDUCATION/TRAINING PROGRAM

## 2023-10-04 PROCEDURE — 36415 COLL VENOUS BLD VENIPUNCTURE: CPT | Performed by: STUDENT IN AN ORGANIZED HEALTH CARE EDUCATION/TRAINING PROGRAM

## 2023-10-04 PROCEDURE — 74177 CT ABD & PELVIS W/CONTRAST: CPT

## 2023-10-04 PROCEDURE — 99284 EMERGENCY DEPT VISIT MOD MDM: CPT

## 2023-10-04 PROCEDURE — 84703 CHORIONIC GONADOTROPIN ASSAY: CPT | Performed by: STUDENT IN AN ORGANIZED HEALTH CARE EDUCATION/TRAINING PROGRAM

## 2023-10-04 PROCEDURE — 87077 CULTURE AEROBIC IDENTIFY: CPT | Performed by: STUDENT IN AN ORGANIZED HEALTH CARE EDUCATION/TRAINING PROGRAM

## 2023-10-04 RX ORDER — HYDROMORPHONE HCL/PF 1 MG/ML
0.5 SYRINGE (ML) INJECTION ONCE
Status: COMPLETED | OUTPATIENT
Start: 2023-10-04 | End: 2023-10-04

## 2023-10-04 RX ORDER — CEFPODOXIME PROXETIL 100 MG/1
100 TABLET, FILM COATED ORAL 2 TIMES DAILY
Qty: 14 TABLET | Refills: 0 | Status: SHIPPED | OUTPATIENT
Start: 2023-10-04 | End: 2023-10-11

## 2023-10-04 RX ORDER — ONDANSETRON 2 MG/ML
4 INJECTION INTRAMUSCULAR; INTRAVENOUS ONCE
Status: COMPLETED | OUTPATIENT
Start: 2023-10-04 | End: 2023-10-04

## 2023-10-04 RX ORDER — DROPERIDOL 2.5 MG/ML
0.62 INJECTION, SOLUTION INTRAMUSCULAR; INTRAVENOUS ONCE
Status: COMPLETED | OUTPATIENT
Start: 2023-10-04 | End: 2023-10-04

## 2023-10-04 RX ORDER — CEFTRIAXONE 1 G/50ML
1000 INJECTION, SOLUTION INTRAVENOUS ONCE
Status: COMPLETED | OUTPATIENT
Start: 2023-10-04 | End: 2023-10-04

## 2023-10-04 RX ADMIN — SODIUM CHLORIDE 1000 ML: 0.9 INJECTION, SOLUTION INTRAVENOUS at 09:03

## 2023-10-04 RX ADMIN — CEFTRIAXONE 1000 MG: 1 INJECTION, SOLUTION INTRAVENOUS at 10:04

## 2023-10-04 RX ADMIN — DROPERIDOL 0.62 MG: 2.5 INJECTION, SOLUTION INTRAMUSCULAR; INTRAVENOUS at 09:03

## 2023-10-04 RX ADMIN — HYDROMORPHONE HYDROCHLORIDE 0.5 MG: 1 INJECTION, SOLUTION INTRAMUSCULAR; INTRAVENOUS; SUBCUTANEOUS at 09:03

## 2023-10-04 RX ADMIN — ONDANSETRON 4 MG: 2 INJECTION INTRAMUSCULAR; INTRAVENOUS at 08:23

## 2023-10-04 RX ADMIN — IOHEXOL 100 ML: 350 INJECTION, SOLUTION INTRAVENOUS at 09:39

## 2023-10-04 NOTE — ED PROVIDER NOTES
History  Chief Complaint   Patient presents with   • Abdominal Pain     Was awakened at 0630 with sharp LLQ pain that radiates up left side and down left leg with vomiting soon after. Hx of kidney stones. States this does not feel the same. Patient is a 71-year-old female, past medical history of kidney stones, who presents to the emergency department for abdominal pain. Started around 6:30 AM this morning. Left lower quadrant, radiating up the left side of her abdomen around towards her flank. No modifying factors. Associated with nausea and vomiting. Denies any prior history of pain like this in the past.  No fevers or chills. No chest pain or shortness of breath. No urinary symptoms. She has no other complaints or concerns at this time. Of note, patient does have a history of kidney stones. However, she states this does not feel similar. Prior to Admission Medications   Prescriptions Last Dose Informant Patient Reported? Taking? Cholecalciferol 50 MCG (2000 UT) CAPS   Yes No   Sig: Take 1 capsule by mouth daily   Prenatal Vit-Fe Fumarate-FA (PRENATAL VITAMINS PO)   Yes No   Sig: Take 1 tablet by mouth daily   Progesterone 200 MG CAPS   Yes No   clomiPHENE (CLOMID) 50 mg tablet   No No   Sig: Take 1 tablet (50 mg total) by mouth daily Day 5-9 of cycle      Facility-Administered Medications: None       Past Medical History:   Diagnosis Date   • Kidney stone    • Renal disorder     kidney stones       Past Surgical History:   Procedure Laterality Date   • NV CYSTO/URETERO W/LITHOTRIPSY &INDWELL STENT INSRT Right 10/24/2018    Procedure: CYSTOSCOPY URETEROSCOPY WITH RETROGRADE PYELOGRAM AND INSERTION STENT URETERAL;  Surgeon: Shara Calderon MD;  Location: [facility];  Service: Urology   • NV LITHOTRIPSY [address] Right 11/14/2018    Procedure: Arden Hilario EXTRACORPORAL SHOCKWAVE (ESWL);   Surgeon: Shara Calderon MD;  Location: [facility];  Service: Urology       Family History Problem Relation Age of Onset   • Cancer Mother    • Diabetes Mother    • Hypertension Mother    • Thyroid disease Mother    • Hyperlipidemia Father         High Triglycerides     I have reviewed and agree with the history as documented. E-Cigarette/Vaping   • E-Cigarette Use Never User      E-Cigarette/Vaping Substances   • Nicotine No    • THC No    • CBD No    • Flavoring No    • Other No    • Unknown No      Social History     Tobacco Use   • Smoking status: Every Day     Packs/day: 0.25     Years: 4.00     Total pack years: 1.00     Types: Cigarettes   • Smokeless tobacco: Former     Quit date: 5/4/2016   Vaping Use   • Vaping Use: Never used   Substance Use Topics   • Alcohol use: Yes     Comment: occasional   • Drug use: No       Review of Systems   Constitutional: Negative for chills and fever. Respiratory: Negative for shortness of breath. Cardiovascular: Negative for chest pain. Gastrointestinal: Positive for abdominal pain, nausea and vomiting. Genitourinary: Negative for difficulty urinating and dysuria. All other systems reviewed and are negative. Physical Exam  Physical Exam  Vitals and nursing note reviewed. Constitutional:       General: She is in acute distress. Appearance: She is well-developed. She is not ill-appearing or diaphoretic. HENT:      Head: Normocephalic and atraumatic. Right Ear: External ear normal.      Left Ear: External ear normal.      Nose: Nose normal.   Eyes:      General: Lids are normal. No scleral icterus. Cardiovascular:      Rate and Rhythm: Normal rate and regular rhythm. Heart sounds: Normal heart sounds. No murmur heard. No friction rub. No gallop. Pulmonary:      Effort: Pulmonary effort is normal. No respiratory distress. Breath sounds: Normal breath sounds. No wheezing or rales. Abdominal:      Palpations: Abdomen is soft. Tenderness: There is abdominal tenderness in the left lower quadrant.  There is no guarding or rebound. Musculoskeletal:         General: No deformity. Normal range of motion. Cervical back: Normal range of motion and neck supple. Skin:     General: Skin is warm and dry. Neurological:      General: No focal deficit present. Mental Status: She is alert.    Psychiatric:         Mood and Affect: Mood normal.         Behavior: Behavior normal.         Vital Signs  ED Triage Vitals [10/04/23 0811]   Temperature Pulse Respirations Blood Pressure SpO2   97.8 °F (36.6 °C) 105 20 (!) 155/102 97 %      Temp Source Heart Rate Source Patient Position - Orthostatic VS BP Location FiO2 (%)   Oral Monitor Standing Left arm --      Pain Score       9           Vitals:    10/04/23 0811   BP: (!) 155/102   Pulse: 105   Patient Position - Orthostatic VS: Standing         Visual Acuity      ED Medications  Medications   ondansetron (ZOFRAN) injection 4 mg (4 mg Intravenous Given 10/4/23 0823)   HYDROmorphone (DILAUDID) injection 0.5 mg (0.5 mg Intravenous Given 10/4/23 0903)   sodium chloride 0.9 % bolus 1,000 mL (0 mL Intravenous Stopped 10/4/23 1204)   cefTRIAXone (ROCEPHIN) IVPB (premix in dextrose) 1,000 mg 50 mL (0 mg Intravenous Stopped 10/4/23 1204)   droperidol (INAPSINE) injection 0.625 mg (0.625 mg Intravenous Given 10/4/23 0903)   iohexol (OMNIPAQUE) 350 MG/ML injection (SINGLE-DOSE) 100 mL (100 mL Intravenous Given 10/4/23 0939)       Diagnostic Studies  Results Reviewed     Procedure Component Value Units Date/Time    hCG, qualitative pregnancy [[id number]]  (Normal) Collected: 10/04/23 0826    Lab Status: Final result Specimen: Blood from Arm, Right Updated: 10/04/23 0906     Preg, Serum Negative    Comprehensive metabolic panel [[id number]]  (Abnormal) Collected: 10/04/23 0826    Lab Status: Final result Specimen: Blood from Arm, Right Updated: 10/04/23 0858     Sodium 138 mmol/L      Potassium 4.1 mmol/L      Chloride 104 mmol/L      CO2 23 mmol/L      ANION GAP 11 mmol/L      BUN 8 mg/dL Creatinine 0.65 mg/dL      Glucose 186 mg/dL      Calcium 9.4 mg/dL      AST 29 U/L      ALT 36 U/L      Alkaline Phosphatase 62 U/L      Total Protein 7.3 g/dL      Albumin 4.4 g/dL      Total Bilirubin 0.62 mg/dL      eGFR 118 ml/min/1.73sq m     Narrative:      [facility] guidelines for Chronic Kidney Disease (CKD):   •  Stage 1 with normal or high GFR (GFR > 90 mL/min/1.73 square meters)  •  Stage 2 Mild CKD (GFR = 60-89 mL/min/1.73 square meters)  •  Stage 3A Moderate CKD (GFR = 45-59 mL/min/1.73 square meters)  •  Stage 3B Moderate CKD (GFR = 30-44 mL/min/1.73 square meters)  •  Stage 4 Severe CKD (GFR = 15-29 mL/min/1.73 square meters)  •  Stage 5 End Stage CKD (GFR <15 mL/min/1.73 square meters)  Note: GFR calculation is accurate only with a steady state creatinine    Lipase [[id number]]  (Normal) Collected: 10/04/23 0826    Lab Status: Final result Specimen: Blood from Arm, Right Updated: 10/04/23 0858     Lipase 33 u/L     Urine Microscopic [[id number]]  (Abnormal) Collected: 10/04/23 0829    Lab Status: Final result Specimen: Urine, Clean Catch Updated: 10/04/23 0847     RBC, UA Innumerable /hpf      WBC, UA 20-30 /hpf      Epithelial Cells Moderate /hpf      Bacteria, UA Innumerable /hpf     Urine culture [[id number]] Collected: 10/04/23 0829    Lab Status:  In process Specimen: Urine, Clean Catch Updated: 10/04/23 0847    UA w Reflex to Microscopic w Reflex to Culture [[id number]]  (Abnormal) Collected: 10/04/23 0829    Lab Status: Final result Specimen: Urine, Clean Catch Updated: 10/04/23 0838     Color, UA Yelena     Clarity, UA Cloudy     Specific Gravity, UA >=1.030     pH, UA 6.5     Leukocytes, UA Small     Nitrite, UA Positive     Protein,  (2+) mg/dl      Glucose,  (1/10%) mg/dl      Ketones, UA Trace mg/dl      Urobilinogen, UA 1.0 E.U./dl      Bilirubin, UA Negative     Occult Blood, UA Moderate    CBC and differential [[id number]]  (Abnormal) Collected: 10/04/23 0826    Lab Status: Final result Specimen: Blood from Arm, Right Updated: 10/04/23 0837     WBC 9.06 Thousand/uL      RBC 4.93 Million/uL      Hemoglobin 15.6 g/dL      Hematocrit 45.2 %      MCV 92 fL      MCH 31.6 pg      MCHC 34.5 g/dL      RDW 12.3 %      MPV 10.7 fL      Platelets 563 Thousands/uL      nRBC 0 /100 WBCs      Neutrophils Relative 53 %      Immat GRANS % 0 %      Lymphocytes Relative 40 %      Monocytes Relative 4 %      Eosinophils Relative 2 %      Basophils Relative 1 %      Neutrophils Absolute 4.81 Thousands/µL      Immature Grans Absolute 0.03 Thousand/uL      Lymphocytes Absolute 3.63 Thousands/µL      Monocytes Absolute 0.33 Thousand/µL      Eosinophils Absolute 0.17 Thousand/µL      Basophils Absolute 0.09 Thousands/µL     POCT pregnancy, urine [[id number]]  (Normal) Resulted: 10/04/23 0832    Lab Status: Final result Updated: 10/04/23 0832     EXT Preg Test, Ur Negative     Control Valid                 CT abdomen pelvis w contrast   ED Interpretation by Eb Sanches DO (10/04 4101)   Mild stranding, left kidney. Pyelo? Final Result by Constantine Alex MD (10/04 1026)      1.  2 mm stone in the proximal left ureter with adjacent periureteral fat stranding and mild left hydronephrosis. 2.  Bilateral nephrolithiasis. 3.  Hepatic steatosis. Workstation performed: [id number]                    Procedures  Procedures         ED Course  ED Course as of 10/04/23 1453   Wed Oct 04, 2023   0835 PREGNANCY TEST URINE: Negative   0840 Nitrite, UA(!): Positive   0840 Leukocytes, UA(!): Small   0847 Bacteria, UA(!): Innumerable   0901 Lipase: 33   0906 PREGNANCY, SERUM: Negative   1027 CT abdomen pelvis w contrast  1.  2 mm stone in the proximal left ureter with adjacent periureteral fat stranding and mild left hydronephrosis. 2.  Bilateral nephrolithiasis. 3.  Hepatic steatosis.      1029 TT sent to uro   1045 Patient reevaluated. Resting comfortably. Pain well controlled at this time. Pending urology recs. 1130 Discussed with Dr Dhara Wise. Will see patient in the office today. 1135 Pt re-evaluated. Resting comfortably. Remains comfortable. Vitals are stable. Discussed results, findings, and plan. She is in agreement. Will d/c with urology followup today. Abx sent. RTED precautions discussed. Pt verbalized understanding and agreed with plan of care. Medical Decision Making  Patient is a 33 y.o. female who presents to the ED for left lower quadrant pain, nausea, vomiting. Patient is nontoxic but in acute distress. Vitals are stable. On exam she has left lower quadrant tenderness without rebound or guarding. Differential includes but is not limited to: Nephrolithiasis, diverticulitis, pyelonephritis. Possible ovarian pathology? Doubt bowel obstruction. Presentation not consistent with pancreatitis. Plan: Labs, pain control, CT abdomen pelvis, UA, reassessment                 Portions of the record may have been created with voice recognition software. Occasional wrong word or "sound a like" substitutions may have occurred due to the inherent limitations of voice recognition software. Read the chart carefully and recognize, using context, where substitutions have occurred. Kidney stone: acute illness or injury  UTI (urinary tract infection): acute illness or injury  Amount and/or Complexity of Data Reviewed  Labs: ordered. Decision-making details documented in ED Course. Radiology: ordered and independent interpretation performed. Decision-making details documented in ED Course. Discussion of management or test interpretation with external provider(s): Discussed with urology. Will see patient in the office. Risk  OTC drugs. Prescription drug management.           Disposition  Final diagnoses:   Kidney stone   UTI (urinary tract infection)     Time reflects when diagnosis was documented in both MDM as applicable and the Disposition within this note     Time User Action Codes Description Comment    10/4/2023 11:33 AM Blanka Unity Add [N20.0] Kidney stone     10/4/2023 11:33 AM Blanka [place] Add [N39.0] UTI (urinary tract infection)       ED Disposition     ED Disposition   Discharge    Condition   Stable    Date/Time   Wed Oct 4, 2023 11:30 AM    Comment   Larry Newsome discharge to home/self care. Follow-up Information     Follow up With Specialties Details Why Contact Info Additional Information    Mik Asencio MD Urology Schedule an appointment as soon as possible for a visit   [address]  [address] [place] [address],  Family Medicine, [address] [address] to  at 230 pm [address]       [address] Emergency Department Emergency Medicine   [address] [address]  1060 [facility] Emergency Department, [address] [place] [address], [id number]          Discharge Medication List as of 10/4/2023 11:36 AM      START taking these medications    Details   cefpodoxime (VANTIN) 100 mg tablet Take 1 tablet (100 mg total) by mouth 2 (two) times a day for 7 days, Starting Wed 10/4/2023, Until Wed 10/11/2023, Normal         CONTINUE these medications which have NOT CHANGED    Details   Cholecalciferol 50 MCG (2000 UT) CAPS Take 1 capsule by mouth daily, Historical Med      clomiPHENE (CLOMID) 50 mg tablet Take 1 tablet (50 mg total) by mouth daily Day 5-9 of cycle, Starting Mon 11/23/2020, Normal      Prenatal Vit-Fe Fumarate-FA (PRENATAL VITAMINS PO) Take 1 tablet by mouth daily, Historical Med      Progesterone 200 MG CAPS Starting Thu 11/18/2021, Historical Med             No discharge procedures on file.     PDMP Review     None          ED Provider  Electronically Signed by           Kiersten Lino DO  10/04/23 2140

## 2023-10-04 NOTE — DISCHARGE INSTRUCTIONS
You were seen in the emergency department for abdominal pain. You were found to have UTI and kidney stone. As discussed please follow-up with urology today. You have an appointment scheduled at 2:30 PM.    Return to the emergency department for any fevers, chills, nausea, vomiting, worsening pain, or any other new or concerning symptoms.

## 2023-10-06 ENCOUNTER — HOSPITAL ENCOUNTER (EMERGENCY)
Facility: HOSPITAL | Age: 33
Discharge: HOME | End: 2023-10-06
Attending: EMERGENCY MEDICINE
Payer: COMMERCIAL

## 2023-10-06 VITALS
SYSTOLIC BLOOD PRESSURE: 133 MMHG | HEART RATE: 99 BPM | RESPIRATION RATE: 24 BRPM | TEMPERATURE: 99 F | DIASTOLIC BLOOD PRESSURE: 84 MMHG | OXYGEN SATURATION: 99 %

## 2023-10-06 DIAGNOSIS — N20.1 URETEROLITHIASIS: Primary | ICD-10-CM

## 2023-10-06 LAB
ALBUMIN SERPL BCP-MCNC: 4.2 G/DL (ref 3.5–5)
ALP SERPL-CCNC: 58 U/L (ref 34–104)
ALT SERPL W P-5'-P-CCNC: 30 U/L (ref 7–52)
ANION GAP SERPL CALCULATED.3IONS-SCNC: 12 MMOL/L
AST SERPL W P-5'-P-CCNC: 26 U/L (ref 13–39)
BACTERIA UR CULT: ABNORMAL
BACTERIA UR CULT: ABNORMAL
BACTERIA UR QL AUTO: ABNORMAL /HPF
BASOPHILS # BLD AUTO: 0.09 THOUSANDS/ÂΜL (ref 0–0.1)
BASOPHILS NFR BLD AUTO: 1 % (ref 0–1)
BILIRUB SERPL-MCNC: 0.58 MG/DL (ref 0.2–1)
BILIRUB UR QL STRIP: NEGATIVE
BUN SERPL-MCNC: 14 MG/DL (ref 5–25)
CALCIUM SERPL-MCNC: 8.8 MG/DL (ref 8.4–10.2)
CHLORIDE SERPL-SCNC: 103 MMOL/L (ref 96–108)
CLARITY UR: ABNORMAL
CO2 SERPL-SCNC: 21 MMOL/L (ref 21–32)
COLOR UR: YELLOW
CREAT SERPL-MCNC: 0.96 MG/DL (ref 0.6–1.3)
EOSINOPHIL # BLD AUTO: 0.1 THOUSAND/ÂΜL (ref 0–0.61)
EOSINOPHIL NFR BLD AUTO: 1 % (ref 0–6)
ERYTHROCYTE [DISTWIDTH] IN BLOOD BY AUTOMATED COUNT: 12.2 % (ref 11.6–15.1)
EXT PREGNANCY TEST URINE: NEGATIVE
EXT. CONTROL: NORMAL
GFR SERPL CREATININE-BSD FRML MDRD: 79 ML/MIN/1.73SQ M
GLUCOSE SERPL-MCNC: 157 MG/DL (ref 65–140)
GLUCOSE UR STRIP-MCNC: NEGATIVE MG/DL
HCT VFR BLD AUTO: 41.2 % (ref 34.8–46.1)
HGB BLD-MCNC: 14.2 G/DL (ref 11.5–15.4)
HGB UR QL STRIP.AUTO: ABNORMAL
IMM GRANULOCYTES # BLD AUTO: 0.03 THOUSAND/UL (ref 0–0.2)
IMM GRANULOCYTES NFR BLD AUTO: 0 % (ref 0–2)
KETONES UR STRIP-MCNC: NEGATIVE MG/DL
LEUKOCYTE ESTERASE UR QL STRIP: NEGATIVE
LYMPHOCYTES # BLD AUTO: 3.19 THOUSANDS/ÂΜL (ref 0.6–4.47)
LYMPHOCYTES NFR BLD AUTO: 24 % (ref 14–44)
MCH RBC QN AUTO: 31.3 PG (ref 26.8–34.3)
MCHC RBC AUTO-ENTMCNC: 34.5 G/DL (ref 31.4–37.4)
MCV RBC AUTO: 91 FL (ref 82–98)
MONOCYTES # BLD AUTO: 0.48 THOUSAND/ÂΜL (ref 0.17–1.22)
MONOCYTES NFR BLD AUTO: 4 % (ref 4–12)
MUCOUS THREADS UR QL AUTO: ABNORMAL
NEUTROPHILS # BLD AUTO: 9.62 THOUSANDS/ÂΜL (ref 1.85–7.62)
NEUTS SEG NFR BLD AUTO: 70 % (ref 43–75)
NITRITE UR QL STRIP: NEGATIVE
NON-SQ EPI CELLS URNS QL MICRO: ABNORMAL /HPF
NRBC BLD AUTO-RTO: 0 /100 WBCS
PH UR STRIP.AUTO: 6 [PH]
PLATELET # BLD AUTO: 339 THOUSANDS/UL (ref 149–390)
PMV BLD AUTO: 10.6 FL (ref 8.9–12.7)
POTASSIUM SERPL-SCNC: 3.5 MMOL/L (ref 3.5–5.3)
PROT SERPL-MCNC: 6.5 G/DL (ref 6.4–8.4)
PROT UR STRIP-MCNC: NEGATIVE MG/DL
RBC # BLD AUTO: 4.54 MILLION/UL (ref 3.81–5.12)
RBC #/AREA URNS AUTO: ABNORMAL /HPF
SODIUM SERPL-SCNC: 136 MMOL/L (ref 135–147)
SP GR UR STRIP.AUTO: >=1.03 (ref 1–1.03)
UROBILINOGEN UR QL STRIP.AUTO: 0.2 E.U./DL
WBC # BLD AUTO: 13.51 THOUSAND/UL (ref 4.31–10.16)
WBC #/AREA URNS AUTO: ABNORMAL /HPF

## 2023-10-06 PROCEDURE — 80053 COMPREHEN METABOLIC PANEL: CPT | Performed by: EMERGENCY MEDICINE

## 2023-10-06 PROCEDURE — 96375 TX/PRO/DX INJ NEW DRUG ADDON: CPT

## 2023-10-06 PROCEDURE — 85025 COMPLETE CBC W/AUTO DIFF WBC: CPT | Performed by: EMERGENCY MEDICINE

## 2023-10-06 PROCEDURE — 99284 EMERGENCY DEPT VISIT MOD MDM: CPT

## 2023-10-06 PROCEDURE — 81001 URINALYSIS AUTO W/SCOPE: CPT | Performed by: EMERGENCY MEDICINE

## 2023-10-06 PROCEDURE — 36415 COLL VENOUS BLD VENIPUNCTURE: CPT | Performed by: EMERGENCY MEDICINE

## 2023-10-06 PROCEDURE — 81025 URINE PREGNANCY TEST: CPT | Performed by: EMERGENCY MEDICINE

## 2023-10-06 PROCEDURE — 96365 THER/PROPH/DIAG IV INF INIT: CPT

## 2023-10-06 PROCEDURE — 99284 EMERGENCY DEPT VISIT MOD MDM: CPT | Performed by: EMERGENCY MEDICINE

## 2023-10-06 RX ORDER — OXYCODONE HYDROCHLORIDE AND ACETAMINOPHEN 5; 325 MG/1; MG/1
1 TABLET ORAL ONCE
Status: COMPLETED | OUTPATIENT
Start: 2023-10-06 | End: 2023-10-06

## 2023-10-06 RX ORDER — OXYCODONE HYDROCHLORIDE AND ACETAMINOPHEN 5; 325 MG/1; MG/1
1 TABLET ORAL EVERY 8 HOURS PRN
Qty: 10 TABLET | Refills: 0 | Status: SHIPPED | OUTPATIENT
Start: 2023-10-06

## 2023-10-06 RX ORDER — FAMOTIDINE 10 MG/ML
20 INJECTION, SOLUTION INTRAVENOUS ONCE
Status: COMPLETED | OUTPATIENT
Start: 2023-10-06 | End: 2023-10-06

## 2023-10-06 RX ORDER — CEFTRIAXONE 1 G/50ML
1000 INJECTION, SOLUTION INTRAVENOUS ONCE
Status: COMPLETED | OUTPATIENT
Start: 2023-10-06 | End: 2023-10-06

## 2023-10-06 RX ORDER — KETOROLAC TROMETHAMINE 30 MG/ML
15 INJECTION, SOLUTION INTRAMUSCULAR; INTRAVENOUS ONCE
Status: COMPLETED | OUTPATIENT
Start: 2023-10-06 | End: 2023-10-06

## 2023-10-06 RX ORDER — METOCLOPRAMIDE 10 MG/1
10 TABLET ORAL EVERY 6 HOURS
Qty: 30 TABLET | Refills: 0 | Status: SHIPPED | OUTPATIENT
Start: 2023-10-06

## 2023-10-06 RX ORDER — METOCLOPRAMIDE HYDROCHLORIDE 5 MG/ML
10 INJECTION INTRAMUSCULAR; INTRAVENOUS ONCE
Status: COMPLETED | OUTPATIENT
Start: 2023-10-06 | End: 2023-10-06

## 2023-10-06 RX ORDER — KETOROLAC TROMETHAMINE 10 MG/1
10 TABLET, FILM COATED ORAL EVERY 6 HOURS PRN
COMMUNITY

## 2023-10-06 RX ORDER — SUCRALFATE 1 G/1
1 TABLET ORAL ONCE
Status: COMPLETED | OUTPATIENT
Start: 2023-10-06 | End: 2023-10-06

## 2023-10-06 RX ADMIN — SUCRALFATE 1 G: 1 TABLET ORAL at 22:11

## 2023-10-06 RX ADMIN — METOCLOPRAMIDE 10 MG: 5 INJECTION, SOLUTION INTRAMUSCULAR; INTRAVENOUS at 22:11

## 2023-10-06 RX ADMIN — KETOROLAC TROMETHAMINE 15 MG: 30 INJECTION, SOLUTION INTRAMUSCULAR; INTRAVENOUS at 22:00

## 2023-10-06 RX ADMIN — SODIUM CHLORIDE 1000 ML: 0.9 INJECTION, SOLUTION INTRAVENOUS at 21:59

## 2023-10-06 RX ADMIN — CEFTRIAXONE 1000 MG: 1 INJECTION, SOLUTION INTRAVENOUS at 22:29

## 2023-10-06 RX ADMIN — FAMOTIDINE 20 MG: 10 INJECTION, SOLUTION INTRAVENOUS at 22:08

## 2023-10-06 RX ADMIN — OXYCODONE HYDROCHLORIDE AND ACETAMINOPHEN 1 TABLET: 5; 325 TABLET ORAL at 22:12

## 2023-10-07 NOTE — ED PROVIDER NOTES
History  Chief Complaint   Patient presents with   • Flank Pain     L flank pain, here two days ago with kidney stone. Seen at that time also by Dr Gabriella Villareal     HPI  Patient is a 73-year-old female history of nephrolithiasis presenting for evaluation of continued left-sided flank pain. Patient states that pain for started 2 days ago, sharp, severe, associated with hematuria. Patient was evaluated in the emergency department where CT demonstrated a 2 mm stone. Patient followed up later that day with urologyPatient was discharged on Toradol and cefpodoxime but states that she only picked up her antibiotics today. Patient states that the pain worsened today and is now a sharp 10 out of 10. Patient states some associated nausea without significant vomiting. Patient denies difficulty urinating, denies significant dysuria, frequency, fevers, chills. Prior to Admission Medications   Prescriptions Last Dose Informant Patient Reported? Taking?    Cholecalciferol 50 MCG (2000 UT) CAPS Not Taking  Yes No   Sig: Take 1 capsule by mouth daily   Patient not taking: Reported on 10/6/2023   Prenatal Vit-Fe Fumarate-FA (PRENATAL VITAMINS PO) Not Taking  Yes No   Sig: Take 1 tablet by mouth daily   Patient not taking: Reported on 10/6/2023   Progesterone 200 MG CAPS Not Taking  Yes No   Patient not taking: Reported on 10/6/2023   cefpodoxime (VANTIN) 100 mg tablet   No No   Sig: Take 1 tablet (100 mg total) by mouth 2 (two) times a day for 7 days   clomiPHENE (CLOMID) 50 mg tablet Not Taking  No No   Sig: Take 1 tablet (50 mg total) by mouth daily Day 5-9 of cycle   Patient not taking: Reported on 10/6/2023   ketorolac (TORADOL) 10 mg tablet   Yes Yes   Sig: Take 10 mg by mouth every 6 (six) hours as needed for moderate pain      Facility-Administered Medications: None       Past Medical History:   Diagnosis Date   • Kidney stone    • Renal disorder     kidney stones       Past Surgical History:   Procedure Laterality Date   • NM CYSTO/URETERO W/LITHOTRIPSY &INDWELL STENT INSRT Right 10/24/2018    Procedure: CYSTOSCOPY URETEROSCOPY WITH RETROGRADE PYELOGRAM AND INSERTION STENT URETERAL;  Surgeon: Mariposa Mullen MD;  Location: [facility];  Service: Urology   • NM LITHOTRIPSY [address] Right 11/14/2018    Procedure: Isac Mcqueen SHOCKWAVE (ESWL); Surgeon: Mariposa Mullen MD;  Location: [facility];  Service: Urology       Family History   Problem Relation Age of Onset   • Cancer Mother    • Diabetes Mother    • Hypertension Mother    • Thyroid disease Mother    • Hyperlipidemia Father         High Triglycerides     I have reviewed and agree with the history as documented. E-Cigarette/Vaping   • E-Cigarette Use Never User      E-Cigarette/Vaping Substances   • Nicotine No    • THC No    • CBD No    • Flavoring No    • Other No    • Unknown No      Social History     Tobacco Use   • Smoking status: Every Day     Packs/day: 0.25     Years: 4.00     Total pack years: 1.00     Types: Cigarettes   • Smokeless tobacco: Former     Quit date: 5/4/2016   Vaping Use   • Vaping Use: Never used   Substance Use Topics   • Alcohol use: Yes     Comment: occasional   • Drug use: No       Review of Systems   Constitutional: Negative for chills and fever. Respiratory: Negative for cough and shortness of breath. Cardiovascular: Negative for chest pain. Gastrointestinal: Positive for nausea and vomiting. Negative for diarrhea. Genitourinary: Positive for flank pain. Musculoskeletal: Negative for arthralgias and myalgias. Neurological: Negative for headaches. Psychiatric/Behavioral: Negative for confusion. All other systems reviewed and are negative. Physical Exam  Physical Exam  Vitals and nursing note reviewed. Constitutional:       General: She is not in acute distress. Appearance: Normal appearance. She is not ill-appearing, toxic-appearing or diaphoretic.       Comments: Uncomfortable appearing but nontoxic nondistressed   HENT:      Head: Normocephalic and atraumatic. Comments: Moist mucous membranes. Not actively vomiting     Right Ear: External ear normal.      Left Ear: External ear normal.   Eyes:      General:         Right eye: No discharge. Left eye: No discharge. Cardiovascular:      Comments: Regular rate and rhythm, no murmurs rubs or gallops. Extremities warm and well-perfused without mottling. Pulmonary:      Effort: No respiratory distress. Comments: No increased work of breathing. Speaking in complete sentences. Lungs clear to auscultation bilaterally without wheezes, rales, rhonchi. Satting 99% on room air indicating adequate oxygenation. Abdominal:      General: There is no distension. Tenderness: There is abdominal tenderness. Comments: Primarily left upper quadrant tenderness without rigidity, rebound, guarding   Genitourinary:     Comments: Left-sided CVA tenderness  Musculoskeletal:         General: No deformity. Cervical back: Normal range of motion. Skin:     Findings: No lesion or rash. Neurological:      Mental Status: She is alert and oriented to person, place, and time. Mental status is at baseline.       Comments: AAOx4, pleasant, interactive   Psychiatric:         Mood and Affect: Mood and affect normal.         Vital Signs  ED Triage Vitals [10/06/23 2102]   Temperature Pulse Respirations Blood Pressure SpO2   99 °F (37.2 °C) 99 (!) 24 133/84 99 %      Temp Source Heart Rate Source Patient Position - Orthostatic VS BP Location FiO2 (%)   Tympanic Monitor Standing Right arm --      Pain Score       8           Vitals:    10/06/23 2102   BP: 133/84   Pulse: 99   Patient Position - Orthostatic VS: Standing         Visual Acuity      ED Medications  Medications   sodium chloride 0.9 % bolus 1,000 mL (1,000 mL Intravenous New Bag 10/6/23 2159)   ketorolac (TORADOL) injection 15 mg (15 mg Intravenous Given 10/6/23 2200)   Famotidine (PF) (PEPCID) injection 20 mg (20 mg Intravenous Given 10/6/23 2208)   sucralfate (CARAFATE) tablet 1 g (1 g Oral Given 10/6/23 2211)   metoclopramide (REGLAN) injection 10 mg (10 mg Intravenous Given 10/6/23 2211)   oxyCODONE-acetaminophen (PERCOCET) 5-325 mg per tablet 1 tablet (1 tablet Oral Given 10/6/23 2212)   cefTRIAXone (ROCEPHIN) IVPB (premix in dextrose) 1,000 mg 50 mL (1,000 mg Intravenous New Bag 10/6/23 2229)       Diagnostic Studies  Results Reviewed     Procedure Component Value Units Date/Time    Urinalysis with microscopic [[id number]]  (Abnormal) Collected: 10/06/23 2152    Lab Status: Final result Specimen: Urine, Clean Catch Updated: 10/06/23 2238     Color, UA Yellow     Clarity, UA Slightly Cloudy     Specific Gravity, UA >=1.030     pH, UA 6.0     Leukocytes, UA Negative     Nitrite, UA Negative     Protein, UA Negative mg/dl      Glucose, UA Negative mg/dl      Ketones, UA Negative mg/dl      Urobilinogen, UA 0.2 E.U./dl      Bilirubin, UA Negative     Occult Blood, UA Moderate     RBC, UA 10-20 /hpf      WBC, UA 0-1 /hpf      Epithelial Cells Occasional /hpf      Bacteria, UA Occasional /hpf      MUCUS THREADS Moderate    CBC and differential [[id number]]  (Abnormal) Collected: 10/06/23 2158    Lab Status: Final result Specimen: Blood from Arm, Right Updated: 10/06/23 2206     WBC 13.51 Thousand/uL      RBC 4.54 Million/uL      Hemoglobin 14.2 g/dL      Hematocrit 41.2 %      MCV 91 fL      MCH 31.3 pg      MCHC 34.5 g/dL      RDW 12.2 %      MPV 10.6 fL      Platelets 710 Thousands/uL      nRBC 0 /100 WBCs      Neutrophils Relative 70 %      Immat GRANS % 0 %      Lymphocytes Relative 24 %      Monocytes Relative 4 %      Eosinophils Relative 1 %      Basophils Relative 1 %      Neutrophils Absolute 9.62 Thousands/µL      Immature Grans Absolute 0.03 Thousand/uL      Lymphocytes Absolute 3.19 Thousands/µL      Monocytes Absolute 0.48 Thousand/µL      Eosinophils Absolute 0.10 Thousand/µL Basophils Absolute 0.09 Thousands/µL     Comprehensive metabolic panel [[id number]] Collected: 10/06/23 2158    Lab Status: In process Specimen: Blood from Arm, Right Updated: 10/06/23 2203    POCT pregnancy, urine [[id number]]  (Normal) Resulted: 10/06/23 2159    Lab Status: Final result Updated: 10/06/23 2200     EXT Preg Test, Ur Negative     Control Valid                 No orders to display              Procedures  Procedures         ED Course                               SBIRT 20yo+    Flowsheet Row Most Recent Value   Initial Alcohol Screen: US AUDIT-C     1. How often do you have a drink containing alcohol? 0 Filed at: 10/06/2023 2201   2. How many drinks containing alcohol do you have on a typical day you are drinking? 0 Filed at: 10/06/2023 2201   3b. FEMALE Any Age, or MALE 65+: How often do you have 4 or more drinks on one occassion? 0 Filed at: 10/06/2023 2201   Audit-C Score 0 Filed at: 10/06/2023 2201   FRENCH: How many times in the past year have you. .. Used an illegal drug or used a prescription medication for non-medical reasons? Never Filed at: 10/06/2023 2201                    Medical Decision Making  I obtained history from the patient. I reviewed external medical documentation regarding patient's visit 2 days ago for a 2 mm ureteral stone with associated UTI. Patient with continued worsening of symptoms however states that she has not been taking antibiotics for antiemetics. Patient denying constitutional symptoms. I ordered and reviewed lab work including CBC, CMP, urinalysis to evaluate for electrolyte or renal derangement, leukocytosis, anemia, continued urologic evidence of infection. I treated patient with IV fluids, Reglan, Percocet, additionally empirically treated with Rocephin for presumed continued urinary tract infection. Patient without evidence of infection on urinalysis. Patient with a slight leukocytosis but lab work otherwise grossly unremarkable.   Stating pain feels a lot better after Percocet and nausea has resolved after Reglan. Provided with a prescription for Percocet, Reglan, instructions to take previously prescribed cefpodoxime, discharged with return precautions. Amount and/or Complexity of Data Reviewed  Labs: ordered. Risk  Prescription drug management. Disposition  Final diagnoses:   Ureterolithiasis     Time reflects when diagnosis was documented in both MDM as applicable and the Disposition within this note     Time User Action Codes Description Comment    10/6/2023 11:08 PM Ana Wong Add [N20.1] Ureterolithiasis       ED Disposition     ED Disposition   Discharge    Condition   Stable    Date/Time   Fri Oct 6, 2023 11:08 PM    [address] discharge to home/self care. Follow-up Information     Follow up With Specialties Details Why Contact Info Additional Information    [address] Emergency Department Emergency Medicine  If symptoms worsen [address] [address]  [address] [facility] Emergency Department, [address] [facility] [address] [place] [address], [id number]          Patient's Medications   Discharge Prescriptions    METOCLOPRAMIDE (REGLAN) 10 MG TABLET    Take 1 tablet (10 mg total) by mouth every 6 (six) hours       Start Date: 10/6/2023 End Date: --       Order Dose: 10 mg       Quantity: 30 tablet    Refills: 0    OXYCODONE-ACETAMINOPHEN (PERCOCET) 5-325 MG PER TABLET    Take 1 tablet by mouth every 8 (eight) hours as needed for moderate pain for up to 10 doses Max Daily Amount: 3 tablets       Start Date: 10/6/2023 End Date: --       Order Dose: 1 tablet       Quantity: 10 tablet    Refills: 0       No discharge procedures on file.     PDMP Review     None          ED Provider  Electronically Signed by           Ana Wong MD  10/06/23 0431

## 2023-10-07 NOTE — DISCHARGE INSTRUCTIONS
Use the prescribed Percocet for severe breakthrough pain, Reglan for nausea. Use the previously prescribed cefpodoxime. Drink lots of fluids. If you have any severe worsening of pain, fevers, chills, persistent nausea and vomiting and are not able to drink fluids, return to the emergency department.

## 2023-10-26 ENCOUNTER — HOSPITAL ENCOUNTER (EMERGENCY)
Facility: HOSPITAL | Age: 33
Discharge: HOME | End: 2023-10-26
Attending: EMERGENCY MEDICINE
Payer: COMMERCIAL

## 2023-10-26 ENCOUNTER — APPOINTMENT (EMERGENCY)
Dept: RADIOLOGY | Facility: HOSPITAL | Age: 33
End: 2023-10-26
Payer: COMMERCIAL

## 2023-10-26 VITALS
WEIGHT: 235 LBS | TEMPERATURE: 97.9 F | SYSTOLIC BLOOD PRESSURE: 165 MMHG | RESPIRATION RATE: 18 BRPM | BODY MASS INDEX: 40.98 KG/M2 | HEART RATE: 88 BPM | DIASTOLIC BLOOD PRESSURE: 93 MMHG | OXYGEN SATURATION: 99 %

## 2023-10-26 DIAGNOSIS — N20.1 LEFT URETERAL STONE: Primary | ICD-10-CM

## 2023-10-26 DIAGNOSIS — N20.1 URETEROLITHIASIS: ICD-10-CM

## 2023-10-26 LAB
ANION GAP SERPL CALCULATED.3IONS-SCNC: 9 MMOL/L
BACTERIA UR QL AUTO: ABNORMAL /HPF
BASOPHILS # BLD AUTO: 0.08 THOUSANDS/ÂΜL (ref 0–0.1)
BASOPHILS NFR BLD AUTO: 1 % (ref 0–1)
BILIRUB UR QL STRIP: NEGATIVE
BUN SERPL-MCNC: 15 MG/DL (ref 5–25)
CALCIUM SERPL-MCNC: 8.7 MG/DL (ref 8.4–10.2)
CHLORIDE SERPL-SCNC: 104 MMOL/L (ref 96–108)
CLARITY UR: ABNORMAL
CO2 SERPL-SCNC: 22 MMOL/L (ref 21–32)
COLOR UR: YELLOW
CREAT SERPL-MCNC: 0.73 MG/DL (ref 0.6–1.3)
EOSINOPHIL # BLD AUTO: 0.35 THOUSAND/ÂΜL (ref 0–0.61)
EOSINOPHIL NFR BLD AUTO: 3 % (ref 0–6)
ERYTHROCYTE [DISTWIDTH] IN BLOOD BY AUTOMATED COUNT: 12 % (ref 11.6–15.1)
EXT PREGNANCY TEST URINE: NEGATIVE
EXT. CONTROL: NORMAL
GFR SERPL CREATININE-BSD FRML MDRD: 110 ML/MIN/1.73SQ M
GLUCOSE SERPL-MCNC: 172 MG/DL (ref 65–140)
GLUCOSE UR STRIP-MCNC: NEGATIVE MG/DL
HCT VFR BLD AUTO: 42.2 % (ref 34.8–46.1)
HGB BLD-MCNC: 14.8 G/DL (ref 11.5–15.4)
HGB UR QL STRIP.AUTO: ABNORMAL
IMM GRANULOCYTES # BLD AUTO: 0.03 THOUSAND/UL (ref 0–0.2)
IMM GRANULOCYTES NFR BLD AUTO: 0 % (ref 0–2)
KETONES UR STRIP-MCNC: NEGATIVE MG/DL
LEUKOCYTE ESTERASE UR QL STRIP: NEGATIVE
LYMPHOCYTES # BLD AUTO: 5.24 THOUSANDS/ÂΜL (ref 0.6–4.47)
LYMPHOCYTES NFR BLD AUTO: 43 % (ref 14–44)
MCH RBC QN AUTO: 32.4 PG (ref 26.8–34.3)
MCHC RBC AUTO-ENTMCNC: 35.1 G/DL (ref 31.4–37.4)
MCV RBC AUTO: 92 FL (ref 82–98)
MONOCYTES # BLD AUTO: 0.61 THOUSAND/ÂΜL (ref 0.17–1.22)
MONOCYTES NFR BLD AUTO: 5 % (ref 4–12)
MUCOUS THREADS UR QL AUTO: ABNORMAL
NEUTROPHILS # BLD AUTO: 5.88 THOUSANDS/ÂΜL (ref 1.85–7.62)
NEUTS SEG NFR BLD AUTO: 48 % (ref 43–75)
NITRITE UR QL STRIP: NEGATIVE
NON-SQ EPI CELLS URNS QL MICRO: ABNORMAL /HPF
NRBC BLD AUTO-RTO: 0 /100 WBCS
PH UR STRIP.AUTO: 5.5 [PH]
PLATELET # BLD AUTO: 346 THOUSANDS/UL (ref 149–390)
PMV BLD AUTO: 10.9 FL (ref 8.9–12.7)
POTASSIUM SERPL-SCNC: 3.8 MMOL/L (ref 3.5–5.3)
PROT UR STRIP-MCNC: ABNORMAL MG/DL
RBC # BLD AUTO: 4.57 MILLION/UL (ref 3.81–5.12)
RBC #/AREA URNS AUTO: ABNORMAL /HPF
SODIUM SERPL-SCNC: 135 MMOL/L (ref 135–147)
SP GR UR STRIP.AUTO: >=1.03 (ref 1–1.03)
UROBILINOGEN UR QL STRIP.AUTO: 0.2 E.U./DL
WBC # BLD AUTO: 12.19 THOUSAND/UL (ref 4.31–10.16)
WBC #/AREA URNS AUTO: ABNORMAL /HPF

## 2023-10-26 PROCEDURE — 36415 COLL VENOUS BLD VENIPUNCTURE: CPT | Performed by: EMERGENCY MEDICINE

## 2023-10-26 PROCEDURE — 96374 THER/PROPH/DIAG INJ IV PUSH: CPT

## 2023-10-26 PROCEDURE — G1004 CDSM NDSC: HCPCS

## 2023-10-26 PROCEDURE — 96375 TX/PRO/DX INJ NEW DRUG ADDON: CPT

## 2023-10-26 PROCEDURE — 80048 BASIC METABOLIC PNL TOTAL CA: CPT | Performed by: EMERGENCY MEDICINE

## 2023-10-26 PROCEDURE — 96361 HYDRATE IV INFUSION ADD-ON: CPT

## 2023-10-26 PROCEDURE — 81025 URINE PREGNANCY TEST: CPT | Performed by: EMERGENCY MEDICINE

## 2023-10-26 PROCEDURE — 85025 COMPLETE CBC W/AUTO DIFF WBC: CPT | Performed by: EMERGENCY MEDICINE

## 2023-10-26 PROCEDURE — 99284 EMERGENCY DEPT VISIT MOD MDM: CPT

## 2023-10-26 PROCEDURE — 99285 EMERGENCY DEPT VISIT HI MDM: CPT | Performed by: EMERGENCY MEDICINE

## 2023-10-26 PROCEDURE — 74176 CT ABD & PELVIS W/O CONTRAST: CPT

## 2023-10-26 PROCEDURE — 81001 URINALYSIS AUTO W/SCOPE: CPT | Performed by: EMERGENCY MEDICINE

## 2023-10-26 RX ORDER — METOCLOPRAMIDE 10 MG/1
10 TABLET ORAL EVERY 6 HOURS
Qty: 15 TABLET | Refills: 0 | Status: SHIPPED | OUTPATIENT
Start: 2023-10-26

## 2023-10-26 RX ORDER — OXYCODONE HYDROCHLORIDE AND ACETAMINOPHEN 5; 325 MG/1; MG/1
1 TABLET ORAL EVERY 8 HOURS PRN
Qty: 10 TABLET | Refills: 0 | Status: SHIPPED | OUTPATIENT
Start: 2023-10-26 | End: 2023-10-26 | Stop reason: SDUPTHER

## 2023-10-26 RX ORDER — KETOROLAC TROMETHAMINE 30 MG/ML
15 INJECTION, SOLUTION INTRAMUSCULAR; INTRAVENOUS ONCE
Status: DISCONTINUED | OUTPATIENT
Start: 2023-10-26 | End: 2023-10-26

## 2023-10-26 RX ORDER — OXYCODONE HYDROCHLORIDE AND ACETAMINOPHEN 5; 325 MG/1; MG/1
1 TABLET ORAL EVERY 8 HOURS PRN
Qty: 10 TABLET | Refills: 0 | Status: SHIPPED | OUTPATIENT
Start: 2023-10-26

## 2023-10-26 RX ORDER — METOCLOPRAMIDE HYDROCHLORIDE 5 MG/ML
10 INJECTION INTRAMUSCULAR; INTRAVENOUS ONCE
Status: COMPLETED | OUTPATIENT
Start: 2023-10-26 | End: 2023-10-26

## 2023-10-26 RX ORDER — HYDROMORPHONE HCL/PF 1 MG/ML
0.5 SYRINGE (ML) INJECTION ONCE
Status: COMPLETED | OUTPATIENT
Start: 2023-10-26 | End: 2023-10-26

## 2023-10-26 RX ADMIN — HYDROMORPHONE HYDROCHLORIDE 0.5 MG: 1 INJECTION, SOLUTION INTRAMUSCULAR; INTRAVENOUS; SUBCUTANEOUS at 03:19

## 2023-10-26 RX ADMIN — SODIUM CHLORIDE 1000 ML: 0.9 INJECTION, SOLUTION INTRAVENOUS at 03:19

## 2023-10-26 RX ADMIN — METOCLOPRAMIDE 10 MG: 5 INJECTION, SOLUTION INTRAMUSCULAR; INTRAVENOUS at 04:42

## 2023-10-26 NOTE — ED NOTES
Pt reported taking Reglan prior to coming as well as ibuprofen, percocet around [address], RN  10/26/23 4971

## 2023-10-26 NOTE — ED PROVIDER NOTES
History  Chief Complaint   Patient presents with    Flank Pain     Pt reported left side pain. Hx of kidney stones. Took pain medications from previous stone around 1am and no relief. 36-year-old female past medical history significant for multiple kidney stones in the past presenting to the ED today for left-sided flank pain. Patient states that the pain started at around 1:30 AM.  She tried taking ibuprofen for her pain however did not improve. She took Reglan for nausea which did improve her nausea. She states that this feels very similar to her kidney stones in the past.  She is complaining of left-sided flank pain with some radiation into the left groin. Denies any chest pain or shortness of breath. No fevers or chills that she was aware of. Per chart review patient's last kidney stone was earlier in October. She did need lithotripsies in the past in 2018. Prior to Admission Medications   Prescriptions Last Dose Informant Patient Reported? Taking?    Cholecalciferol 50 MCG (2000 UT) CAPS   Yes No   Sig: Take 1 capsule by mouth daily   Patient not taking: Reported on 10/6/2023   Prenatal Vit-Fe Fumarate-FA (PRENATAL VITAMINS PO)   Yes No   Sig: Take 1 tablet by mouth daily   Patient not taking: Reported on 10/6/2023   Progesterone 200 MG CAPS   Yes No   Patient not taking: Reported on 10/6/2023   clomiPHENE (CLOMID) 50 mg tablet   No No   Sig: Take 1 tablet (50 mg total) by mouth daily Day 5-9 of cycle   Patient not taking: Reported on 10/6/2023   ketorolac (TORADOL) 10 mg tablet   Yes No   Sig: Take 10 mg by mouth every 6 (six) hours as needed for moderate pain   metoclopramide (Reglan) 10 mg tablet   No No   Sig: Take 1 tablet (10 mg total) by mouth every 6 (six) hours   metoclopramide (Reglan) 10 mg tablet   No Yes   Sig: Take 1 tablet (10 mg total) by mouth every 6 (six) hours   oxyCODONE-acetaminophen (Percocet) 5-325 mg per tablet   No No   Sig: Take 1 tablet by mouth every 8 (eight) hours as needed for moderate pain for up to 10 doses Max Daily Amount: 3 tablets   oxyCODONE-acetaminophen (Percocet) 5-325 mg per tablet   No Yes   Sig: Take 1 tablet by mouth every 8 (eight) hours as needed for moderate pain for up to 10 doses Max Daily Amount: 3 tablets      Facility-Administered Medications: None       Past Medical History:   Diagnosis Date    Kidney stone     Renal disorder     kidney stones       Past Surgical History:   Procedure Laterality Date    UT CYSTO/URETERO W/LITHOTRIPSY &INDWELL STENT INSRT Right 10/24/2018    Procedure: CYSTOSCOPY URETEROSCOPY WITH RETROGRADE PYELOGRAM AND INSERTION STENT URETERAL;  Surgeon: Tru Young MD;  Location: [facility];  Service: Urology    UT LITHOTRIPSY [address] Right 11/14/2018    Procedure: Yuval Sharp EXTRACORPORAL SHOCKWAVE (ESWL); Surgeon: Tru Young MD;  Location: [facility];  Service: Urology       Family History   Problem Relation Age of Onset    Cancer Mother     Diabetes Mother     Hypertension Mother     Thyroid disease Mother     Hyperlipidemia Father         High Triglycerides     I have reviewed and agree with the history as documented. E-Cigarette/Vaping    E-Cigarette Use Never User      E-Cigarette/Vaping Substances    Nicotine No     THC No     CBD No     Flavoring No     Other No     Unknown No      Social History     Tobacco Use    Smoking status: Every Day     Packs/day: 0.25     Years: 4.00     Total pack years: 1.00     Types: Cigarettes    Smokeless tobacco: Former     Quit date: 5/4/2016   Vaping Use    Vaping Use: Never used   Substance Use Topics    Alcohol use: Yes     Comment: occasional    Drug use: No       Review of Systems   Constitutional:  Negative for chills and fever. HENT:  Negative for hearing loss. Eyes:  Negative for visual disturbance. Respiratory:  Negative for shortness of breath. Cardiovascular:  Negative for chest pain.    Gastrointestinal:  Negative for abdominal pain, constipation, diarrhea, nausea and vomiting. Genitourinary:  Positive for flank pain. Negative for difficulty urinating. Musculoskeletal:  Negative for myalgias. Skin:  Negative for color change. Neurological:  Negative for dizziness and headaches. Psychiatric/Behavioral:  Negative for agitation. All other systems reviewed and are negative. Physical Exam  Physical Exam  Vitals and nursing note reviewed. Constitutional:       General: She is not in acute distress. Appearance: Normal appearance. She is well-developed. She is not ill-appearing. HENT:      Head: Normocephalic and atraumatic. Right Ear: External ear normal.      Left Ear: External ear normal.      Nose: Nose normal. No congestion. Mouth/Throat:      Mouth: Mucous membranes are moist.      Pharynx: Oropharynx is clear. No oropharyngeal exudate. Eyes:      General:         Right eye: No discharge. Left eye: No discharge. Extraocular Movements: Extraocular movements intact. Conjunctiva/sclera: Conjunctivae normal.      Pupils: Pupils are equal, round, and reactive to light. Cardiovascular:      Rate and Rhythm: Normal rate and regular rhythm. Heart sounds: Normal heart sounds. No murmur heard. No friction rub. No gallop. Pulmonary:      Effort: Pulmonary effort is normal. No respiratory distress. Breath sounds: Normal breath sounds. No stridor. No wheezing. Abdominal:      General: Bowel sounds are normal. There is no distension. Palpations: Abdomen is soft. Tenderness: There is abdominal tenderness. There is left CVA tenderness. Comments: LLQ abdominal tenderness present. No rebound or guarding. Musculoskeletal:         General: No swelling. Normal range of motion. Cervical back: Normal range of motion and neck supple. No rigidity. Skin:     General: Skin is warm and dry. Capillary Refill: Capillary refill takes less than 2 seconds.    Neurological: General: No focal deficit present. Mental Status: She is alert and oriented to person, place, and time. Mental status is at baseline. Motor: No weakness.       Gait: Gait normal.   Psychiatric:         Mood and Affect: Mood normal.         Behavior: Behavior normal.         Vital Signs  ED Triage Vitals [10/26/23 0306]   Temperature Pulse Respirations Blood Pressure SpO2   97.9 °F (36.6 °C) 88 18 165/93 99 %      Temp Source Heart Rate Source Patient Position - Orthostatic VS BP Location FiO2 (%)   Oral Monitor Sitting Right arm --      Pain Score       No Pain           Vitals:    10/26/23 0306   BP: 165/93   Pulse: 88   Patient Position - Orthostatic VS: Sitting         Visual Acuity      ED Medications  Medications   sodium chloride 0.9 % bolus 1,000 mL (1,000 mL Intravenous New Bag 10/26/23 0319)   HYDROmorphone (DILAUDID) injection 0.5 mg (0.5 mg Intravenous Given 10/26/23 0319)   metoclopramide (REGLAN) injection 10 mg (10 mg Intravenous Given 10/26/23 0442)       Diagnostic Studies  Results Reviewed       Procedure Component Value Units Date/Time    Basic metabolic panel [[id number]]  (Abnormal) Collected: 10/26/23 0315    Lab Status: Final result Specimen: Blood from Arm, Right Updated: 10/26/23 0350     Sodium 135 mmol/L      Potassium 3.8 mmol/L      Chloride 104 mmol/L      CO2 22 mmol/L      ANION GAP 9 mmol/L      BUN 15 mg/dL      Creatinine 0.73 mg/dL      Glucose 172 mg/dL      Calcium 8.7 mg/dL      eGFR 110 ml/min/1.73sq m     Narrative:      [place] guidelines for Chronic Kidney Disease (CKD):     Stage 1 with normal or high GFR (GFR > 90 mL/min/1.73 square meters)    Stage 2 Mild CKD (GFR = 60-89 mL/min/1.73 square meters)    Stage 3A Moderate CKD (GFR = 45-59 mL/min/1.73 square meters)    Stage 3B Moderate CKD (GFR = 30-44 mL/min/1.73 square meters)    Stage 4 Severe CKD (GFR = 15-29 mL/min/1.73 square meters)    Stage 5 End Stage CKD (GFR <15 mL/min/1.73 square meters)  Note: GFR calculation is accurate only with a steady state creatinine    Urine Microscopic [[id number]]  (Abnormal) Collected: 10/26/23 0319    Lab Status: Final result Specimen: Urine, Other Updated: 10/26/23 0336     RBC, UA Innumerable /hpf      WBC, UA 0-1 /hpf      Epithelial Cells Moderate /hpf      Bacteria, UA Moderate /hpf      MUCUS THREADS Occasional    UA w Reflex to Microscopic w Reflex to Culture [[id number]]  (Abnormal) Collected: 10/26/23 0319    Lab Status: Final result Specimen: Urine, Other Updated: 10/26/23 0329     Color, UA Yellow     Clarity, UA Slightly Cloudy     Specific Gravity, UA >=1.030     pH, UA 5.5     Leukocytes, UA Negative     Nitrite, UA Negative     Protein, UA Trace mg/dl      Glucose, UA Negative mg/dl      Ketones, UA Negative mg/dl      Urobilinogen, UA 0.2 E.U./dl      Bilirubin, UA Negative     Occult Blood, UA Large    CBC and differential [[id number]]  (Abnormal) Collected: 10/26/23 0315    Lab Status: Final result Specimen: Blood from Arm, Right Updated: 10/26/23 0328     WBC 12.19 Thousand/uL      RBC 4.57 Million/uL      Hemoglobin 14.8 g/dL      Hematocrit 42.2 %      MCV 92 fL      MCH 32.4 pg      MCHC 35.1 g/dL      RDW 12.0 %      MPV 10.9 fL      Platelets 802 Thousands/uL      nRBC 0 /100 WBCs      Neutrophils Relative 48 %      Immat GRANS % 0 %      Lymphocytes Relative 43 %      Monocytes Relative 5 %      Eosinophils Relative 3 %      Basophils Relative 1 %      Neutrophils Absolute 5.88 Thousands/µL      Immature Grans Absolute 0.03 Thousand/uL      Lymphocytes Absolute 5.24 Thousands/µL      Monocytes Absolute 0.61 Thousand/µL      Eosinophils Absolute 0.35 Thousand/µL      Basophils Absolute 0.08 Thousands/µL     POCT pregnancy, urine [[id number]]  (Normal) Resulted: 10/26/23 0315    Lab Status: Final result Updated: 10/26/23 0323     EXT Preg Test, Ur Negative     Control Valid                   CT renal stone study abdomen pelvis wo contrast   ED Interpretation by Agustin Pike MD (10/26 1583)   Abnormal   I interpreted this CT renal stone study as Left Ureteral stone. Will await formal read by radiologist.      Final Result by Gerardo Lenz MD (10/26 7921)      1. Bilateral nephrolithiasis with a 3 x 2 x 3 mm calculus in the distal left ureter with associated moderate left hydronephrosis. 2.  Hepatic steatosis. Workstation performed: [id number]                    Procedures  Procedures         ED Course  ED Course as of 10/26/23 0529   u Oct 26, 2023   0329 WBC(!): 12.19  Minimally elevated   0331 Blood, UA(!): Large  Likely from possible kidney stone. Will await CT renal stone study   5677 Basic metabolic panel(!)  No signs of ETHAN   0352 Urine Microscopic(!)  No WBC present. Moderate epithelial and bacteria which likely means that this is all dirty specimen. Would not treat as UTI                                             Medical Decision Making  21-year-old female presenting to the ED today with left-sided flank pain. At this time differential diagnosis includes kidney stone versus pyelonephritis versus UTI versus pregnancy. Will evaluate at this time with a UA, urine pride, CT renal stone study. We will check a BMP to evaluate for kidney function should she have a kidney stone. We will also check a CBC to evaluate for infection. Amount and/or Complexity of Data Reviewed  Labs: ordered. Decision-making details documented in ED Course. Radiology: ordered and independent interpretation performed. Risk  Prescription drug management.              Disposition  Final diagnoses:   Left ureteral stone     Time reflects when diagnosis was documented in both MDM as applicable and the Disposition within this note       Time User Action Codes Description Comment    10/26/2023  3:59 AM Agustin Pike Add [N20.1] Left ureteral stone     10/26/2023  4:00 AM Rozetta Ahumada, Fady Add [N20.1] Ureterolithiasis           ED Disposition ED Disposition   Discharge    Condition   Stable    Date/Time   Thu Oct 26, 2023  5:09 AM    Comment   Gaviota Sheppard discharge to home/self care. Follow-up Information       Follow up With Specialties Details Why Contact Info Additional Information    Leonora Mittal MD Urology Schedule an appointment as soon as possible for a visit  for follow up [address]  [address] [facility] Emergency Medicine Go to  If symptoms worsen, As needed [address] [address]  [address] [facility], [address] , [facility], [address]            Current Discharge Medication List        CONTINUE these medications which have CHANGED    Details   metoclopramide (Reglan) 10 mg tablet Take 1 tablet (10 mg total) by mouth every 6 (six) hours  Qty: 15 tablet, Refills: 0    Associated Diagnoses: Ureterolithiasis      oxyCODONE-acetaminophen (Percocet) 5-325 mg per tablet Take 1 tablet by mouth every 8 (eight) hours as needed for moderate pain for up to 10 doses Max Daily Amount: 3 tablets  Qty: 10 tablet, Refills: 0    Associated Diagnoses: Ureterolithiasis           CONTINUE these medications which have NOT CHANGED    Details   Cholecalciferol 50 MCG (2000 UT) CAPS Take 1 capsule by mouth daily      clomiPHENE (CLOMID) 50 mg tablet Take 1 tablet (50 mg total) by mouth daily Day 5-9 of cycle  Qty: 5 tablet, Refills: 2    Associated Diagnoses: Infertility management      ketorolac (TORADOL) 10 mg tablet Take 10 mg by mouth every 6 (six) hours as needed for moderate pain      Prenatal Vit-Fe Fumarate-FA (PRENATAL VITAMINS PO) Take 1 tablet by mouth daily      Progesterone 200 MG CAPS              No discharge procedures on file.     PDMP Review       None            ED Provider  Electronically Signed by             Parul Mann MD  10/26/23 9381

## 2023-10-26 NOTE — DISCHARGE INSTRUCTIONS
Please call Dr. Jennie Chandra office to make an appointment for follow-up. I have refilled your Reglan prescription we can take 1 tablet every 6 hours as needed for nausea. I refilled your Percocet as well you can take 1 tablet every 8 hours as needed for pain. Take 600 mg of ibuprofen every 6 hours as well for your pain. Please strain your urine until you pass the stone. Return to ER if you develop any fevers or chills.

## 2024-02-21 PROBLEM — N30.01 ACUTE CYSTITIS WITH HEMATURIA: Status: RESOLVED | Noted: 2018-12-17 | Resolved: 2024-02-21

## 2024-07-21 ENCOUNTER — APPOINTMENT (EMERGENCY)
Dept: RADIOLOGY | Facility: HOSPITAL | Age: 34
End: 2024-07-21
Payer: COMMERCIAL

## 2024-07-21 ENCOUNTER — HOSPITAL ENCOUNTER (EMERGENCY)
Facility: HOSPITAL | Age: 34
Discharge: HOME | End: 2024-07-21
Attending: STUDENT IN AN ORGANIZED HEALTH CARE EDUCATION/TRAINING PROGRAM | Admitting: STUDENT IN AN ORGANIZED HEALTH CARE EDUCATION/TRAINING PROGRAM
Payer: COMMERCIAL

## 2024-07-21 VITALS
HEART RATE: 88 BPM | SYSTOLIC BLOOD PRESSURE: 123 MMHG | BODY MASS INDEX: 40.98 KG/M2 | RESPIRATION RATE: 24 BRPM | OXYGEN SATURATION: 97 % | WEIGHT: 235 LBS | TEMPERATURE: 98.9 F | DIASTOLIC BLOOD PRESSURE: 63 MMHG

## 2024-07-21 DIAGNOSIS — O20.0 THREATENED MISCARRIAGE: Primary | ICD-10-CM

## 2024-07-21 LAB
ABO GROUP BLD: NORMAL
ALBUMIN SERPL BCG-MCNC: 4.4 G/DL (ref 3.5–5)
ALP SERPL-CCNC: 59 U/L (ref 34–104)
ALT SERPL W P-5'-P-CCNC: 24 U/L (ref 7–52)
AMORPH URATE CRY URNS QL MICRO: ABNORMAL
ANION GAP SERPL CALCULATED.3IONS-SCNC: 10 MMOL/L (ref 4–13)
AST SERPL W P-5'-P-CCNC: 21 U/L (ref 13–39)
B-HCG SERPL-ACNC: ABNORMAL MIU/ML (ref 0–5)
BACTERIA UR QL AUTO: ABNORMAL /HPF
BASOPHILS # BLD AUTO: 0.08 THOUSANDS/ÂΜL (ref 0–0.1)
BASOPHILS NFR BLD AUTO: 1 % (ref 0–1)
BILIRUB SERPL-MCNC: 0.57 MG/DL (ref 0.2–1)
BILIRUB UR QL STRIP: NEGATIVE
BUN SERPL-MCNC: 9 MG/DL (ref 5–25)
CALCIUM SERPL-MCNC: 9.8 MG/DL (ref 8.4–10.2)
CHLORIDE SERPL-SCNC: 103 MMOL/L (ref 96–108)
CLARITY UR: ABNORMAL
CO2 SERPL-SCNC: 23 MMOL/L (ref 21–32)
COLOR UR: YELLOW
CREAT SERPL-MCNC: 0.7 MG/DL (ref 0.6–1.3)
EOSINOPHIL # BLD AUTO: 0.21 THOUSAND/ÂΜL (ref 0–0.61)
EOSINOPHIL NFR BLD AUTO: 2 % (ref 0–6)
ERYTHROCYTE [DISTWIDTH] IN BLOOD BY AUTOMATED COUNT: 12 % (ref 11.6–15.1)
EXT PREGNANCY TEST URINE: POSITIVE
EXT. CONTROL: ABNORMAL
GFR SERPL CREATININE-BSD FRML MDRD: 115 ML/MIN/1.73SQ M
GLUCOSE SERPL-MCNC: 139 MG/DL (ref 65–140)
GLUCOSE UR STRIP-MCNC: NEGATIVE MG/DL
HCT VFR BLD AUTO: 40.8 % (ref 34.8–46.1)
HGB BLD-MCNC: 13.6 G/DL (ref 11.5–15.4)
HGB UR QL STRIP.AUTO: ABNORMAL
IMM GRANULOCYTES # BLD AUTO: 0.02 THOUSAND/UL (ref 0–0.2)
IMM GRANULOCYTES NFR BLD AUTO: 0 % (ref 0–2)
KETONES UR STRIP-MCNC: NEGATIVE MG/DL
LEUKOCYTE ESTERASE UR QL STRIP: ABNORMAL
LYMPHOCYTES # BLD AUTO: 3.88 THOUSANDS/ÂΜL (ref 0.6–4.47)
LYMPHOCYTES NFR BLD AUTO: 36 % (ref 14–44)
MCH RBC QN AUTO: 30.7 PG (ref 26.8–34.3)
MCHC RBC AUTO-ENTMCNC: 33.3 G/DL (ref 31.4–37.4)
MCV RBC AUTO: 92 FL (ref 82–98)
MONOCYTES # BLD AUTO: 0.51 THOUSAND/ÂΜL (ref 0.17–1.22)
MONOCYTES NFR BLD AUTO: 5 % (ref 4–12)
NEUTROPHILS # BLD AUTO: 6 THOUSANDS/ÂΜL (ref 1.85–7.62)
NEUTS SEG NFR BLD AUTO: 56 % (ref 43–75)
NITRITE UR QL STRIP: NEGATIVE
NON-SQ EPI CELLS URNS QL MICRO: ABNORMAL /HPF
NRBC BLD AUTO-RTO: 0 /100 WBCS
PH UR STRIP.AUTO: 6.5 [PH]
PLATELET # BLD AUTO: 326 THOUSANDS/UL (ref 149–390)
PMV BLD AUTO: 10.9 FL (ref 8.9–12.7)
POTASSIUM SERPL-SCNC: 3.3 MMOL/L (ref 3.5–5.3)
PROT SERPL-MCNC: 7.1 G/DL (ref 6.4–8.4)
PROT UR STRIP-MCNC: NEGATIVE MG/DL
RBC # BLD AUTO: 4.43 MILLION/UL (ref 3.81–5.12)
RBC #/AREA URNS AUTO: ABNORMAL /HPF
RH BLD: POSITIVE
SODIUM SERPL-SCNC: 136 MMOL/L (ref 135–147)
SP GR UR STRIP.AUTO: 1.02 (ref 1–1.03)
UROBILINOGEN UR STRIP-ACNC: <2 MG/DL
WBC # BLD AUTO: 10.7 THOUSAND/UL (ref 4.31–10.16)
WBC #/AREA URNS AUTO: ABNORMAL /HPF

## 2024-07-21 PROCEDURE — 81025 URINE PREGNANCY TEST: CPT | Performed by: STUDENT IN AN ORGANIZED HEALTH CARE EDUCATION/TRAINING PROGRAM

## 2024-07-21 PROCEDURE — 80053 COMPREHEN METABOLIC PANEL: CPT | Performed by: STUDENT IN AN ORGANIZED HEALTH CARE EDUCATION/TRAINING PROGRAM

## 2024-07-21 PROCEDURE — 86900 BLOOD TYPING SEROLOGIC ABO: CPT | Performed by: STUDENT IN AN ORGANIZED HEALTH CARE EDUCATION/TRAINING PROGRAM

## 2024-07-21 PROCEDURE — 85025 COMPLETE CBC W/AUTO DIFF WBC: CPT | Performed by: STUDENT IN AN ORGANIZED HEALTH CARE EDUCATION/TRAINING PROGRAM

## 2024-07-21 PROCEDURE — 87077 CULTURE AEROBIC IDENTIFY: CPT | Performed by: STUDENT IN AN ORGANIZED HEALTH CARE EDUCATION/TRAINING PROGRAM

## 2024-07-21 PROCEDURE — 87186 SC STD MICRODIL/AGAR DIL: CPT | Performed by: STUDENT IN AN ORGANIZED HEALTH CARE EDUCATION/TRAINING PROGRAM

## 2024-07-21 PROCEDURE — 99284 EMERGENCY DEPT VISIT MOD MDM: CPT | Performed by: STUDENT IN AN ORGANIZED HEALTH CARE EDUCATION/TRAINING PROGRAM

## 2024-07-21 PROCEDURE — 84702 CHORIONIC GONADOTROPIN TEST: CPT | Performed by: STUDENT IN AN ORGANIZED HEALTH CARE EDUCATION/TRAINING PROGRAM

## 2024-07-21 PROCEDURE — 36415 COLL VENOUS BLD VENIPUNCTURE: CPT | Performed by: STUDENT IN AN ORGANIZED HEALTH CARE EDUCATION/TRAINING PROGRAM

## 2024-07-21 PROCEDURE — 86901 BLOOD TYPING SEROLOGIC RH(D): CPT | Performed by: STUDENT IN AN ORGANIZED HEALTH CARE EDUCATION/TRAINING PROGRAM

## 2024-07-21 PROCEDURE — 76801 OB US < 14 WKS SINGLE FETUS: CPT

## 2024-07-21 PROCEDURE — 99284 EMERGENCY DEPT VISIT MOD MDM: CPT

## 2024-07-21 PROCEDURE — 81001 URINALYSIS AUTO W/SCOPE: CPT | Performed by: STUDENT IN AN ORGANIZED HEALTH CARE EDUCATION/TRAINING PROGRAM

## 2024-07-21 PROCEDURE — 87086 URINE CULTURE/COLONY COUNT: CPT | Performed by: STUDENT IN AN ORGANIZED HEALTH CARE EDUCATION/TRAINING PROGRAM

## 2024-07-21 RX ORDER — CEPHALEXIN 500 MG/1
500 CAPSULE ORAL EVERY 8 HOURS SCHEDULED
Qty: 12 CAPSULE | Refills: 0 | Status: SHIPPED | OUTPATIENT
Start: 2024-07-21 | End: 2024-07-25

## 2024-07-21 NOTE — ED PROVIDER NOTES
History  Chief Complaint   Patient presents with    Abdominal Pain     Recently found out she is pregnant approx 6 weeks by her calculations. Yesterday started with spotting and cramping,      Patient is a 33-year-old female, , currently approximately 6 weeks pregnant, who presents to the emergency department for vaginal spotting and pain.  She states the symptoms began yesterday.  She has noted a very small amount of blood and possibly a small blood clot.  She is also developed left lower quadrant pain.  No modifying factors.  Associated with nausea and vomiting.  She is concerned she may have an ectopic pregnancy.  No other complaints or concerns.      Abdominal Pain  Associated symptoms: vaginal bleeding        Prior to Admission Medications   Prescriptions Last Dose Informant Patient Reported? Taking?   Cholecalciferol 50 MCG ( UT) CAPS   Yes No   Sig: Take 1 capsule by mouth daily   Patient not taking: Reported on 10/6/2023   Prenatal MV-Min-Fe Fum-FA-DHA (PRENATAL 1 PO)   Yes Yes   Sig: Take by mouth in the morning   Prenatal Vit-Fe Fumarate-FA (PRENATAL VITAMINS PO)   Yes No   Sig: Take 1 tablet by mouth daily   Patient not taking: Reported on 10/6/2023   Progesterone 200 MG CAPS   Yes No   Patient not taking: Reported on 10/6/2023   clomiPHENE (CLOMID) 50 mg tablet   No No   Sig: Take 1 tablet (50 mg total) by mouth daily Day 5-9 of cycle   Patient not taking: Reported on 10/6/2023   ketorolac (TORADOL) 10 mg tablet Not Taking  Yes No   Sig: Take 10 mg by mouth every 6 (six) hours as needed for moderate pain   Patient not taking: Reported on 2024   metoclopramide (Reglan) 10 mg tablet Not Taking  No No   Sig: Take 1 tablet (10 mg total) by mouth every 6 (six) hours   Patient not taking: Reported on 2024   oxyCODONE-acetaminophen (Percocet) 5-325 mg per tablet Not Taking  No No   Sig: Take 1 tablet by mouth every 8 (eight) hours as needed for moderate pain for up to 10 doses Max Daily  Amount: 3 tablets   Patient not taking: Reported on 7/21/2024      Facility-Administered Medications: None       Past Medical History:   Diagnosis Date    Kidney stone     Renal disorder     kidney stones       Past Surgical History:   Procedure Laterality Date    IA CYSTO/URETERO W/LITHOTRIPSY &INDWELL STENT INSRT Right 10/24/2018    Procedure: CYSTOSCOPY URETEROSCOPY WITH RETROGRADE PYELOGRAM AND INSERTION STENT URETERAL;  Surgeon: Gregory Goodman MD;  Location: [facility];  Service: Urology    IA LITHOTRIPSY XTRCORP SHOCK WAVE Right 11/14/2018    Procedure: LITHROTRIPSY EXTRACORPORAL SHOCKWAVE (ESWL);  Surgeon: Gregory Goodman MD;  Location: [facility];  Service: Urology       Family History   Problem Relation Age of Onset    Cancer Mother     Diabetes Mother     Hypertension Mother     Thyroid disease Mother     Hyperlipidemia Father         High Triglycerides     I have reviewed and agree with the history as documented.    E-Cigarette/Vaping    E-Cigarette Use Never User      E-Cigarette/Vaping Substances    Nicotine No     THC No     CBD No     Flavoring No     Other No     Unknown No      Social History     Tobacco Use    Smoking status: Every Day     Current packs/day: 0.25     Average packs/day: 0.3 packs/day for 4.0 years (1.0 ttl pk-yrs)     Types: Cigarettes    Smokeless tobacco: Former     Quit date: 5/4/2016   Vaping Use    Vaping status: Never Used   Substance Use Topics    Alcohol use: Yes     Comment: occasional    Drug use: No       Review of Systems   Gastrointestinal:  Positive for abdominal pain.   Genitourinary:  Positive for vaginal bleeding.   All other systems reviewed and are negative.      Physical Exam  Physical Exam  Vitals and nursing note reviewed.   Constitutional:       General: She is not in acute distress.     Appearance: She is well-developed. She is not diaphoretic.   HENT:      Head: Normocephalic and atraumatic.      Right Ear: External ear normal.      Left Ear: External ear  normal.      Nose: Nose normal.   Eyes:      General: Lids are normal. No scleral icterus.  Cardiovascular:      Rate and Rhythm: Normal rate and regular rhythm.      Heart sounds: Normal heart sounds. No murmur heard.     No friction rub. No gallop.   Pulmonary:      Effort: Pulmonary effort is normal. No respiratory distress.      Breath sounds: Normal breath sounds. No wheezing or rales.   Abdominal:      Palpations: Abdomen is soft.      Tenderness: There is abdominal tenderness in the left lower quadrant. There is no guarding or rebound.   Musculoskeletal:         General: No deformity. Normal range of motion.      Cervical back: Normal range of motion and neck supple.   Skin:     General: Skin is warm and dry.   Neurological:      General: No focal deficit present.      Mental Status: She is alert.   Psychiatric:         Mood and Affect: Mood normal.         Behavior: Behavior normal.         Vital Signs  ED Triage Vitals [07/21/24 1530]   Temperature Pulse Respirations Blood Pressure SpO2   98.9 °F (37.2 °C) 88 (!) 24 123/63 97 %      Temp Source Heart Rate Source Patient Position - Orthostatic VS BP Location FiO2 (%)   Tympanic Monitor Sitting Right arm --      Pain Score       7           Vitals:    07/21/24 1530   BP: 123/63   Pulse: 88   Patient Position - Orthostatic VS: Sitting         Visual Acuity      ED Medications  Medications - No data to display    Diagnostic Studies  Results Reviewed       Procedure Component Value Units Date/Time    hCG, quantitative [[id number]]  (Abnormal) Collected: 07/21/24 1606    Lab Status: Final result Specimen: Blood from Line, Venous Updated: 07/21/24 1705     HCG, Quant 30,681.2 mIU/mL     Narrative:       Expected Ranges:    HCG results between 5.0 and 25.0 mIU/mL may be indicative of early pregnancy but should be interpreted in light of the total clinical presentation.    HCG can rise to detectable levels in corina and post menopausal women (0-11.6 mIU/mL).      Approximate               Approximate HCG  Gestation age          Concentration ( mIU/mL)  _____________          ______________________   Weeks                      HCG values  0.2-1                       5-50  1-2                           2-3                         100-5000  3-4                         500-25726  4-5                         1000-97869  5-6                         [id number]  6-8                         [id number]  8-12                        [id number]      Comprehensive metabolic panel [[id number]]  (Abnormal) Collected: 07/21/24 1606    Lab Status: Final result Specimen: Blood from Line, Venous Updated: 07/21/24 1637     Sodium 136 mmol/L      Potassium 3.3 mmol/L      Chloride 103 mmol/L      CO2 23 mmol/L      ANION GAP 10 mmol/L      BUN 9 mg/dL      Creatinine 0.70 mg/dL      Glucose 139 mg/dL      Calcium 9.8 mg/dL      AST 21 U/L      ALT 24 U/L      Alkaline Phosphatase 59 U/L      Total Protein 7.1 g/dL      Albumin 4.4 g/dL      Total Bilirubin 0.57 mg/dL      eGFR 115 ml/min/1.73sq m     Narrative:      National Kidney Disease Foundation guidelines for Chronic Kidney Disease (CKD):     Stage 1 with normal or high GFR (GFR > 90 mL/min/1.73 square meters)    Stage 2 Mild CKD (GFR = 60-89 mL/min/1.73 square meters)    Stage 3A Moderate CKD (GFR = 45-59 mL/min/1.73 square meters)    Stage 3B Moderate CKD (GFR = 30-44 mL/min/1.73 square meters)    Stage 4 Severe CKD (GFR = 15-29 mL/min/1.73 square meters)    Stage 5 End Stage CKD (GFR <15 mL/min/1.73 square meters)  Note: GFR calculation is accurate only with a steady state creatinine    CBC and differential [[id number]]  (Abnormal) Collected: 07/21/24 1606    Lab Status: Final result Specimen: Blood from Line, Venous Updated: 07/21/24 1615     WBC 10.70 Thousand/uL      RBC 4.43 Million/uL      Hemoglobin 13.6 g/dL      Hematocrit 40.8 %      MCV 92 fL      MCH 30.7 pg      MCHC 33.3 g/dL      RDW 12.0 %      MPV 10.9 fL       Platelets 326 Thousands/uL      nRBC 0 /100 WBCs      Segmented % 56 %      Immature Grans % 0 %      Lymphocytes % 36 %      Monocytes % 5 %      Eosinophils Relative 2 %      Basophils Relative 1 %      Absolute Neutrophils 6.00 Thousands/µL      Absolute Immature Grans 0.02 Thousand/uL      Absolute Lymphocytes 3.88 Thousands/µL      Absolute Monocytes 0.51 Thousand/µL      Eosinophils Absolute 0.21 Thousand/µL      Basophils Absolute 0.08 Thousands/µL     Urine Microscopic [[id number]]  (Abnormal) Collected: 07/21/24 1550    Lab Status: Final result Specimen: Urine, Other Updated: 07/21/24 1606     RBC, UA None Seen /hpf      WBC, UA 1-2 /hpf      Epithelial Cells Moderate /hpf      Bacteria, UA Innumerable /hpf      Amorphous Crystals, UA Moderate     URINE COMMENT --    UA w Reflex to Microscopic w Reflex to Culture [[id number]]  (Abnormal) Collected: 07/21/24 1550    Lab Status: Final result Specimen: Urine, Other Updated: 07/21/24 1557     Color, UA Yellow     Clarity, UA Slightly Cloudy     Specific Gravity, UA 1.020     pH, UA 6.5     Leukocytes, UA Trace     Nitrite, UA Negative     Protein, UA Negative mg/dl      Glucose, UA Negative mg/dl      Ketones, UA Negative mg/dl      Urobilinogen, UA <2.0 mg/dl      Bilirubin, UA Negative     Occult Blood, UA Trace     URINE COMMENT --    Urine culture [[id number]] Collected: 07/21/24 1550    Lab Status: In process Specimen: Urine, Other Updated: 07/21/24 1557    POCT pregnancy, urine [[id number]]  (Abnormal) Resulted: 07/21/24 1553    Lab Status: Final result Updated: 07/21/24 1553     EXT Preg Test, Ur Positive     Control Valid                   US OB < 14 weeks with transvaginal   Final Result by Lopez Mejía MD (07/21 1753)      Intrauterine gestational sac with a small yolk sac. Embryo not yet well visualized. Findings likely reflect early pregnancy.         Workstation performed: [id number]                    Procedures  Procedures         ED Course  ED  "Course as of 07/21/24 1845   Sun Jul 21, 2024   0054 PREGNANCY TEST URINE(!): Positive   1753 US OB < 14 weeks with transvaginal  Intrauterine gestational sac with a small yolk sac. Embryo not yet well visualized. Findings likely reflect early pregnancy.     1800 Patient reevaluated.  Resting comfortably.  Discussed results and findings.  Will discharge.  Discussed need for OB/GYN follow-up soon as possible.  Discussed repeat hCG in 48 hours.  Discussed treatment for asymptomatic bacteriuria.  Strict return precautions discussed.  Patient verbalized understanding and agreed to plan of care.                                               Medical Decision Making  Patient is a 33 y.o. female who presents to the ED for lower abdominal pain and vaginal bleeding/spotting in the setting of an early pregnancy.  Patient is nontoxic, well-appearing.  Vitals are stable.  On exam there is mild suprapubic and left lower quadrant tenderness without rebound or guarding.    Differential includes but is not limited to: Ectopic, early pregnancy, miscarriage    Plan: Labs, transvaginal ultrasound, quantitative hCG, reassess                 Portions of the record may have been created with voice recognition software. Occasional wrong word or \"sound a like\" substitutions may have occurred due to the inherent limitations of voice recognition software. Read the chart carefully and recognize, using context, where substitutions have occurred.    Amount and/or Complexity of Data Reviewed  Labs: ordered. Decision-making details documented in ED Course.  Radiology: ordered. Decision-making details documented in ED Course.    Risk  Prescription drug management.                 Disposition  Final diagnoses:   Threatened miscarriage     Time reflects when diagnosis was documented in both MDM as applicable and the Disposition within this note       Time User Action Codes Description Comment    7/21/2024  6:00 PM Jc Gonzalez Add [O20.0] " Threatened miscarriage           ED Disposition       ED Disposition   Discharge    Condition   Stable    Date/Time   Sun Jul 21, 2024  5:16 PM    Comment   Karena Garcia discharge to home/self care.                   Follow-up Information       Follow up With Specialties Details Why Contact Info Additional Information    Frank Lombardi, DO Family Medicine, Wound Care   [address] [facility]  [address]  [facility] [address]  [phone]       [facility] Emergency Department Emergency Medicine   [address] [facility] [address]  [phone] [facility] Emergency Department, [address] [place], [place], [id number] [place] [address] [facility] [place] [facility] Schedule an appointment as soon as possible for a visit   [address] [facility] [address]  [facility] [address] [phone] [facility] [place], [address], [phone]            Discharge Medication List as of 7/21/2024  6:01 PM        CONTINUE these medications which have NOT CHANGED    Details   Prenatal MV-Min-Fe Fum-FA-DHA (PRENATAL 1 PO) Take by mouth in the morning, Historical Med      Cholecalciferol 50 MCG (2000 UT) CAPS Take 1 capsule by mouth daily, Historical Med      clomiPHENE (CLOMID) 50 mg tablet Take 1 tablet (50 mg total) by mouth daily Day 5-9 of cycle, Starting Mon 11/23/2020, Normal      ketorolac (TORADOL) 10 mg tablet Take 10 mg by mouth every 6 (six) hours as needed for moderate pain, Historical Med      metoclopramide (Reglan) 10 mg tablet Take 1 tablet (10 mg total) by mouth every 6 (six) hours, Starting Thu 10/26/2023, Normal      oxyCODONE-acetaminophen (Percocet) 5-325 mg per tablet Take 1 tablet by mouth every 8 (eight) hours as needed for moderate pain for up to 10 doses Max Daily Amount: 3 tablets, Starting Thu 10/26/2023, Normal      Prenatal Vit-Fe Fumarate-FA (PRENATAL  VITAMINS PO) Take 1 tablet by mouth daily, Historical Med      Progesterone 200 MG CAPS Starting Thu 11/18/2021, Historical Med             Outpatient Discharge Orders   hCG, quantitative   Standing Status: Future Standing Exp. Date: 07/21/25       PDMP Review       None            ED Provider  Electronically Signed by             Jc Gonzalez DO  07/21/24 6272

## 2024-07-21 NOTE — DISCHARGE INSTRUCTIONS
You were seen in the emergency department for vaginal bleeding and pain.  As discussed please have your quantitative hCG repeated in 48 hours.  Please follow-up with OB/GYN as soon as possible.  Return to the emergency room for any worsening pain, fevers, persistent bleeding or discharge, or for any other new or concerning symptoms.   [Colon Cancer Screening] : colon cancer screening [Procedure Explained] : The procedure was explained [Allergies Reviewed] : allergies reviewed. [Benefits] : benefits [Risks] : Risks [Bleeding] : bleeding risk [Alternatives] : alternatives [Infection] : risk of infection [Consent Obtained] : written consent was obtained prior to the procedure and is detailed in the patient's record [Patient] : the patient [Bowel Prep Kit] : the patient took the appropriate bowel preparation kit as directed [Approved Diet Followed] : the patient avoided solid foods and adhered to the approved diet list for 24 hours prior to the procedure [Automated Blood Pressure Cuff] : automated blood pressure cuff [Cardiac Monitor] : cardiac monitor [Pulse Oximeter] : pulse oximeter [Propofol ___ mg IV] : Propofol [unfilled] ~Umg intravenously [2] : 2 [Prep Qualtiy: ___] : Prep Quality:  [unfilled] [Withdrawal Time: ___] : Withdrawal Time:  [unfilled] [Left Lateral Decubitus] : The patient was positioned in the left lateral decubitus position [Cecum (Landmarks/Transillum)] : and guided to the cecum which was identified by the anatomic landmarks of the appendiceal orifice and ileocecal valve and by transillumination in the right lower quadrant [No Difficulty] : without difficulty [Insufflated] : insufflated [Single Pass Needed] : after a single pass [Patient Rotated Into Alternating Positions] : the patient was not rotated [Normal] : Normal [Tolerated Well] : the patient tolerated the procedure well [Vital Signs Stable] : the vital signs were stable [No Complications] : There were no complications [de-identified] : [id number] [de-identified] : f/u colon 10 years

## 2024-07-23 ENCOUNTER — APPOINTMENT (OUTPATIENT)
Dept: LAB | Facility: HOSPITAL | Age: 34
End: 2024-07-23
Payer: COMMERCIAL

## 2024-07-23 DIAGNOSIS — O20.0 THREATENED MISCARRIAGE: ICD-10-CM

## 2024-07-23 LAB — B-HCG SERPL-ACNC: ABNORMAL MIU/ML (ref 0–5)

## 2024-07-23 PROCEDURE — 84702 CHORIONIC GONADOTROPIN TEST: CPT

## 2024-07-23 PROCEDURE — 36415 COLL VENOUS BLD VENIPUNCTURE: CPT

## 2024-07-24 LAB
BACTERIA UR CULT: ABNORMAL
BACTERIA UR CULT: ABNORMAL

## 2024-07-25 ENCOUNTER — APPOINTMENT (OUTPATIENT)
Dept: LAB | Facility: HOSPITAL | Age: 34
End: 2024-07-25
Payer: COMMERCIAL

## 2024-07-25 DIAGNOSIS — O20.0 THREATENED MISCARRIAGE: ICD-10-CM

## 2024-07-25 LAB — B-HCG SERPL-ACNC: ABNORMAL MIU/ML (ref 0–5)

## 2024-07-25 PROCEDURE — 36415 COLL VENOUS BLD VENIPUNCTURE: CPT

## 2024-07-25 PROCEDURE — 84702 CHORIONIC GONADOTROPIN TEST: CPT

## 2024-08-02 NOTE — PROGRESS NOTES
"S: 33 y.o.  who presents for viability scan with LMP of 24 (CABRERA 3/12/25). Menses are irregular. She is 8 weeks and 5 days by her LMP. She reports an episode of spotting after IC. She went to the ED on 24; hcg was 30,000. US indicated GS with yolk sac, embryo not visualized. This is  a planned and welcomed pregnancy.    Past Medical History:   Diagnosis Date    Kidney stone     Renal disorder     kidney stones       OB History    Para Term  AB Living   2 0 0 0 0 0   SAB IAB Ectopic Multiple Live Births   0 0 0 0 0      # Outcome Date GA Lbr Edinson/2nd Weight Sex Type Anes PTL Lv   2 Current            1                Obstetric Comments   biochemical        O:  Vitals:    24 1427   BP: 124/86   BP Location: Left arm   Patient Position: Sitting   Cuff Size: Large   Weight: 107 kg (235 lb)   Height: 5' 3.5\" (1.613 m)       TVUS indicates, viable steele IUP measuring 13.8mm, correlating with 7w5d gestation. CABRERA based off today's US. CABRERA: 25. FHR: 164.      A/P:  1. Viable pregnancy on TVUS  2. MFM referral placed.  3. RTC in 2 week for nurse intake visit    Problem List Items Addressed This Visit    None  Visit Diagnoses       Amenorrhea    -  Primary    Relevant Orders    [facility] US OB < 14 weeks single or first gestation level 1 (Completed)    7 weeks gestation of pregnancy        Relevant Orders    Ambulatory Referral to Maternal Fetal Medicine            "

## 2024-08-05 ENCOUNTER — ULTRASOUND (OUTPATIENT)
Dept: OBGYN CLINIC | Facility: CLINIC | Age: 34
End: 2024-08-05
Payer: COMMERCIAL

## 2024-08-05 VITALS
SYSTOLIC BLOOD PRESSURE: 124 MMHG | DIASTOLIC BLOOD PRESSURE: 86 MMHG | BODY MASS INDEX: 40.12 KG/M2 | WEIGHT: 235 LBS | HEIGHT: 64 IN

## 2024-08-05 DIAGNOSIS — Z3A.01 7 WEEKS GESTATION OF PREGNANCY: ICD-10-CM

## 2024-08-05 DIAGNOSIS — N91.2 AMENORRHEA: Primary | ICD-10-CM

## 2024-08-05 PROCEDURE — 99204 OFFICE O/P NEW MOD 45 MIN: CPT

## 2024-08-05 PROCEDURE — 76817 TRANSVAGINAL US OBSTETRIC: CPT

## 2024-08-08 NOTE — PATIENT INSTRUCTIONS
Congratulations!! Please review our Pregnancy Essential Guide and [facility] L&D Virtual tour from our networks website.     [facility] Pregnancy Essentials Guide  [facility] ([email or web address])     [facility] ([facility])

## 2024-08-12 ENCOUNTER — INITIAL PRENATAL (OUTPATIENT)
Dept: OBGYN CLINIC | Facility: CLINIC | Age: 34
End: 2024-08-12

## 2024-08-12 VITALS — DIASTOLIC BLOOD PRESSURE: 62 MMHG | SYSTOLIC BLOOD PRESSURE: 106 MMHG

## 2024-08-12 DIAGNOSIS — Z34.01 ENCOUNTER FOR SUPERVISION OF NORMAL FIRST PREGNANCY, FIRST TRIMESTER: Primary | ICD-10-CM

## 2024-08-12 DIAGNOSIS — Z31.430 ENCOUNTER OF FEMALE FOR TESTING FOR GENETIC DISEASE CARRIER STATUS FOR PROCREATIVE MANAGEMENT: ICD-10-CM

## 2024-08-12 PROCEDURE — OBC

## 2024-08-12 NOTE — PROGRESS NOTES
Details that I feel the provider should be aware of:   - h/o 3 unsuccessful attempts of IUI with [facility]  - h/o chemical pregnancy per pt in 2016  - h/o multiple kidney stones, s/p consult with urology and testing of stones encouraged modifying calcium and no further stones noted since 10/2023  - pt and FOB previously completed extended genetic carrier screening panel with [facility], medical records request completed at check out to obtain records  - last pap 3/14/18, denies h/o abn pap    OB INTAKE INTERVIEW  Patient is 33 y.o. who presents for OB intake at 8w5d  She is accompanied by Kenroy, Karena's spouse, during this encounter.  The father of her baby (Kenroy) is supportive and involved in the pregnancy and is 37 years old.      Last Menstrual Period: 24  Ultrasound: Measured 7 weeks 5 days on 24  Estimated Date of Delivery: 3/19/25 changed by 7 week US    Signs/Symptoms of Pregnancy  Current pregnancy symptoms: +nausea, sporadic. Insomnia, hip pain. Reviewed tips for dealing with nausea in pregnancy and OTC medications safe in pregnancy for nausea, sleep and aches/pain.  Constipation no  Headaches YES - occasional and pt reports tylenol is effective. Reviewed tylenol recommendations.  Cramping/spotting YES - pt reports 1 episode of VB after intercourse, s/p eval in ED 24 and no further episodes noted.   PICA cravings no    Diabetes-  BMI 40.98  If patient has 1 or more, please order early 1 hour GTT  History of GDM no  BMI >35 YES  History of PCOS or current metformin use no  History of LGA/macrosomic infant (4000g/9lbs) no    If patient has 2 or more, please order early 1 hour GTT  BMI>30 YES  AMA no  First degree relative with type 2 diabetes YES  History of chronic HTN, hyperlipidemia, elevated A1C no  High risk race (, , ,  or ) no    Hypertension- if you answer yes to any of the following, please order baseline preeclampsia labs  (cbc, comprehensive metabolic panel, urine protein creatinine ratio, uric acid)  History of of chronic HTN no  History of gestational HTN no  History of preeclampsia, eclampsia, or HELLP syndrome no  History of diabetes no  History of lupus, autoimmune disease, kidney disease no    Thyroid- if yes order TSH with reflex T4  History of thyroid disease no    Bleeding Disorder or Hx of DVT-patient or first degree relative with history of. Order the following if not done previously.   (Factor V, antithrombin III, prothrombin gene mutation, protein C and S Ag, lupus anticoagulant, anticardiolipin, beta-2 glycoprotein)   no    OB/GYN-  History of abnormal pap smear no       Date of last pap smear 3/14/18  History of HPV no  History of Herpes/HSV no  History of other STI (gonorrhea, chlamydia, trich) no  History of prior  no  History of prior  no  History of  delivery prior to 36 weeks 6 days no  History of blood transfusion no  Ok for blood transfusion yes    Substance screening-   History of tobacco use YES - quit 2024 and pt reports doing she is doing well and no longer has cravings.  Currently using tobacco no  Substance Use Screen Level (N/A, LOW, HIGH) no risk    MRSA Screening-   Does the pt have a hx of MRSA? no    Immunizations:  Influenza vaccine given this season n/a - reviewed  Discussed Tdap vaccine yes  Discussed COVID Vaccine yes    Genetic/MFM-  Do you or your partner have a history of any of the following in yourselves or first degree relatives?  Cystic fibrosis no  Spinal muscular atrophy no  Hemoglobinopathy/Sickle Cell/Thalassemia no  Fragile X Intellectual Disability no    Patient has known Mormon ancestry/ of Ashkenazi Mormon Descent through a family member doing DNA testing. FOB also of Mormon ancestry. Both Karena and Kenroy have previously completed extended genetic carrier screening  in  while going through infertility management with [facility]. Patient declined offer for  genetic counselor to be placed at this time. Medical records released signed and will obtain records from [facility] for genetic carrier screenings completed.     Appointment for Nuchal Translucency Ultrasound at [facility] scheduled for 9/17/24      Interview education  [facility] Pregnancy Essentials Book reviewed, discussed and attached to their AVS yes    Nurse/Family Partnership- patient may qualify no; referral placed no    Prenatal lab work scripts yes  Extra labs ordered:  1hr glucose    Aspirin/Preeclampsia Screen    Risk Level Risk Factor Recommendation   LOW Prior Uncomplicated full-term delivery no No Aspirin recommendation        MODERATE Nulliparity YES Recommend low-dose aspirin if     BMI>30 YES 2 or more moderate risk factors    Family History Preeclampsia (mother/sister) no     35yr old or greater no     Black Race, Concern for SDOH/Low Socioeconomic no     IVF Pregnancy  no     Personal History Risks (low birth weight, prior adverse preg outcome, >10yr preg interval) no         HIGH History of Preeclampsia no Recommend low-dose aspirin if     Multifetal gestation no 1 or more high risk factors    Chronic HTN no     Type 1 or 2 Diabetes no     Renal Disease no     Autoimmune Disease  no      Contraindications to ASA therapy:  NSAID/ ASA allergy: no  Nasal polyps: no  Asthma with history of ASA induced bronchospasm: no  Relative contraindications:  History of GI bleed: no  Active peptic ulcer disease: no  Severe hepatic dysfunction: no    Patient should be recommended to take ASA 162mg during this pregnancy from 12-36wks to lower her risk of preeclampsia: reviewed risk factors and recommendations. Patient verbalized understanding and will further review with [facility] at upcoming scheduled appt 9/17/24      The patient has a history now or in prior pregnancy notable for:  Elevated BMI      PN1 visit scheduled. The patient was oriented to our practice, the navigator role, reviewed delivering physicians and [facility]  for Delivery. All questions were answered.    Interviewed by: ODESSA Conklin RN

## 2024-08-20 ENCOUNTER — OFFICE VISIT (OUTPATIENT)
Dept: FAMILY MEDICINE CLINIC | Facility: CLINIC | Age: 34
End: 2024-08-20
Payer: COMMERCIAL

## 2024-08-20 ENCOUNTER — APPOINTMENT (OUTPATIENT)
Dept: LAB | Facility: HOSPITAL | Age: 34
End: 2024-08-20
Payer: COMMERCIAL

## 2024-08-20 VITALS
SYSTOLIC BLOOD PRESSURE: 122 MMHG | HEIGHT: 62 IN | DIASTOLIC BLOOD PRESSURE: 84 MMHG | BODY MASS INDEX: 44.05 KG/M2 | HEART RATE: 84 BPM | WEIGHT: 239.4 LBS | TEMPERATURE: 98.2 F | RESPIRATION RATE: 18 BRPM

## 2024-08-20 DIAGNOSIS — Z00.00 ROUTINE ADULT HEALTH MAINTENANCE: Primary | ICD-10-CM

## 2024-08-20 DIAGNOSIS — O24.419 GESTATIONAL DIABETES MELLITUS (GDM) IN FIRST TRIMESTER, GESTATIONAL DIABETES METHOD OF CONTROL UNSPECIFIED: Primary | ICD-10-CM

## 2024-08-20 DIAGNOSIS — Z13.6 SCREENING FOR CARDIOVASCULAR CONDITION: ICD-10-CM

## 2024-08-20 DIAGNOSIS — Z3A.10 10 WEEKS GESTATION OF PREGNANCY: ICD-10-CM

## 2024-08-20 DIAGNOSIS — Z13.29 SCREENING FOR THYROID DISORDER: ICD-10-CM

## 2024-08-20 DIAGNOSIS — Z34.01 ENCOUNTER FOR SUPERVISION OF NORMAL FIRST PREGNANCY, FIRST TRIMESTER: ICD-10-CM

## 2024-08-20 LAB
BACTERIA UR QL AUTO: ABNORMAL /HPF
BASOPHILS # BLD AUTO: 0.06 THOUSANDS/ÂΜL (ref 0–0.1)
BASOPHILS NFR BLD AUTO: 1 % (ref 0–1)
BILIRUB UR QL STRIP: NEGATIVE
CLARITY UR: CLEAR
COLOR UR: YELLOW
EOSINOPHIL # BLD AUTO: 0.16 THOUSAND/ÂΜL (ref 0–0.61)
EOSINOPHIL NFR BLD AUTO: 2 % (ref 0–6)
ERYTHROCYTE [DISTWIDTH] IN BLOOD BY AUTOMATED COUNT: 12 % (ref 11.6–15.1)
GLUCOSE 1H P 50 G GLC PO SERPL-MCNC: 217 MG/DL (ref 70–134)
GLUCOSE UR STRIP-MCNC: NEGATIVE MG/DL
HBV SURFACE AG SER QL: NORMAL
HCT VFR BLD AUTO: 39.5 % (ref 34.8–46.1)
HCV AB SER QL: NORMAL
HGB BLD-MCNC: 13.5 G/DL (ref 11.5–15.4)
HGB UR QL STRIP.AUTO: NEGATIVE
HIV 1+2 AB+HIV1 P24 AG SERPL QL IA: NORMAL
HIV 2 AB SERPL QL IA: NORMAL
HIV1 AB SERPL QL IA: NORMAL
HIV1 P24 AG SERPL QL IA: NORMAL
IMM GRANULOCYTES # BLD AUTO: 0.02 THOUSAND/UL (ref 0–0.2)
IMM GRANULOCYTES NFR BLD AUTO: 0 % (ref 0–2)
KETONES UR STRIP-MCNC: NEGATIVE MG/DL
LEUKOCYTE ESTERASE UR QL STRIP: ABNORMAL
LYMPHOCYTES # BLD AUTO: 2.76 THOUSANDS/ÂΜL (ref 0.6–4.47)
LYMPHOCYTES NFR BLD AUTO: 31 % (ref 14–44)
MCH RBC QN AUTO: 31.4 PG (ref 26.8–34.3)
MCHC RBC AUTO-ENTMCNC: 34.2 G/DL (ref 31.4–37.4)
MCV RBC AUTO: 92 FL (ref 82–98)
MONOCYTES # BLD AUTO: 0.31 THOUSAND/ÂΜL (ref 0.17–1.22)
MONOCYTES NFR BLD AUTO: 4 % (ref 4–12)
MUCOUS THREADS UR QL AUTO: ABNORMAL
NEUTROPHILS # BLD AUTO: 5.48 THOUSANDS/ÂΜL (ref 1.85–7.62)
NEUTS SEG NFR BLD AUTO: 62 % (ref 43–75)
NITRITE UR QL STRIP: NEGATIVE
NON-SQ EPI CELLS URNS QL MICRO: ABNORMAL /HPF
NRBC BLD AUTO-RTO: 0 /100 WBCS
PH UR STRIP.AUTO: 6.5 [PH]
PLATELET # BLD AUTO: 335 THOUSANDS/UL (ref 149–390)
PMV BLD AUTO: 10.4 FL (ref 8.9–12.7)
PROT UR STRIP-MCNC: NEGATIVE MG/DL
RBC # BLD AUTO: 4.3 MILLION/UL (ref 3.81–5.12)
RBC #/AREA URNS AUTO: ABNORMAL /HPF
RUBV IGG SERPL IA-ACNC: 24 IU/ML
SP GR UR STRIP.AUTO: 1.02 (ref 1–1.03)
TREPONEMA PALLIDUM IGG+IGM AB [PRESENCE] IN SERUM OR PLASMA BY IMMUNOASSAY: NORMAL
UROBILINOGEN UR STRIP-ACNC: <2 MG/DL
WBC # BLD AUTO: 8.79 THOUSAND/UL (ref 4.31–10.16)
WBC #/AREA URNS AUTO: ABNORMAL /HPF

## 2024-08-20 PROCEDURE — 83020 HEMOGLOBIN ELECTROPHORESIS: CPT

## 2024-08-20 PROCEDURE — 87389 HIV-1 AG W/HIV-1&-2 AB AG IA: CPT

## 2024-08-20 PROCEDURE — 81001 URINALYSIS AUTO W/SCOPE: CPT

## 2024-08-20 PROCEDURE — 86762 RUBELLA ANTIBODY: CPT

## 2024-08-20 PROCEDURE — 82950 GLUCOSE TEST: CPT

## 2024-08-20 PROCEDURE — 36415 COLL VENOUS BLD VENIPUNCTURE: CPT

## 2024-08-20 PROCEDURE — 86780 TREPONEMA PALLIDUM: CPT

## 2024-08-20 PROCEDURE — 85025 COMPLETE CBC W/AUTO DIFF WBC: CPT

## 2024-08-20 PROCEDURE — 87340 HEPATITIS B SURFACE AG IA: CPT

## 2024-08-20 PROCEDURE — 99395 PREV VISIT EST AGE 18-39: CPT | Performed by: NURSE PRACTITIONER

## 2024-08-20 PROCEDURE — 86803 HEPATITIS C AB TEST: CPT

## 2024-08-20 PROCEDURE — 87086 URINE CULTURE/COLONY COUNT: CPT

## 2024-08-20 NOTE — PROGRESS NOTES
FAMILY PRACTICE HEALTH MAINTENANCE OFFICE VISIT  [facility]    NAME: Karena Garcia  AGE: 33 y.o. SEX: female  : 1990     DATE: 2024    Assessment and Plan     1. Routine adult health maintenance  2. Screening for thyroid disorder  -     TSH, 3rd generation with Free T4 reflex; Future  3. Screening for cardiovascular condition  -     Lipid Panel with Direct LDL reflex; Future  4. 10 weeks gestation of pregnancy      Patient Counseling:   Nutrition: Stressed importance of a well balanced diet, moderation of sodium/saturated fat, caloric balance and sufficient intake of fiber  Exercise: Stressed the importance of regular exercise with a goal of 150 minutes per week  Dental Health: Discussed daily flossing and brushing and regular dental visits   Sexuality: Discussed sexually transmitted infections, use of condoms and prevention of unintended pregnancy  Alcohol Use:  Recommended moderation of alcohol intake  Injury Prevention: Discussed Safety Belts, Safety Helmets, and Smoke Detectors    Immunizations reviewed:  will tdap with gynecologist office  Discussed benefits of:  Cervical Cancer screening and Screening labs.  BMI Counseling: Body mass index is 43.79 kg/m². Discussed with patient's BMI with her. The BMI is above normal. Nutrition recommendations include 3-5 servings of fruits/vegetables daily, reducing fast food intake, and consuming healthier snacks.    Return if symptoms worsen or fail to improve.        Chief Complaint     Chief Complaint   Patient presents with   • Physical Exam     YC       History of Present Illness     HPI    Well Adult Physical   Patient here for a comprehensive physical exam.  Currently 10 weeks pregnant and denies any issues and concerns.  Due for Tdap and will take it once recommended by gynecologist.        Diet and Physical Activity  Diet: well balanced diet  Exercise: occasionally      Depression Screen  PHQ-2/9 Depression  Screening    Little interest or pleasure in doing things: 0 - not at all  Feeling down, depressed, or hopeless: 0 - not at all  PHQ-2 Score: 0  PHQ-2 Interpretation: Negative depression screen          General Health  Hearing: Normal:  bilateral  Vision: no vision problems and most recent eye exam <1 year  Dental: regular dental visits    Reproductive Health  Follows with gynecologist      The following portions of the patient's history were reviewed and updated as appropriate: allergies, current medications, past family history, past medical history, past social history, past surgical history and problem list.    Review of Systems     Review of Systems   Constitutional: Negative.    HENT: Negative.     Eyes: Negative.    Respiratory: Negative.     Cardiovascular: Negative.    Gastrointestinal: Negative.    Endocrine: Negative.    Genitourinary: Negative.    Musculoskeletal: Negative.    Skin: Negative.    Allergic/Immunologic: Negative.    Neurological: Negative.    Hematological: Negative.    Psychiatric/Behavioral: Negative.         Past Medical History     Past Medical History:   Diagnosis Date   • Kidney stone    • Renal disorder     kidney stones       Past Surgical History     Past Surgical History:   Procedure Laterality Date   • OK CYSTO/URETERO W/LITHOTRIPSY &INDWELL STENT INSRT Right 10/24/2018    Procedure: CYSTOSCOPY URETEROSCOPY WITH RETROGRADE PYELOGRAM AND INSERTION STENT URETERAL;  Surgeon: Gregory Goodman MD;  Location: [facility];  Service: Urology   • OK LITHOTRIPSY XTRCORP SHOCK WAVE Right 11/14/2018    Procedure: LITHROTRIPSY EXTRACORPORAL SHOCKWAVE (ESWL);  Surgeon: Gregory Goodman MD;  Location: [facility];  Service: Urology       Social History     Social History     Socioeconomic History   • Marital status: /Civil Union     Spouse name: None   • Number of children: None   • Years of education: None   • Highest education level: None   Occupational History   • None   Tobacco Use   •  Smoking status: Former     Current packs/day: 0.25     Average packs/day: 0.3 packs/day for 4.0 years (1.0 ttl pk-yrs)     Types: Cigarettes     Passive exposure: Never   • Smokeless tobacco: Former     Quit date: 5/4/2016   • Tobacco comments:     Weaning prior to pregnancy, quit with +UPT 6/2024   Vaping Use   • Vaping status: Never Used   Substance and Sexual Activity   • Alcohol use: Not Currently     Comment: occasional   • Drug use: No   • Sexual activity: Yes     Partners: Male     Birth control/protection: None   Other Topics Concern   • None   Social History Narrative   • None     Social Determinants of Health     Financial Resource Strain: Not on file   Food Insecurity: Not on file   Transportation Needs: Not on file   Physical Activity: Not on file   Stress: Not on file   Social Connections: Not on file   Intimate Partner Violence: Patient Unable To Answer (8/12/2024)    Humiliation, Afraid, Rape, and Kick questionnaire    • Fear of Current or Ex-Partner: Patient unable to answer    • Emotionally Abused: Patient unable to answer    • Physically Abused: Patient unable to answer    • Sexually Abused: Patient unable to answer   Housing Stability: Not on file       Family History     Family History   Problem Relation Age of Onset   • Cancer Mother    • Diabetes Mother    • Hypertension Mother    • Thyroid disease Mother    • Other Mother         CADISAL   • Diabetes Father    • Hyperlipidemia Father         High Triglycerides   • Kidney disease Sister         chronic UTIs/pylenophritis   • Heart disease Maternal Grandmother    • Diabetes Maternal Grandmother    • No Known Problems Maternal Grandfather    • Hypertension Paternal Grandmother    • Hypertension Paternal Grandfather    • Heart disease Paternal Grandfather    • Diabetes Paternal Grandfather        Current Medications       Current Outpatient Medications:   •  Prenatal MV-Min-Fe Fum-FA-DHA (PRENATAL 1 PO), Take by mouth in the morning, Disp: , Rfl:   "    Allergies     Allergies   Allergen Reactions   • Pollen Extract Sneezing       Objective     /84   Pulse 84   Temp 98.2 °F (36.8 °C) (Tympanic)   Resp 18   Ht 5' 2\" (1.575 m)   Wt 109 kg (239 lb 6.4 oz)   LMP 06/05/2024 (Exact Date)   BMI 43.79 kg/m²      Physical Exam  Vitals reviewed.   Constitutional:       Appearance: Normal appearance.   HENT:      Head: Normocephalic and atraumatic.      Salivary Glands: Right salivary gland is not tender. Left salivary gland is not tender.      Right Ear: Tympanic membrane, ear canal and external ear normal.      Left Ear: Tympanic membrane, ear canal and external ear normal. There is no impacted cerumen.      Nose: Nose normal. No congestion.      Mouth/Throat:      Mouth: Mucous membranes are moist.      Pharynx: No oropharyngeal exudate or posterior oropharyngeal erythema.   Eyes:      General: Lids are normal.         Right eye: No discharge or hordeolum.         Left eye: No discharge or hordeolum.      Conjunctiva/sclera: Conjunctivae normal.      Right eye: Right conjunctiva is not injected. No exudate or hemorrhage.     Left eye: Left conjunctiva is not injected. No exudate or hemorrhage.     Pupils: Pupils are equal, round, and reactive to light.   Neck:      Thyroid: No thyromegaly or thyroid tenderness.   Cardiovascular:      Rate and Rhythm: Normal rate and regular rhythm.      Pulses: Normal pulses.      Heart sounds: Normal heart sounds.   Pulmonary:      Effort: Pulmonary effort is normal.      Breath sounds: Normal breath sounds.   Chest:      Chest wall: No deformity, swelling, tenderness, crepitus or edema. There is no dullness to percussion.   Abdominal:      General: Abdomen is flat. Bowel sounds are normal.      Palpations: Abdomen is soft.      Tenderness: There is no abdominal tenderness.      Hernia: There is no hernia in the umbilical area, ventral area, left inguinal area or right inguinal area.   Genitourinary:     Comments:  and " breast exam deferred as follows with gynecologist  Musculoskeletal:         General: No swelling or tenderness. Normal range of motion.      Cervical back: Full passive range of motion without pain, normal range of motion and neck supple.      Right lower leg: No edema.      Left lower leg: No edema.   Lymphadenopathy:      Cervical:      Right cervical: No superficial or posterior cervical adenopathy.     Left cervical: No superficial or posterior cervical adenopathy.      Upper Body:      Right upper body: No supraclavicular or axillary adenopathy.      Left upper body: No supraclavicular or axillary adenopathy.      Lower Body: No right inguinal adenopathy. No left inguinal adenopathy.   Skin:     General: Skin is warm and dry.      Findings: No rash.   Neurological:      General: No focal deficit present.      Mental Status: She is alert and oriented to person, place, and time. Mental status is at baseline.      GCS: GCS eye subscore is 4. GCS verbal subscore is 5. GCS motor subscore is 6.      Motor: Motor function is intact.      Coordination: Coordination is intact. Coordination normal.      Gait: Gait normal.      Deep Tendon Reflexes: Reflexes are normal and symmetric.   Psychiatric:         Attention and Perception: Attention normal.         Mood and Affect: Mood normal.         Speech: Speech normal.         Behavior: Behavior normal. Behavior is cooperative.         Thought Content: Thought content normal.         Judgment: Judgment normal.           Vision Screening    Right eye Left eye Both eyes   Without correction      With correction 20/20 20/20 20/20           Teresa Whitehead, [facility]

## 2024-08-20 NOTE — PATIENT INSTRUCTIONS
"Patient Education     Routine physical for adults   The Basics   Written by the doctors and editors at [facility]   What is a physical? -- A physical is a routine visit, or \"check-up,\" with your doctor. You might also hear it called a \"wellness visit\" or \"preventive visit.\"  During each visit, the doctor will:   Ask about your physical and mental health   Ask about your habits, behaviors, and lifestyle   Do an exam   Give you vaccines if needed   Talk to you about any medicines you take   Give advice about your health   Answer your questions  Getting regular check-ups is an important part of taking care of your health. It can help your doctor find and treat any problems you have. But it's also important for preventing health problems.  A routine physical is different from a \"sick visit.\" A sick visit is when you see a doctor because of a health concern or problem. Since physicals are scheduled ahead of time, you can think about what you want to ask the doctor.  How often should I get a physical? -- It depends on your age and health. In general, for people age 21 years and older:   If you are younger than 50 years, you might be able to get a physical every 3 years.   If you are 50 years or older, your doctor might recommend a physical every year.  If you have an ongoing health condition, like diabetes or high blood pressure, your doctor will probably want to see you more often.  What happens during a physical? -- In general, each visit will include:   Physical exam - The doctor or nurse will check your height, weight, heart rate, and blood pressure. They will also look at your eyes and ears. They will ask about how you are feeling and whether you have any symptoms that bother you.   Medicines - It's a good idea to bring a list of all the medicines you take to each doctor visit. Your doctor will talk to you about your medicines and answer any questions. Tell them if you are having any side effects that bother you. You " "should also tell them if you are having trouble paying for any of your medicines.   Habits and behaviors - This includes:   Your diet   Your exercise habits   Whether you smoke, drink alcohol, or use drugs   Whether you are sexually active   Whether you feel safe at home  Your doctor will talk to you about things you can do to improve your health and lower your risk of health problems. They will also offer help and support. For example, if you want to quit smoking, they can give you advice and might prescribe medicines. If you want to improve your diet or get more physical activity, they can help you with this, too.   Lab tests, if needed - The tests you get will depend on your age and situation. For example, your doctor might want to check your:   Cholesterol   Blood sugar   Iron level   Vaccines - The recommended vaccines will depend on your age, health, and what vaccines you already had. Vaccines are very important because they can prevent certain serious or deadly infections.   Discussion of screening - \"Screening\" means checking for diseases or other health problems before they cause symptoms. Your doctor can recommend screening based on your age, risk, and preferences. This might include tests to check for:   Cancer, such as breast, prostate, cervical, ovarian, colorectal, prostate, lung, or skin cancer   Sexually transmitted infections, such as chlamydia and gonorrhea   Mental health conditions like depression and anxiety  Your doctor will talk to you about the different types of screening tests. They can help you decide which screenings to have. They can also explain what the results might mean.   Answering questions - The physical is a good time to ask the doctor or nurse questions about your health. If needed, they can refer you to other doctors or specialists, too.  Adults older than 65 years often need other care, too. As you get older, your doctor will talk to you about:   How to prevent falling at " home   Hearing or vision tests   Memory testing   How to take your medicines safely   Making sure that you have the help and support you need at home  All topics are updated as new evidence becomes available and our peer review process is complete.  This topic retrieved from [employer] on: May 02, 2024.  Topic 383269 Version 1.0  Release: 32.4.3 - C32.122  © 2024 UpToDate, Inc. and/or its affiliates. All rights reserved.  Consumer Information Use and Disclaimer   Disclaimer: This generalized information is a limited summary of diagnosis, treatment, and/or medication information. It is not meant to be comprehensive and should be used as a tool to help the user understand and/or assess potential diagnostic and treatment options. It does NOT include all information about conditions, treatments, medications, side effects, or risks that may apply to a specific patient. It is not intended to be medical advice or a substitute for the medical advice, diagnosis, or treatment of a health care provider based on the health care provider's examination and assessment of a patient's specific and unique circumstances. Patients must speak with a health care provider for complete information about their health, medical questions, and treatment options, including any risks or benefits regarding use of medications. This information does not endorse any treatments or medications as safe, effective, or approved for treating a specific patient. UpToDate, Inc. and its affiliates disclaim any warranty or liability relating to this information or the use thereof.The use of this information is governed by the Terms of Use, available at https://www.[employer].com/en/know/clinical-effectiveness-terms. 2024© UpToDate, Inc. and its affiliates and/or licensors. All rights reserved.  Copyright   © 2024 UpToDate, Inc. and/or its affiliates. All rights reserved.

## 2024-08-22 LAB — BACTERIA UR CULT: NORMAL

## 2024-08-23 LAB
HGB A MFR BLD: 2.7 % (ref 1.8–3.2)
HGB A MFR BLD: 97.3 % (ref 96.4–98.8)
HGB F MFR BLD: 0 % (ref 0–2)
HGB FRACT BLD-IMP: NORMAL
HGB S MFR BLD: 0 %

## 2024-08-26 NOTE — PATIENT INSTRUCTIONS
-Check A1c and BMP.  -A1c goal is 5.6% or less.  -Follow up with dietitian.  -Keep 3 day food log to review with dietitian.  -Start self monitoring blood glucose (SMBG) fasting; 2 hours after start of each meal and with hypoglycemia.   -Glucose goals: fasting 60-95 mg/dL, 140 mg/dL or less 1 hour post meals, and 120 mg/dL or less 2 hours post meal.   -Report glucose readings weekly via [employer].  -Start GDM meal plan with 3 meals and 3 snacks including recommended combination of carb, protein and fat per meal/snack.  -Please eat meal or snack every 2-3.5 hours while awake.  -No more than 8 to 10 hours of fasting overnight.  -Refer to [employer] MyPlate online as a reference.  -2nd/3rd trimester minimum total daily carbohydrates 175 grams paired with half grams in protein.   -Stay active if no restriction from your OB, walk up to 30 minutes a day.  -Always have glucose available to treat hypoglycemia. Use 15:15 rule.   -Refer to hypoglycemia patient education sheet. SMBG when experiencing signs and symptoms of hypoglycemia and prior to driving.   -Serial fetal growth ultrasounds.  -20 weeks detailed fetal growth ultrasound.  -22-24 weeks fetal echo.  -If diabetes related medications are started, at 32 weeks gestation; NST twice a week and SKY weekly.   -Continue prenatal vitamin as recommended.  -Start baby aspirin 162 mg between 12 to 16 weeks.   -At 36 weeks gestation, stop baby aspirin.   -Continue follow-up with your OB and MFM as recommended.  -Stay in close contact with diabetes education team.  -Insulin requirements during pregnancy; basal/bolus concept and Metformin discussed.  -Very important to maintain tight glucose control during pregnancy to decrease risk factors including fetal macrosomia; birth injury; risk of ; polyhydramnios; pre-term labor; pre-eclampsia;  hypoglycemia; jaundice and stillbirth.   -Diabetes and pregnancy booklet; meal plan and hypoglycemia patient education.       Thank you for choosing us for your  care today.  If you have any questions about your ultrasound or care, please do not hesitate to contact us or your primary obstetrician.        Some general instructions for your pregnancy are:    Exercise: Aim for 150 minutes per week of regular exercise.  Walking is great!  Nutrition: Choose healthy sources of calcium, iron, and protein.  Avoid ultraprocessed foods and added sugar.  Learn about Preeclampsia: preeclampsia is a common, potentially serious high blood pressure complication in pregnancy.  A blood pressure of 140mmHg (systolic or top number) or 90mmHg (diastolic or bottom number) should be evaluated by your doctor.  Aspirin is sometimes prescribed in early pregnancy to prevent preeclampsia in women with risk factors - ask your obstetrician if you should be on this medication.  For more resources, visit:  https://www.highriskpregnancyinfo.org/preeclampsia  If you smoke, please try to quit completely but also try to reduce your smoking by as much as possible (as soon as possible).  Do not vape.  Please also avoid cannabis products.  Other warning signs to watch out for in pregnancy or postpartum: chest pain, obstructed breathing or shortness of breath, seizures, thoughts of hurting yourself or your baby, bleeding, a painful or swollen leg, fever, or headache (see [facility] POST-BIRTH Warning Signs campaign).  If these happen call 911.  Itching is also not normal in pregnancy and if you experience this, especially over your hands and feet, potentially worse at night, notify your doctors.

## 2024-08-28 ENCOUNTER — OFFICE VISIT (OUTPATIENT)
Facility: HOSPITAL | Age: 34
End: 2024-08-28
Payer: COMMERCIAL

## 2024-08-28 VITALS — BODY MASS INDEX: 43.24 KG/M2 | HEIGHT: 62 IN | WEIGHT: 235 LBS

## 2024-08-28 DIAGNOSIS — O24.419 GESTATIONAL DIABETES MELLITUS (GDM) IN FIRST TRIMESTER, GESTATIONAL DIABETES METHOD OF CONTROL UNSPECIFIED: Primary | ICD-10-CM

## 2024-08-28 DIAGNOSIS — O99.211 MATERNAL MORBID OBESITY, ANTEPARTUM, FIRST TRIMESTER (HCC): ICD-10-CM

## 2024-08-28 DIAGNOSIS — Z3A.11 11 WEEKS GESTATION OF PREGNANCY: ICD-10-CM

## 2024-08-28 DIAGNOSIS — E66.01 MATERNAL MORBID OBESITY, ANTEPARTUM, FIRST TRIMESTER (HCC): ICD-10-CM

## 2024-08-28 PROCEDURE — 99215 OFFICE O/P EST HI 40 MIN: CPT | Performed by: NURSE PRACTITIONER

## 2024-08-28 PROCEDURE — 99417 PROLNG OP E/M EACH 15 MIN: CPT | Performed by: NURSE PRACTITIONER

## 2024-08-28 RX ORDER — BLOOD SUGAR DIAGNOSTIC
STRIP MISCELLANEOUS
Qty: 100 STRIP | Refills: 7 | Status: SHIPPED | OUTPATIENT
Start: 2024-08-28

## 2024-08-28 RX ORDER — LANCETS 33 GAUGE
EACH MISCELLANEOUS
Qty: 100 EACH | Refills: 7 | Status: SHIPPED | OUTPATIENT
Start: 2024-08-28

## 2024-08-28 RX ORDER — BLOOD-GLUCOSE METER
KIT MISCELLANEOUS
Qty: 1 KIT | Refills: 0 | Status: SHIPPED | OUTPATIENT
Start: 2024-08-28

## 2024-08-28 NOTE — ASSESSMENT & PLAN NOTE
-Pre-pregnancy weight 235 lbs.  -Current weight 235 lbs.  -Recommended weight gain 11 to 20 lbs.  -Start GDM meal and follow up with dietitian.

## 2024-08-28 NOTE — ASSESSMENT & PLAN NOTE
-Check A1c and BMP.  -A1c goal is 5.6% or less.  -Follow up with dietitian.  -Keep 3 day food log to review with dietitian.  -Start self monitoring blood glucose (SMBG) fasting; 2 hours after start of each meal and with hypoglycemia.   -Glucose goals: fasting 60-95 mg/dL, 140 mg/dL or less 1 hour post meals, and 120 mg/dL or less 2 hours post meal.   -Report glucose readings weekly via [email or web address].  -Start GDM meal plan with 3 meals and 3 snacks including recommended combination of carb, protein and fat per meal/snack.  -Please eat meal or snack every 2-3.5 hours while awake.  -No more than 8 to 10 hours of fasting overnight.  -Refer to [employer] MyPlate online as a reference.  -2nd/3rd trimester minimum total daily carbohydrates 175 grams paired with half grams in protein.   -Stay active if no restriction from your OB, walk up to 30 minutes a day.  -Always have glucose available to treat hypoglycemia. Use 15:15 rule.   -Refer to hypoglycemia patient education sheet. SMBG when experiencing signs and symptoms of hypoglycemia and prior to driving.   -Serial fetal growth ultrasounds.  -20 weeks detailed fetal growth ultrasound.  -22-24 weeks fetal echo.  -If diabetes related medications are started, at 32 weeks gestation; NST twice a week and SKY weekly.   -Continue prenatal vitamin as recommended.  -Start baby aspirin 162 mg between 12 to 16 weeks.   -At 36 weeks gestation, stop baby aspirin.   -Continue follow-up with your OB and MFM as recommended.  -Stay in close contact with diabetes education team.  -Insulin requirements during pregnancy; basal/bolus concept and Metformin discussed.  -Very important to maintain tight glucose control during pregnancy to decrease risk factors including fetal macrosomia; birth injury; risk of ; polyhydramnios; pre-term labor; pre-eclampsia;  hypoglycemia; jaundice and stillbirth.   -Diabetes and pregnancy booklet; meal plan and hypoglycemia patient education.   "    No results found for: \"HGBA1C\"  "

## 2024-08-28 NOTE — PROGRESS NOTES
Ambulatory Visit  Name: Karena Garcia      : 1990      MRN: [id number]  Encounter Provider: AJAY Best  Encounter Date: 2024   Encounter department: [facility]    Assessment & Plan   1. Gestational diabetes mellitus (GDM) in first trimester, gestational diabetes method of control unspecified  Assessment & Plan:  -Check A1c and BMP.  -A1c goal is 5.6% or less.  -Follow up with dietitian.  -Keep 3 day food log to review with dietitian.  -Start self monitoring blood glucose (SMBG) fasting; 2 hours after start of each meal and with hypoglycemia.   -Glucose goals: fasting 60-95 mg/dL, 140 mg/dL or less 1 hour post meals, and 120 mg/dL or less 2 hours post meal.   -Report glucose readings weekly via [employer].  -Start GDM meal plan with 3 meals and 3 snacks including recommended combination of carb, protein and fat per meal/snack.  -Please eat meal or snack every 2-3.5 hours while awake.  -No more than 8 to 10 hours of fasting overnight.  -Refer to [employer] MyPlate online as a reference.  -2nd/3rd trimester minimum total daily carbohydrates 175 grams paired with half grams in protein.   -Stay active if no restriction from your OB, walk up to 30 minutes a day.  -Always have glucose available to treat hypoglycemia. Use 15:15 rule.   -Refer to hypoglycemia patient education sheet. SMBG when experiencing signs and symptoms of hypoglycemia and prior to driving.   -Serial fetal growth ultrasounds.  -20 weeks detailed fetal growth ultrasound.  -22-24 weeks fetal echo.  -If diabetes related medications are started, at 32 weeks gestation; NST twice a week and SKY weekly.   -Continue prenatal vitamin as recommended.  -Start baby aspirin 162 mg between 12 to 16 weeks.   -At 36 weeks gestation, stop baby aspirin.   -Continue follow-up with your OB and MFM as recommended.  -Stay in close contact with diabetes education team.  -Insulin requirements during pregnancy; basal/bolus concept  "and Metformin discussed.  -Very important to maintain tight glucose control during pregnancy to decrease risk factors including fetal macrosomia; birth injury; risk of ; polyhydramnios; pre-term labor; pre-eclampsia;  hypoglycemia; jaundice and stillbirth.   -Diabetes and pregnancy booklet; meal plan and hypoglycemia patient education.      No results found for: \"HGBA1C\"  Orders:  -     Ambulatory Referral to Maternal Fetal Medicine  -     Lancets (OneTouch Delica Plus [id number]) MISC; Use 4 a day. GDM.  -     Blood Glucose Monitoring Suppl (OneTouch Verio Reflect) w/Device KIT; Dispense 1 kit per insurance company. GDM.  -     glucose blood (OneTouch Verio) test strip; Test 4 times a day. GDM.  -     Hemoglobin A1C; Standing  -     Basic metabolic panel; Future  -     [employer] glucose flowsheet  2. 11 weeks gestation of pregnancy  -     Lancets (OneTouch Delica Plus [id number]) MISC; Use 4 a day. GDM.  -     Blood Glucose Monitoring Suppl (OneTouch Verio Reflect) w/Device KIT; Dispense 1 kit per insurance company. GDM.  -     glucose blood (OneTouch Verio) test strip; Test 4 times a day. GDM.  -     Hemoglobin A1C; Standing  -     Basic metabolic panel; Future  -     [employer] glucose flowsheet  3. Maternal morbid obesity, antepartum, first trimester (HCC)  Assessment & Plan:  -Pre-pregnancy weight 235 lbs.  -Current weight 235 lbs.  -Recommended weight gain 11 to 20 lbs.  -Start GDM meal and follow up with dietitian.   Orders:  -     Lancets (OneTouch Delica Plus [id number]) MISC; Use 4 a day. GDM.  -     Blood Glucose Monitoring Suppl (OneTouch Verio Reflect) w/Device KIT; Dispense 1 kit per insurance company. GDM.  -     glucose blood (OneTouch Verio) test strip; Test 4 times a day. GDM.  -     Hemoglobin A1C; Standing  -     Basic metabolic panel; Future  -     [employer] glucose flowsheet  4. BMI 40.0-44.9, adult (HCC)  -     Lancets (OneTouch Delica Plus [id number]) MISC; Use 4 a day. GDM.  -     " Blood Glucose Monitoring Suppl (OneTouch Verio Reflect) w/Device KIT; Dispense 1 kit per insurance company. GDM.  -     glucose blood (OneTouch Verio) test strip; Test 4 times a day. GDM.  -     Hemoglobin A1C; Standing  -     Basic metabolic panel; Future  -     [facility] glucose flowsheet      History of Present Illness     Karena Garcia is a 33 y.o. female  11 0/7 weeks gestation GDM. 1 hour . Strong family history of diabetes.   Wakes up around 8 AM; 9 AM breakfast-cheerios or raisen brand dry or 2 eggs; 11 AM snack-greek yogurt with granola; 12:30 PM lunch-mac and cheese or cup of soup-chicken with wild rice or loaded baked potato soup-low sodium; 2:30 PM snack-protein/carb bar; 5:30-6 PM dinner-wings with french fries or pasta with chicken and veggies or salad-grilled chicken, carrots, cucumbers, eggs, croutons; snack on popcorn or baked chips around 8:30 to 9 PM then bedtime.   Walking 20-30 minutes daily.     Review of Systems   Constitutional:  Positive for fatigue. Negative for fever.   HENT:  Negative for congestion and trouble swallowing.    Eyes:  Negative for visual disturbance.   Respiratory:  Positive for shortness of breath (with exertion). Negative for cough.    Cardiovascular:  Negative for chest pain, palpitations and leg swelling.   Gastrointestinal:  Positive for nausea. Negative for constipation and vomiting.   Endocrine: Positive for polyuria. Negative for polydipsia and polyphagia.   Genitourinary:  Negative for difficulty urinating and vaginal bleeding.   Musculoskeletal:  Negative for back pain.   Skin:  Negative for rash.   Neurological:  Positive for headaches (history of headaches). Negative for numbness.   Psychiatric/Behavioral:  Positive for sleep disturbance.      Medical History Reviewed by provider this encounter:  Tobacco  Allergies  Meds  Problems  Med Hx  Surg Hx  Fam Hx  Soc   Hx      Current Outpatient Medications on File Prior to Visit   Medication Sig  "Dispense Refill    Prenatal MV-Min-Fe Fum-FA-DHA (PRENATAL 1 PO) Take by mouth in the morning       No current facility-administered medications on file prior to visit.      Social History     Tobacco Use    Smoking status: Former     Current packs/day: 0.25     Average packs/day: 0.3 packs/day for 4.0 years (1.0 ttl pk-yrs)     Types: Cigarettes     Passive exposure: Never    Smokeless tobacco: Former     Quit date: 5/4/2016    Tobacco comments:     Weaning prior to pregnancy, quit with +UPT 6/2024   Vaping Use    Vaping status: Never Used   Substance and Sexual Activity    Alcohol use: Not Currently     Comment: occasional    Drug use: No    Sexual activity: Yes     Partners: Male     Birth control/protection: None     Objective     Ht 5' 2\" (1.575 m)   Wt 107 kg (235 lb)   LMP 06/05/2024 (Exact Date)   BMI 42.98 kg/m²     Physical Exam  HENT:      Head: Normocephalic.      Nose: Nose normal.   Eyes:      Conjunctiva/sclera: Conjunctivae normal.   Pulmonary:      Effort: Pulmonary effort is normal.   Neurological:      Mental Status: She is alert and oriented to person, place, and time.   Psychiatric:         Mood and Affect: Mood normal.         Behavior: Behavior normal.         Thought Content: Thought content normal.         Judgment: Judgment normal.       Administrative Statements   I have spent a total time of 75 minutes in caring for this patient on the day of the visit/encounter including Instructions for management, Patient and family education, Importance of tx compliance, Risk factor reductions, Counseling / Coordination of care, Documenting in the medical record, Reviewing / ordering tests, medicine, procedures  , and Obtaining or reviewing history  .        "

## 2024-09-06 NOTE — PATIENT INSTRUCTIONS
Thank you for choosing us for your  care today.  If you have any questions about your ultrasound or care, please do not hesitate to contact us or your primary obstetrician.      INSULIN EDUCATION    Thank you for attending our insulin education class.     For a quick recap on what you learned today you can review the Basaglar[employer]com educational video for insulin pens. https://www.[employer].TriPlay/how-to-use-basaglar    Medications are being recommended to decrease the risk of side effects resulting from hyperglycemia in pregnancy including macrosomia,  hypoglycemia, polyhydramnios, pre-term labor and stillbirth.    Insulin Education:  You will receive 5 pens from the pharmacy  Each insulin pen has 300 units in one pen. Can deliver up to 80 units at one time.   When opening a new pen, remove one insulin pen 15 minutes up to 2 hours before administering to bring insulin to room temperature.   Once you open an insulin pen, it is only good for 28 days at room temperature.   Insulin can be titrated every 3-5 days as needed to control blood sugars.  Insulin doses vary as the pregnancy progresses depending on your weekly blood sugars.     Preparing your pen:  Remove pen cap.  Wipe rubber seal with alcohol wipe. Let dry.  Make sure insulin is clear and colorless. Check expiration date. (Do not use if it's cloudy, colored, or had particles or clumps in it.)  Select a new needle each time. Remove tab, connect and twist on top of insulin pen.   Remove outer and inner needle shields that help protect needle. Keep outer shield to recap your used needle once you are done.     Priming, dosing and injecting:  Perform a 2 unit test dose before each injection. Repeat priming steps until you see insulin at tip of needle. (Do not repeat more than 4 times. If after 4 times you do not see insulin at tip of needle, remove and insert a new needle)   Turn your dose knob to the prescribed units. (Do not count clicks, make sure the  number and line matches your prescribed dose)  Choose your site. Remember to rotate injection sites and stay away from stretch marks or scars.   Wipe injection site clean with alcohol and let dry completely.   Insert needle into your skin, press dose knob and inject slowly. Count to 10 before removing needle from skin.     Clean up:  Replace outer needle shield to cover the needle back up. Unscrew capped needle and throw it away in sharps container.  Make sure your sharp container is: heavy duty plastic, puncture-resistant lid, upright and stable, leak resistant, properly labeled.     Hypoglycemia:  Test your blood sugar at 3:00 am for first 3 mornings following insulin start to monitor for hypoglycemia.   Goal range for 2-3 am:  mg/dL.  Treat low blood sugars <60 or <80 using the 15-15 rule  Always have glucose available for hypoglycemia, use 15 by 15 rule. You can find the 15:15 rule in our GDM booklet.     Scheduling:  Non-stress testing two times weekly and SKY testing beginning at 32 weeks gestation. Call [phone] to schedule if not already scheduled.   Ultrasounds every 4 weeks at the [facility] Maternal Fetal Medicine. Call [phone] to schedule if not already scheduled.     Reporting Blood Sugars:  When adding your blood sugar readings to your glucose flow sheet please ADD UNITS of insulin. This will allow our team see what day you started and when you made any possible adjustments. Important for our team to know in regards to making any changes since it takes 3-5 days to see a difference. We would appreciate this and thank you for all you do to communicate with our team.     Blood Sugar Ranges:  Fastin-90 mg/dL   Before meals:  mg/dL  1 hour after the start of each meal: 140 mg/dL or <   2 hours after start of each meal: 120 mg/dL or <  2-3 am:  mg/dL    Continue Recommendations:  Testing Blood Sugars: 4 times daily (fasting and 1 or 2hrs after the start of each meal) - as  instructed    Reporting Blood Sugars: via [employer] Glucose Flowsheet on a weekly basis until you deliver.  Meal Plan: 3 meals and 3 snacks daily.   Eating every 2 to 3.5 hours during the day.   Be sure to have bedtime snack that includes at least 15 grams of carbohydrates and 2 to 3 servings of protein.  Minimum of total carbohydrates of 175 grams daily.   Carbohydrates always need to be paired with protein.   Physical Activity: If ok by your OB try adding a 20-30 minute walk in evening after dinner to help keep fasting glucose within range.  Continue follow up with OB and MFM as recommended.  Stay in close contact with diabetes education team.   While sick or feeling ill, follow our sick day guidelines in our GDM booklet.   For travel and dining out tips refer to our GDM booklet.    If you have any questions or concerns, please do not hesitate to call us at [phone].      Chely Pryor   Diabetes Educator  The Diabetes and Pregnancy Program   at [facility] - Maternal Fetal Medicine     Some general instructions for your pregnancy are:    Exercise: Aim for 150 minutes per week of regular exercise.  Walking is great!  Nutrition: Choose healthy sources of calcium, iron, and protein.  Avoid ultraprocessed foods and added sugar.  Learn about Preeclampsia: preeclampsia is a common, potentially serious high blood pressure complication in pregnancy.  A blood pressure of 140mmHg (systolic or top number) or 90mmHg (diastolic or bottom number) should be evaluated by your doctor.  Aspirin is sometimes prescribed in early pregnancy to prevent preeclampsia in women with risk factors - ask your obstetrician if you should be on this medication.  For more resources, visit:  https://www.[email or web address].org/preeclampsia  If you smoke, please try to quit completely but also try to reduce your smoking by as much as possible (as soon as possible).  Do not vape.  Please also avoid cannabis products.  Other  warning signs to watch out for in pregnancy or postpartum: chest pain, obstructed breathing or shortness of breath, seizures, thoughts of hurting yourself or your baby, bleeding, a painful or swollen leg, fever, or headache (see [facility] POST-BIRTH Warning Signs campaign).  If these happen call 911.  Itching is also not normal in pregnancy and if you experience this, especially over your hands and feet, potentially worse at night, notify your doctors.

## 2024-09-06 NOTE — PROGRESS NOTES
"CLASS 2 - Individual  (in-person office visit )    Thank you for referring your patient to [facility] Maternal Fetal Medicine Diabetes and Pregnancy Program.     Karena Garcia is a  33 y.o. female who presents today unaccompanied for Patient Education (Class 2/Insulin education) and Gestational Diabetes (12w6d/).  Patient is at 12w6d gestation, Estimated Date of Delivery: 3/19/25.     Visit Diagnosis:  Encounter Diagnosis     ICD-10-CM    1. Gestational diabetes mellitus (GDM) in first trimester, gestational diabetes method of control unspecified  O24.419       2. 12 weeks gestation of pregnancy  Z3A.12            Reviewed and updated the following from patients medical record:  Allergies, and Current Medications. Patient reports increased urination since pregnancy (wakes up 3 times nightly to use bathroom)    Labs  GDM LABS: See Class 1 Note    A1C:  No results found for: \"HGBA1C\"     Labs Ordered This Visit: A1C and CMP    Current Medications:    Current Outpatient Medications:     Blood Glucose Monitoring Suppl (OneTouch Verio Reflect) w/Device KIT, Dispense 1 kit per insurance company. GDM., Disp: 1 kit, Rfl: 0    glucose blood (OneTouch Verio) test strip, Test 4 times a day. GDM., Disp: 100 strip, Rfl: 7    Lancets (OneTouch Delica Plus [id number]) MISC, Use 4 a day. GDM., Disp: 100 each, Rfl: 7    Prenatal MV-Min-Fe Fum-FA-DHA (PRENATAL 1 PO), Take by mouth in the morning, Disp: , Rfl:      Anthropometrics:  Ht Readings from Last 1 Encounters:   09/10/24 5' 2\" (1.575 m)      Wt Readings from Last 3 Encounters:   09/10/24 106 kg (234 lb)   09/10/24 107 kg (236 lb 3.2 oz)   08/28/24 107 kg (235 lb)        Pre-Gravid Wt Pre-Gravid BMI TWG   107 kg (235 lb) 42.97 -0.454 kg (-1 lb)     Total Pregnancy Weight Gain Recommendations: BMI (> 30) 11-20 lbs  Current Wt Status Compared to Recommendations: *Women should gain 2-5lbs in first trimester; Rate of wt gain in 2nd and 3rd trimester depends on TWG recommendations " based on pregravid BMI and height    Most Recent Ultrasound Results:  Next US date:  Nuchal Tranlucency 24    BLOOD GLUCOSE MONITORING:   Glucometer: OneTouch Verio Flex     Reinforced at Today's Visit:   Timing/Frequency of SMB x per day (Fasting, 2 hour after start of each meal)  Goals: (Fasting) 60-95mg/dL // (2hr PP) <120mg/dL  Reporting Guidelines: Weekly via Phone: [phone] OR My Chart (Message with image attachment) OR Glucose Flowsheet  Method of Reporting: [employer] Glucose Flowsheet    BG LOG:       Date Fasting Post-  breakfast Post-  lunch Post-  dinner Comments   24 106  172 174    9/3/24 121 185  174 Skipped lunch due to blood sugars being elevated at breakfast   24 99 167  151 Skipped Lunch          Started using One Touch Meter prescribed on 24 vs meter obtained from [employer]; Patient states she does not have blood sugar values from over the weekend as she traveled to [place] and did not a chance to upload. Noticed improved fasting blood sugars in [place] due to increased exercise but remained above 100mg/dL    Review of Blood Glucose Log:   Indicates poor glycemic control  FBG = Not well controlled  Post-Prandial BG = Not well controlled    INSULIN EDUCATION - Reviewed plan/approved by AJAY Best    Due to consistently elevated fasting blood sugars (>95mg/dL),  Initiate long-acting insulin Semglee 20 units once daily at bedtime (9-10pm)  Monitor need for bolus/mealtime insulin; Instructed patient to send a message to Diabetes team on 24 if fasting blood sugars remain elevated (New Target Range of 60-90mg/dL)    The patient was instructed on the following:  Side effects of hyperglycemia in pregnancy including macrosomia,  hypoglycemia, polyhydramnios, pre-term labor and stillbirth.  Insulin Administration Time: Once daily at bedtime  Insulin Action: Approx. 24hours   How to Inject Insulin  Injection Sites   Demonstration completed on proper insulin  "injection using demo pen; Patient able to successfully return demonstrate insulin injection via pen. Denies further questions at this time.    Monitoring:  Hypoglycemia signs, symptoms and treatment.   Advised patient to test blood sugar at 3:00 am for first 3 mornings following insulin start to monitor for hypoglycemia  Input in \"Night-time Glucose\" on [employer] Glucose Flowsheet  Increase in maternal-fetal surveillance with insulin initiation.  Non-stress testing twice weekly and SKY once weekly beginning at 32 weeks gestation.        Continue ultrasounds every 4 weeks at the [facility]  Continue to test blood sugars 4 time daily:   Fasting (goal 60 mg/dl to 90 mg/dl)  2hrs After the START of each meal (goal less than 120 mg/dl).  Labs: HbA1c every 6 to 8 weeks    Reporting Blood Sugars:   Diabetes team will continue to review blood sugars once weekly till delivery  Pt instructed to message diabetes team via [employer] message every 3-5 days for insulin adjustment if fasting blood sugars remain >90    MEAL PLAN (Patient was provided with a meal plan including 3 meals and 3 snacks at class 1)  *Calories: 1800 calorie (CHO: 45-15-45-15-45-30) (PRO:2-1-3-1-3-2)    Review of Patient's Current Diet: refer to class 1 note for additional details (Typically wakes up 8:15-8:30am; Bedtime: 8:30-9pm, Falls asleep at 10pm)    Breakfast: (8:45am-9am) 2 Hardboiled Eggs, 1/2 cup watermelon (113) OR Skipped Breakfast   AM Snack: Skipped  Lunch: (11:30am-12pm) Sausage Egg Cheese (on Pancakes - Stephen Christiano), 8 oz iced tea, 1/2 cup watermelon (135) OR Uncrustables  PM Snack: (2pm) Cucumbers + 10 mini rice cakes   Dinner (5:30pm): Grilled Chicken Salad (Cheese, Eggs, Croutons) with Light Balsamic Vinaigrette (206)   Bedtime Snack(8:45pm): Skipped Snack OR [employer] snack size OR 1 oz Kettle Chips OR Popcorn    Beverages: No Sugar Sweetened Beverages - Water, Zero Sugar Peach Snapple (Drinks 80 oz water/day)  Dining Out " Frequency:  Weekly or Bi-Weekly ( Recently traveled to [place] over the weekend)    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 Comments:    Consistent CHO Intake No  Discussed target minimum of 175 grams carbohydrate/daily   3 Meals and 3 Snacks No  Patient reports skipping meals at times out of concern of blood sugars being elevated   Protein w/ Every Meal and Snack No  Discussed protein sources (eggs, cheese, nuts, PB fit)   Eating every 2-3.5hrs while awake  No  Skipped AM breakfast   8-10hrs Fasting (from time of bedtime snack until first meal of the day) No  - Exceeding 10hrs Fasting Overnight: Reinforced recommended time frame of (8-10hrs) from time of bedtime snack     Overall Impression: Pt has a good compliance and understanding of diet recommendations at this time.    Reinforced Diet Instructions:  Individualized meal plan.   Importance of consistent carbohydrate intake via 3 meals and 3 snacks per day   Importance of protein as it relates to blood glucose control.   Encouraged  patient to eat every 2.0-3.5 hours while awake  Encouraged patient to go no longer than 8-10 hours fasting overnight until first meal of the day.  Provided suggested meal/snack options to increase nutrition and maintain consistent meal and snack intakes.    Physical Activity:  Currently physically active? Yes, walking daily    Reviewed w/ Pt:   Benefits of physical activity to optimize blood glucose control, encouraged activity at patient is physically able.   Instructed pt to always consult a physician prior to starting an exercise program.   Recommend 20-30 minutes daily.    Additional Topics Reviewed:    Medications: (reviewed options available with pt)  Discussed if blood sugars are not within normal range with meal planning and exercise  Reviewed medication such as metformin and/or basal/bolus insulin may be needed for better glucose control  Maternal-Fetal Testing:   Ultrasounds: growth  "scans every 4 weeks.  NST: twice weekly starting at 32nd week GA  SKY:  weekly starting at 32 weeks GA  Sick day Guidelines:   Advised that sickness will raise blood sugar   If blood sugar is > 160 mg/dL twice in one day call doctor  If on diabetes medications, continue as instructed   If unable to consume normal meal plan, instructed to remain well hydrated   Hypoglycemia & Treatment Guidelines:  Reviewed what hypoglycemia is, signs and symptoms, and how to treat via the 15:15 rule.    Patient Stated Goal: \"I will check my blood sugar 4 times each day, as directed by diabetes and pregnancy team\"  Goal Assessment: On track    Diabetes Self Management Support Plan outside of ongoing care: Spouse/Family    Barriers to Learning/Change: No Barriers  Expected Compliance: good    Date to report blood sugars: Weekly   Follow up:  Return in about 4 weeks (around 10/8/2024) for Review Diet, Review Blood Sugars.     Begin Time: 11:01am  End Time: 12:10pm    It was a pleasure working with them today. Please feel free to call ([phone]) with any questions or concerns.    Chely Pryor   Diabetes Educator  [facility] Maternal Fetal Medicine  Diabetes and Pregnancy Program    "

## 2024-09-09 NOTE — PROGRESS NOTES
OB/GYN  PN Visit  Karena Garcia  [id number]  9/10/2024  3:55 PM  AJAY Grover    S: 33 y.o.  12w6d here for PN visit. Pregnancy complicated by GDM and BMI of 43.     OB complaints:  Denies c/o n/v/ha, no edema, no smoking, no DV.   No vb/lof  No cramping/ctxns or signs of PTL.    She reports feeling a lot better. She feels more energy and less nausea.    O:    Pre- Vitals      Flowsheet Row Most Recent Value   Prenatal Assessment    Fetal Heart Rate 164   Prenatal Vitals    Blood Pressure 110/80   Weight - Scale 106 kg (234 lb)   Urine Albumin/Glucose    Dilation/Effacement/Station    Vaginal Drainage    Edema               Gen: no acute distress, nonlabored breathing.  OB exam completed: fundal height, +FHT.  Urine: -/-    A/P:  Problem List Items Addressed This Visit       Gestational diabetes mellitus (GDM) in first trimester    Maternal morbid obesity, antepartum, first trimester (HCC)    BMI 40.0-44.9, adult ([facility])     Other Visit Diagnoses       Prenatal care in first trimester    -  Primary    Relevant Orders    Liquid-based pap, screening    Chlamydia/GC amplified DNA by PCR    Trichomonas vaginalis Thin prep          #1. 12w6d GESTATION  Labor precautions reviewed  Labs UTD; Rh+. Advised to complete blood work ordered by GDM specialist.  Last pap:   Pap collected today.  Cx collected.  PE completed.      RTC in 4 weeks    AJAY Grover  9/10/2024  3:55 PM

## 2024-09-10 ENCOUNTER — INITIAL PRENATAL (OUTPATIENT)
Dept: OBGYN CLINIC | Facility: CLINIC | Age: 34
End: 2024-09-10

## 2024-09-10 ENCOUNTER — OFFICE VISIT (OUTPATIENT)
Facility: HOSPITAL | Age: 34
End: 2024-09-10
Payer: COMMERCIAL

## 2024-09-10 VITALS
DIASTOLIC BLOOD PRESSURE: 80 MMHG | HEIGHT: 62 IN | BODY MASS INDEX: 43.06 KG/M2 | SYSTOLIC BLOOD PRESSURE: 110 MMHG | WEIGHT: 234 LBS

## 2024-09-10 VITALS
BODY MASS INDEX: 43.2 KG/M2 | DIASTOLIC BLOOD PRESSURE: 70 MMHG | HEART RATE: 75 BPM | SYSTOLIC BLOOD PRESSURE: 112 MMHG | WEIGHT: 236.2 LBS

## 2024-09-10 DIAGNOSIS — O24.419 GESTATIONAL DIABETES MELLITUS (GDM) IN FIRST TRIMESTER, GESTATIONAL DIABETES METHOD OF CONTROL UNSPECIFIED: ICD-10-CM

## 2024-09-10 DIAGNOSIS — Z34.91 PRENATAL CARE IN FIRST TRIMESTER: Primary | ICD-10-CM

## 2024-09-10 DIAGNOSIS — Z3A.12 12 WEEKS GESTATION OF PREGNANCY: ICD-10-CM

## 2024-09-10 DIAGNOSIS — O24.419 GESTATIONAL DIABETES MELLITUS (GDM) IN FIRST TRIMESTER, GESTATIONAL DIABETES METHOD OF CONTROL UNSPECIFIED: Primary | ICD-10-CM

## 2024-09-10 DIAGNOSIS — O99.211 MATERNAL MORBID OBESITY, ANTEPARTUM, FIRST TRIMESTER (HCC): ICD-10-CM

## 2024-09-10 DIAGNOSIS — O24.414 INSULIN CONTROLLED GESTATIONAL DIABETES MELLITUS (GDM) IN FIRST TRIMESTER: Primary | ICD-10-CM

## 2024-09-10 DIAGNOSIS — E66.01 MATERNAL MORBID OBESITY, ANTEPARTUM, FIRST TRIMESTER (HCC): ICD-10-CM

## 2024-09-10 PROCEDURE — 87491 CHLMYD TRACH DNA AMP PROBE: CPT

## 2024-09-10 PROCEDURE — 87591 N.GONORRHOEAE DNA AMP PROB: CPT

## 2024-09-10 PROCEDURE — G0476 HPV COMBO ASSAY CA SCREEN: HCPCS

## 2024-09-10 PROCEDURE — PNV

## 2024-09-10 PROCEDURE — G0108 DIAB MANAGE TRN  PER INDIV: HCPCS | Performed by: DIETITIAN, REGISTERED

## 2024-09-10 PROCEDURE — 87661 TRICHOMONAS VAGINALIS AMPLIF: CPT

## 2024-09-10 PROCEDURE — G0145 SCR C/V CYTO,THINLAYER,RESCR: HCPCS

## 2024-09-10 RX ORDER — PEN NEEDLE, DIABETIC 32GX 5/32"
NEEDLE, DISPOSABLE MISCELLANEOUS
Qty: 100 EACH | Refills: 3 | Status: SHIPPED | OUTPATIENT
Start: 2024-09-10

## 2024-09-10 RX ORDER — INSULIN GLARGINE-YFGN 100 [IU]/ML
20 INJECTION, SOLUTION SUBCUTANEOUS
Qty: 15 ML | Refills: 0 | Status: SHIPPED | OUTPATIENT
Start: 2024-09-10

## 2024-09-11 LAB
HPV HR 12 DNA CVX QL NAA+PROBE: POSITIVE
HPV16 DNA CVX QL NAA+PROBE: NEGATIVE
HPV18 DNA CVX QL NAA+PROBE: NEGATIVE

## 2024-09-12 ENCOUNTER — PATIENT MESSAGE (OUTPATIENT)
Dept: OBGYN CLINIC | Facility: CLINIC | Age: 34
End: 2024-09-12

## 2024-09-12 LAB
C TRACH DNA SPEC QL NAA+PROBE: NEGATIVE
N GONORRHOEA DNA SPEC QL NAA+PROBE: NEGATIVE
T VAGINALIS DNA SPEC QL NAA+PROBE: NEGATIVE

## 2024-09-12 NOTE — PATIENT COMMUNICATION
Goals to be met by: Discharge     Patient will increase functional independence with mobility by performin. Sit to stand transfer with Modified Matthews  2. Bed to chair transfer with Modified Matthews using No Assistive Device  3. Gait  x 325 feet with Supervision using Rolling Walker.   4. Increased functional strength to 5/5 for B Hips. .   Spoke in depth to patient about HPV offered also to send her some info in MY[facility] which she would like-    She was concerned for pregnancy- reassured her baby not effected-     Her mom had cervical cancer and hysterectomy at age 26 and is about 50 now but going through early dementia so she doesn't know much else of her medical hx. to ask her.     Patient reassured at close of the phone call

## 2024-09-16 ENCOUNTER — TREATMENT (OUTPATIENT)
Dept: PERINATAL CARE | Facility: CLINIC | Age: 34
End: 2024-09-16

## 2024-09-16 DIAGNOSIS — O24.410 DIET CONTROLLED GESTATIONAL DIABETES MELLITUS (GDM) IN FIRST TRIMESTER: Primary | ICD-10-CM

## 2024-09-16 NOTE — PROGRESS NOTES
"  Date: 09/16/24  Karena Garcia  1990  Estimated Date of Delivery: 3/19/25  13w5d  OB/GYN:[facility]    Diagnosis:  Insulin controlled GDM    Blood Sugar Logs Submitted via: Glucose Flowsheet      Assessment and Plan:  Semglee--Increase from 20 to 24 units . Elevated BG p lunch & dinner  1800 calorie (CHO: 45-15-45-15-45-30) (PRO:2-1-3-1-3-2) meal plan consisting of 3 meals and 3 snacks daily including protein at each  Advised patient to  Change both lunch & dinner to 2 CHO servings (30 gm) & 4 oz protein.   Avoid fasting for > 10 hours overnight  Continue SMBG 4 x per day (Fasting, 2 hour after start of each meal) with a One-Touch Verio glucose meter  Walks daily    Lab Work:   No results found for: \"HGBA1C\"   HbA1c was ordered on 8/28/24--advised to complete soon.     Ultrasound:       Next: 9/17/24    Diabetes Self Management Support Plan outside of ongoing care: Spouse/Family   Patient Stated Goal: \"I will check my blood sugar 4 times each day, as directed by diabetes and pregnancy team\"   Goal Assessment: On track    Date to report next: Thursday, 9/19/24     Joie Hinson, MS, RD, [facility]  Diabetes Educator  [facility] Maternal Fetal Medicine  Diabetes in Pregnancy Program  [address], [address]  [place] [address]    "

## 2024-09-17 ENCOUNTER — APPOINTMENT (OUTPATIENT)
Dept: LAB | Facility: CLINIC | Age: 34
End: 2024-09-17
Payer: COMMERCIAL

## 2024-09-17 ENCOUNTER — ROUTINE PRENATAL (OUTPATIENT)
Facility: HOSPITAL | Age: 34
End: 2024-09-17
Payer: COMMERCIAL

## 2024-09-17 ENCOUNTER — APPOINTMENT (OUTPATIENT)
Dept: LAB | Facility: HOSPITAL | Age: 34
End: 2024-09-17
Payer: COMMERCIAL

## 2024-09-17 VITALS
SYSTOLIC BLOOD PRESSURE: 128 MMHG | HEART RATE: 113 BPM | HEIGHT: 62 IN | BODY MASS INDEX: 43.06 KG/M2 | WEIGHT: 234 LBS | OXYGEN SATURATION: 99 % | DIASTOLIC BLOOD PRESSURE: 68 MMHG

## 2024-09-17 DIAGNOSIS — Z3A.13 13 WEEKS GESTATION OF PREGNANCY: ICD-10-CM

## 2024-09-17 DIAGNOSIS — Z36.9 UNSPECIFIED ANTENATAL SCREENING: ICD-10-CM

## 2024-09-17 DIAGNOSIS — O99.211 MATERNAL MORBID OBESITY, ANTEPARTUM, FIRST TRIMESTER (HCC): ICD-10-CM

## 2024-09-17 DIAGNOSIS — O24.419 GESTATIONAL DIABETES MELLITUS (GDM) IN FIRST TRIMESTER, GESTATIONAL DIABETES METHOD OF CONTROL UNSPECIFIED: ICD-10-CM

## 2024-09-17 DIAGNOSIS — Z13.6 SCREENING FOR CARDIOVASCULAR CONDITION: ICD-10-CM

## 2024-09-17 DIAGNOSIS — Z36.82 ENCOUNTER FOR (NT) NUCHAL TRANSLUCENCY SCAN: ICD-10-CM

## 2024-09-17 DIAGNOSIS — Z36.0 ENCOUNTER FOR ANTENATAL SCREENING FOR CHROMOSOMAL ANOMALIES: Primary | ICD-10-CM

## 2024-09-17 DIAGNOSIS — E66.01 MATERNAL MORBID OBESITY, ANTEPARTUM, FIRST TRIMESTER (HCC): ICD-10-CM

## 2024-09-17 DIAGNOSIS — O24.414 INSULIN CONTROLLED GESTATIONAL DIABETES MELLITUS (GDM) IN FIRST TRIMESTER: ICD-10-CM

## 2024-09-17 DIAGNOSIS — Z33.1 PREGNANT STATE, INCIDENTAL: ICD-10-CM

## 2024-09-17 DIAGNOSIS — Z13.29 SCREENING FOR THYROID DISORDER: ICD-10-CM

## 2024-09-17 DIAGNOSIS — Z3A.11 11 WEEKS GESTATION OF PREGNANCY: ICD-10-CM

## 2024-09-17 LAB
ANION GAP SERPL CALCULATED.3IONS-SCNC: 8 MMOL/L (ref 4–13)
BUN SERPL-MCNC: 6 MG/DL (ref 5–25)
CALCIUM SERPL-MCNC: 9.2 MG/DL (ref 8.4–10.2)
CHLORIDE SERPL-SCNC: 103 MMOL/L (ref 96–108)
CHOLEST SERPL-MCNC: 197 MG/DL
CO2 SERPL-SCNC: 26 MMOL/L (ref 21–32)
CREAT SERPL-MCNC: 0.55 MG/DL (ref 0.6–1.3)
EST. AVERAGE GLUCOSE BLD GHB EST-MCNC: 131 MG/DL
GFR SERPL CREATININE-BSD FRML MDRD: 124 ML/MIN/1.73SQ M
GLUCOSE P FAST SERPL-MCNC: 95 MG/DL (ref 65–99)
HBA1C MFR BLD: 6.2 %
HDLC SERPL-MCNC: 62 MG/DL
LDLC SERPL CALC-MCNC: 108 MG/DL (ref 0–100)
POTASSIUM SERPL-SCNC: 4.3 MMOL/L (ref 3.5–5.3)
SODIUM SERPL-SCNC: 137 MMOL/L (ref 135–147)
TRIGL SERPL-MCNC: 134 MG/DL
TSH SERPL DL<=0.05 MIU/L-ACNC: 1.17 UIU/ML (ref 0.45–4.5)

## 2024-09-17 PROCEDURE — 76813 OB US NUCHAL MEAS 1 GEST: CPT | Performed by: STUDENT IN AN ORGANIZED HEALTH CARE EDUCATION/TRAINING PROGRAM

## 2024-09-17 PROCEDURE — 76801 OB US < 14 WKS SINGLE FETUS: CPT | Performed by: STUDENT IN AN ORGANIZED HEALTH CARE EDUCATION/TRAINING PROGRAM

## 2024-09-17 PROCEDURE — 99243 OFF/OP CNSLTJ NEW/EST LOW 30: CPT | Performed by: STUDENT IN AN ORGANIZED HEALTH CARE EDUCATION/TRAINING PROGRAM

## 2024-09-17 PROCEDURE — 80048 BASIC METABOLIC PNL TOTAL CA: CPT

## 2024-09-17 PROCEDURE — 80061 LIPID PANEL: CPT

## 2024-09-17 PROCEDURE — 36415 COLL VENOUS BLD VENIPUNCTURE: CPT

## 2024-09-17 PROCEDURE — 84443 ASSAY THYROID STIM HORMONE: CPT

## 2024-09-17 PROCEDURE — 83036 HEMOGLOBIN GLYCOSYLATED A1C: CPT

## 2024-09-17 NOTE — PROGRESS NOTES
"[facility]: Ms. Garcia was seen today for nuchal translucency ultrasound.  See ultrasound report under \"OB Procedures\" tab.        Physical Exam  Constitutional:       General: She is not in acute distress.     Appearance: Normal appearance.   HENT:      Head: Normocephalic and atraumatic.   Eyes:      Extraocular Movements: Extraocular movements intact.   Cardiovascular:      Rate and Rhythm: Normal rate.   Pulmonary:      Effort: Pulmonary effort is normal. No respiratory distress.   Skin:     Findings: No erythema or rash.   Neurological:      Mental Status: She is alert and oriented to person, place, and time.   Psychiatric:         Mood and Affect: Mood normal.         Behavior: Behavior normal.         Please don't hesitate to contact our office with any concerns or questions.  -Margarita Garcia MD      "

## 2024-09-17 NOTE — PROGRESS NOTES
Patient chose to have [facility] [id number] Non-Invasive Prenatal Screen [id number] PLUS w/ SCA, WITH fetal sex.  Patient choose to be billed through insurance.     Patient given brochure and is aware [facility] will contact patient's insurance and coordinate coverage.  Provided [facility] contact information. General inquiries [phone], Cost estimates [phone] and [facility] Billing [phone]. Website [employer].     Blood collection tubes labeled with patient identifiers (name, medical record number, and date of birth).     Filled out [facility] order form. Patient chose to be sent to an outpatient lab to complete blood work. Attempted 1x in left AC and 1x in right AC by Kamilla WILD MA. Attempted 1x in left AC and 1x in left wrist by Argelia MORAN RN.       If patient chose to have blood work drawn at a [facility] lab we requested patient notify MFM (via phone call or Ghost[employer] message) when blood collected so office can follow up on results.       Maternal Fetal Medicine will have results in approximately 5-7 business days and will call patient or notify via Ghost[employer].  Patient aware viewing lab result online will reveal fetal sex if ordered.    Patient verbalized understanding of all instructions and no questions at this time.

## 2024-09-18 LAB
LAB AP GYN PRIMARY INTERPRETATION: NORMAL
LAB AP LMP: NORMAL
Lab: NORMAL

## 2024-09-19 LAB — MISCELLANEOUS LAB TEST RESULT: NORMAL

## 2024-09-22 LAB
CFDNA.FET/CFDNA.TOTAL SFR FETUS: NORMAL %
CITATION REF LAB TEST: NORMAL
FET 13+18+21+X+Y ANEUP PLAS.CFDNA: NEGATIVE
FET CHR 21 TS PLAS.CFDNA QL: NEGATIVE
FET CHR 21 TS PLAS.CFDNA QL: NEGATIVE
FET MS X RISK WBC.DNA+CFDNA QL: NOT DETECTED
FET SEX PLAS.CFDNA DOSAGE CFDNA: NORMAL
FET TS 13 RISK PLAS.CFDNA QL: NEGATIVE
FET X + Y ANEUP RISK PLAS.CFDNA SEQ-IMP: NOT DETECTED
GA EST FROM CONCEPTION DATE: NORMAL D
GESTATIONAL AGE > 9:: YES
LAB DIRECTOR NAME PROVIDER: NORMAL
LAB DIRECTOR NAME PROVIDER: NORMAL
LABORATORY COMMENT REPORT: NORMAL
LIMITATIONS OF THE TEST: NORMAL
NEGATIVE PREDICTIVE VALUE: NORMAL
PERFORMANCE CHARACTERISTICS: NORMAL
POSITIVE PREDICTIVE VALUE: NORMAL
REF LAB TEST METHOD: NORMAL
SL AMB NOTE:: NORMAL
TEST PERFORMANCE INFO SPEC: NORMAL

## 2024-10-01 ENCOUNTER — ROUTINE PRENATAL (OUTPATIENT)
Dept: OBGYN CLINIC | Facility: CLINIC | Age: 34
End: 2024-10-01

## 2024-10-01 VITALS
DIASTOLIC BLOOD PRESSURE: 68 MMHG | HEIGHT: 62 IN | BODY MASS INDEX: 42.69 KG/M2 | WEIGHT: 232 LBS | SYSTOLIC BLOOD PRESSURE: 110 MMHG

## 2024-10-01 DIAGNOSIS — O21.9 NAUSEA AND VOMITING IN PREGNANCY: ICD-10-CM

## 2024-10-01 DIAGNOSIS — Z36.89 ENCOUNTER FOR OTHER SPECIFIED ANTENATAL SCREENING: ICD-10-CM

## 2024-10-01 DIAGNOSIS — Z34.82 PRENATAL CARE, SUBSEQUENT PREGNANCY, SECOND TRIMESTER: Primary | ICD-10-CM

## 2024-10-01 PROCEDURE — PNV: Performed by: NURSE PRACTITIONER

## 2024-10-01 RX ORDER — ONDANSETRON 4 MG/1
4 TABLET, FILM COATED ORAL EVERY 8 HOURS PRN
Qty: 20 TABLET | Refills: 0 | Status: SHIPPED | OUTPATIENT
Start: 2024-10-01

## 2024-10-01 NOTE — PROGRESS NOTES
"Karena Garcia is a 33 y.o.  at 15w6d with early onset GDM. She is on Lantus 24 units at night. She is feeling flutters.  She is doing well with exception of nausea and HA. Denies vomiting. Denies LOF/Bleeding/Cramping. Denies edema. Denies tobacco, drugs or ETOH use. Denies DV.     NIPS is negative.     Visit Vitals  /68 (BP Location: Right arm, Cuff Size: Standard)   Ht 5' 2\" (1.575 m)   Wt 105 kg (232 lb)   LMP 2024 (Exact Date)   BMI 42.43 kg/m²   OB Status Pregnant   Smoking Status Former   BSA 2.03 m²     Pre-Myra Vitals      Flowsheet Row Most Recent Value   Prenatal Assessment    Fetal Heart Rate 156   Fundal Height (cm) 16 cm   Movement Present  [feeling flutter]   Prenatal Vitals    Blood Pressure 110/68   Weight - Scale 105 kg (232 lb)   Urine Albumin/Glucose    Dilation/Effacement/Station    Vaginal Drainage    Edema           Urine neg/neg    Diagnoses and all orders for this visit:    Prenatal care, subsequent pregnancy, second trimester    Nausea and vomiting in pregnancy  -     ondansetron (ZOFRAN) 4 mg tablet; Take 1 tablet (4 mg total) by mouth every 8 (eight) hours as needed for nausea or vomiting    Encounter for other specified  screening  -     Alpha fetoprotein, maternal; Future      Script for Zofran provided.     AFP ordered to be drawn between 16 to 20 weeks    RTO in 4 weeks for PNV or sooner if needed.    "

## 2024-10-08 ENCOUNTER — APPOINTMENT (OUTPATIENT)
Dept: LAB | Facility: HOSPITAL | Age: 34
End: 2024-10-08
Payer: COMMERCIAL

## 2024-10-08 ENCOUNTER — OFFICE VISIT (OUTPATIENT)
Facility: HOSPITAL | Age: 34
End: 2024-10-08
Payer: COMMERCIAL

## 2024-10-08 VITALS
HEART RATE: 98 BPM | OXYGEN SATURATION: 99 % | BODY MASS INDEX: 43.13 KG/M2 | WEIGHT: 235.8 LBS | SYSTOLIC BLOOD PRESSURE: 128 MMHG | DIASTOLIC BLOOD PRESSURE: 82 MMHG

## 2024-10-08 DIAGNOSIS — Z3A.16 16 WEEKS GESTATION OF PREGNANCY: ICD-10-CM

## 2024-10-08 DIAGNOSIS — Z36.89 ENCOUNTER FOR OTHER SPECIFIED ANTENATAL SCREENING: ICD-10-CM

## 2024-10-08 DIAGNOSIS — O24.414 INSULIN CONTROLLED GESTATIONAL DIABETES MELLITUS (GDM) IN SECOND TRIMESTER: Primary | ICD-10-CM

## 2024-10-08 PROCEDURE — 36415 COLL VENOUS BLD VENIPUNCTURE: CPT

## 2024-10-08 PROCEDURE — G0108 DIAB MANAGE TRN  PER INDIV: HCPCS | Performed by: DIETITIAN, REGISTERED

## 2024-10-08 PROCEDURE — 82105 ALPHA-FETOPROTEIN SERUM: CPT

## 2024-10-08 RX ORDER — INSULIN LISPRO 100 [IU]/ML
4 INJECTION, SOLUTION INTRAVENOUS; SUBCUTANEOUS
Qty: 15 ML | Refills: 0 | Status: SHIPPED | OUTPATIENT
Start: 2024-10-08

## 2024-10-08 NOTE — PROGRESS NOTES
"Follow-Up Visit   (in-person office visit )    Thank you for referring your patient to [facility] Maternal Fetal Medicine Diabetes and Pregnancy Program.     Subjective:     Karena Garcia is a 33 y.o. female who presents today unaccompanied for Patient Education (Follow Up/Review Blood Sugars) and Gestational Diabetes (16w6d/).     Patient is at 16w6d gestation, Estimated Date of Delivery: 3/19/25.     Reviewed and updated the following from patients medical record:  Allergies, and Current Medications. Continues to experience nausea (Dry heaving 20 minutes on average 2 x day/3-4 days/week), patient feels that nausea related to hunger pains. Encouraged patient to discuss medication options for nausea with OB doctor.    Visit Diagnosis:  Encounter Diagnosis     ICD-10-CM    1. Insulin controlled gestational diabetes mellitus (GDM) in second trimester  O24.414       2. 16 weeks gestation of pregnancy  Z3A.16            Current Outpatient Medications  Current Outpatient Medications   Medication Instructions    BABY ASPIRIN PO Oral    Blood Glucose Monitoring Suppl (OneTouch Verio Reflect) w/Device KIT Dispense 1 kit per insurance company. GDM.    Cyanocobalamin (VITAMIN B-12 PO) Oral    glucose blood (OneTouch Verio) test strip Test 4 times a day. GDM.    insulin lispro (HUMALOG KWIKPEN) 4 Units, Subcutaneous, Daily before dinner    Insulin Pen Needle (BD Pen Needle Kirsten U/F) 32G X 4 MM MISC Use 1 pen needle with each insulin pen injection. GDM.    Lancets (OneTouch Delica Plus [id number]) MISC Use 4 a day. GDM.    ondansetron (ZOFRAN) 4 mg, Oral, Every 8 hours PRN    Prenatal MV-Min-Fe Fum-FA-DHA (PRENATAL 1 PO) Oral, Daily    Semglee (yfgn) 20 Units, Subcutaneous, Daily at bedtime, At 9-10 PM daily. Titrate as directed. GDM.        Anthropometrics:  Ht Readings from Last 1 Encounters:   10/01/24 5' 2\" (1.575 m)      Wt Readings from Last 3 Encounters:   10/08/24 107 kg (235 lb 12.8 oz)   10/01/24 105 kg (232 lb) "   24 106 kg (234 lb)        Pre-Gravid Wt Pre-Gravid BMI TWG   107 kg (235 lb) 42.97 0.363 kg (12.8 oz)     Total Pregnancy Weight Gain Recommendations: BMI (> 30) 11-20 lbs  Current Wt Status Compared to Recommendations: *Women should gain 2-5lbs in first trimester; Rate of wt gain in 2nd and 3rd trimester depends on TWG recommendations based on pregravid BMI and height    Most Recent Ultrasound Results:  Findings:  Nuchal Translucency 24  Further Fetal Surveillance: Yes, NST twice weekly and SKY once weekly starting at 32wk GA d/t insulin controlled GDM  Next US date: Scheduled Appropriately - Early anatomy 10/10/24    BLOOD GLUCOSE MONITORING:   Glucometer: OneTouch Verio Flex     Reinforced at Today's Visit:   Timing/Frequency of SMB x per day (Fasting, 1 hour after start of each meal) - Patient requesting to test 1 hour after meals vs 2 hours moving forward  Goals: (Fasting) 60-90mg/dL // (2hr PP) <120mg/dL  Reporting Guidelines: Weekly via Phone: [phone] OR My Chart (Message with image attachment) OR Glucose Flowsheet  Method of Reporting: clickTRUE Glucose Flowsheet    BG LOG:       Noted A1c (24): 6.2%    Review of Blood Glucose Log:   Indicates fair glycemic control  FBG = Well controlled  Post-Prandial BG =  Elevations noted primarily at dinner  - Value of 105mg/dL noted after eating no carbohydrates after dinner (patient reports only eating steak). Value of 207 noted after dinner on 10/6/24 - (Hamburger, Tomato, Pickles, 1/2 cup Macaroni salad, Potato Salad)    MEDICATIONS (Plan reviewed/approved by AJAY Best):    Due to fasting blood sugars overall within target levels (60-90mg/dL),  Continue Semglee 24 units once daily at bedtime (9pm)    Due to elevated post dinner blood sugars (>120mg/dL),  Start Humalog 4 units once daily before dinner   Inject 15-20 minutes before starting meal; Hold if not eating   Reviewed basal/bolus insulin with patient. Education completed that  bolus insulin provides an extra burst of insulin to help decrease the spike in blood sugar after eating. Recommend tie a red string around Humalog pen as added visual cue to distinguish between long acting insulin.     MEAL PLAN (Patient was provided with a meal plan including 3 meals and 3 snacks at class 1)  *Continue Meal Plan: 1800 calorie (CHO: 45-15-45-15-45-30) (PRO:2-1-3-1-3-2)    Review of Patient's Current Diet: (Typically wakes up 8:15-8:30am; Bedtime: 8:30-9pm, Falls asleep at 10pm)    Breakfast (7:45am): Banana + Water  AM Snack: Skipped  Lunch (12pm): Peanut Butter Jelly Crustable, 6 oz Lemonade/Iced tea, (Lunch on Sunday 10/6 - Crackers) Juice  (Alternative: Whole Wheat Ritz Crackers + Cream Cheese  PM Snack: (1:30pm)1 cup grapes (Oval)  Dinner (6:30pm): 1 slice pepperoni pizza, Sprite Zero (Alternative: Pulled Chicken, Eggplant Parmesan made with HERMEL DELOR Brand), Baked Pork Chops, Whole Wheat Egg Noodle, Mixed Vegetables, Salad + Hardboiled Eggs, Chicken, Sunflower Seeds, Light Balsamic Vinaigrette, 2 Tbsp Croutons)  Bedtime Snack(Skipped): 1 oz pretzels or Quakes Rice Crisp (5-10) (or Snack size payday - reviewed nutrition label with patient to discuss added sugar content)    Beverages: Sugar Sweetened Beverages Including Liquid IV (Discussed sugar content of Liquid IV with patient), Juice  - Also drinks Diet Gingerale   Dining Out Frequency: Weekly      Meal Plan Recommendations Compliant? Comments:    Consistent CHO Intake No  Reviewed minimum of 175 grams carbohydrate daily, Encouraged patient to avoid sweetened beverages   3 Meals and 3 Snacks No  Patient states she is nervous to eat snacks due to concern over blood sugars   Protein w/ Every Meal and Snack No  Discussed the importance of protein with every meal/snack (Protein sources include: Skim Mendel, Mozzarella Cheese   Eating every 2-3.5hrs while awake  No  Keeps snack bag in car (Peanuts, Slim Mendel, Austin Bianca Snack Size, Ritz )   8-10hrs Fasting  "(from time of bedtime snack until first meal of the day) Yes  Appears to be exceeding fasting at times        Overall Impression: Pt has a Good compliance and understanding of diet recommendations at this time.    Physical Activity:  Currently physically active? Yes, Walking daily -20-25 minutes (in the afternoon with )    Reviewed w/ Pt:   Benefits of physical activity to optimize blood glucose control, encouraged activity at patient is physically able.   Instructed pt to always consult a physician prior to starting an exercise program.   Recommend 20-30 minutes daily.    Patient Stated Goal: \"I will check my blood sugar 4 times each day, as directed by diabetes and pregnancy team\"  Goal Assessment: On track    Diabetes Self Management Support Plan outside of ongoing care: Spouse/Family    Barriers to Learning/Change: No Barriers  Expected Compliance: good    Date to report blood sugars: Weekly   Follow up:  Return in about 3 weeks (around 10/29/2024) for Visit w/ AJAY Best.     Plans to snack, reading nutrition facts label     Begin Time: 10:25am  End Time: 11:21am    It was a pleasure working with them today. Please feel free to call [phone]) with any questions or concerns.    Chely Pryor   Diabetes Educator  [facility] Maternal Fetal Medicine  Diabetes and Pregnancy Program    "

## 2024-10-09 ENCOUNTER — TELEPHONE (OUTPATIENT)
Age: 34
End: 2024-10-09

## 2024-10-09 NOTE — TELEPHONE ENCOUNTER
Patient calling to schedule NST twice weekly and SKY once weekly till delivery starting at 32wk GA per last message from Chely DEL TORO Diabetes Educator. Warm transfer to [facility], unsuccessful. Please call patient back to schedule appointments. CABRERA 03/19/2025

## 2024-10-10 ENCOUNTER — ROUTINE PRENATAL (OUTPATIENT)
Facility: HOSPITAL | Age: 34
End: 2024-10-10
Payer: COMMERCIAL

## 2024-10-10 VITALS
SYSTOLIC BLOOD PRESSURE: 124 MMHG | HEIGHT: 62 IN | HEART RATE: 95 BPM | BODY MASS INDEX: 43.21 KG/M2 | WEIGHT: 234.79 LBS | DIASTOLIC BLOOD PRESSURE: 78 MMHG

## 2024-10-10 DIAGNOSIS — O24.414 INSULIN CONTROLLED GESTATIONAL DIABETES MELLITUS (GDM) IN SECOND TRIMESTER: ICD-10-CM

## 2024-10-10 DIAGNOSIS — Z3A.17 17 WEEKS GESTATION OF PREGNANCY: Primary | ICD-10-CM

## 2024-10-10 DIAGNOSIS — E66.813 OBESITY, CLASS 3: ICD-10-CM

## 2024-10-10 DIAGNOSIS — O28.0 ABNORMAL MSAFP (MATERNAL SERUM ALPHA-FETOPROTEIN), ELEVATED: ICD-10-CM

## 2024-10-10 PROBLEM — N92.6 MISSED MENSES: Status: RESOLVED | Noted: 2021-02-12 | Resolved: 2024-10-10

## 2024-10-10 PROBLEM — N92.6 IRREGULAR MENSES: Status: RESOLVED | Noted: 2018-02-06 | Resolved: 2024-10-10

## 2024-10-10 LAB
2ND TRIMESTER 4 SCREEN SERPL-IMP: ABNORMAL
AFP ADJ MOM SERPL: 2.95
AFP INTERP AMN-IMP: ABNORMAL
AFP INTERP SERPL-IMP: ABNORMAL
AFP INTERP SERPL-IMP: ABNORMAL
AFP SERPL-MCNC: 81 NG/ML
AGE AT DELIVERY: 33.2 YR
GA METHOD: ABNORMAL
GA: 18.3 WEEKS
IDDM PATIENT QL: YES
MULTIPLE PREGNANCY: NO
NEURAL TUBE DEFECT RISK FETUS: 38 %

## 2024-10-10 PROCEDURE — 76811 OB US DETAILED SNGL FETUS: CPT | Performed by: OBSTETRICS & GYNECOLOGY

## 2024-10-10 PROCEDURE — 99214 OFFICE O/P EST MOD 30 MIN: CPT | Performed by: OBSTETRICS & GYNECOLOGY

## 2024-10-10 NOTE — LETTER
"October 10, 2024     Hiwot Mercedes, AJAY  [address] [address]    Patient: Karena Garcia   YOB: 1990   Date of Visit: 10/10/2024       Dear Ms. Mercedes:    Thank you for referring Karena Garcia to me for evaluation. Below are my notes for this consultation.    If you have questions, please do not hesitate to call me. I look forward to following your patient along with you.         Sincerely,        Bryan Ivey MD        CC: No Recipients    Bryan Ivey MD  10/10/2024  4:35 PM  Sign when Signing Visit  A fetal ultrasound was completed. See Ob procedures in Epic for an interpretation and recommendations. Do not hesitate to contact us in [facility] if you have questions.    Bryan Ivey MD, MSCE  Maternal Fetal Medicine      Marj Dietz MD  10/10/2024  3:41 PM  Sign when Signing Visit  [facility]: Karena Garcia was seen today at 17w1d for  early anatomic survey .  See ultrasound report under \"OB Procedures\" tab.  Please don't hesitate to contact our office with any concerns or questions.  -Marj Dietz MD    "

## 2024-10-10 NOTE — PROGRESS NOTES
A fetal ultrasound was completed. See Ob procedures in Epic for an interpretation and recommendations. Do not hesitate to contact us in [facility] if you have questions.    Bryan Ivey MD, MSCE  Maternal Fetal Medicine

## 2024-10-10 NOTE — PROGRESS NOTES
"[facility]: Karena Garcia was seen today at 17w1d for  early anatomic survey .  See ultrasound report under \"OB Procedures\" tab.  Please don't hesitate to contact our office with any concerns or questions.  -Marj Dietz MD    "

## 2024-10-11 ENCOUNTER — TELEPHONE (OUTPATIENT)
Dept: OBGYN CLINIC | Facility: CLINIC | Age: 34
End: 2024-10-11

## 2024-10-11 NOTE — TELEPHONE ENCOUNTER
Spoke with patient re: afp results. She was seen by MFM yesterday and results were reviewed as well. They discussed with her MSAFP is not associated with fetal abnormalities, but result in increase risk for preeclampsia, FGR and also increased risk for FD. She will return at 20 weeks for completion of fetal anatomy and a fetal echo at 22-23 weeks. Low dose aspiring recommended. Today is feeling nauseated, but otherwise she is well. Recommended small frequent meals.

## 2024-10-18 ENCOUNTER — TREATMENT (OUTPATIENT)
Dept: PERINATAL CARE | Facility: CLINIC | Age: 34
End: 2024-10-18

## 2024-10-18 DIAGNOSIS — Z3A.18 18 WEEKS GESTATION OF PREGNANCY: ICD-10-CM

## 2024-10-18 DIAGNOSIS — O24.414 INSULIN CONTROLLED GESTATIONAL DIABETES MELLITUS (GDM) IN SECOND TRIMESTER: Primary | ICD-10-CM

## 2024-10-18 NOTE — PROGRESS NOTES
"  Date: 10/18/24  Karena Garcia  1990  Estimated Date of Delivery: 3/19/25  GA: 18w2d  OB/GYN:[facility]    Diagnosis:  Insulin controlled GDM    Blood Sugar Logs Submitted via: Glucose Flowsheet      Assessment and Plan:    Due to after dinner blood sugars elevated >120,   Increase Humalog from 4 to 6 units at Dinner. Inject 15 minutes before the start of Dinner. Hold if not eating.   Continue Semglee 24 units once daily at bedtime (9-10pm)    Continue:  1800 calorie (CHO: 45-15-30-15-30-15) (PRO:2-1-4-1-4-2/3) meal plan consisting of 3 meals and 3 snacks daily including protein at each  Avoid fasting for > 10 hours overnight  SMBG 4 x per day (Fasting, 2 hour after start of each meal) with a One-Touch Verio glucose meter  Walking daily    Lab Work:   Lab Results   Component Value Date    HGBA1C 6.2 (H) 09/17/2024      Ultrasound:  10/10/24 - NML Growth/SKY  Next: 11/12/24    Diabetes Self Management Support Plan outside of ongoing care: Spouse/Family   Patient Stated Goal: \"I will check my blood sugar 4 times each day, as directed by diabetes and pregnancy team\"   Goal Assessment: On track    Date to report next: Weekly    Jeanie Yoder RD, MS  Diabetes Educator  [facility] Maternal Fetal Medicine  Diabetes and Pregnancy Program  [address], [address]  [place] [address]    "

## 2024-10-18 NOTE — PATIENT INSTRUCTIONS
Due to after dinner blood sugars elevated >120,   Increase Humalog from 4 to 6 units at Dinner. Inject 15 minutes before the start of Dinner. Hold if not eating.   Continue Semglee 24 units once daily at bedtime (9-10pm)    Continue:  1800 calorie (CHO: 45-15-30-15-30-15) (PRO:2-1-4-1-4-2/3) meal plan consisting of 3 meals and 3 snacks daily including protein at each  Avoid fasting for > 10 hours overnight  SMBG 4 x per day (Fasting, 2 hour after start of each meal) with a One-Touch Verio glucose meter  Walking daily

## 2024-10-23 DIAGNOSIS — Z3A.19 19 WEEKS GESTATION OF PREGNANCY: ICD-10-CM

## 2024-10-23 DIAGNOSIS — O24.414 INSULIN CONTROLLED GESTATIONAL DIABETES MELLITUS (GDM) IN SECOND TRIMESTER: Primary | ICD-10-CM

## 2024-10-23 RX ORDER — INSULIN GLARGINE-YFGN 100 [IU]/ML
24 INJECTION, SOLUTION SUBCUTANEOUS
Qty: 15 ML | Refills: 0 | Status: SHIPPED | OUTPATIENT
Start: 2024-10-23 | End: 2024-12-25

## 2024-10-24 ENCOUNTER — TREATMENT (OUTPATIENT)
Dept: PERINATAL CARE | Facility: CLINIC | Age: 34
End: 2024-10-24

## 2024-10-24 DIAGNOSIS — Z3A.19 19 WEEKS GESTATION OF PREGNANCY: ICD-10-CM

## 2024-10-24 DIAGNOSIS — O24.414 INSULIN CONTROLLED GESTATIONAL DIABETES MELLITUS (GDM) IN SECOND TRIMESTER: Primary | ICD-10-CM

## 2024-10-24 DIAGNOSIS — Z79.4 INSULIN DOSE CHANGED (HCC): ICD-10-CM

## 2024-10-24 NOTE — PROGRESS NOTES
"  Date: 10/24/24  Karena Garcia  1990  Estimated Date of Delivery: 3/19/25  GA: 19w1d  OB/GYN: [facility]    Diagnosis:  Insulin controlled GDM    Blood Sugar Logs Submitted via: Glucose Flowsheet        Assessment and Plan:    Due to after dinner blood sugars elevated >120,   Increase Humalog from 6 to 8 units at Dinner. Inject 15 minutes before the start of Dinner. Hold if not eating.   Continue Semglee 24 units once daily at bedtime (9-10pm)    Continue:  1800 calorie (CHO: 45-15-30-15-30-15) (PRO:2-1-4-1-4-2/3) meal plan consisting of 3 meals and 3 snacks daily including protein at each  Avoid fasting for > 10 hours overnight  SMBG 4 x per day (Fasting, 1 hour after start of each meal) with a One-Touch Verio glucose meter  Walking daily    Lab Work:   Lab Results   Component Value Date    HGBA1C 6.2 (H) 09/17/2024      Ultrasound:  10/10/24 - NML Growth/SKY  Next: 11/12/24    Diabetes Self Management Support Plan outside of ongoing care: Spouse/Family   Patient Stated Goal: \"I will check my blood sugar 4 times each day, as directed by diabetes and pregnancy team\"   Goal Assessment: On track    Date to report next: Weekly    Jeanei oYder RD, MS  Diabetes Educator  [facility] Maternal Fetal Medicine  Diabetes and Pregnancy Program  [address], [address]  [place] [address]    "

## 2024-10-24 NOTE — PATIENT INSTRUCTIONS
-A1c 6.2% prediabetes range.  -A1c goal is 5.6% or less.  -Repeat A1c and CMP with 28 weeks prenatal labs.   -Due to hyperglycemia, increase semglee to 32 units at 9 PM daily.   -Due to 1 hour post meal readings above 140; increase lispro to 4-4-16 units before meals 1-2-3. Hold if not eating.   -Follow up with dietitian as needed.  -Self monitoring blood glucose (SMBG) fasting; 2 hours after start of each meal and with hypoglycemia.   -Glucose goals: fasting 60-90 mg/dL, 140 mg/dL or less 1 hour post meals, and 120 mg/dL or less 2 hours post meal.   -Report glucose readings weekly via [employer].  -Communicate with diabetes team every Monday and Thursday.   -Continue GDM meal plan with 3 meals and 3 snacks including recommended combination of carb, protein and fat per meal/snack.  -Please eat meal or snack every 2-3.5 hours while awake.  -No more than 8 to 10 hours of fasting overnight.  -Refer to [employer] MyPlate online as a reference.  -2nd/3rd trimester minimum total daily carbohydrates 175 grams paired with half grams in protein.   -Stay active if no restriction from your OB, walk up to 30 minutes a day.  -Always have glucose available to treat hypoglycemia. Use 15:15 rule.   -Refer to hypoglycemia patient education sheet. SMBG when experiencing signs and symptoms of hypoglycemia and prior to driving.   -Serial fetal growth ultrasounds.  -20 weeks detailed fetal growth ultrasound.  -22-24 weeks fetal echo.  -At 32 weeks gestation; NST twice a week and SKY weekly.   -Continue prenatal vitamin and baby aspirin as recommended.  -At 36 weeks gestation, stop baby aspirin.   -Continue follow-up with your OB and MFM as recommended.  -Stay in close contact with diabetes education team.  -4 to 12 weeks postpartum complete 2-hour glucose tolerance test for diabetes screening.  -Postpartum continue with a healthy lifestyle to decrease risk of developing type 2 diabetes.     Thank you for choosing us for your   care today.  If you have any questions about your ultrasound or care, please do not hesitate to contact us or your primary obstetrician.        Some general instructions for your pregnancy are:    Exercise: Aim for 150 minutes per week of regular exercise.  Walking is great!  Nutrition: Choose healthy sources of calcium, iron, and protein.  Avoid ultraprocessed foods and added sugar.  Learn about Preeclampsia: preeclampsia is a common, potentially serious high blood pressure complication in pregnancy.  A blood pressure of 140mmHg (systolic or top number) or 90mmHg (diastolic or bottom number) should be evaluated by your doctor.  Aspirin is sometimes prescribed in early pregnancy to prevent preeclampsia in women with risk factors - ask your obstetrician if you should be on this medication.  For more resources, visit:  https://www.highriskpregnancyinfo.org/preeclampsia  If you smoke, please try to quit completely but also try to reduce your smoking by as much as possible (as soon as possible).  Do not vape.  Please also avoid cannabis products.  Other warning signs to watch out for in pregnancy or postpartum: chest pain, obstructed breathing or shortness of breath, seizures, thoughts of hurting yourself or your baby, bleeding, a painful or swollen leg, fever, or headache (see [employer] POST-BIRTH Warning Signs campaign).  If these happen call 911.  Itching is also not normal in pregnancy and if you experience this, especially over your hands and feet, potentially worse at night, notify your doctors.

## 2024-10-24 NOTE — PATIENT INSTRUCTIONS
Due to after dinner blood sugars elevated >120,   Increase Humalog from 6 to 8 units at Dinner. Inject 15 minutes before the start of Dinner. Hold if not eating.   Continue Semglee 24 units once daily at bedtime (9-10pm)    Continue:  1800 calorie (CHO: 45-15-30-15-30-15) (PRO:2-1-4-1-4-2/3) meal plan consisting of 3 meals and 3 snacks daily including protein at each  Avoid fasting for > 10 hours overnight  SMBG 4 x per day (Fasting, 1 hour after start of each meal) with a One-Touch Verio glucose meter  Walking daily

## 2024-10-28 ENCOUNTER — ROUTINE PRENATAL (OUTPATIENT)
Dept: OBGYN CLINIC | Facility: CLINIC | Age: 34
End: 2024-10-28

## 2024-10-28 VITALS — SYSTOLIC BLOOD PRESSURE: 100 MMHG | BODY MASS INDEX: 42.98 KG/M2 | WEIGHT: 235 LBS | DIASTOLIC BLOOD PRESSURE: 60 MMHG

## 2024-10-28 DIAGNOSIS — Z34.82 PRENATAL CARE, SUBSEQUENT PREGNANCY, SECOND TRIMESTER: Primary | ICD-10-CM

## 2024-10-28 PROCEDURE — PNV: Performed by: NURSE PRACTITIONER

## 2024-10-28 NOTE — PROGRESS NOTES
Karena Garcia is a 33 y.o.  at 19w 5d complicated by GDM. Managed with humolog at dinner and Semglee at bedtime.  Reports +FM  She is doing well, except for daily HA. She is taking B12. Recommended to add Magnesium. Denies LOF/Bleeding/Cramping. Denies n/v, edema. Denies tobacco, drugs or ETOH use. Denies DV.     Visit Vitals  /60   Wt 107 kg (235 lb)   LMP 2024 (Exact Date)   BMI 42.98 kg/m²   OB Status Pregnant   Smoking Status Former   BSA 2.05 m²     Pre-Myra Vitals      Flowsheet Row Most Recent Value   Prenatal Assessment    Fetal Heart Rate 157   Fundal Height (cm) 19 cm   Movement Present   Prenatal Vitals    Blood Pressure 100/60   Weight - Scale 107 kg (235 lb)   Urine Albumin/Glucose    Dilation/Effacement/Station    Vaginal Drainage    Edema           Urine neg/neg    Diagnoses and all orders for this visit:    Prenatal care, subsequent pregnancy, second trimester      Detailed anatomy scan on .    RTO in 4 weeks for PNV or sooner if needed.

## 2024-10-29 ENCOUNTER — OFFICE VISIT (OUTPATIENT)
Facility: HOSPITAL | Age: 34
End: 2024-10-29
Payer: COMMERCIAL

## 2024-10-29 VITALS — HEIGHT: 62 IN | BODY MASS INDEX: 43.58 KG/M2 | WEIGHT: 236.8 LBS

## 2024-10-29 DIAGNOSIS — Z3A.19 19 WEEKS GESTATION OF PREGNANCY: ICD-10-CM

## 2024-10-29 DIAGNOSIS — E66.01 CLASS 3 SEVERE OBESITY DUE TO EXCESS CALORIES WITH SERIOUS COMORBIDITY AND BODY MASS INDEX (BMI) OF 40.0 TO 44.9 IN ADULT (HCC): ICD-10-CM

## 2024-10-29 DIAGNOSIS — E66.813 CLASS 3 SEVERE OBESITY DUE TO EXCESS CALORIES WITH SERIOUS COMORBIDITY AND BODY MASS INDEX (BMI) OF 40.0 TO 44.9 IN ADULT (HCC): ICD-10-CM

## 2024-10-29 DIAGNOSIS — O24.414 INSULIN CONTROLLED GESTATIONAL DIABETES MELLITUS (GDM) IN SECOND TRIMESTER: Primary | ICD-10-CM

## 2024-10-29 PROCEDURE — 99215 OFFICE O/P EST HI 40 MIN: CPT | Performed by: NURSE PRACTITIONER

## 2024-10-29 NOTE — ASSESSMENT & PLAN NOTE
-Pre-pregnancy weight 235 lbs.  -Current weight 236 lbs.  -Recommended weight gain 11 to 20 lbs.  -Continue GDM meal and follow up with dietitian as needed.

## 2024-10-29 NOTE — ASSESSMENT & PLAN NOTE
-A1c 6.2% prediabetes range.  -A1c goal is 5.6% or less.  -Repeat A1c and CMP with 28 weeks prenatal labs.   -Due to hyperglycemia, increase semglee to 32 units at 9 PM daily.   -Due to 1 hour post meal readings above 140; increase lispro to 4-4-16 units before meals 1-2-3. Hold if not eating.   -Follow up with dietitian as needed.  -Self monitoring blood glucose (SMBG) fasting; 2 hours after start of each meal and with hypoglycemia.   -Glucose goals: fasting 60-90 mg/dL, 140 mg/dL or less 1 hour post meals, and 120 mg/dL or less 2 hours post meal.   -Report glucose readings weekly via [employer].  -Communicate with diabetes team every Monday and Thursday.   -Continue GDM meal plan with 3 meals and 3 snacks including recommended combination of carb, protein and fat per meal/snack.  -Please eat meal or snack every 2-3.5 hours while awake.  -No more than 8 to 10 hours of fasting overnight.  -Refer to [place] MyPlate online as a reference.  -2nd/3rd trimester minimum total daily carbohydrates 175 grams paired with half grams in protein.   -Stay active if no restriction from your OB, walk up to 30 minutes a day.  -Always have glucose available to treat hypoglycemia. Use 15:15 rule.   -Refer to hypoglycemia patient education sheet. SMBG when experiencing signs and symptoms of hypoglycemia and prior to driving.   -Serial fetal growth ultrasounds.  -20 weeks detailed fetal growth ultrasound.  -22-24 weeks fetal echo.  -At 32 weeks gestation; NST twice a week and SKY weekly.   -Continue prenatal vitamin and baby aspirin as recommended.  -At 36 weeks gestation, stop baby aspirin.   -Continue follow-up with your OB and MFM as recommended.  -Stay in close contact with diabetes education team.  -4 to 12 weeks postpartum complete 2-hour glucose tolerance test for diabetes screening.  -Postpartum continue with a healthy lifestyle to decrease risk of developing type 2 diabetes.   Lab Results   Component Value Date    HGBA1C 6.2  (H) 09/17/2024

## 2024-10-29 NOTE — PROGRESS NOTES
Ambulatory Visit  Name: Karena Garcia      : 1990      MRN: [id number]  Encounter Provider: AJAY Best  Encounter Date: 10/29/2024   Encounter department: [facility]    Assessment & Plan  19 weeks gestation of pregnancy         Class 3 severe obesity due to excess calories with serious comorbidity and body mass index (BMI) of 40.0 to 44.9 in adult (HCC)  -Pre-pregnancy weight 235 lbs.  -Current weight 236 lbs.  -Recommended weight gain 11 to 20 lbs.  -Continue GDM meal and follow up with dietitian as needed.         Insulin controlled gestational diabetes mellitus (GDM) in second trimester  -A1c 6.2% prediabetes range.  -A1c goal is 5.6% or less.  -Repeat A1c and CMP with 28 weeks prenatal labs.   -Due to hyperglycemia, increase semglee to 32 units at 9 PM daily.   -Due to 1 hour post meal readings above 140; increase lispro to 4-4-16 units before meals 1-2-3. Hold if not eating.   -Follow up with dietitian as needed.  -Self monitoring blood glucose (SMBG) fasting; 2 hours after start of each meal and with hypoglycemia.   -Glucose goals: fasting 60-90 mg/dL, 140 mg/dL or less 1 hour post meals, and 120 mg/dL or less 2 hours post meal.   -Report glucose readings weekly via [employer].  -Communicate with diabetes team every Monday and Thursday.   -Continue GDM meal plan with 3 meals and 3 snacks including recommended combination of carb, protein and fat per meal/snack.  -Please eat meal or snack every 2-3.5 hours while awake.  -No more than 8 to 10 hours of fasting overnight.  -Refer to [employer] MyPlate online as a reference.  -2nd/3rd trimester minimum total daily carbohydrates 175 grams paired with half grams in protein.   -Stay active if no restriction from your OB, walk up to 30 minutes a day.  -Always have glucose available to treat hypoglycemia. Use 15:15 rule.   -Refer to hypoglycemia patient education sheet. SMBG when experiencing signs and symptoms of hypoglycemia  and prior to driving.   -Serial fetal growth ultrasounds.  -20 weeks detailed fetal growth ultrasound.  -22-24 weeks fetal echo.  -At 32 weeks gestation; NST twice a week and SKY weekly.   -Continue prenatal vitamin and baby aspirin as recommended.  -At 36 weeks gestation, stop baby aspirin.   -Continue follow-up with your OB and MFM as recommended.  -Stay in close contact with diabetes education team.  -4 to 12 weeks postpartum complete 2-hour glucose tolerance test for diabetes screening.  -Postpartum continue with a healthy lifestyle to decrease risk of developing type 2 diabetes.   Lab Results   Component Value Date    HGBA1C 6.2 (H) 2024              History of Present Illness     Karena Garcia is a 33 y.o. female  19 6/7 weeks gestation who presents GDM on semglee 24 units daily and lispro 8 units before dinner. Trying to follow GDM meal plan. Due to a busy schedule at times misses a meal or snack. Testing fasting and 1 hour post start of each meal and reporting via flowsheet; mostly misses post lunch glucose checks. Insulin adjustments made during visit due to hyperglycemia noted overall. Did feel s/s with reading of 68 and hypoglycemia treatment reviewed. Sample Dexcom G7 CGM provided for more glucose data. Weight stable. BP within normal.     History obtained from : patient  Review of Systems   Constitutional:  Negative for fatigue and fever.   HENT:  Negative for congestion and trouble swallowing.    Eyes:  Negative for visual disturbance.   Respiratory:  Negative for cough and shortness of breath.    Cardiovascular:  Negative for chest pain, palpitations and leg swelling.   Gastrointestinal:  Negative for constipation, nausea and vomiting.   Endocrine: Positive for polyuria. Negative for polydipsia and polyphagia.   Genitourinary:  Negative for difficulty urinating and vaginal bleeding.   Musculoskeletal:  Positive for back pain.        Hip pain   Skin:  Negative for rash.   Neurological:   Positive for headaches.   Psychiatric/Behavioral:  Positive for sleep disturbance.      Medical History Reviewed by provider this encounter:  Tobacco  Meds  Problems  Med Hx  Surg Hx  Fam Hx  Soc Hx      Current Outpatient Medications on File Prior to Visit   Medication Sig Dispense Refill    BABY ASPIRIN PO Take 162 mg by mouth daily at bedtime      Blood Glucose Monitoring Suppl (OneTouch Verio Reflect) w/Device KIT Dispense 1 kit per insurance company. GDM. 1 kit 0    Cyanocobalamin (VITAMIN B-12 PO) Take by mouth      glucose blood (OneTouch Verio) test strip Test 4 times a day. GDM. 100 strip 7    insulin lispro (HumaLOG KwikPen) 100 units/mL injection pen Inject 4 Units under the skin daily before dinner (Patient taking differently: Inject 8 Units under the skin daily before dinner) 15 mL 0    Insulin Pen Needle (BD Pen Needle Kirsten U/F) 32G X 4 MM MISC Use 1 pen needle with each insulin pen injection. GDM. 100 each 3    Lancets (OneTouch Delica Plus [id number]) MISC Use 4 a day. GDM. 100 each 7    ondansetron (ZOFRAN) 4 mg tablet Take 1 tablet (4 mg total) by mouth every 8 (eight) hours as needed for nausea or vomiting 20 tablet 0    Prenatal MV-Min-Fe Fum-FA-DHA (PRENATAL 1 PO) Take by mouth in the morning      Semglee, yfgn, 100 UNIT/ML SOPN Inject 0.24 mL (24 Units total) under the skin daily at bedtime At 9-10 PM daily. Titrate as directed. GDM. 15 mL 0     No current facility-administered medications on file prior to visit.      Social History     Tobacco Use    Smoking status: Former     Current packs/day: 0.25     Average packs/day: 0.3 packs/day for 4.0 years (1.0 ttl pk-yrs)     Types: Cigarettes     Passive exposure: Never    Smokeless tobacco: Former     Quit date: 5/4/2016    Tobacco comments:     Weaning prior to pregnancy, quit with +UPT 6/2024   Vaping Use    Vaping status: Never Used   Substance and Sexual Activity    Alcohol use: Not Currently     Comment: occasional    Drug use: No     "Sexual activity: Yes     Partners: Male     Birth control/protection: None         Objective     Ht 5' 2\" (1.575 m)   Wt 107 kg (236 lb 12.8 oz)   LMP 06/05/2024 (Exact Date)   BMI 43.31 kg/m²     Physical Exam  HENT:      Head: Normocephalic.      Nose: Nose normal.   Eyes:      Conjunctiva/sclera: Conjunctivae normal.   Pulmonary:      Effort: Pulmonary effort is normal.   Neurological:      Mental Status: She is alert and oriented to person, place, and time.   Psychiatric:         Mood and Affect: Mood normal.         Behavior: Behavior normal.         Thought Content: Thought content normal.         Judgment: Judgment normal.       Administrative Statements   I have spent a total time of 45 minutes in caring for this patient on the day of the visit/encounter including Instructions for management, Risk factor reductions, Impressions, Counseling / Coordination of care, Documenting in the medical record, and Reviewing / ordering tests, medicine, procedures  .  "

## 2024-11-04 ENCOUNTER — CLINICAL SUPPORT (OUTPATIENT)
Age: 34
End: 2024-11-04

## 2024-11-04 DIAGNOSIS — Z32.2 ENCOUNTER FOR CHILDBIRTH INSTRUCTION: Primary | ICD-10-CM

## 2024-11-12 ENCOUNTER — ROUTINE PRENATAL (OUTPATIENT)
Facility: HOSPITAL | Age: 34
End: 2024-11-12
Payer: COMMERCIAL

## 2024-11-12 VITALS
SYSTOLIC BLOOD PRESSURE: 130 MMHG | DIASTOLIC BLOOD PRESSURE: 78 MMHG | BODY MASS INDEX: 44.1 KG/M2 | HEART RATE: 123 BPM | HEIGHT: 62 IN | WEIGHT: 239.64 LBS

## 2024-11-12 DIAGNOSIS — O24.414 INSULIN CONTROLLED GESTATIONAL DIABETES MELLITUS (GDM) IN SECOND TRIMESTER: ICD-10-CM

## 2024-11-12 DIAGNOSIS — Z36.86 ENCOUNTER FOR ANTENATAL SCREENING FOR CERVICAL LENGTH: ICD-10-CM

## 2024-11-12 DIAGNOSIS — Z3A.21 21 WEEKS GESTATION OF PREGNANCY: Primary | ICD-10-CM

## 2024-11-12 DIAGNOSIS — Z36.3 ENCOUNTER FOR ANTENATAL SCREENING FOR MALFORMATION USING ULTRASOUND: ICD-10-CM

## 2024-11-12 DIAGNOSIS — O28.0 ABNORMAL MSAFP (MATERNAL SERUM ALPHA-FETOPROTEIN), ELEVATED: ICD-10-CM

## 2024-11-12 PROCEDURE — 76817 TRANSVAGINAL US OBSTETRIC: CPT | Performed by: OBSTETRICS & GYNECOLOGY

## 2024-11-12 PROCEDURE — 76816 OB US FOLLOW-UP PER FETUS: CPT | Performed by: OBSTETRICS & GYNECOLOGY

## 2024-11-12 PROCEDURE — 99213 OFFICE O/P EST LOW 20 MIN: CPT | Performed by: OBSTETRICS & GYNECOLOGY

## 2024-11-12 NOTE — PROGRESS NOTES
Ultrasound Probe Disinfection    A transvaginal ultrasound was performed.   Prior to use, disinfection was performed with High Level Disinfection Process ([employer]).  Probe serial number [id number] was used.    Lizz Rice  11/12/24  11:08 AM   
5

## 2024-11-14 ENCOUNTER — HOSPITAL ENCOUNTER (OUTPATIENT)
Facility: HOSPITAL | Age: 34
Discharge: HOME | End: 2024-11-14
Attending: OBSTETRICS & GYNECOLOGY | Admitting: OBSTETRICS & GYNECOLOGY
Payer: COMMERCIAL

## 2024-11-14 ENCOUNTER — NURSE TRIAGE (OUTPATIENT)
Age: 34
End: 2024-11-14

## 2024-11-14 VITALS
TEMPERATURE: 98.2 F | SYSTOLIC BLOOD PRESSURE: 101 MMHG | RESPIRATION RATE: 20 BRPM | DIASTOLIC BLOOD PRESSURE: 58 MMHG | HEART RATE: 90 BPM

## 2024-11-14 DIAGNOSIS — O26.899 ABDOMINAL PAIN COMPLICATING PREGNANCY, ANTEPARTUM: Primary | ICD-10-CM

## 2024-11-14 DIAGNOSIS — R10.9 ABDOMINAL PAIN COMPLICATING PREGNANCY, ANTEPARTUM: Primary | ICD-10-CM

## 2024-11-14 PROCEDURE — G0463 HOSPITAL OUTPT CLINIC VISIT: HCPCS

## 2024-11-14 PROCEDURE — 76817 TRANSVAGINAL US OBSTETRIC: CPT

## 2024-11-14 PROCEDURE — NC001 PR NO CHARGE: Performed by: OBSTETRICS & GYNECOLOGY

## 2024-11-14 PROCEDURE — 99214 OFFICE O/P EST MOD 30 MIN: CPT

## 2024-11-14 RX ORDER — CYCLOBENZAPRINE HCL 10 MG
10 TABLET ORAL EVERY 8 HOURS PRN
Qty: 20 TABLET | Refills: 0 | Status: SHIPPED | OUTPATIENT
Start: 2024-11-14

## 2024-11-14 RX ORDER — CYCLOBENZAPRINE HCL 10 MG
10 TABLET ORAL ONCE
Status: COMPLETED | OUTPATIENT
Start: 2024-11-14 | End: 2024-11-14

## 2024-11-14 RX ADMIN — CYCLOBENZAPRINE 10 MG: 10 TABLET, FILM COATED ORAL at 20:38

## 2024-11-14 NOTE — TELEPHONE ENCOUNTER
Karena called back stating the pain is getting worse throughout the day. Pain level now a 6/7. Pain is at the very top and very bottom of stomach. Feels very crampy and having pain with palpation. Pain is taking her breathe away.    No relief with tylenol, has not tried tums.    ESC to DR. Mora who recommends eval on L/D.  Charge RN added to ESC.    ETA 15-20 min.

## 2024-11-14 NOTE — PROGRESS NOTES
L&D Triage Note - OB/GYN  Karena Garcia 33 y.o. female MRN: [id number]  Unit/Bed#:  TRIAGE 2 Encounter: [id number]      ASSESSMENT:    Karena Garcia is a 33 y.o.  at 22w2d who was evaluated today in triage due to abdominal pain. Microscopy wnl, cervical length wnl, SVE closed. Reassuring work up, the patient does not appear to be in  labor and it is safe to discharge home.     PLAN:    1) Abdominal pain: r/o  labor  - Speculum exam: no abnormal discharge, no pooling, no bleeding  - Microscopy wnl  - Cervical length via TVUS: 3.1-3.3 cm  - SVE: closed  - pain improved with flexeril 10 mg, pt discharged with prescription     2) 22 weeks gestation of pregnancy  - Continue routine prenatal care  - Discharge from OB triage with  labor precautions    - Reviewed rupture of membranes, false vs true labor, decreased fetal movement, and vaginal bleeding   - Pt to call provider with any concerns and follow up at her next scheduled prenatal appointment    - Case discussed with Dr. Mora    SUBJECTIVE:    Karena Garcia 33 y.o.  at 22w2d with an Estimated Date of Delivery: 3/19/25 presenting with positional, sharp epigastric pain that intermittently radiates down to her pelvis. Pt states the pain began last night and worsens with movement with improvement when sitting up straight or standing. The pain was unrelieved by tylenol and remained unchanged with meals. She denies SOB or chest pain and reports an episode of vomiting this morning.    She also endorses lower back pain which has been persistent over the last several weeks and is unchanged today.     This pregnancy has been notable for GDM, hx of nephrolithiasis, elevated MSAFP.    Contractions: denies  Leakage of fluid: Denies  Vaginal Bleeding: Denies  Fetal movement: present    OBJECTIVE:    Vitals:    24 1743   BP: 101/58   Pulse: 90   Resp: 20   Temp: 98.2 °F (36.8 °C)       ROS:  Constitutional: Negative  Respiratory:  Negative  Cardiovascular: Negative    Gastrointestinal: Negative    General Physical Exam:  General: Well appearing, no distress  Respiratory: Unlabored breathing  Cardiovascular: Regular rate.  Abdomen: Soft, gravid, tender to palpation in the epigastric region; no CVA tenderness  Fundal Height: Appropriate for gestational age.  Extremities: Warm and well perfused.  Non tender.      Fetal monitoring:  Fetal heart rate: Baseline Rate (FHR): 155 bpm  Variability: Moderate  [place]: Contraction Frequency (minutes): 0      Cervical Exam  Speculum: Cervical os is closed. No abnormal discharge, no bleeding, no lesions, no pooling    KOH/WTMT:     Infection:   - no clue cells    - no hyphae   - no trichomonads present    Membrane status   - no ferning   - negative nitrazene   - no pooling       SVE: closed      Imaging:       TVUS   - Cervical length    - 3.10 cm    - 3.17 cm    - 3.30 cm   - Presentation: vertex                mAerica Winn, MS3    Margarita Araujo MD  OBGYN, PGY-I

## 2024-11-14 NOTE — TELEPHONE ENCOUNTER
"22w1d OB called in reporting pain in the center of her upper stomach area. Is okay with standing and just notices it when she bends to sit- 4/10. Has taken regular strength tylenol. Denies any VB. Was constipated 2-3 days ago but has been going regularly since then. Has felt baby move in pregnancy so far. Discussed with patient she should increase her water intake, at least 8-10 cups of fluid daily. Discussed with patient taking extra strength tylenol 1000mg every 8 hours as needed for a max of 3000mg daily.   Reviewed with patient monitoring symptoms in the meantime, and contacting the office back with any new or worsening symptoms.    ESC sent to Dr Mora. \"Does sound muscular so stretching and trying different positions may also help, can also try a little milk or maalox in case is acid reflux \"    Called patient and informed of response and recommendations. Verbalized understanding. No further questions.     Reason for Disposition   MILD abdominal pain (e.g., doesn't interfere with normal activities)    Answer Assessment - Initial Assessment Questions  1. LOCATION: \"Where does it hurt?\"       Upper stomach, center   2. RADIATION: \"Does the pain shoot anywhere else?\" (e.g., chest, back)      Denies  3. ONSET: \"When did the pain begin?\" (Minutes, hours or days ago)       Yesterday  4. SUDDEN: \"Gradual or sudden onset?\"      Sudden   5. PATTERN: \"Does the pain come and go, or has it been constant since it started?\"       Comes and goes   6. SEVERITY: \"How bad is the pain?\" \"What does it keep you from doing?\"  (e.g., Scale 1-10; mild, moderate, or severe)      4/10 when sitting at its worse   7. RECURRENT SYMPTOM: \"Have you ever had this type of stomach pain before?\" If Yes, ask: \"When was the last time?\" and \"What happened that time?\"       Denies   8. CAUSE: \"What do you think is causing the stomach pain?      Unsure   9. RELIEVING/AGGRAVATING FACTORS: \"What makes it better or worse?\" (e.g., antacids, bowel " "movement, movement)      Standing, walking   10. FETAL MOVEMENT: \"Has the baby's movement decreased or changed significantly from normal?\"        Has felt some movement   11. OTHER SYMPTOMS: \"Do you have any other symptoms?\" (e.g., back pain, contractions, diarrhea, fever, headache, urination pain, vaginal bleeding, vaginal discharge, vomiting)        Constipated 2-3 days ago    12. CABRERA: \"What date are you expecting to deliver?\"        03/19/2025    Protocols used: Pregnancy - Abdominal Pain Greater Than 20 Weeks EGA-Adult-[place]    "

## 2024-11-15 NOTE — PROCEDURES
Karena Garcia, a  at 22w1d with an CABRERA of 3/19/2025, by Ultrasound, was seen at [facility] for the following procedure(s): $Procedure Type: US - Transvaginal]                         Ultrasound Other  Cervical Length: 3.1         Ultrasound Probe Disinfection    A transvaginal ultrasound was performed.   Prior to use, disinfection was performed with High Level Disinfection Process ([employer])  Probe serial number [id number]:  [id number] was used    Margarita Araujo MD  24  10:03 PM

## 2024-11-21 PROBLEM — Z34.92 PRENATAL CARE, SECOND TRIMESTER: Status: ACTIVE | Noted: 2024-11-21

## 2024-11-21 PROBLEM — Z3A.23 23 WEEKS GESTATION OF PREGNANCY: Status: ACTIVE | Noted: 2024-11-21

## 2024-11-21 NOTE — PROGRESS NOTES
Karena is a 33 y.o.  23w5d. Reports ++FM, no LOF, VB, or regular contractions.     Vitals:    24 1122   BP: 108/74     S=D  +FHTs  Assessment & Plan  23 weeks gestation of pregnancy  First tri labs completed, notable for GDM  Third tri labs ordered  Rh status POS  Growth/fetal echo scheduled 24  TDAP: not yet due  RSV: not yet due  Influenza: would like   Birth Plan: TBD  Feeding: TBD  Peds: TBD  Contraception:  Discussed pre-term labor precautions  Return to office in 4 weeks    Orders:    CBC and Platelet; Future    RPR-Syphilis Screening (Total Syphilis IGG/IGM); Future    Hemoglobin A1c (w/out EAG); Future    Ambulatory Referral to Physical Therapy; Future    Prenatal care, second trimester    Orders:    CBC and Platelet; Future    RPR-Syphilis Screening (Total Syphilis IGG/IGM); Future    Hemoglobin A1c (w/out EAG); Future    Ambulatory Referral to Physical Therapy; Future    Diet controlled gestational diabetes mellitus (GDM) in second trimester    Lab Results   Component Value Date    HGBA1C 6.2 (H) 2024       Orders:    Hemoglobin A1c (w/out EAG); Future    Bilateral hip pain    Orders:    Ambulatory Referral to Physical Therapy; Future

## 2024-11-21 NOTE — ASSESSMENT & PLAN NOTE
First tri labs completed, notable for GDM  Third tri labs ordered  Rh status POS  Growth/fetal echo scheduled 12/05/24  TDAP: not yet due  RSV: not yet due  Influenza: would like   Birth Plan: TBD  Feeding: TBD  Peds: TBD  Contraception:  Discussed pre-term labor precautions  Return to office in 4 weeks    Orders:    CBC and Platelet; Future    RPR-Syphilis Screening (Total Syphilis IGG/IGM); Future    Hemoglobin A1c (w/out EAG); Future    Ambulatory Referral to Physical Therapy; Future

## 2024-11-21 NOTE — ASSESSMENT & PLAN NOTE
Orders:    CBC and Platelet; Future    RPR-Syphilis Screening (Total Syphilis IGG/IGM); Future    Hemoglobin A1c (w/out EAG); Future    Ambulatory Referral to Physical Therapy; Future

## 2024-11-25 ENCOUNTER — ROUTINE PRENATAL (OUTPATIENT)
Dept: OBGYN CLINIC | Facility: CLINIC | Age: 34
End: 2024-11-25
Payer: COMMERCIAL

## 2024-11-25 VITALS — BODY MASS INDEX: 43.86 KG/M2 | SYSTOLIC BLOOD PRESSURE: 108 MMHG | WEIGHT: 239.8 LBS | DIASTOLIC BLOOD PRESSURE: 74 MMHG

## 2024-11-25 DIAGNOSIS — Z23 INFLUENZA VACCINE NEEDED: ICD-10-CM

## 2024-11-25 DIAGNOSIS — O24.410 DIET CONTROLLED GESTATIONAL DIABETES MELLITUS (GDM) IN SECOND TRIMESTER: ICD-10-CM

## 2024-11-25 DIAGNOSIS — M25.551 BILATERAL HIP PAIN: ICD-10-CM

## 2024-11-25 DIAGNOSIS — Z34.92 PRENATAL CARE, SECOND TRIMESTER: ICD-10-CM

## 2024-11-25 DIAGNOSIS — Z3A.23 23 WEEKS GESTATION OF PREGNANCY: Primary | ICD-10-CM

## 2024-11-25 DIAGNOSIS — M25.552 BILATERAL HIP PAIN: ICD-10-CM

## 2024-11-25 PROCEDURE — 90656 IIV3 VACC NO PRSV 0.5 ML IM: CPT | Performed by: STUDENT IN AN ORGANIZED HEALTH CARE EDUCATION/TRAINING PROGRAM

## 2024-11-25 PROCEDURE — PNV: Performed by: STUDENT IN AN ORGANIZED HEALTH CARE EDUCATION/TRAINING PROGRAM

## 2024-11-25 PROCEDURE — 90471 IMMUNIZATION ADMIN: CPT | Performed by: STUDENT IN AN ORGANIZED HEALTH CARE EDUCATION/TRAINING PROGRAM

## 2024-11-26 ENCOUNTER — TREATMENT (OUTPATIENT)
Facility: HOSPITAL | Age: 34
End: 2024-11-26

## 2024-11-26 ENCOUNTER — TELEPHONE (OUTPATIENT)
Facility: HOSPITAL | Age: 34
End: 2024-11-26

## 2024-11-26 DIAGNOSIS — O24.414 INSULIN CONTROLLED GESTATIONAL DIABETES MELLITUS (GDM) IN SECOND TRIMESTER: Primary | ICD-10-CM

## 2024-11-26 DIAGNOSIS — Z3A.23 23 WEEKS GESTATION OF PREGNANCY: ICD-10-CM

## 2024-11-26 NOTE — PROGRESS NOTES
"  Date: 11/26/24  Karena Garcia  1990  Estimated Date of Delivery: 3/19/25  GA: 23w6d  OB/GYN: [facility]    Diagnosis:  Insulin controlled GDM    DEXCOM REPORT: See report attached (11/20/24-11/26/24)    Average Glucose: 126mg/dL; 53% Time In Range, 47% High, 0% Very High, 0% Low, 0% Very Low  Pattern of Nighttime Highs (10:00pm-11:55pm)  Pattern of Daytime Highs: 7:30pm-9pm    Blood Sugar Logs Submitted via: Glucose Flowsheet        Assessment and Plan: Plan approved per Daria Hawk  Due to elevated fasting blood sugars,  Increase Semglee from 32 units to 38 units once daily at bedtime (9-10pm)    Due to after dinner blood sugars elevated >120,   Increase Humalog from 20 to 24 units at Dinner. Inject 15 minutes before the start of Dinner. Hold if not eating.   Continue Humalog 10-4 units with Breakfast - Lunch (Meals 1-2)  Follow 15-15 rule to treat any hypoglycemia        Continue:  1800 calorie (CHO: 45-15-30-15-30-15) (PRO:2-1-4-1-4-2/3) meal plan consisting of 3 meals and 3 snacks daily including protein at each  Avoid fasting for > 10 hours overnight  SMBG 4 x per day (Fasting, 1 hour after start of each meal) with a One-Touch Verio glucose meter  Walking daily    Lab Work:   Lab Results   Component Value Date    HGBA1C 6.2 (H) 09/17/2024      Findings: NML Growth/SKY - 11/12/24  Further Fetal Surveillance: Yes, NST twice weekly and SKY once weekly starting at 32wk GA d/t insulin controlled GDM  Next US date: Scheduled Appropriately - Fetal Echo 12/5/24, Growth Ultrasound 12/8/24    Diabetes Self Management Support Plan outside of ongoing care: Spouse/Family   Patient Stated Goal: \"I will check my blood sugar 4 times each day, as directed by diabetes and pregnancy team\"   Goal Assessment: On track    Date to report next: Weekly    Chely Pryor RD, MPH  Diabetes Educator  [facility]  Diabetes and Pregnancy Program    "

## 2024-11-26 NOTE — TELEPHONE ENCOUNTER
No Show Documentation  Diabetes and Pregnancy Program    Karena Garcia was a no show today (11/26/24) for Follow Up.    Called pt. No Answer. Left VM. Sent [email or web address] Message. Pt was instructed to reach out to call [phone] to reschedule.    Chely Pryor, MPH, RDN, LDN, [facility]  Diabetes Educator   [facility]  Diabetes and Pregnancy Program

## 2024-11-29 ENCOUNTER — TELEPHONE (OUTPATIENT)
Facility: HOSPITAL | Age: 34
End: 2024-11-29

## 2024-11-29 DIAGNOSIS — Z3A.24 24 WEEKS GESTATION OF PREGNANCY: ICD-10-CM

## 2024-11-29 DIAGNOSIS — O24.414 INSULIN CONTROLLED GESTATIONAL DIABETES MELLITUS (GDM) IN SECOND TRIMESTER: Primary | ICD-10-CM

## 2024-11-29 RX ORDER — ACYCLOVIR 400 MG/1
1 TABLET ORAL
Qty: 3 EACH | Refills: 4 | Status: SHIPPED | OUTPATIENT
Start: 2024-11-29

## 2024-11-29 NOTE — TELEPHONE ENCOUNTER
Prior Authorization Request: Dexcom G7 sensor    Noted prior authorization message received when ordering Dexcom as seen below. Request routed to prior authorization tem.

## 2024-12-02 DIAGNOSIS — Z3A.16 16 WEEKS GESTATION OF PREGNANCY: ICD-10-CM

## 2024-12-02 DIAGNOSIS — Z3A.19 19 WEEKS GESTATION OF PREGNANCY: ICD-10-CM

## 2024-12-02 DIAGNOSIS — O24.414 INSULIN CONTROLLED GESTATIONAL DIABETES MELLITUS (GDM) IN SECOND TRIMESTER: ICD-10-CM

## 2024-12-02 NOTE — TELEPHONE ENCOUNTER
Pharm benefit #- [phone]    Quin with [employer] stated sensors went through with not required 3 per 30 days. Paid claim

## 2024-12-04 ENCOUNTER — TELEPHONE (OUTPATIENT)
Facility: HOSPITAL | Age: 34
End: 2024-12-04

## 2024-12-04 RX ORDER — INSULIN LISPRO 100 [IU]/ML
INJECTION, SOLUTION INTRAVENOUS; SUBCUTANEOUS
Qty: 15 ML | Refills: 0 | Status: SHIPPED | OUTPATIENT
Start: 2024-12-04

## 2024-12-04 RX ORDER — INSULIN GLARGINE-YFGN 100 [IU]/ML
38 INJECTION, SOLUTION SUBCUTANEOUS
Qty: 15 ML | Refills: 0 | Status: SHIPPED | OUTPATIENT
Start: 2024-12-04 | End: 2025-02-05

## 2024-12-04 NOTE — TELEPHONE ENCOUNTER
Unable to reach patient by phone, left voicemail explaining our [facility] office has had a slight change in scheduling. We rescheduled tomorrow's fetal echo to 9:15 a.m. at our [facility] located at [facility] [facility] [place], [place] . If this is not convenient, please contact our office at [phone].

## 2024-12-05 ENCOUNTER — ROUTINE PRENATAL (OUTPATIENT)
Facility: HOSPITAL | Age: 34
End: 2024-12-05
Payer: COMMERCIAL

## 2024-12-05 VITALS
HEART RATE: 92 BPM | BODY MASS INDEX: 44.67 KG/M2 | SYSTOLIC BLOOD PRESSURE: 128 MMHG | HEIGHT: 62 IN | WEIGHT: 242.73 LBS | DIASTOLIC BLOOD PRESSURE: 80 MMHG

## 2024-12-05 DIAGNOSIS — O24.414 INSULIN CONTROLLED GESTATIONAL DIABETES MELLITUS (GDM) IN SECOND TRIMESTER: ICD-10-CM

## 2024-12-05 DIAGNOSIS — Z3A.25 25 WEEKS GESTATION OF PREGNANCY: Primary | ICD-10-CM

## 2024-12-05 PROCEDURE — 93325 DOPPLER ECHO COLOR FLOW MAPG: CPT | Performed by: OBSTETRICS & GYNECOLOGY

## 2024-12-05 PROCEDURE — 76827 ECHO EXAM OF FETAL HEART: CPT | Performed by: OBSTETRICS & GYNECOLOGY

## 2024-12-05 PROCEDURE — 76825 ECHO EXAM OF FETAL HEART: CPT | Performed by: OBSTETRICS & GYNECOLOGY

## 2024-12-05 PROCEDURE — 99213 OFFICE O/P EST LOW 20 MIN: CPT | Performed by: OBSTETRICS & GYNECOLOGY

## 2024-12-05 NOTE — PROGRESS NOTES
The patient was seen today for an ultrasound.  Please see ultrasound report (located under Ob Procedures) for additional details.   Thank you very much for allowing us to participate in the care of this very nice patient.  Should you have any questions, please do not hesitate to contact me.     Wilton Castellanos MD FACOG  Attending Physician, Maternal-Fetal Medicine  [facility]

## 2024-12-11 ENCOUNTER — TELEPHONE (OUTPATIENT)
Dept: OBGYN CLINIC | Facility: CLINIC | Age: 34
End: 2024-12-11

## 2024-12-11 NOTE — TELEPHONE ENCOUNTER
Left message for pt to call back to reschedule one of her OB appts.  Pt can be connected to office for rescheduling.

## 2024-12-17 ENCOUNTER — TREATMENT (OUTPATIENT)
Dept: PERINATAL CARE | Facility: CLINIC | Age: 34
End: 2024-12-17

## 2024-12-17 DIAGNOSIS — Z79.4 INSULIN DOSE CHANGED (HCC): ICD-10-CM

## 2024-12-17 DIAGNOSIS — O24.414 INSULIN CONTROLLED GESTATIONAL DIABETES MELLITUS (GDM) IN SECOND TRIMESTER: Primary | ICD-10-CM

## 2024-12-17 DIAGNOSIS — Z3A.26 26 WEEKS GESTATION OF PREGNANCY: ICD-10-CM

## 2024-12-17 NOTE — PROGRESS NOTES
Date: 12/17/24  Karena Garcia  1990  Estimated Date of Delivery: 3/19/25  GA: 26w6d  OB/GYN: [facility]    Diagnosis:  Insulin controlled GDM    CGM (Dexcom G7): See Attached Files for Report; Reporting period: Wed Dec 11, 2024 - Tue Dec 17, 2024    Glucose Details  Average glucose: 126 mg/dL  Standard deviation: 38 mg/dL  -----------------------------  Time in Range  Very High: 22%  High: 25%  In Range: 53%  Low: 0%  Very Low: 0%    Target Range  Day (6:00 AM - 10:00 PM):  mg/dL  Night (10:00 PM - 6:00 AM):  mg/dL  -----------------------------  CGM Details  Sensor usage: 100%  Days with CGM data: 7/7          [employer] Glucose Flowsheet:         ASSESSMENT AND PLAN:    Confirm if testing 1hr or 2hrs after the start of meal. Be consistent with testing after meal blood sugars.    Due to fasting blood sugars <90,  Continue Semglee 48 units once daily at bedtime (9-10pm)    Due to elevated after dinner blood sugars,  Increase Humalog from 18-10-32 to 18-10-36 units before meals 1-2-3 (B/L/D). Inject 15 minutes before the start of meal. Hold if not eating.   Use the 15-15 rule to treat a low blood sugar <60 or <80 with symptoms. Call [phone] to notify the diabetes team if you experience a blood sugar <60.  Send a message to Daria Hawk on Friday (12/20) if after meal blood sugars remain >120 to notify the diabetes team to review to determine if an additional insulin adjustment is needed.    Continue:  1800 calorie (CHO: 45-15-30-15-30-15) (PRO:2-1-4-1-4-2/3) meal plan consisting of 3 meals and 3 snacks daily including protein at each  Avoid fasting for > 10 hours overnight  SMBG 4 x per day (Fasting, 1 hour after start of each meal) with a One-Touch Verio glucose meter  Walking daily    Lab Work:   Lab Results   Component Value Date    HGBA1C 6.2 (H) 09/17/2024      Findings: NML Growth/SKY - 11/12/24  Further Fetal Surveillance: Yes, NST twice weekly and SKY once weekly starting at 32wk GA  "d/t insulin controlled GDM  Next US date: Scheduled Appropriately - Fetal Echo 12/5/24, Growth Ultrasound 12/18/24    Diabetes Self Management Support Plan outside of ongoing care: Spouse/Family   Patient Stated Goal: \"I will check my blood sugar 4 times each day, as directed by diabetes and pregnancy team\"   Goal Assessment: On track    Date to report next: Weekly  Jeanie Yoder RD  Diabetes Educator  [facility] Maternal Fetal Medicine  Diabetes and Pregnancy Program    "

## 2024-12-17 NOTE — PATIENT INSTRUCTIONS
Confirm if testing 1hr or 2hrs after the start of meal. Be consistent with testing after meal blood sugars.    Due to fasting blood sugars <90,  Continue Semglee 48 units once daily at bedtime (9-10pm)    Due to elevated after dinner blood sugars,  Increase Humalog from 18-10-32 to 18-10-36 units before meals 1-2-3 (B/L/D). Inject 15 minutes before the start of meal. Hold if not eating.   Use the 15-15 rule to treat a low blood sugar <60 or <80 with symptoms. Call [phone] to notify the diabetes team if you experience a blood sugar <60.  Send a message to Daria Hawk on Friday (12/20) if after meal blood sugars remain >120 to notify the diabetes team to review to determine if an additional insulin adjustment is needed.    Continue:  1800 calorie (CHO: 45-15-30-15-30-15) (PRO:2-1-4-1-4-2/3) meal plan consisting of 3 meals and 3 snacks daily including protein at each  Avoid fasting for > 10 hours overnight  SMBG 4 x per day (Fasting, 1 hour after start of each meal) with a One-Touch Verio glucose meter  Walking daily

## 2024-12-18 ENCOUNTER — ULTRASOUND (OUTPATIENT)
Facility: HOSPITAL | Age: 34
End: 2024-12-18
Payer: COMMERCIAL

## 2024-12-18 VITALS
HEART RATE: 128 BPM | WEIGHT: 244.49 LBS | HEIGHT: 62 IN | DIASTOLIC BLOOD PRESSURE: 80 MMHG | BODY MASS INDEX: 44.99 KG/M2 | SYSTOLIC BLOOD PRESSURE: 120 MMHG

## 2024-12-18 DIAGNOSIS — E66.813 CLASS 3 SEVERE OBESITY DUE TO EXCESS CALORIES WITH SERIOUS COMORBIDITY AND BODY MASS INDEX (BMI) OF 40.0 TO 44.9 IN ADULT (HCC): ICD-10-CM

## 2024-12-18 DIAGNOSIS — O24.414 INSULIN CONTROLLED GESTATIONAL DIABETES MELLITUS (GDM) IN SECOND TRIMESTER: ICD-10-CM

## 2024-12-18 DIAGNOSIS — Z3A.27 27 WEEKS GESTATION OF PREGNANCY: Primary | ICD-10-CM

## 2024-12-18 DIAGNOSIS — O28.0 ABNORMAL MSAFP (MATERNAL SERUM ALPHA-FETOPROTEIN), ELEVATED: ICD-10-CM

## 2024-12-18 DIAGNOSIS — E66.01 CLASS 3 SEVERE OBESITY DUE TO EXCESS CALORIES WITH SERIOUS COMORBIDITY AND BODY MASS INDEX (BMI) OF 40.0 TO 44.9 IN ADULT (HCC): ICD-10-CM

## 2024-12-18 PROCEDURE — 76816 OB US FOLLOW-UP PER FETUS: CPT | Performed by: OBSTETRICS & GYNECOLOGY

## 2024-12-18 PROCEDURE — 99213 OFFICE O/P EST LOW 20 MIN: CPT | Performed by: OBSTETRICS & GYNECOLOGY

## 2024-12-18 NOTE — PROGRESS NOTES
"[facility]: Karena Garcia was seen today for fetal growth assessment ultrasound.  See ultrasound report under \"OB Procedures\" tab.   The time spent on this established patient on the encounter date included 5 minutes previsit service time reviewing records and precharting, 10 minutes face-to-face service time counseling regarding results and coordinating care, and  5 minutes charting, totalling 20 minutes.  Please don't hesitate to contact our office with any concerns or questions.  -Morena Dickson MD    "

## 2024-12-19 NOTE — PROGRESS NOTES
OB/GYN  PN Visit  Karena Garcia  [id number]  2024  3:05 PM  Freda Soliman PA-C    S: 34 y.o.  27w1d here for PN visit. Pregnancy complicated by GDM.     OB complaints:  Denies c/o n/v, no edema, no smoking, no DV.   No vb/lof  No cramping/ctxns or signs of PTL.    She reports that she is feeling well.  She does note continued migraines 2-3 times per week. She has a h/o migraines and these sx are common. We reviewed OTC meds to trial including tylenol, caffeine and Benadryl. Can also utilize Mg and Riboflavin to help limit recurrences of sx.   We reviewed s/x preE to call with.   Pt declines any preE sx.     O:    Pre-Myra Vitals    Flowsheet Row Most Recent Value   Prenatal Assessment    Fetal Heart Rate 160   Fundal Height (cm) 29 cm   Movement Present   Prenatal Vitals    Blood Pressure 112/74   Weight - Scale 111 kg (245 lb)   Urine Albumin/Glucose    Dilation/Effacement/Station    Vaginal Drainage    Draining Fluid No   Edema    LLE Edema Trace   RLE Edema Trace   Facial Edema None            Gen: no acute distress, nonlabored breathing.  OB exam completed: fundal height, +FHT.  Urine: -/-    A/P:    1. 27 weeks gestation of pregnancy  Assessment & Plan:  Overview:     Labs UTD,3rd trimester labs ordered. Pt w GDM  Pap smear: 09/10/2024   WNL + non 16/18 HPV   GC/CT:  negative   Prenatal Panel: normal   Blood Type: A+ RhoGam  indicated   GBS: plan at 36 weeks    Genetic Testing: AFP +, NIPS normal     Vaccines:  Tdap: will plan at next visit  Flu: 24     Birth Plan:  + epidural  Yellow packet given and consents to be signed at 30 weeks.   Feeding Plan: breast  Breast pump: ordered today  Pediatrician: will review packet at next visit  Contraception: undecided  Ultrasounds: 24 normal fetal growth and anatomy, f/u growth in 5 weeks, twice weekly APFS at 32 weeks due to GDM    2. Class 3 severe obesity due to excess calories with serious comorbidity and body mass index (BMI) of  40.0 to 44.9 in adult (HCC)  3. BMI 40.0-44.9, adult (HCC)  4. Abnormal MSAFP (maternal serum alpha-fetoprotein), elevated  5. Insulin controlled gestational diabetes mellitus (GDM) in second trimester                RTC in 2 weeks    Freda Soliman PA-C  12/23/2024  3:05 PM

## 2024-12-23 ENCOUNTER — ROUTINE PRENATAL (OUTPATIENT)
Dept: OBGYN CLINIC | Facility: CLINIC | Age: 34
End: 2024-12-23

## 2024-12-23 VITALS
BODY MASS INDEX: 45.08 KG/M2 | WEIGHT: 245 LBS | DIASTOLIC BLOOD PRESSURE: 74 MMHG | HEIGHT: 62 IN | SYSTOLIC BLOOD PRESSURE: 112 MMHG

## 2024-12-23 DIAGNOSIS — Z3A.27 27 WEEKS GESTATION OF PREGNANCY: Primary | ICD-10-CM

## 2024-12-23 DIAGNOSIS — E66.813 CLASS 3 SEVERE OBESITY DUE TO EXCESS CALORIES WITH SERIOUS COMORBIDITY AND BODY MASS INDEX (BMI) OF 40.0 TO 44.9 IN ADULT (HCC): ICD-10-CM

## 2024-12-23 DIAGNOSIS — O24.414 INSULIN CONTROLLED GESTATIONAL DIABETES MELLITUS (GDM) IN SECOND TRIMESTER: ICD-10-CM

## 2024-12-23 DIAGNOSIS — O28.0 ABNORMAL MSAFP (MATERNAL SERUM ALPHA-FETOPROTEIN), ELEVATED: ICD-10-CM

## 2024-12-23 DIAGNOSIS — E66.01 CLASS 3 SEVERE OBESITY DUE TO EXCESS CALORIES WITH SERIOUS COMORBIDITY AND BODY MASS INDEX (BMI) OF 40.0 TO 44.9 IN ADULT (HCC): ICD-10-CM

## 2024-12-23 PROCEDURE — PNV: Performed by: PHYSICIAN ASSISTANT

## 2024-12-23 NOTE — ASSESSMENT & PLAN NOTE
Overview:     Labs UTD,3rd trimester labs ordered. Pt w GDM  Pap smear: 09/10/2024   WNL + non 16/18 HPV   GC/CT:  negative   Prenatal Panel: normal   Blood Type: A+ RhoGam  indicated   GBS: plan at 36 weeks    Genetic Testing: AFP +, NIPS normal     Vaccines:  Tdap: will plan at next visit  Flu: 24     Birth Plan:  + epidural  Yellow packet given and consents to be signed at 30 weeks.   Feeding Plan: breast  Breast pump: ordered today  Pediatrician: will review packet at next visit  Contraception: undecided  Ultrasounds: 24 normal fetal growth and anatomy, f/u growth in 5 weeks, twice weekly APFS at 32 weeks due to GDM

## 2024-12-24 LAB
DME PARACHUTE DELIVERY DATE REQUESTED: NORMAL
DME PARACHUTE ITEM DESCRIPTION: NORMAL
DME PARACHUTE ORDER STATUS: NORMAL
DME PARACHUTE SUPPLIER NAME: NORMAL
DME PARACHUTE SUPPLIER PHONE: NORMAL

## 2024-12-31 ENCOUNTER — APPOINTMENT (OUTPATIENT)
Dept: LAB | Facility: HOSPITAL | Age: 34
End: 2024-12-31
Attending: STUDENT IN AN ORGANIZED HEALTH CARE EDUCATION/TRAINING PROGRAM
Payer: COMMERCIAL

## 2024-12-31 ENCOUNTER — RESULTS FOLLOW-UP (OUTPATIENT)
Dept: LABOR AND DELIVERY | Facility: HOSPITAL | Age: 34
End: 2024-12-31

## 2024-12-31 DIAGNOSIS — Z34.92 PRENATAL CARE, SECOND TRIMESTER: ICD-10-CM

## 2024-12-31 DIAGNOSIS — Z3A.23 23 WEEKS GESTATION OF PREGNANCY: ICD-10-CM

## 2024-12-31 DIAGNOSIS — O24.410 DIET CONTROLLED GESTATIONAL DIABETES MELLITUS (GDM) IN SECOND TRIMESTER: ICD-10-CM

## 2024-12-31 LAB
ERYTHROCYTE [DISTWIDTH] IN BLOOD BY AUTOMATED COUNT: 12.7 % (ref 11.6–15.1)
EST. AVERAGE GLUCOSE BLD GHB EST-MCNC: 131 MG/DL
HBA1C MFR BLD: 6.2 %
HCT VFR BLD AUTO: 36.2 % (ref 34.8–46.1)
HGB BLD-MCNC: 12.1 G/DL (ref 11.5–15.4)
MCH RBC QN AUTO: 30.2 PG (ref 26.8–34.3)
MCHC RBC AUTO-ENTMCNC: 33.4 G/DL (ref 31.4–37.4)
MCV RBC AUTO: 90 FL (ref 82–98)
PLATELET # BLD AUTO: 380 THOUSANDS/UL (ref 149–390)
PMV BLD AUTO: 9.8 FL (ref 8.9–12.7)
RBC # BLD AUTO: 4.01 MILLION/UL (ref 3.81–5.12)
TREPONEMA PALLIDUM IGG+IGM AB [PRESENCE] IN SERUM OR PLASMA BY IMMUNOASSAY: NORMAL
WBC # BLD AUTO: 11.82 THOUSAND/UL (ref 4.31–10.16)

## 2024-12-31 PROCEDURE — 36415 COLL VENOUS BLD VENIPUNCTURE: CPT

## 2024-12-31 PROCEDURE — 85027 COMPLETE CBC AUTOMATED: CPT

## 2024-12-31 PROCEDURE — 86780 TREPONEMA PALLIDUM: CPT

## 2024-12-31 PROCEDURE — 83036 HEMOGLOBIN GLYCOSYLATED A1C: CPT

## 2025-01-01 PROBLEM — Z3A.29 29 WEEKS GESTATION OF PREGNANCY: Status: ACTIVE | Noted: 2024-11-21

## 2025-01-01 NOTE — PROGRESS NOTES
OB/GYN  PN Visit  Karena Garcia  [id number]  2025  8:43 AM  Freda Soliman PA-C    S: 34 y.o.  29w0d here for PN visit. Pregnancy complicated by GDM, recurrent kidney stones.     OB complaints:  Denies c/o n/v/ha, no edema, no smoking, no DV.   No vb/lof  No cramping/ctxns or signs of PTL.    She reports that overall she is feeling well. Notes fatigue.   She notes her BG was harder to control over the holidays but she is staying in close touch with the diabetic team at [facility].     O:    Pre- Vitals    Flowsheet Row Most Recent Value   Prenatal Assessment    Fetal Heart Rate 147   Fundal Height (cm) 31 cm   Movement Present   Prenatal Vitals    Blood Pressure 112/74   Weight - Scale 112 kg (247 lb)   Urine Albumin/Glucose    Dilation/Effacement/Station    Vaginal Drainage    Draining Fluid No   Edema    LLE Edema None   RLE Edema None   Facial Edema None            Gen: no acute distress, nonlabored breathing.  OB exam completed: fundal height, +FHT.  Urine: -/-    A/P:    1. 29 weeks gestation of pregnancy  Assessment & Plan:    Labs UTD Pt w GDM  Pap smear: 09/10/2024   WNL + non 16/18 HPV   GC/CT:  negative   Prenatal Panel: normal   Blood Type: A+ RhoGam  indicated   GBS: plan at 36 weeks     Genetic Testing: AFP +, NIPS normal      Vaccines:  Tdap: will plan today   Flu: 24      Birth Plan:  + epidural  Yellow packet given and consents signed today. Reviewed establishing w peds office after delivery. Reviewed perineal massage as well.   Feeding Plan: breast  Breast pump: ordered previously  Pediatrician: will establish  Contraception: undecided  Ultrasounds: 24 normal fetal growth and anatomy, f/u growth in 5 weeks, twice weekly APFS at 32 weeks due to GDM  2. Class 3 severe obesity due to excess calories with serious comorbidity and body mass index (BMI) of 40.0 to 44.9 in adult ([facility])  3. BMI 40.0-44.9, adult ([facility])  4. Abnormal MSAFP (maternal serum alpha-fetoprotein),  elevated  5. Encounter for immunization  -     Tdap Vaccine greater than or equal to 6yo             RTC in 2 weeks    Freda Soliman PA-C  1/6/2025  8:43 AM

## 2025-01-03 ENCOUNTER — TELEPHONE (OUTPATIENT)
Facility: HOSPITAL | Age: 35
End: 2025-01-03

## 2025-01-03 ENCOUNTER — TREATMENT (OUTPATIENT)
Facility: HOSPITAL | Age: 35
End: 2025-01-03

## 2025-01-03 DIAGNOSIS — Z3A.29 29 WEEKS GESTATION OF PREGNANCY: ICD-10-CM

## 2025-01-03 DIAGNOSIS — O24.414 INSULIN CONTROLLED GESTATIONAL DIABETES MELLITUS (GDM) IN THIRD TRIMESTER: Primary | ICD-10-CM

## 2025-01-03 NOTE — PROGRESS NOTES
Date: 01/03/25  Karena Garcia  1990  Estimated Date of Delivery: 3/19/25  GA: 26w6d  OB/GYN: [facility]    Diagnosis:  Insulin controlled GDM    CGM (Dexcom G7): See Attached Files for Report; Reporting period: Sat Dec 28, 2024 - Fri Kip 3, 2025    Glucose Details  Average glucose: 145 mg/dL  Standard deviation: 47 mg/dL  -----------------------------  Time in Range  Very High: 34%  High: 23%  In Range: 43%  Low: 0%  Very Low: <1%    Target Range  Day (6:00 AM - 10:00 PM):  mg/dL  Night (10:00 PM - 6:00 AM):  mg/dL  -----------------------------  CGM Details  Sensor usage: 100%  Days with CGM data: 7/7      Noted pattern of significant highs between 8:05PM-1:45AM  Noted pattern of significant highs between 12:35PM - 2:05PM    [facility] Glucose Flowsheet (only 1/3/25 available, previously reported 12/15/24):         ASSESSMENT AND PLAN:    Due to inconsistently elevated fasting blood sugar readings (>90mg/dL,  Continue Semglee 48 units once daily at bedtime (9-10pm)  Instructed patient to record fasting blood sugars and report to Diabetes team    Due to elevated after lunch/dinner blood sugars,  Increase Humalog from 18-10-36 to 18-12-38 units before meals 1-2-3 (B/L/D). Inject 15 minutes before the start of meal. Hold if not eating.   Use the 15-15 rule to treat a low blood sugar <60 or <80 with symptoms. Call [phone] to notify the diabetes team if you experience a blood sugar <60.  Send a message to Daria Hawk on Monday (1/6) if after meal blood sugars remain >120 to notify the diabetes team to review to determine if an additional insulin adjustment is needed.  Patient confirms she is testing 2 hours after start of meals    Continue:  1800 calorie (CHO: 45-15-30-15-30-15) (PRO:2-1-4-1-4-2/3) meal plan consisting of 3 meals and 3 snacks daily including protein at each  Avoid fasting for > 10 hours overnight  SMBG 4 x per day (Fasting, 2 hour after start of each meal) with a One-Touch Verio  "glucose meter  Walking daily    Lab Work:   Lab Results   Component Value Date    HGBA1C 6.2 (H) 12/31/2024      Findings: NML Growth/SKY - 12/18/24  Further Fetal Surveillance: Yes, NST twice weekly and SKY once weekly starting at 32wk GA d/t insulin controlled GDM  Next US date: Scheduled Appropriately -    Diabetes Self Management Support Plan outside of ongoing care: Spouse/Family   Patient Stated Goal: \"I will check my blood sugar 4 times each day, as directed by diabetes and pregnancy team\"   Goal Assessment: On track    Date to report next: Weekly  Chely Pryor RD  Diabetes Educator  [facility] Maternal Fetal Medicine  Diabetes and Pregnancy Program    "

## 2025-01-03 NOTE — TELEPHONE ENCOUNTER
Reason for Call: Gestational Diabetes (29w2d/) and Medication Management    Outcome: Called patient, patient answered. Patient reports testing fasting blood sugars daily (log/meter not available currently) but reports fasting readings have been 87-94mg/dL. Patient reports taking Semglee 48 units, Lispro 18-10-32 (Noted last adjustment recommended by Diabetes team on 12/17/24 to increase to 36 units with dinner); Patient states that she has been experiencing hyperglycemia after dinners and self-adjusting Humalog dinner dose from 36-38 units. Instructed patient to test blood sugars 4 times daily and document insulin dose and fingersticks on Dexcom or Glucose Flowsheet for Diabetes team to review.      Noted episode of hypoglycemia on 12/31/24 per Dexcom report; patient reports confirming alert with a fingerstick (47) and treated using 15-15 rule. Patient reports feeling unwell in the evening on 12/30/24 and ate lighter dinner/skipped bedtime snack. Educator reinforced holding bolus insulin if not eating, 15-15 rule to treat any hypoglycemia.         Chely Pryor RD  Diabetes Educator   [facility]  Diabetes and Pregnancy Program

## 2025-01-06 ENCOUNTER — ROUTINE PRENATAL (OUTPATIENT)
Dept: OBGYN CLINIC | Facility: CLINIC | Age: 35
End: 2025-01-06
Payer: COMMERCIAL

## 2025-01-06 ENCOUNTER — TREATMENT (OUTPATIENT)
Facility: HOSPITAL | Age: 35
End: 2025-01-06

## 2025-01-06 VITALS
BODY MASS INDEX: 45.45 KG/M2 | WEIGHT: 247 LBS | HEIGHT: 62 IN | SYSTOLIC BLOOD PRESSURE: 112 MMHG | DIASTOLIC BLOOD PRESSURE: 74 MMHG

## 2025-01-06 DIAGNOSIS — Z3A.29 29 WEEKS GESTATION OF PREGNANCY: ICD-10-CM

## 2025-01-06 DIAGNOSIS — E66.01 CLASS 3 SEVERE OBESITY DUE TO EXCESS CALORIES WITH SERIOUS COMORBIDITY AND BODY MASS INDEX (BMI) OF 40.0 TO 44.9 IN ADULT (HCC): ICD-10-CM

## 2025-01-06 DIAGNOSIS — E66.813 CLASS 3 SEVERE OBESITY DUE TO EXCESS CALORIES WITH SERIOUS COMORBIDITY AND BODY MASS INDEX (BMI) OF 40.0 TO 44.9 IN ADULT (HCC): ICD-10-CM

## 2025-01-06 DIAGNOSIS — Z3A.19 19 WEEKS GESTATION OF PREGNANCY: ICD-10-CM

## 2025-01-06 DIAGNOSIS — O24.414 INSULIN CONTROLLED GESTATIONAL DIABETES MELLITUS (GDM) IN THIRD TRIMESTER: Primary | ICD-10-CM

## 2025-01-06 DIAGNOSIS — O28.0 ABNORMAL MSAFP (MATERNAL SERUM ALPHA-FETOPROTEIN), ELEVATED: ICD-10-CM

## 2025-01-06 DIAGNOSIS — Z23 ENCOUNTER FOR IMMUNIZATION: ICD-10-CM

## 2025-01-06 DIAGNOSIS — Z3A.29 29 WEEKS GESTATION OF PREGNANCY: Primary | ICD-10-CM

## 2025-01-06 DIAGNOSIS — O24.414 INSULIN CONTROLLED GESTATIONAL DIABETES MELLITUS (GDM) IN SECOND TRIMESTER: ICD-10-CM

## 2025-01-06 PROCEDURE — PNV: Performed by: PHYSICIAN ASSISTANT

## 2025-01-06 PROCEDURE — 90471 IMMUNIZATION ADMIN: CPT | Performed by: PHYSICIAN ASSISTANT

## 2025-01-06 PROCEDURE — 90715 TDAP VACCINE 7 YRS/> IM: CPT | Performed by: PHYSICIAN ASSISTANT

## 2025-01-06 NOTE — ASSESSMENT & PLAN NOTE
Labs UTD Pt w GDM  Pap smear: 09/10/2024   WNL + non 16/18 HPV   GC/CT:  negative   Prenatal Panel: normal   Blood Type: A+ RhoGam  indicated   GBS: plan at 36 weeks     Genetic Testing: AFP +, NIPS normal      Vaccines:  Tdap: will plan today   Flu: 24      Birth Plan:  + epidural  Yellow packet given and consents signed today. Reviewed establishing w peds office after delivery. Reviewed perineal massage as well.   Feeding Plan: breast  Breast pump: ordered previously  Pediatrician: will establish  Contraception: undecided  Ultrasounds: 24 normal fetal growth and anatomy, f/u growth in 5 weeks, twice weekly APFS at 32 weeks due to GDM

## 2025-01-06 NOTE — PROGRESS NOTES
Date: 01/06/25  Karena Garcia  1990  Estimated Date of Delivery: 3/19/25  GA: 29w5d  OB/GYN: [facility]    Diagnosis:  Insulin controlled GDM    CGM (Dexcom G7): See Attached Files for Report; Reporting period: Sat Dec 31, 2024 - Fri Jan 6, 2025    Glucose Details  Average glucose: 152 mg/dL  Standard deviation: 52 mg/dL  -----------------------------  Time in Range  Very High: 36%  High: 25%  In Range: 39%  Low: 0%  Very Low: <1%    Target Range  Day (6:00 AM - 10:00 PM):  mg/dL  Night (10:00 PM - 6:00 AM):  mg/dL  -----------------------------  CGM Details  Sensor usage: 100%  Days with CGM data: 7/7      Noted pattern of significant highs between 6:40PM-2:10AM  Noted pattern of significant highs between 12:50PM - 2:10PM  Noted fingersticks reported on Dexcom report: 90 mg/dL (Fasting, 1/6/25)); Post Meal 2: 138 mg/dL (Testing 2 hours from start of meal)    [employer] Glucose Flowsheet: Fingersticks last reported 1/3/24: Fasting 94     ASSESSMENT AND PLAN:    Due to elevated fasting blood sugar readings (>90mg/dL),  Increase Semglee from 48 to 56 units daily at bedtime (9-10pm);   Instructed patient to record fasting and 2 hour post meal blood sugars and report to Diabetes team    Due to elevated after lunch/dinner blood sugars,  Increase Humalog from 18-10-36 to 18-12-38 units before meals 1-2-3 (B/L/D). Instructions sent via [employer] message on 1/3/25, noted not read.  Inject 15 minutes before the start of meal. Hold if not eating.   Use the 15-15 rule to treat a low blood sugar <60 or <80 with symptoms. Call [phone] to notify the diabetes team if you experience a blood sugar <60.  Record fasting and 2 hour post meal blood sugars, current insulin regimen on Dexcom edward. Send a message to Diabetes team on Thursday 1/9/25 if fasting or post meal blood sugars remain above target.    Continue:  1800 calorie (CHO: 45-15-30-15-30-15) (PRO:2-1-4-1-4-2/3) meal plan consisting of 3 meals and 3  "snacks daily including protein at each  Avoid fasting for > 10 hours overnight  SMBG 4 x per day (Fasting, 2 hour after start of each meal) with a One-Touch Verio glucose meter  Walking daily    Lab Work:   Lab Results   Component Value Date    HGBA1C 6.2 (H) 12/31/2024      Findings: NML Growth/SKY - 12/18/24  Further Fetal Surveillance: Yes, NST twice weekly and SKY once weekly starting at 32wk GA d/t insulin controlled GDM  Next US date: Scheduled Appropriately -    Diabetes Self Management Support Plan outside of ongoing care: Spouse/Family   Patient Stated Goal: \"I will check my blood sugar 4 times each day, as directed by diabetes and pregnancy team\"   Goal Assessment: On track    Date to report next: Weekly  Chely Pryor RD  Diabetes Educator  [facility] Maternal Fetal Medicine  Diabetes and Pregnancy Program    "

## 2025-01-07 RX ORDER — INSULIN GLARGINE-YFGN 100 [IU]/ML
INJECTION, SOLUTION SUBCUTANEOUS
Qty: 15 ML | Refills: 0 | Status: SHIPPED | OUTPATIENT
Start: 2025-01-07

## 2025-01-08 DIAGNOSIS — Z3A.24 24 WEEKS GESTATION OF PREGNANCY: ICD-10-CM

## 2025-01-08 DIAGNOSIS — O24.414 INSULIN CONTROLLED GESTATIONAL DIABETES MELLITUS (GDM) IN SECOND TRIMESTER: ICD-10-CM

## 2025-01-09 RX ORDER — ACYCLOVIR 400 MG/1
1 TABLET ORAL
Qty: 3 EACH | Refills: 5 | Status: SHIPPED | OUTPATIENT
Start: 2025-01-09

## 2025-01-13 LAB
DME PARACHUTE DELIVERY DATE ACTUAL: NORMAL
DME PARACHUTE DELIVERY DATE REQUESTED: NORMAL
DME PARACHUTE ITEM DESCRIPTION: NORMAL
DME PARACHUTE ORDER STATUS: NORMAL
DME PARACHUTE SUPPLIER NAME: NORMAL
DME PARACHUTE SUPPLIER PHONE: NORMAL

## 2025-01-21 ENCOUNTER — ULTRASOUND (OUTPATIENT)
Facility: HOSPITAL | Age: 35
End: 2025-01-21
Payer: COMMERCIAL

## 2025-01-21 VITALS
WEIGHT: 247.4 LBS | SYSTOLIC BLOOD PRESSURE: 118 MMHG | HEART RATE: 100 BPM | HEIGHT: 62 IN | BODY MASS INDEX: 45.53 KG/M2 | DIASTOLIC BLOOD PRESSURE: 72 MMHG

## 2025-01-21 DIAGNOSIS — O24.414 INSULIN CONTROLLED GESTATIONAL DIABETES MELLITUS (GDM) IN SECOND TRIMESTER: ICD-10-CM

## 2025-01-21 DIAGNOSIS — Z3A.16 16 WEEKS GESTATION OF PREGNANCY: ICD-10-CM

## 2025-01-21 DIAGNOSIS — Z3A.31 31 WEEKS GESTATION OF PREGNANCY: Primary | ICD-10-CM

## 2025-01-21 PROCEDURE — 99214 OFFICE O/P EST MOD 30 MIN: CPT | Performed by: OBSTETRICS & GYNECOLOGY

## 2025-01-21 PROCEDURE — 59025 FETAL NON-STRESS TEST: CPT | Performed by: OBSTETRICS & GYNECOLOGY

## 2025-01-21 PROCEDURE — 76816 OB US FOLLOW-UP PER FETUS: CPT | Performed by: OBSTETRICS & GYNECOLOGY

## 2025-01-21 NOTE — LETTER
NST sleeve cover sheet    Patient name: Karena Garcia  : 1990  MRN: [id number]    CABRERA: Estimated Date of Delivery: 3/19/25    Obstetrician: ***    Reason(s) for testing: GDM, BMI >40, abnormal MSAFP    Testing frequency:    ___  2x/wk  ___  1x/wk  ___  Dopplers  ___  BPP?      Last growth scan: __________, _________, _________    Baby:      Male   /   Female   /   Wideman             Baby Name: ___________________            IOL or  C/S: _____________________

## 2025-01-22 DIAGNOSIS — Z3A.16 16 WEEKS GESTATION OF PREGNANCY: ICD-10-CM

## 2025-01-22 DIAGNOSIS — O24.414 INSULIN CONTROLLED GESTATIONAL DIABETES MELLITUS (GDM) IN SECOND TRIMESTER: ICD-10-CM

## 2025-01-22 RX ORDER — INSULIN LISPRO 100 [IU]/ML
INJECTION, SOLUTION INTRAVENOUS; SUBCUTANEOUS
Qty: 30 ML | Refills: 0 | Status: SHIPPED | OUTPATIENT
Start: 2025-01-22 | End: 2025-01-22

## 2025-01-22 RX ORDER — INSULIN LISPRO 100 [IU]/ML
INJECTION, SOLUTION INTRAVENOUS; SUBCUTANEOUS
Qty: 30 ML | Refills: 0 | Status: SHIPPED | OUTPATIENT
Start: 2025-01-22

## 2025-01-23 ENCOUNTER — ROUTINE PRENATAL (OUTPATIENT)
Facility: HOSPITAL | Age: 35
End: 2025-01-23
Attending: OBSTETRICS & GYNECOLOGY
Payer: COMMERCIAL

## 2025-01-23 VITALS
HEIGHT: 62 IN | HEART RATE: 98 BPM | DIASTOLIC BLOOD PRESSURE: 82 MMHG | WEIGHT: 248.2 LBS | SYSTOLIC BLOOD PRESSURE: 120 MMHG | BODY MASS INDEX: 45.67 KG/M2

## 2025-01-23 DIAGNOSIS — Z3A.32 32 WEEKS GESTATION OF PREGNANCY: Primary | ICD-10-CM

## 2025-01-23 DIAGNOSIS — O24.414 INSULIN CONTROLLED GESTATIONAL DIABETES MELLITUS (GDM) IN SECOND TRIMESTER: ICD-10-CM

## 2025-01-23 PROCEDURE — 59025 FETAL NON-STRESS TEST: CPT | Performed by: OBSTETRICS & GYNECOLOGY

## 2025-01-23 NOTE — LETTER
"   Date: 2025    Hoa Mora MD  [address] [facility] [address]    Patient: Karena Garcia   YOB: 1990   Date of Visit: 2025   Gestational age 32w1d   Nature of this communication: Priority: You are seeing her tomorrow for prenatal visit. She has been inconsistent in reporting blood sugars to our diabetes educators. Could you please encourage her to respond to [employer] messages or update her glucose flowsheet so we can adjust insulin? She was not well controlled in early January when she last uploaded numbers. Thank you.       This patient was seen recently in our  office.  Please see ultrasound report under \"OB Procedures\" tab.  Please don't hesitate to contact our office with any concerns or questions.      Sincerely,      Marj Dietz MD  Attending Physician, Maternal-Fetal Medicine  [facility]    "

## 2025-01-24 ENCOUNTER — ROUTINE PRENATAL (OUTPATIENT)
Dept: OBGYN CLINIC | Facility: CLINIC | Age: 35
End: 2025-01-24

## 2025-01-24 VITALS — WEIGHT: 251 LBS | DIASTOLIC BLOOD PRESSURE: 78 MMHG | BODY MASS INDEX: 45.91 KG/M2 | SYSTOLIC BLOOD PRESSURE: 120 MMHG

## 2025-01-24 DIAGNOSIS — E66.813 CLASS 3 SEVERE OBESITY DUE TO EXCESS CALORIES WITH SERIOUS COMORBIDITY AND BODY MASS INDEX (BMI) OF 40.0 TO 44.9 IN ADULT (HCC): ICD-10-CM

## 2025-01-24 DIAGNOSIS — O28.0 ABNORMAL MSAFP (MATERNAL SERUM ALPHA-FETOPROTEIN), ELEVATED: ICD-10-CM

## 2025-01-24 DIAGNOSIS — E66.01 CLASS 3 SEVERE OBESITY DUE TO EXCESS CALORIES WITH SERIOUS COMORBIDITY AND BODY MASS INDEX (BMI) OF 40.0 TO 44.9 IN ADULT (HCC): ICD-10-CM

## 2025-01-24 DIAGNOSIS — O24.414 INSULIN CONTROLLED GESTATIONAL DIABETES MELLITUS (GDM) IN THIRD TRIMESTER: ICD-10-CM

## 2025-01-24 DIAGNOSIS — Z34.93 PRENATAL CARE IN THIRD TRIMESTER: Primary | ICD-10-CM

## 2025-01-24 DIAGNOSIS — Z3A.19 19 WEEKS GESTATION OF PREGNANCY: ICD-10-CM

## 2025-01-24 PROCEDURE — PNV: Performed by: OBSTETRICS & GYNECOLOGY

## 2025-01-24 NOTE — PROGRESS NOTES
Patient reports good fm, no n/v,  bleeding, loss of fluid, dom violence, or smoking.  kenzie pnv some headache cramping edema, urine trace protein negative glucose  Assessment & Plan  Prenatal care in third trimester  - Continue PNV  - Fetal kick counts reviewed, monitoring at  center  - Labs: UTD  -Pap: 2024 normal, positive non- 16/18 high risk HPV  -Rh: A+  - Genetics: AFP positive and IPS normal  - Ultrasounds: 121 2586 percentile 4 pound 14 ounce Vertex, normal SKY  - Tdap: Had  - Flu Shot: Had   - RSV:  Will offer during season (-) @ [id number]   - Rhogam: Not indicated  - Delivery:  with epidural  - Contraception: Undecided  - Breastfeeding: Was ordered  - Pediatrician: Will establish  -Delivery Consent & Packet: Done  -GBS: At 36 weeks  - RTO in 2 week  \       Insulin controlled gestational diabetes mellitus (GDM) in third trimester  Per MFM note patient reminded to contact MFM she has been downloading glucose to Dexcom but it may not be transmitting to MFM she will check, reports glucoses have been improved  Lab Results   Component Value Date    HGBA1C 6.2 (H) 2024

## 2025-01-28 ENCOUNTER — ULTRASOUND (OUTPATIENT)
Facility: HOSPITAL | Age: 35
End: 2025-01-28
Payer: COMMERCIAL

## 2025-01-28 VITALS
DIASTOLIC BLOOD PRESSURE: 68 MMHG | HEIGHT: 62 IN | WEIGHT: 251.4 LBS | SYSTOLIC BLOOD PRESSURE: 108 MMHG | BODY MASS INDEX: 46.26 KG/M2 | HEART RATE: 84 BPM

## 2025-01-28 DIAGNOSIS — Z3A.32 32 WEEKS GESTATION OF PREGNANCY: Primary | ICD-10-CM

## 2025-01-28 DIAGNOSIS — O24.414 INSULIN CONTROLLED GESTATIONAL DIABETES MELLITUS (GDM) IN THIRD TRIMESTER: ICD-10-CM

## 2025-01-28 PROCEDURE — 76815 OB US LIMITED FETUS(S): CPT | Performed by: OBSTETRICS & GYNECOLOGY

## 2025-01-28 PROCEDURE — 59025 FETAL NON-STRESS TEST: CPT | Performed by: OBSTETRICS & GYNECOLOGY

## 2025-01-28 NOTE — LETTER
January 28, 2025     Hoa Mora MD  [address] [address]    Patient: Karena Garcia   YOB: 1990   Date of Visit: 1/28/2025       Dear Dr. Mora:    Thank you for referring Karena Garcia to me for evaluation. Below are my notes for this consultation.    If you have questions, please do not hesitate to call me. I look forward to following your patient along with you.         Sincerely,        Gregory Cruz MD        CC: No Recipients    Gregory Cruz MD  1/28/2025 10:47 AM  Sign when Signing Visit  Please refer to the [facility] ultrasound report in Ob Procedures for additional information regarding today's visit

## 2025-01-28 NOTE — PROGRESS NOTES
Repeat Non-Stress Testing:    Patient verbalizes +FM. Pt denies ALL:               Leaking of fluid   Contractions   Vaginal bleeding   Decreased fetal movement    Patient is performing daily kick counts. Patient has no questions or concerns.   NST strip reviewed by Dr. Cruz in-person.

## 2025-01-30 ENCOUNTER — ROUTINE PRENATAL (OUTPATIENT)
Facility: HOSPITAL | Age: 35
End: 2025-01-30
Payer: COMMERCIAL

## 2025-01-30 VITALS
SYSTOLIC BLOOD PRESSURE: 110 MMHG | HEIGHT: 62 IN | WEIGHT: 251.2 LBS | BODY MASS INDEX: 46.22 KG/M2 | DIASTOLIC BLOOD PRESSURE: 62 MMHG | HEART RATE: 101 BPM

## 2025-01-30 DIAGNOSIS — Z3A.33 33 WEEKS GESTATION OF PREGNANCY: Primary | ICD-10-CM

## 2025-01-30 DIAGNOSIS — O24.414 INSULIN CONTROLLED GESTATIONAL DIABETES MELLITUS (GDM) IN THIRD TRIMESTER: ICD-10-CM

## 2025-01-30 DIAGNOSIS — O28.0 ABNORMAL MSAFP (MATERNAL SERUM ALPHA-FETOPROTEIN), ELEVATED: ICD-10-CM

## 2025-01-30 PROCEDURE — 59025 FETAL NON-STRESS TEST: CPT | Performed by: OBSTETRICS & GYNECOLOGY

## 2025-01-30 NOTE — PROGRESS NOTES
Repeat Non-Stress Testing:    Patient verbalizes +FM. Pt denies ALL:               Leaking of fluid   Contractions   Vaginal bleeding   Decreased fetal movement    Patient is performing daily kick counts. Patient has no questions or concerns.   NST strip reviewed by Dr. Castellanos in-person.

## 2025-02-03 ENCOUNTER — TELEPHONE (OUTPATIENT)
Facility: HOSPITAL | Age: 35
End: 2025-02-03

## 2025-02-04 ENCOUNTER — HOSPITAL ENCOUNTER (OUTPATIENT)
Facility: HOSPITAL | Age: 35
Discharge: HOME | End: 2025-02-04
Attending: STUDENT IN AN ORGANIZED HEALTH CARE EDUCATION/TRAINING PROGRAM | Admitting: STUDENT IN AN ORGANIZED HEALTH CARE EDUCATION/TRAINING PROGRAM
Payer: COMMERCIAL

## 2025-02-04 ENCOUNTER — ULTRASOUND (OUTPATIENT)
Facility: HOSPITAL | Age: 35
End: 2025-02-04
Payer: COMMERCIAL

## 2025-02-04 VITALS
WEIGHT: 251.2 LBS | HEART RATE: 85 BPM | SYSTOLIC BLOOD PRESSURE: 116 MMHG | DIASTOLIC BLOOD PRESSURE: 74 MMHG | BODY MASS INDEX: 46.22 KG/M2 | HEIGHT: 62 IN

## 2025-02-04 VITALS — SYSTOLIC BLOOD PRESSURE: 131 MMHG | DIASTOLIC BLOOD PRESSURE: 86 MMHG | RESPIRATION RATE: 20 BRPM | HEART RATE: 103 BPM

## 2025-02-04 DIAGNOSIS — Z3A.33 33 WEEKS GESTATION OF PREGNANCY: Primary | ICD-10-CM

## 2025-02-04 DIAGNOSIS — O24.414 INSULIN CONTROLLED GESTATIONAL DIABETES MELLITUS (GDM) IN THIRD TRIMESTER: ICD-10-CM

## 2025-02-04 DIAGNOSIS — N76.0 ACUTE VAGINITIS: Primary | ICD-10-CM

## 2025-02-04 LAB
BACTERIA UR QL AUTO: ABNORMAL /HPF
BILIRUB UR QL STRIP: NEGATIVE
CLARITY UR: ABNORMAL
COLOR UR: ABNORMAL
GLUCOSE UR STRIP-MCNC: NEGATIVE MG/DL
HGB UR QL STRIP.AUTO: NEGATIVE
KETONES UR STRIP-MCNC: ABNORMAL MG/DL
LEUKOCYTE ESTERASE UR QL STRIP: ABNORMAL
MUCOUS THREADS UR QL AUTO: ABNORMAL
NITRITE UR QL STRIP: NEGATIVE
NON-SQ EPI CELLS URNS QL MICRO: ABNORMAL /HPF
PH UR STRIP.AUTO: 6.5 [PH] (ref 4.5–8)
PROT UR STRIP-MCNC: ABNORMAL MG/DL
RBC #/AREA URNS AUTO: ABNORMAL /HPF
SP GR UR STRIP.AUTO: 1.02 (ref 1–1.03)
UROBILINOGEN UR QL STRIP.AUTO: 0.2 E.U./DL
WBC #/AREA URNS AUTO: ABNORMAL /HPF

## 2025-02-04 PROCEDURE — 99213 OFFICE O/P EST LOW 20 MIN: CPT

## 2025-02-04 PROCEDURE — NC001 PR NO CHARGE: Performed by: STUDENT IN AN ORGANIZED HEALTH CARE EDUCATION/TRAINING PROGRAM

## 2025-02-04 PROCEDURE — 59025 FETAL NON-STRESS TEST: CPT | Performed by: OBSTETRICS & GYNECOLOGY

## 2025-02-04 PROCEDURE — 76815 OB US LIMITED FETUS(S): CPT | Performed by: OBSTETRICS & GYNECOLOGY

## 2025-02-04 PROCEDURE — 81001 URINALYSIS AUTO W/SCOPE: CPT

## 2025-02-04 PROCEDURE — 87086 URINE CULTURE/COLONY COUNT: CPT

## 2025-02-04 PROCEDURE — G0463 HOSPITAL OUTPT CLINIC VISIT: HCPCS

## 2025-02-04 RX ORDER — METRONIDAZOLE 500 MG/1
500 TABLET ORAL EVERY 12 HOURS SCHEDULED
Qty: 14 TABLET | Refills: 0 | Status: SHIPPED | OUTPATIENT
Start: 2025-02-04 | End: 2025-02-11

## 2025-02-04 NOTE — PATIENT INSTRUCTIONS
Please go to Labor and Delivery now for evaluation.  The address of [facility] is [address] [address] ([facility] [place]).        Kick Counts in Pregnancy   AMBULATORY CARE:   Kick counts  measure how much your baby is moving in your womb. A kick from your baby can be felt as a twist, turn, swish, roll, or jab. It is common to feel your baby kicking at 26 to 28 weeks of pregnancy. You may feel your baby kick as early as 20 weeks of pregnancy. You may want to start counting at 28 weeks.   Contact your doctor immediately if:   You feel a change in the number of kicks or movements of your baby.      You feel fewer than 10 kicks within 2 hours.      You have questions or concerns about your baby's movements.     Why measure kick counts:  Your baby's movement may provide information about your baby's health. He or she may move less, or not at all, if there are problems. Your baby may move less if he or she is not getting enough oxygen or nutrition from the placenta. Do not smoke while you are pregnant. Smoking decreases the amount of oxygen that gets to your baby. Talk to your healthcare provider if you need help to quit smoking. Tell your healthcare provider as soon as you feel a change in your baby's movements.  When to measure kick counts:   Measure kick counts at the same time every day.       Measure kick counts when your baby is awake and most active. Your baby may be most active in the evening.     How to measure kick counts:  Check that your baby is awake before you measure kick counts. You can wake up your baby by lightly pushing on your belly, walking, or drinking something cold. Your healthcare provider may tell you different ways to measure kick counts. You may be told to do the following:  Use a chart or clock to keep track of the time you start and finish counting.      Sit in a chair or lie on your left side.      Place your hands on the largest part of your  belly.      Count until you reach 10 kicks. Write down how much time it takes to count 10 kicks.      It may take 30 minutes to 2 hours to count 10 kicks. It should not take more than 2 hours to count 10 kicks.     Follow up with your doctor as directed:  Write down your questions so you remember to ask them during your visits.   © Copyright [employer] 2023 Information is for End User's use only and may not be sold, redistributed or otherwise used for commercial purposes.  The above information is an  only. It is not intended as medical advice for individual conditions or treatments. Talk to your doctor, nurse or pharmacist before following any medical regimen to see if it is safe and effective for you.

## 2025-02-04 NOTE — PROGRESS NOTES
Repeat Non-Stress Testing:    Patient verbalizes +FM.  Pt also reports intermittent pain/pressure with active fetal movement, Rates 3 out of 10 pain. Reports increased in urinary frequency.   Pt denies ALL:               Leaking of fluid   Vaginal bleeding   Decreased fetal movement    Patient is performing daily kick counts. Patient has no questions or concerns.   NST strip reviewed by Dr. Castellanos in-person.

## 2025-02-04 NOTE — PROGRESS NOTES
L&D Triage Note - OB/GYN  Karena Garcia 34 y.o. female MRN: [id number]  Unit/Bed#:  TRIAGE  Encounter: [id number]      ASSESSMENT:    Karena Garcia is a 34 y.o.  at 33w6d who was evaluated today in triage due to irregular contractions on the monitor. Microscopy wnl, cervical length wnl. SVE 0.5/0/-4 with irregular contractions on tocometer.  Reassuring work up, the patient does not appear to be in  labor and it is safe to discharge home.     PLAN:    #Irregular Contractions  #Bacterial vaginosis  - Speculum exam: no abnormal discharge, no pooling, no bleeding  - Microscopy wnl  - SVE: 0.5/0/-4  - Prescribed Flagyl 500mg BID for 7 days    33 w 6d gestation of pregnancy  - Continue routine prenatal care  - Discharge from OB triage with  labor precautions    - Reviewed rupture of membranes, false vs true labor, decreased fetal movement, and vaginal bleeding   - Pt to call provider with any concerns and follow up at her next scheduled prenatal appointment    - Case discussed with Taniya Douglas MD  OB/GYN PGY-1  2025 1:02 PM    SUBJECTIVE:    Karena Garcia 34 y.o.  at 33w6d with an Estimated Date of Delivery: 3/19/25 presenting with abdominal pain and cramping since Saturday. She was seen at [facility] today and she was sent to triage due to frequent contractions. Patient denies vaginal bleeding. Patient does not have headache, blurry vision, epigastric pain, or edema. Fetal movement is normal.   Her current pregnancy is notable for A2GDM, Hx of recurrent kidney stones    ROS  General: No fevers, chills or night sweats  Cardiovascular: No chest pain, or wheezing  HEENT: No visual changes  Pulmonary: No cough,  GI: No diarrhea, no nausea, no blood in stools or black stools  : No dysuria or hematuria  Lower Extremity: Edema wnl for pregnancy    OBJECTIVE  Vitals:   Vitals:    25 1133   BP: 131/86   Pulse: 103   Resp: 20     There is no height or weight on file  to calculate BMI.  GBS: Not Tested  Blood type: A  Estimated Date of Delivery: 3/19/25    General Physical Exam:  General: Well appearing, no acute distress  Cardiovascular: tachycardic  Lungs: non-labored breathing  Abdomen: Soft, Non-tender, Gravid  Lower extremities: Edema wnl for pregnancy    Speculum: Normal vaginal epithelium, normal physiologic discharge, no yeast like discharge, no odor  SVE: 0.5 / 0% / -4    Fetal monitoring:  Fetal heart rate: Baseline Rate (FHR): 150 bpm  Variability: Moderate  Accelerations: 15 x 15 or greater, With fetal movement  Decelerations: None  Burke: Contraction Frequency (minutes): 3-5  Contraction Duration (seconds): 40-.60  Contraction Intensity: Mild    Wet mount/KOH: present clue cells, absent hyphae, absent trichomonads present   Membrane Status: Nitrazine absent, Ferning absent, Pooling absent    Labs:   Recent Results (from the past 24 hours)   Urine Macroscopic, POC    Collection Time: 02/04/25 12:22 PM   Result Value Ref Range    Color, UA Yelena     Clarity, UA Slightly Cloudy     pH, UA 6.5 4.5 - 8.0    Leukocytes, UA Trace (A) Negative    Nitrite, UA Negative Negative    Protein, UA 30 (1+) (A) Negative mg/dl    Glucose, UA Negative Negative mg/dl    Ketones, UA 15 (1+) (A) Negative mg/dl    Urobilinogen, UA 0.2 0.2, 1.0 E.U./dl E.U./dl    Bilirubin, UA Negative Negative    Occult Blood, UA Negative Negative    Specific Gravity, UA 1.025 1.003 - 1.030   Urine Microscopic    Collection Time: 02/04/25 12:22 PM   Result Value Ref Range    RBC, UA 2-4 (A) None Seen, 1-2 /hpf    WBC, UA 10-20 (A) None Seen, 1-2 /hpf    Epithelial Cells Occasional None Seen, Occasional /hpf    Bacteria, UA Innumerable (A) None Seen, Occasional /hpf    MUCUS THREADS Occasional (A) None Seen

## 2025-02-04 NOTE — DISCHARGE INSTR - AVS FIRST PAGE
Medications and Pregnancy  The following list of over-the-counter medications is usually considered safe to take during pregnancy. Take care to not double up on products containing acetaminophen (Tylenol).   Colds/Sore Throat  Robitussin DM - Plain (guaifenesin)  Saline nasal spray  Warm salt water gargle  Cepacol throat lozenges or mouthwash (cetylpyridinium)  Sucrets (hexylresoricinol)  Allergy  AVOID the “D” - or DECONGESTANT  Claritin (loratadine)  Zrytec (cetirizine)  Allerga (fexofenadine)  Headaches / Aches and Pains  Tylenol (acetaminophen)  Do NOT exceed more than 3000 mg of Tylenol in a 24-hour period.  Heartburn  Mylanta (aluminum hydroxide/simethicone, magnesium hydroxide)  Maalaox (aluminum magnesium hydroxide, magnesium hydroxide)  Tums (calcium carbonate)  Riopan (magaldrate)  Constipation  Colace (docusate sodium)  Surfak (docusate sodium)  MiraLAX  Glycerin suppositories  Fleets enema (sodium phosphate & sodium biphosphate)  Nausea/Vomiting  Vitamin B6 (pyridoxine) - May take 50 mg at bedtime, 25 mg in the morning, 25 mg in the afternoon  Unisom (doxylamine) - May use for nausea/vomiting - (cut a 25 mg tablet in half). May cause drowsiness.   Sleep  Benadryl (diphenhydramine) - Take 1-2 tablets as needed at bedtime  Unisom (doxylamine) 25 mg tablet - As needed at bedtime  Melatonin 5 mg tablet - As needed at bedtime    Generally the generic form of medicine is usually lower priced than the brand name form of the medicine.   Talk to your healthcare provider before you take any medicines.  Many medicines may harm your baby if you take them when you are pregnant. Do not take any medicines, vitamins, herbs, or supplements without first talking to your healthcare provider.     Warning signs during pregnancy  [phone] Answering service during non-business hours     1. Vaginal bleeding  2. Sharp abdominal pain that does not go away  3. Fever (more than 100.4 and is not relieved by Tylenol)  4.  Persistent vomiting lasting greater than 24 hours  5. Chest pain   6. Pain or burning when you urinate  7. Severe headache that doesn't resolve with Tylenol  8. Blurred vision or seeing spots in your vision  9. Sudden swelling of your face or hands  10. Redness, swelling or pain in a leg  11. A sudden weight gain in just a few days  12. Decrease in your baby's movement (after 28 weeks or the 6th month of pregnancy)for 24 hrs  13. A loss of watery fluid from your vagina - can be a gush, a trickle or continuous wetness  14. After 20 weeks of pregnancy, rhythmic cramping (greater than 4 per hour) or menstrual like low/pelvic pain  15. You have cravings for substances such as toni, dirt, laundry starch, or ice.    Am I in labor?  As you enter the final stages of your pregnancy, your body will give signs that labor is approaching. The following information should help you to understand these signs and make it easier for you to determine whether you are in labour.  Some of the signs and symptoms of going into labor may include:  - Period-like cramps  - Backache  - Diarrhea  - Mucous discharge or ‘show’  - Gush or trickle of water as the membranes break  - Contractions    Engagement  As you move closer to delivery, your baby’s head may drop and become engaged in your pelvis in preparation for labour. If you are expecting your first baby, you may notice pressure in your groin and on your bladder beginning up to four weeks before the birth. You may also notice that you can breathe a little easier and have a little more appetite as your baby drops, and is not pushed up against your diaphragm and stomach quite so much. This is sometimes known as “lightening”, as women generally feel lighter.    Show  During your pregnancy a mucous plug fills your cervix. Towards the end of pregnancy, the cervix becomes softer and this mucous plug may become loosened and start to come away. The process of discharging this mucous is called a  "‘show’ and might often contain streaks of blood or may also be brownish in color. This is different from any flow of fresh blood which you would report immediately to your doctor or the hospital. The show may continue over a period of hours or even days. It is one of the signs that your body is preparing for birth. Labor may begin in the next few days, hours or weeks following a show. There is no need to phone the hospital if you have had a show.    Water breaking (rupture of membranes)  This may occur at any time prior to the start of labor, or at any time during labor. The break may be low, near the opening of the uterus, and will produce a gush of amniotic fluid. If this occurs, place a sanitary pad on and note the color of the fluid. Call the hospital. You will usually be asked to come in to the hospital.    Another type of amniotic fluid leak may occur higher up in the amniotic sack, or top of the uterus. This will be less obvious to you and you may only notice a trickle of fluid. Since many women have a heavy vaginal discharge or leak a small amount of urine towards the end of their pregnancy and it is often difficult to tell the difference between urine and amniotic fluid - urine is often yellow; where amniotic fluid is usually clear, or has a pink tinge, and has a \"sweet\" odor. If you are unsure, call the hospital.    If the color of the fluid is green or brownish, it indicates that your baby has passed a bowel movement (meconium) inside the uterus. It is very common to have meconium-stained amniotic fluid in a pregnancy over 41 weeks, but this may also be a sign that your baby is distressed. You will need to call the hospital immediately and then come into the hospital.    Contractions  Thang Mccarthy contractions  P[place] Mccarthy contractions are sometimes mistaken for labor. These “practice” contractions usually start residential through the pregnancy and continue right through to the end. These contractions are " often irregular and can be uncomfortable and tight. [place] Mccarthy contractions usually increase in regularity and strength towards the end of your pregnancy, preparing your uterus for the birth. Sometimes it is difficult to distinguish between these [place] Mccarthy contractions and labor contractions. Below are the common differences between the two.  Labor contractions  True labor contractions usually increase in strength and duration. In order to time your contractions, time the interval between the start of one contraction to the start of the next. Early labor contractions are often likened to period cramps with or without backache.    [place] Mccarthy contractions    Labor contractions    usually irregular and short    become regular with time    do not get closer together    get closer together with time    do not get stronger     become stronger    walking does not make them stronger  walking can make them stronger    lying down may make them go away   lying down does not make them go away    uncomfortable and tight--not painful   Painful - can't walk, talk or breathe through them    How does labor start?  Labor can start in different ways. You may be start experiencing some period like pains or contractions. You might notice that these tightenings/contractions start to get stronger, closer and last longer than before. Or you might start with some back ache or a stomach upset that gets stronger and develops into regular contractions. In approximately 10-15% of women, labour will start when your membranes rupture. Contractions usually follow.    Should I call my doctor?  You should call your doctor when:  - your degroot break  - you have bright blood loss  - your contractions are regular and five minutes apart  - if you have decreased fetal movement

## 2025-02-05 ENCOUNTER — RESULTS FOLLOW-UP (OUTPATIENT)
Dept: OTHER | Facility: HOSPITAL | Age: 35
End: 2025-02-05

## 2025-02-05 PROBLEM — Z3A.34 34 WEEKS GESTATION OF PREGNANCY: Status: ACTIVE | Noted: 2024-11-21

## 2025-02-05 LAB — BACTERIA UR CULT: NORMAL

## 2025-02-05 NOTE — PATIENT INSTRUCTIONS
Kick Counts in Pregnancy   AMBULATORY CARE:   Kick counts  measure how much your baby is moving in your womb. A kick from your baby can be felt as a twist, turn, swish, roll, or jab. It is common to feel your baby kicking at 26 to 28 weeks of pregnancy. You may feel your baby kick as early as 20 weeks of pregnancy. You may want to start counting at 28 weeks.   Contact your doctor immediately if:   You feel a change in the number of kicks or movements of your baby.      You feel fewer than 10 kicks within 2 hours.      You have questions or concerns about your baby's movements.     Why measure kick counts:  Your baby's movement may provide information about your baby's health. He or she may move less, or not at all, if there are problems. Your baby may move less if he or she is not getting enough oxygen or nutrition from the placenta. Do not smoke while you are pregnant. Smoking decreases the amount of oxygen that gets to your baby. Talk to your healthcare provider if you need help to quit smoking. Tell your healthcare provider as soon as you feel a change in your baby's movements.  When to measure kick counts:   Measure kick counts at the same time every day.       Measure kick counts when your baby is awake and most active. Your baby may be most active in the evening.     How to measure kick counts:  Check that your baby is awake before you measure kick counts. You can wake up your baby by lightly pushing on your belly, walking, or drinking something cold. Your healthcare provider may tell you different ways to measure kick counts. You may be told to do the following:  Use a chart or clock to keep track of the time you start and finish counting.      Sit in a chair or lie on your left side.      Place your hands on the largest part of your belly.      Count until you reach 10 kicks. Write down how much time it takes to count 10 kicks.      It may take 30 minutes to 2 hours to count 10 kicks. It should not take more  than 2 hours to count 10 kicks.     Follow up with your doctor as directed:  Write down your questions so you remember to ask them during your visits.   ©  Mer2023 Information is for End User's use only and may not be sold, redistributed or otherwise used for commercial purposes.  The above information is an  only. It is not intended as medical advice for individual conditions or treatments. Talk to your doctor, nurse or pharmacist before following any medical regimen to see if it is safe and effective for you. Thank you for choosing us for your  care today.  If you have any questions about your ultrasound or care, please do not hesitate to contact us or your primary obstetrician.        Some general instructions for your pregnancy are:    Exercise: Aim for 150 minutes per week of regular exercise.  Walking is great!  Nutrition: Choose healthy sources of calcium, iron, and protein.  Avoid ultraprocessed foods and added sugar.  Learn about Preeclampsia: preeclampsia is a common, potentially serious high blood pressure complication in pregnancy.  A blood pressure of 140mmHg (systolic or top number) or 90mmHg (diastolic or bottom number) should be evaluated by your doctor.  Aspirin is sometimes prescribed in early pregnancy to prevent preeclampsia in women with risk factors - ask your obstetrician if you should be on this medication.  For more resources, visit:  https://www.highriskpregnancyinfo.org/preeclampsia  If you smoke, please try to quit completely but also try to reduce your smoking by as much as possible (as soon as possible).  Do not vape.  Please also avoid cannabis products.  Other warning signs to watch out for in pregnancy or postpartum: chest pain, obstructed breathing or shortness of breath, seizures, thoughts of hurting yourself or your baby, bleeding, a painful or swollen leg, fever, or headache (see [facility]WHON[facility] POST-BIRTH Warning Signs campaign).  If these happen  call 911.  Itching is also not normal in pregnancy and if you experience this, especially over your hands and feet, potentially worse at night, notify your doctors.        [Normal] : Developmental history within normal limits

## 2025-02-06 ENCOUNTER — ROUTINE PRENATAL (OUTPATIENT)
Facility: HOSPITAL | Age: 35
End: 2025-02-06
Payer: COMMERCIAL

## 2025-02-06 VITALS
SYSTOLIC BLOOD PRESSURE: 106 MMHG | DIASTOLIC BLOOD PRESSURE: 72 MMHG | BODY MASS INDEX: 46.67 KG/M2 | HEART RATE: 96 BPM | HEIGHT: 62 IN | WEIGHT: 253.6 LBS

## 2025-02-06 DIAGNOSIS — Z3A.34 34 WEEKS GESTATION OF PREGNANCY: ICD-10-CM

## 2025-02-06 DIAGNOSIS — O99.213 SEVERE OBESITY DUE TO EXCESS CALORIES AFFECTING PREGNANCY IN THIRD TRIMESTER (HCC): ICD-10-CM

## 2025-02-06 DIAGNOSIS — O28.0 ABNORMAL MSAFP (MATERNAL SERUM ALPHA-FETOPROTEIN), ELEVATED: ICD-10-CM

## 2025-02-06 DIAGNOSIS — E66.01 SEVERE OBESITY DUE TO EXCESS CALORIES AFFECTING PREGNANCY IN THIRD TRIMESTER (HCC): ICD-10-CM

## 2025-02-06 DIAGNOSIS — E66.01 CLASS 3 SEVERE OBESITY DUE TO EXCESS CALORIES WITH SERIOUS COMORBIDITY AND BODY MASS INDEX (BMI) OF 40.0 TO 44.9 IN ADULT (HCC): ICD-10-CM

## 2025-02-06 DIAGNOSIS — E66.813 CLASS 3 SEVERE OBESITY DUE TO EXCESS CALORIES WITH SERIOUS COMORBIDITY AND BODY MASS INDEX (BMI) OF 40.0 TO 44.9 IN ADULT (HCC): ICD-10-CM

## 2025-02-06 DIAGNOSIS — O24.414 INSULIN CONTROLLED GESTATIONAL DIABETES MELLITUS (GDM) IN THIRD TRIMESTER: Primary | ICD-10-CM

## 2025-02-06 PROCEDURE — 59025 FETAL NON-STRESS TEST: CPT | Performed by: OBSTETRICS & GYNECOLOGY

## 2025-02-06 NOTE — PROGRESS NOTES
Repeat Non-Stress Testing:    Patient verbalizes +FM. Pt denies ALL:               Leaking of fluid   Contractions   Vaginal bleeding   Decreased fetal movement    Patient is performing daily kick counts. Patient has no questions or concerns.   NST strip reviewed by DR Lawton in-person.

## 2025-02-06 NOTE — LETTER
February 6, 2025     AJAY Grover  [address] [facility] [address]    Patient: Karena Garcia   YOB: 1990   Date of Visit: 2/6/2025       Dear Dr. Mercedes:    Thank you for referring Karena Garcia to me for evaluation. Below are my notes for this consultation.    If you have questions, please do not hesitate to call me. I look forward to following your patient along with you.         Sincerely,        Radha Lawton MD        CC: No Recipients    Radha Lawton MD  2/6/2025  9:33 PM  Sign when Signing Visit  NST is reactive.  Radha Lawton M.D.

## 2025-02-10 NOTE — PROGRESS NOTES
OB/GYN  PN Visit  Karena Garcia  [id number]  2025  1:58 PM  Freda Soliman PA-C    S: 34 y.o.  34w5d here for PN visit. Pregnancy complicated by GDM.     OB complaints:  Denies c/o n/v/ha, no edema, no smoking, no DV.   No vb/lof      She reports she was seen on L&D last week w cramping but sx have resolved at this point.   She notes that her BG has still been elevated and they are considering increasing her dinnertime dose again.   Will continue to monitor w MFM.    O:    Pre- Vitals    Flowsheet Row Most Recent Value   Prenatal Assessment    Fetal Heart Rate 145   Fundal Height (cm) 37 cm   Movement Present   Prenatal Vitals    Blood Pressure 116/74   Weight - Scale 116 kg (256 lb)   Urine Albumin/Glucose    Dilation/Effacement/Station    Vaginal Drainage    Draining Fluid No   Edema    LLE Edema None   RLE Edema None   Facial Edema None            Gen: no acute distress, nonlabored breathing.  OB exam completed: fundal height, +FHT.  Urine: -/-    A/P:    1. Insulin controlled gestational diabetes mellitus (GDM) in third trimester  Overview:  Pt will continue to monitor using Dexacom and send BG logs to MFM as directed.   2. 34 weeks gestation of pregnancy  Assessment & Plan:    Labs UTD Pt w GDM  Pap smear: 09/10/2024   WNL + non 16/18 HPV   GC/CT:  negative   Prenatal Panel: normal   Blood Type: A+ RhoGam  indicated   GBS: plan at 36 weeks     Genetic Testing: AFP +, NIPS normal      Vaccines:  Tdap: done previously  Flu: 24      Birth Plan:  + epidural  Yellow packet given and consents signed previously.  Reviewed establishing w peds office after delivery. Reviewed perineal massage as well.   Feeding Plan: breast  Breast pump: ordered previously  Pediatrician: will establish  Contraception: undecided  Ultrasounds: 25 EFW 86th%  twice weekly APFS at 32 weeks due to GDM  3. Abnormal MSAFP (maternal serum alpha-fetoprotein), elevated  Overview:  2.95 MOM  Normal anatomy  scan  4. Class 3 severe obesity due to excess calories with serious comorbidity and body mass index (BMI) of 40.0 to 44.9 in adult ([facility])  Overview:  -Pre-pregnancy weight 235 lbs.  -Current weight 236 lbs.  -Recommended weight gain 11 to 20 lbs.  -Continue GDM meal and follow up with dietitian as needed.   5. BMI 40.0-44.9, adult ([facility])          RTC in 2 weeks    Freda Soliman PA-C  2/11/2025  1:58 PM

## 2025-02-10 NOTE — ASSESSMENT & PLAN NOTE
Labs UTD Pt w GDM  Pap smear: 09/10/2024   WNL + non 16/18 HPV   GC/CT:  negative   Prenatal Panel: normal   Blood Type: A+ RhoGam  indicated   GBS: plan at 36 weeks     Genetic Testing: AFP +, NIPS normal      Vaccines:  Tdap: done previously  Flu: 24      Birth Plan:  + epidural  Yellow packet given and consents signed previously.  Reviewed establishing w peds office after delivery. Reviewed perineal massage as well.   Feeding Plan: breast  Breast pump: ordered previously  Pediatrician: will establish  Contraception: undecided  Ultrasounds: 25 EFW 86th%  twice weekly APFS at 32 weeks due to GDM

## 2025-02-11 ENCOUNTER — ROUTINE PRENATAL (OUTPATIENT)
Dept: OBGYN CLINIC | Facility: CLINIC | Age: 35
End: 2025-02-11

## 2025-02-11 ENCOUNTER — ULTRASOUND (OUTPATIENT)
Facility: HOSPITAL | Age: 35
End: 2025-02-11
Payer: COMMERCIAL

## 2025-02-11 ENCOUNTER — TELEPHONE (OUTPATIENT)
Dept: OBGYN CLINIC | Facility: CLINIC | Age: 35
End: 2025-02-11

## 2025-02-11 VITALS
BODY MASS INDEX: 46.78 KG/M2 | SYSTOLIC BLOOD PRESSURE: 100 MMHG | DIASTOLIC BLOOD PRESSURE: 70 MMHG | HEART RATE: 94 BPM | WEIGHT: 254.2 LBS | HEIGHT: 62 IN

## 2025-02-11 VITALS — DIASTOLIC BLOOD PRESSURE: 74 MMHG | WEIGHT: 256 LBS | SYSTOLIC BLOOD PRESSURE: 116 MMHG | BODY MASS INDEX: 46.82 KG/M2

## 2025-02-11 DIAGNOSIS — O24.414 INSULIN CONTROLLED GESTATIONAL DIABETES MELLITUS (GDM) IN THIRD TRIMESTER: ICD-10-CM

## 2025-02-11 DIAGNOSIS — O24.414 INSULIN CONTROLLED GESTATIONAL DIABETES MELLITUS (GDM) IN THIRD TRIMESTER: Primary | ICD-10-CM

## 2025-02-11 DIAGNOSIS — E66.813 CLASS 3 SEVERE OBESITY DUE TO EXCESS CALORIES WITH SERIOUS COMORBIDITY AND BODY MASS INDEX (BMI) OF 40.0 TO 44.9 IN ADULT (HCC): ICD-10-CM

## 2025-02-11 DIAGNOSIS — E66.01 CLASS 3 SEVERE OBESITY DUE TO EXCESS CALORIES WITH SERIOUS COMORBIDITY AND BODY MASS INDEX (BMI) OF 40.0 TO 44.9 IN ADULT (HCC): ICD-10-CM

## 2025-02-11 DIAGNOSIS — Z3A.34 34 WEEKS GESTATION OF PREGNANCY: Primary | ICD-10-CM

## 2025-02-11 DIAGNOSIS — O28.0 ABNORMAL MSAFP (MATERNAL SERUM ALPHA-FETOPROTEIN), ELEVATED: ICD-10-CM

## 2025-02-11 DIAGNOSIS — Z3A.34 34 WEEKS GESTATION OF PREGNANCY: ICD-10-CM

## 2025-02-11 PROCEDURE — 76815 OB US LIMITED FETUS(S): CPT | Performed by: OBSTETRICS & GYNECOLOGY

## 2025-02-11 PROCEDURE — 59025 FETAL NON-STRESS TEST: CPT | Performed by: OBSTETRICS & GYNECOLOGY

## 2025-02-11 PROCEDURE — PNV: Performed by: PHYSICIAN ASSISTANT

## 2025-02-11 NOTE — TELEPHONE ENCOUNTER
Pt returned call to OB Navigator, Francie. Appears unavailable in Teams. Pt communicated preference for IOL on 3/12/25. Please provide a c/b at your earliest convenience. Thank you.

## 2025-02-11 NOTE — TELEPHONE ENCOUNTER
Patient informed IOL scheduled for 3/12/2025 8 pm ripening into 3/13/2025 with instructions given.  Patient to keep appts as scheduled up to & including 3/6/2025.  Pink sticky note updated. Staff message sent to  Dr Medel & Dr Jha.

## 2025-02-11 NOTE — TELEPHONE ENCOUNTER
ESC message sent to Libertad VILLA ([facility] L&D) to check availability for IOL @ 39 wks (medical - GDM) 3/12/2025 8 pm into 3/13/2025

## 2025-02-11 NOTE — TELEPHONE ENCOUNTER
Left message patient's VM re: any preference for IOL with ripening - 2 day (medical indication for GDM) @ 39 wks (3/12/2025 or later) - [facility] 3/19/09560 -

## 2025-02-11 NOTE — TELEPHONE ENCOUNTER
----- Message from Freda Soliman PA-C sent at 2/11/2025  1:40 PM EST -----  Regarding: IOL  Pt would like to plan 39 week IOL due to GDM. She will likely need 2 day. Would be medical indication due to GDM.

## 2025-02-13 ENCOUNTER — ROUTINE PRENATAL (OUTPATIENT)
Facility: HOSPITAL | Age: 35
End: 2025-02-13
Payer: COMMERCIAL

## 2025-02-13 VITALS
DIASTOLIC BLOOD PRESSURE: 76 MMHG | HEART RATE: 85 BPM | BODY MASS INDEX: 46.71 KG/M2 | WEIGHT: 255.4 LBS | SYSTOLIC BLOOD PRESSURE: 118 MMHG

## 2025-02-13 DIAGNOSIS — Z3A.35 35 WEEKS GESTATION OF PREGNANCY: Primary | ICD-10-CM

## 2025-02-13 PROCEDURE — 59025 FETAL NON-STRESS TEST: CPT | Performed by: OBSTETRICS & GYNECOLOGY

## 2025-02-13 NOTE — PROGRESS NOTES
Nonstress test at 35 and 1 seventh weeks.    Indications class III obesity, abnormal MSAFP, GDM.    Interpretation reactive.    Plan.    Once a week nonstress test once a week SKY daily fetal movement counts.     Bryan Ivey MD, MSCE    Maternal-fetal medicine

## 2025-02-13 NOTE — PROGRESS NOTES
Repeat Non-Stress Testing:    Patient verbalizes +FM. Pt denies ALL:               Leaking of fluid   Contractions   Vaginal bleeding   Decreased fetal movement    Patient is performing daily kick counts. Patient has no questions or concerns.   NST strip reviewed by Dr Ivey in-person.

## 2025-02-18 ENCOUNTER — ULTRASOUND (OUTPATIENT)
Facility: HOSPITAL | Age: 35
End: 2025-02-18
Payer: COMMERCIAL

## 2025-02-18 ENCOUNTER — HOSPITAL ENCOUNTER (OUTPATIENT)
Facility: HOSPITAL | Age: 35
Discharge: HOME | End: 2025-02-18
Attending: OBSTETRICS & GYNECOLOGY | Admitting: OBSTETRICS & GYNECOLOGY
Payer: COMMERCIAL

## 2025-02-18 VITALS
DIASTOLIC BLOOD PRESSURE: 82 MMHG | WEIGHT: 264 LBS | SYSTOLIC BLOOD PRESSURE: 125 MMHG | RESPIRATION RATE: 18 BRPM | HEART RATE: 91 BPM | TEMPERATURE: 97.8 F | BODY MASS INDEX: 48.58 KG/M2 | HEIGHT: 62 IN

## 2025-02-18 VITALS
HEART RATE: 90 BPM | DIASTOLIC BLOOD PRESSURE: 76 MMHG | WEIGHT: 264.4 LBS | SYSTOLIC BLOOD PRESSURE: 104 MMHG | BODY MASS INDEX: 48.66 KG/M2 | HEIGHT: 62 IN

## 2025-02-18 DIAGNOSIS — Z36.89 ENCOUNTER FOR ULTRASOUND TO CHECK FETAL GROWTH: ICD-10-CM

## 2025-02-18 DIAGNOSIS — E66.01 SEVERE OBESITY DUE TO EXCESS CALORIES AFFECTING PREGNANCY IN THIRD TRIMESTER (HCC): ICD-10-CM

## 2025-02-18 DIAGNOSIS — O24.414 INSULIN CONTROLLED GESTATIONAL DIABETES MELLITUS (GDM) IN THIRD TRIMESTER: ICD-10-CM

## 2025-02-18 DIAGNOSIS — O99.213 SEVERE OBESITY DUE TO EXCESS CALORIES AFFECTING PREGNANCY IN THIRD TRIMESTER (HCC): ICD-10-CM

## 2025-02-18 DIAGNOSIS — O28.0 ABNORMAL MSAFP (MATERNAL SERUM ALPHA-FETOPROTEIN), ELEVATED: ICD-10-CM

## 2025-02-18 DIAGNOSIS — Z3A.35 35 WEEKS GESTATION OF PREGNANCY: Primary | ICD-10-CM

## 2025-02-18 PROBLEM — O36.8390 NON-REASSURING FETAL HEART RATE OR RHYTHM AFFECTING MANAGEMENT OF MOTHER: Status: ACTIVE | Noted: 2025-02-18

## 2025-02-18 LAB
HOLD SPECIMEN: NORMAL
HOLD SPECIMEN: NORMAL

## 2025-02-18 PROCEDURE — 76816 OB US FOLLOW-UP PER FETUS: CPT | Performed by: STUDENT IN AN ORGANIZED HEALTH CARE EDUCATION/TRAINING PROGRAM

## 2025-02-18 PROCEDURE — NC001 PR NO CHARGE: Performed by: OBSTETRICS & GYNECOLOGY

## 2025-02-18 PROCEDURE — 59025 FETAL NON-STRESS TEST: CPT | Performed by: STUDENT IN AN ORGANIZED HEALTH CARE EDUCATION/TRAINING PROGRAM

## 2025-02-18 PROCEDURE — 99214 OFFICE O/P EST MOD 30 MIN: CPT | Performed by: STUDENT IN AN ORGANIZED HEALTH CARE EDUCATION/TRAINING PROGRAM

## 2025-02-18 PROCEDURE — G0463 HOSPITAL OUTPT CLINIC VISIT: HCPCS

## 2025-02-18 PROCEDURE — 99213 OFFICE O/P EST LOW 20 MIN: CPT

## 2025-02-18 RX ADMIN — SODIUM CHLORIDE, SODIUM LACTATE, POTASSIUM CHLORIDE, AND CALCIUM CHLORIDE 1000 ML: .6; .31; .03; .02 INJECTION, SOLUTION INTRAVENOUS at 13:59

## 2025-02-18 NOTE — PROGRESS NOTES
"L&D Triage Note - OB/GYN  Karena Garcia 34 y.o. female MRN: [id number]  Unit/Bed#: LD PACU-01 Encounter: [id number]    Patient is seen by CFW    ASSESSMENT  Karena Garcia is a 34 y.o.  at 35w6d who presented after being sent from [facility] for fetal tachycardia. In triage, fetal baseline HR normalized to 155 bpm.     * Non-reassuring fetal heart rate or rhythm affecting management of mother  Assessment & Plan  Presented to triage after being sent from [facility] for fetal tachycardia. Monitored for >1hr in triage; received 1L LR bolus. Fetal heart rate normalized to baseline of 155 bpm; tracing reactive. Fetal status overall reassuring at this time  - continue routine prenatal care  - return precautions given      #Discharge instructions  - patient instructed to call/return if experiencing worsening contractions, vaginal bleeding, loss of fluid, or decreased fetal movement  - next OB appointment: 25    Patient discussed with attending physician Dr. Man  ______________    SUBJECTIVE  CABRERA: Estimated Date of Delivery: 3/19/25  HPI:  34 y.o.  @35w6d with history of nephrolithiasis, BMI 48 presents after being sent from [facility] office for fetal tachycardia. This pregnancy is complicated by the above medical history and A2GDM, suspected fetal macrosomia.     She denies contractions, vaginal bleeding, leakage of fluid, or decreased fetal movement.    OBJECTIVE:  Vitals:  /82 (BP Location: Right arm)   Pulse 91   Temp 97.8 °F (36.6 °C) (Axillary)   Resp 18   Ht 5' 2\" (1.575 m)   Wt 120 kg (264 lb)   LMP 2024 (Exact Date)   BMI 48.29 kg/m²   Body mass index is 48.29 kg/m².    Physical Exam  Vitals reviewed.   Constitutional:       General: She is not in acute distress.     Appearance: She is well-developed.   HENT:      Head: Normocephalic and atraumatic.   Cardiovascular:      Rate and Rhythm: Normal rate.   Pulmonary:      Effort: Pulmonary effort is normal. No respiratory distress.   Skin:     " Findings: No rash (on exposed skin).   Neurological:      Mental Status: She is alert and oriented to person, place, and time.   Psychiatric:         Mood and Affect: Affect normal.         Speech: Speech normal.         Behavior: Behavior normal.       FHT:  Baseline Rate (FHR): 155 bpm  Variability: Moderate  Accelerations: 15 x 15 or greater, At variable times  Decelerations: None  FHR Category: Category I    TOCO:   Contraction Intensity: Mild    Labs:   Recent Results (from the past 24 hours)   Lavender Top on hold    Collection Time: 02/18/25  2:08 PM   Result Value Ref Range    Extra Tube on hold      Sebastian Mathew MD  OBGYN PGY-1  02/18/25  7:01 PM

## 2025-02-18 NOTE — PROGRESS NOTES
Repeat Non-Stress Testing:    Patient verbalizes +FM. Pt denies ALL:               Leaking of fluid   Contractions   Vaginal bleeding   Decreased fetal movement    Patient is performing daily kick counts. Patient has no questions or concerns.   NST strip reviewed by Dr. Garcia in-person prior to completion of appointment.

## 2025-02-18 NOTE — PROGRESS NOTES
[facility]  Center: Ms. Garcia was seen today for NST (found under the pregnancy episode) which I reviewed the RN assessment and agree, and fetal growth ultrasound (see ultrasound report under OB procedures tab).         MDM:   I. Diagnoses/Problems addressed:  Low  II.  Data: Moderate  III.  Risk of morbidity: Moderate    Please don't hesitate to contact our office with any concerns or questions.  -Margarita Garcia MD

## 2025-02-18 NOTE — PROGRESS NOTES
L&D Triage Note - OB/GYN  Karena Garcia 34 y.o. female MRN: [id number]  Unit/Bed#: LD PACU-01 Encounter: [id number]      ASSESSMENT:    Karena Garcia is a 34 y.o.  at 35w6d presenting with ***.     PLAN:    1) ***  - ***  - ***    ***) *** weeks gestation of pregnancy  - Continue routine prenatal care  - Discharge from OB triage with pre***term*** labor precautions    - Reviewed rupture of membranes, false vs true labor, decreased fetal movement, and vaginal bleeding   - Pt to call provider with any concerns and follow up at her next scheduled prenatal appointment    - Case discussed with  ***    SUBJECTIVE:    Karena Garcia 34 y.o.  at 35w6d with an Estimated Date of Delivery: 3/19/25 who presents to triage for ***.  She otherwise denies contractions***, leakage of fluid***, vaginal bleeding***, and decreased fetal movement***.    Her current obstetrical history is significant for ***    Her past obstetrical history is significant for ***    OBJECTIVE:    There were no vitals filed for this visit.    ROS:  Constitutional: Negative***  Respiratory: Negative***  Cardiovascular: Negative***    Gastrointestinal: Negative***    General Physical Exam:  General: Well appearing, no distress  Respiratory: Unlabored breathing  Cardiovascular: Regular rate.  Abdomen: Soft, gravid, nontender  Fundal Height: Appropriate for gestational age.  Extremities: Warm and well perfused.  Non tender.      Cervical Exam  Speculum: Cervical os is ***  SVE: ***    FETAL ASSESSMENT:  Fetal heart rate:    [place]:          Dayna Mcdonald MD  2025  12:45 PM

## 2025-02-18 NOTE — ASSESSMENT & PLAN NOTE
Presented to triage after being sent from [facility] for fetal tachycardia. Monitored for >1hr in triage; received 1L LR bolus. Fetal heart rate normalized to baseline of 155 bpm; tracing reactive. Fetal status overall reassuring at this time  - continue routine prenatal care  - return precautions given

## 2025-02-19 NOTE — ASSESSMENT & PLAN NOTE
- Continue PNV  - Labor precautions reviewed  - Fetal kick counts reviewed  Labs UTD Pt w GDM  Pap smear: 09/10/2024   WNL + non 16/18 HPV   GC/CT:  negative   Prenatal Panel: normal   Blood Type: A+ RhoGam  indicated   GBS: collected 25   Genetic Testing: AFP +, NIPS normal   Tdap: done previously  Flu: 24   Birth Plan:  + epidural - possible 38 weeks IOL for uncontrolled DM. Pending hgbA1c results. Encouraged to complete.   Delivery consent - signed 25  Feeding Plan: breast  Breast pump: ordered previously  Pediatrician: will establish  Contraception: undecided  Ultrasounds: 25 EFW 86th%. 2/15/25 - EFW90%.   twice weekly APFS at 32 weeks due to GDM

## 2025-02-19 NOTE — PROGRESS NOTES
OB/GYN  PN Visit  Karena Garcia  [id number]  2025  12:22 PM  AJAY Bradley    S: 34 y.o.  36w2d here for PN visit.   She denies leakage of fluid and vaginal bleeding. (+) valentin vieira for the past month and a half. States abdominal cramping woke her up out of sleep this morning.   She endorses good fetal movement.   She denies nausea, vomiting, headache, and smoking.   Her pregnancy is complicated by A2GDM, nephrolithiasis, BMI of 48 and suspected fetal macrosomia.   She reports examination on LD on 25 for fetal tachycardia, D/C home after IVF and normalized FHR    O:  Pre-Myra Vitals      Flowsheet Row Most Recent Value   Prenatal Assessment    Fetal Heart Rate 180   Fundal Height (cm) 38 cm   Movement Present   Prenatal Vitals    Blood Pressure 126/86   Weight - Scale 120 kg (265 lb)   Urine Albumin/Glucose    Dilation/Effacement/Station    Vaginal Drainage    Edema           Xy=643 kg (265 lb); Body mass index is 48.47 kg/m².; TWG=13.6 kg (30 lb)  Physical Exam    General: Well appearing, no distress  Respiratory: Unlabored breathing  Abdomen: Soft, gravid, nontender  Fundal Height: Appropriate for gestational age.   Extremities: Warm and well perfused.  Non tender.  OB exam completed: fundal height, +FHT.  Urine: 30 protein/-     A/P:    Problem List Items Addressed This Visit       Insulin controlled gestational diabetes mellitus (GDM) in third trimester    Continue monitoring using dexacom   Biweekly NST and weekly SKY          Class 3 severe obesity due to excess calories with serious comorbidity and body mass index (BMI) of 40.0 to 44.9 in adult (HCC)    -Pre-pregnancy weight 235 lbs.  -Current weight 236 lbs.  -Recommended weight gain 11 to 20 lbs.  -Continue GDM meal and follow up with dietitian as needed.             BMI 40.0-44.9, adult (HCC)    Abnormal MSAFP (maternal serum alpha-fetoprotein), elevated - Primary    2.95 MOM  Normal anatomy scan          36 weeks  gestation of pregnancy    - Continue PNV  - Labor precautions reviewed  - Fetal kick counts reviewed  Labs UTD Pt w GDM  Pap smear: 09/10/2024   WNL + non 16/18 HPV   GC/CT:  negative   Prenatal Panel: normal   Blood Type: A+ RhoGam  indicated   GBS: collected 25   Genetic Testing: AFP +, NIPS normal   Tdap: done previously  Flu: 24   Birth Plan:  + epidural - possible 38 weeks IOL for uncontrolled DM. Pending hgbA1c results. Encouraged to complete.   Delivery consent - signed 25  Feeding Plan: breast  Breast pump: ordered previously  Pediatrician: will establish  Contraception: undecided  Ultrasounds: 25 EFW 86th%. 2/15/25 - EFW90%.   twice weekly APFS at 32 weeks due to GDM         Relevant Orders    Strep B DNA probe, amplification    Prenatal care, second trimester    Fetal macrosomia affecting management of mother, antepartum    25 - EFW 3283g 90%          #2 - per MFM report, advised to complete hemoglobin A1C to determine appropriate delivery timing. Possible 38 week induction due to uncontrolled diabetes. Encouraged patient to complete. Encouraged to report dexcom readings to diabetes educator.     #3- will continue biweekly NST and weekly SKY.     #4 - initial FHR at 180 on doppler, placed on NST. Baseline at 170, reactive, (+) accelerations, one variable wisam >90. Discussed with Dr Medel and advised evaluation in LD triage for fetal tachycardia.     AJAY Bradley  2025  12:22 PM

## 2025-02-20 ENCOUNTER — ROUTINE PRENATAL (OUTPATIENT)
Facility: HOSPITAL | Age: 35
End: 2025-02-20
Payer: COMMERCIAL

## 2025-02-20 VITALS
BODY MASS INDEX: 48.84 KG/M2 | HEART RATE: 93 BPM | WEIGHT: 265.4 LBS | DIASTOLIC BLOOD PRESSURE: 80 MMHG | HEIGHT: 62 IN | SYSTOLIC BLOOD PRESSURE: 122 MMHG

## 2025-02-20 DIAGNOSIS — Z3A.36 36 WEEKS GESTATION OF PREGNANCY: Primary | ICD-10-CM

## 2025-02-20 DIAGNOSIS — O24.414 INSULIN CONTROLLED GESTATIONAL DIABETES MELLITUS (GDM) IN THIRD TRIMESTER: ICD-10-CM

## 2025-02-20 PROCEDURE — 59025 FETAL NON-STRESS TEST: CPT | Performed by: OBSTETRICS & GYNECOLOGY

## 2025-02-21 ENCOUNTER — ROUTINE PRENATAL (OUTPATIENT)
Dept: OBGYN CLINIC | Facility: CLINIC | Age: 35
End: 2025-02-21

## 2025-02-21 ENCOUNTER — HOSPITAL ENCOUNTER (INPATIENT)
Facility: HOSPITAL | Age: 35
LOS: 4 days | Discharge: HOME | End: 2025-02-27
Attending: STUDENT IN AN ORGANIZED HEALTH CARE EDUCATION/TRAINING PROGRAM | Admitting: OBSTETRICS & GYNECOLOGY
Payer: COMMERCIAL

## 2025-02-21 VITALS — SYSTOLIC BLOOD PRESSURE: 126 MMHG | BODY MASS INDEX: 48.47 KG/M2 | DIASTOLIC BLOOD PRESSURE: 86 MMHG | WEIGHT: 265 LBS

## 2025-02-21 DIAGNOSIS — O14.93 PRE-ECLAMPSIA IN THIRD TRIMESTER: ICD-10-CM

## 2025-02-21 DIAGNOSIS — O24.414 INSULIN CONTROLLED GESTATIONAL DIABETES MELLITUS (GDM) IN THIRD TRIMESTER: ICD-10-CM

## 2025-02-21 DIAGNOSIS — Z3A.34 34 WEEKS GESTATION OF PREGNANCY: ICD-10-CM

## 2025-02-21 DIAGNOSIS — O36.8390 FETAL TACHYCARDIA AFFECTING MANAGEMENT OF MOTHER: Primary | ICD-10-CM

## 2025-02-21 DIAGNOSIS — Z98.891 S/P CESAREAN SECTION: ICD-10-CM

## 2025-02-21 DIAGNOSIS — E66.01 CLASS 3 SEVERE OBESITY DUE TO EXCESS CALORIES WITH SERIOUS COMORBIDITY AND BODY MASS INDEX (BMI) OF 40.0 TO 44.9 IN ADULT (HCC): ICD-10-CM

## 2025-02-21 DIAGNOSIS — Z34.92 PRENATAL CARE, SECOND TRIMESTER: ICD-10-CM

## 2025-02-21 DIAGNOSIS — O61.9: ICD-10-CM

## 2025-02-21 DIAGNOSIS — Z3A.36 36 WEEKS GESTATION OF PREGNANCY: ICD-10-CM

## 2025-02-21 DIAGNOSIS — E66.813 CLASS 3 SEVERE OBESITY DUE TO EXCESS CALORIES WITH SERIOUS COMORBIDITY AND BODY MASS INDEX (BMI) OF 40.0 TO 44.9 IN ADULT (HCC): ICD-10-CM

## 2025-02-21 DIAGNOSIS — O28.0 ABNORMAL MSAFP (MATERNAL SERUM ALPHA-FETOPROTEIN), ELEVATED: Primary | ICD-10-CM

## 2025-02-21 DIAGNOSIS — O36.60X0 FETAL MACROSOMIA AFFECTING MANAGEMENT OF MOTHER, ANTEPARTUM: ICD-10-CM

## 2025-02-21 DIAGNOSIS — O14.13 SEVERE PRE-ECLAMPSIA IN THIRD TRIMESTER: ICD-10-CM

## 2025-02-21 LAB
ABO GROUP BLD: NORMAL
APTT PPP: 23 SECONDS (ref 23–34)
BASOPHILS # BLD AUTO: 0.05 THOUSANDS/ΜL (ref 0–0.1)
BASOPHILS NFR BLD AUTO: 1 % (ref 0–1)
BLD GP AB SCN SERPL QL: NEGATIVE
EOSINOPHIL # BLD AUTO: 0.06 THOUSAND/ΜL (ref 0–0.61)
EOSINOPHIL NFR BLD AUTO: 1 % (ref 0–6)
ERYTHROCYTE [DISTWIDTH] IN BLOOD BY AUTOMATED COUNT: 13.3 % (ref 11.6–15.1)
EST. AVERAGE GLUCOSE BLD GHB EST-MCNC: 171 MG/DL
FIBRINOGEN PPP-MCNC: 564 MG/DL (ref 206–523)
GLUCOSE SERPL-MCNC: 130 MG/DL (ref 65–140)
GLUCOSE SERPL-MCNC: 138 MG/DL (ref 65–140)
GLUCOSE SERPL-MCNC: 169 MG/DL (ref 65–140)
HBA1C MFR BLD: 7.6 %
HCT VFR BLD AUTO: 39.8 % (ref 34.8–46.1)
HGB BLD-MCNC: 13.2 G/DL (ref 11.5–15.4)
HOLD SPECIMEN: NORMAL
IMM GRANULOCYTES # BLD AUTO: 0.03 THOUSAND/UL (ref 0–0.2)
IMM GRANULOCYTES NFR BLD AUTO: 0 % (ref 0–2)
INR PPP: 0.89 (ref 0.85–1.19)
LYMPHOCYTES # BLD AUTO: 3.28 THOUSANDS/ΜL (ref 0.6–4.47)
LYMPHOCYTES NFR BLD AUTO: 33 % (ref 14–44)
MCH RBC QN AUTO: 29.5 PG (ref 26.8–34.3)
MCHC RBC AUTO-ENTMCNC: 33.2 G/DL (ref 31.4–37.4)
MCV RBC AUTO: 89 FL (ref 82–98)
MONOCYTES # BLD AUTO: 0.39 THOUSAND/ΜL (ref 0.17–1.22)
MONOCYTES NFR BLD AUTO: 4 % (ref 4–12)
NEUTROPHILS # BLD AUTO: 6.14 THOUSANDS/ΜL (ref 1.85–7.62)
NEUTS SEG NFR BLD AUTO: 61 % (ref 43–75)
NRBC BLD AUTO-RTO: 0 /100 WBCS
PLATELET # BLD AUTO: 291 THOUSANDS/UL (ref 149–390)
PMV BLD AUTO: 11.9 FL (ref 8.9–12.7)
PROTHROMBIN TIME: 12.8 SECONDS (ref 12.3–15)
RBC # BLD AUTO: 4.48 MILLION/UL (ref 3.81–5.12)
RH BLD: POSITIVE
SPECIMEN EXPIRATION DATE: NORMAL
TSH SERPL DL<=0.05 MIU/L-ACNC: 1.52 UIU/ML (ref 0.45–4.5)
WBC # BLD AUTO: 9.95 THOUSAND/UL (ref 4.31–10.16)

## 2025-02-21 PROCEDURE — 84443 ASSAY THYROID STIM HORMONE: CPT

## 2025-02-21 PROCEDURE — NC001 PR NO CHARGE: Performed by: OBSTETRICS & GYNECOLOGY

## 2025-02-21 PROCEDURE — PNV

## 2025-02-21 PROCEDURE — 82948 REAGENT STRIP/BLOOD GLUCOSE: CPT

## 2025-02-21 PROCEDURE — 86780 TREPONEMA PALLIDUM: CPT

## 2025-02-21 PROCEDURE — 86901 BLOOD TYPING SEROLOGIC RH(D): CPT

## 2025-02-21 PROCEDURE — 86850 RBC ANTIBODY SCREEN: CPT

## 2025-02-21 PROCEDURE — 59025 FETAL NON-STRESS TEST: CPT | Performed by: OBSTETRICS & GYNECOLOGY

## 2025-02-21 PROCEDURE — G0463 HOSPITAL OUTPT CLINIC VISIT: HCPCS

## 2025-02-21 PROCEDURE — 85730 THROMBOPLASTIN TIME PARTIAL: CPT

## 2025-02-21 PROCEDURE — 4A1HXCZ MONITORING OF PRODUCTS OF CONCEPTION, CARDIAC RATE, EXTERNAL APPROACH: ICD-10-PCS | Performed by: STUDENT IN AN ORGANIZED HEALTH CARE EDUCATION/TRAINING PROGRAM

## 2025-02-21 PROCEDURE — 85610 PROTHROMBIN TIME: CPT

## 2025-02-21 PROCEDURE — 99254 IP/OBS CNSLTJ NEW/EST MOD 60: CPT | Performed by: OBSTETRICS & GYNECOLOGY

## 2025-02-21 PROCEDURE — 85384 FIBRINOGEN ACTIVITY: CPT

## 2025-02-21 PROCEDURE — 87150 DNA/RNA AMPLIFIED PROBE: CPT

## 2025-02-21 PROCEDURE — 83036 HEMOGLOBIN GLYCOSYLATED A1C: CPT | Performed by: STUDENT IN AN ORGANIZED HEALTH CARE EDUCATION/TRAINING PROGRAM

## 2025-02-21 PROCEDURE — 86900 BLOOD TYPING SEROLOGIC ABO: CPT

## 2025-02-21 PROCEDURE — 99214 OFFICE O/P EST MOD 30 MIN: CPT

## 2025-02-21 PROCEDURE — 85025 COMPLETE CBC W/AUTO DIFF WBC: CPT

## 2025-02-21 PROCEDURE — 99222 1ST HOSP IP/OBS MODERATE 55: CPT | Performed by: STUDENT IN AN ORGANIZED HEALTH CARE EDUCATION/TRAINING PROGRAM

## 2025-02-21 RX ORDER — INSULIN LISPRO 100 [IU]/ML
10 INJECTION, SOLUTION INTRAVENOUS; SUBCUTANEOUS
Status: DISCONTINUED | OUTPATIENT
Start: 2025-02-22 | End: 2025-02-23

## 2025-02-21 RX ORDER — INSULIN LISPRO 100 [IU]/ML
50 INJECTION, SOLUTION INTRAVENOUS; SUBCUTANEOUS
Status: DISCONTINUED | OUTPATIENT
Start: 2025-02-22 | End: 2025-02-23

## 2025-02-21 RX ORDER — INSULIN LISPRO 100 [IU]/ML
18 INJECTION, SOLUTION INTRAVENOUS; SUBCUTANEOUS
Status: DISCONTINUED | OUTPATIENT
Start: 2025-02-22 | End: 2025-02-23

## 2025-02-21 RX ORDER — ONDANSETRON 2 MG/ML
4 INJECTION INTRAMUSCULAR; INTRAVENOUS EVERY 8 HOURS PRN
Status: DISCONTINUED | OUTPATIENT
Start: 2025-02-21 | End: 2025-02-24

## 2025-02-21 RX ORDER — INSULIN LISPRO 100 [IU]/ML
49 INJECTION, SOLUTION INTRAVENOUS; SUBCUTANEOUS
Status: DISCONTINUED | OUTPATIENT
Start: 2025-02-21 | End: 2025-02-21

## 2025-02-21 RX ORDER — INSULIN LISPRO 100 [IU]/ML
10 INJECTION, SOLUTION INTRAVENOUS; SUBCUTANEOUS ONCE
Status: COMPLETED | OUTPATIENT
Start: 2025-02-21 | End: 2025-02-21

## 2025-02-21 RX ORDER — INSULIN GLARGINE 100 [IU]/ML
38 INJECTION, SOLUTION SUBCUTANEOUS
Status: DISCONTINUED | OUTPATIENT
Start: 2025-02-21 | End: 2025-02-23

## 2025-02-21 RX ORDER — FAMOTIDINE 20 MG/1
20 TABLET, FILM COATED ORAL DAILY
Status: DISCONTINUED | OUTPATIENT
Start: 2025-02-22 | End: 2025-02-23

## 2025-02-21 RX ADMIN — INSULIN LISPRO 10 UNITS: 100 INJECTION, SOLUTION INTRAVENOUS; SUBCUTANEOUS at 15:45

## 2025-02-21 RX ADMIN — SODIUM CHLORIDE, SODIUM LACTATE, POTASSIUM CHLORIDE, AND CALCIUM CHLORIDE 1000 ML: .6; .31; .03; .02 INJECTION, SOLUTION INTRAVENOUS at 13:37

## 2025-02-21 RX ADMIN — INSULIN LISPRO 49 UNITS: 100 INJECTION, SOLUTION INTRAVENOUS; SUBCUTANEOUS at 19:46

## 2025-02-21 RX ADMIN — INSULIN GLARGINE 38 UNITS: 100 INJECTION, SOLUTION SUBCUTANEOUS at 22:20

## 2025-02-21 NOTE — H&P
H&P Exam - Obstetrics   Karena Garcia 34 y.o. female MRN: [id number]  Unit/Bed#: LD TRIAGE  Encounter: [id number]      ASSESSMENT:  35yo  at 36w2d weeks gestation who is being admitted for observation in the setting of fetal tachycardia.  EFW: 90% at 36w5d    PLAN:  Fetal macrosomia affecting management of mother, antepartum  Assessment & Plan  @35w6d  AC             34.46 cm        38 weeks 3 days* (98%)  BPD             9.18 cm        37 weeks 2 days* (89%)  HC             32.96 cm        37 weeks 4 days* (60%)  Femur           7.05 cm        36 weeks 1 day * (52%)     EFW [place] 4   3263 grams - 7 lbs 3 oz                 (90%)    Non-reassuring fetal heart rate or rhythm affecting management of mother  Assessment & Plan  Fetal tachycardia noted on outpatient NST  with baseline 170 bpm; she was seen in triage for extended monitoring and baseline came down to 150-155 bpm with 1L fluid bolus. She presented for routine outpatient PNV  and fetal tachycardia was again noted with baseline 170 bpm.   In triage, baseline returned to 150-155 bpm with moderate variability and accelerations after a 1L IVF bolus.     Plan:  - Admit for observation  - Follow up admission CBC, T&S, RPR  - Follow up fibrinogen, PT, PTT  - Continuous fetal monitoring   - MFM consult, appreciate recommendations    36 weeks gestation of pregnancy  Assessment & Plan  Continue routine prenatal care.     Insulin controlled gestational diabetes mellitus (GDM) in third trimester  Assessment & Plan  Continue home regimen: 18u at breakfast, 10u at lunch, 49u with dinner and Lantus 38u qHS  Monitor blood glucose: fasting and 2h postprandial   Diabetic diet ordered        Discussed with Dr. Medel      This patient will be an OBSERVATION and I certify the anticipated length of stay is <2 Midnights.      History of Present Illness     Chief Complaint: Observation    HPI:  Karena Garcia is a 34 y.o.  female with an CABRERA of  3/19/2025, by Ultrasound at 36w2d weeks gestation who is being admitted for observation in the setting of fetal tachycardia. This is her second presentation for fetal tachycardia in the last week. She denies abdominal pain, vaginal bleeding, dysuria, hematuria. She denies fever, chills, SOB.     Contractions: occasional  Loss of fluid: denies  Vaginal bleeding: denies  Fetal movement: present      OB History    Para Term  AB Living   3 0 0 0 2 0   SAB IAB Ectopic Multiple Live Births   2 0 0 0 0      # Outcome Date GA Lbr [place]/2nd Weight Sex Type Anes PTL Lv   3 Current            2 2016     Biochemical      1 2014     Biochemical          Review of Systems   Constitutional:  Negative for chills and fever.   Respiratory:  Negative for cough and shortness of breath.    Cardiovascular:  Negative for chest pain and palpitations.   Gastrointestinal:  Negative for abdominal pain and nausea.   Genitourinary:  Negative for dysuria and vaginal bleeding.         Historical Information   Past Medical History:   Diagnosis Date    Female infertility     Kidney stone     Varicella     vaccinated     Past Surgical History:   Procedure Laterality Date    KS CYSTO/URETERO W/LITHOTRIPSY &INDWELL STENT INSRT Right 10/24/2018    Procedure: CYSTOSCOPY URETEROSCOPY WITH RETROGRADE PYELOGRAM AND INSERTION STENT URETERAL;  Surgeon: Gregory Goodman MD;  Location: [facility];  Service: Urology    KS LITHOTRIPSY XTRCORP SHOCK WAVE Right 2018    Procedure: LITHROTRIPSY EXTRACORPORAL SHOCKWAVE (ESWL);  Surgeon: Gregory Goodman MD;  Location: [facility];  Service: Urology     Social History   Social History     Substance and Sexual Activity   Alcohol Use Not Currently    Comment: occasional     Social History     Substance and Sexual Activity   Drug Use No     Social History     Tobacco Use   Smoking Status Former    Current packs/day: 0.25    Average packs/day: 0.3 packs/day for 4.0 years (1.0 ttl pk-yrs)    Types:  "Cigarettes    Passive exposure: Never   Smokeless Tobacco Former    Quit date: 5/4/2016   Tobacco Comments    Weaning prior to pregnancy, quit with +UPT 6/2024     Family History: Family history non-contributory    Meds/Allergies      Medications Prior to Admission:     Continuous Glucose Sensor (Dexcom G7 Sensor)    Cyanocobalamin (VITAMIN B-12 PO)    Famotidine (PEPCID PO)    insulin lispro (HumaLOG) 100 units/mL injection pen    Insulin Pen Needle (BD Pen Needle Kirsten U/F) 32G X 4 MM MISC    Lancets (OneTouch Delica Plus [id number]) MISC    Prenatal MV-Min-Fe Fum-FA-DHA (PRENATAL 1 PO)    Semglee, yfgn, 100 UNIT/ML SOPN    BABY ASPIRIN PO    Blood Glucose Monitoring Suppl (OneTouch Verio Reflect) w/Device KIT    cyclobenzaprine (FLEXERIL) 10 mg tablet    glucose blood (OneTouch Verio) test strip    ondansetron (ZOFRAN) 4 mg tablet     Allergies   Allergen Reactions    Pollen Extract Sneezing       OBJECTIVE:    Vitals: Blood pressure 122/72, pulse 91, temperature 98 °F (36.7 °C), temperature source Oral, resp. rate 16, height 5' 2\" (1.575 m), weight 120 kg (265 lb), last menstrual period 06/05/2024, SpO2 96%, not currently breastfeeding.Body mass index is 48.47 kg/m².    Physical Exam  Vitals reviewed. Exam conducted with a chaperone present.   Constitutional:       General: She is not in acute distress.     Appearance: Normal appearance.   HENT:      Head: Normocephalic and atraumatic.   Eyes:      Extraocular Movements: Extraocular movements intact.   Cardiovascular:      Rate and Rhythm: Normal rate.   Pulmonary:      Effort: Pulmonary effort is normal. No respiratory distress.   Abdominal:      Tenderness: There is no abdominal tenderness. There is no guarding or rebound.      Comments: gravid   Skin:     General: Skin is warm and dry.   Neurological:      Mental Status: She is alert and oriented to person, place, and time.   Psychiatric:         Mood and Affect: Mood normal.         Behavior: Behavior normal. "       Fetal heart rate:   Baseline Rate (FHR): 155 bpm  Variability: Moderate  Accelerations: 15 x 15 or greater, With fetal movement, Present  Decelerations: None  FHR Category: Category I    [place]:   Contraction Frequency (minutes): 0    GBS: unknown, collected 2/21/2025    Prenatal Labs:   Blood Type:   Lab Results   Component Value Date/Time    ABO Grouping A 07/21/2024 04:06 PM      D (Rh type):   Lab Results   Component Value Date/Time    Rh Factor Positive 07/21/2024 04:06 PM      Antibody Screen: pending 2/21/2025    HCT/HGB:   Lab Results   Component Value Date/Time    Hematocrit 36.2 12/31/2024 10:25 AM    Hemoglobin 12.1 12/31/2024 10:25 AM     Platelets:   Lab Results   Component Value Date/Time    Platelets 380 12/31/2024 10:25 AM      1 hour Glucola:   Lab Results   Component Value Date/Time    Glucose 217 (H) 08/20/2024 10:14 AM     Rubella:   Lab Results   Component Value Date/Time    Rubella IgG Quant 24.0 08/20/2024 10:14 AM     RPR: nonreactive    Hep B:   Lab Results   Component Value Date/Time    Hepatitis B Surface Ag Non-reactive 08/20/2024 10:14 AM      Hep C:   Lab Results   Component Value Date/Time    Hepatitis C Ab Non-reactive 08/20/2024 10:14 AM      HIV: nonreactive    Chlamydia: negative    Gonorrhea:   Lab Results   Component Value Date/Time    N gonorrhoeae, DNA Probe Negative 09/10/2024 04:12 PM         Invasive Devices       Peripheral Intravenous Line  Duration             Peripheral IV 02/21/25 Proximal;Right;Ventral (anterior) Forearm <1 day                  Margarita Araujo MD  OBGYN, PGY-I  02/21/25  3:59 PM

## 2025-02-21 NOTE — ASSESSMENT & PLAN NOTE
Suboptimal glycemic control on home regimen. Her current regimen is Semglee 52 units nightly and Humalog 10/18/48 units with meals (which was increased as recently as 2/18/25). She still is having post-dinner hyperglycemia, so dinner dose of short-acting insulin was increased to 50 units. She reports her CGM has been reporting lows overnight, has her sensor in place currently, which is calibrating. 3am glucose ordered to monitor for overnight hypoglycemia. We discussed that she will likely need an insulin drip in labor.

## 2025-02-21 NOTE — ASSESSMENT & PLAN NOTE
Labs UTD Pt w GDM  Pap smear: 09/10/2024   WNL + non 16/18 HPV   GC/CT:  negative   Prenatal Panel: normal   Blood Type: A+ RhoGam  indicated   GBS: collected 25  Genetic Testing: AFP +, NIPS normal   Tdap: done   Flu: 24   Birth Plan:  + epidural   Delivery consent - signed 25  Feeding Plan: breast  Breast pump: ordered previously  Pediatrician: will establish  Contraception: undecided

## 2025-02-21 NOTE — ASSESSMENT & PLAN NOTE
Unclear etiology for fetal tachycardia, no evidence of dehydration, ruptured membranes, intrauterine infection.  Presence of moderate variability and accelerations are reassuring regarding fetal status currently  TSH wnl  K-B to be collected  IV fluid resuscitation stopped  Continuous fetal monitoring.   If persistent fetal tachycardia will move towards delivery.

## 2025-02-21 NOTE — CONSULTS
Consultation - Maternal Fetal Medicine   Karena Garcia 34 y.o. female MRN: [id number]  Unit/Bed#: -01 Encounter: [id number]  Admit date: 2025.  Today's date: 25    Assessment & Plan   Ms. Garcia is a 34 y.o. year-old  at 36w2d, Hospital Day: 1, admitted for fetal tachycardia.  By issue:    Fetal tachycardia affecting management of mother  Assessment & Plan  Unclear etiology for fetal tachycardia, no evidence of dehydration, ruptured membranes, intrauterine infection. I would not give further IV fluid resuscitation at this time. Agree with admission for continuous fetal monitoring. If persistent fetal tachycardia, I would recommend delivery. Presence of moderate variability and accelerations are reassuring regarding fetal status currently. I recommended checking TSH and K-B to evaluate for potential causes.    Insulin controlled gestational diabetes mellitus (GDM) in third trimester  Assessment & Plan  Suboptimal glycemic control on home regimen. Her current regimen is Semglee 52 units nightly and Humalog 10/18/48 units with meals (which was increased as recently as 25). She still is having post-dinner hyperglycemia, so dinner dose of short-acting insulin was increased to 50 units. She reports her CGM has been reporting lows overnight, has her sensor in place currently, which is calibrating. 3am glucose ordered to monitor for overnight hypoglycemia. We discussed that she will likely need an insulin drip in labor.              Chief Complaint   Patient presents with    tachy     Fetal tachycardia in OB office       Physician Requesting Consult: Taniya Medel MD  Reason for Consult / Principal Problem:  fetal tachycardia  Subspeciality: Perinatology    Dear Dr. Araujo,    Thank you for referring patient Karena Garcia for Maternal-Fetal Medicine consultation regarding fetal tachycardia. She was also seen in triage 25 for this indication with resolution after IV hydration. She  denies any fevers, abdominal pain, leakage of fluid, and reports active fetal movement. She admits glycemic control has been sub-optimal.    Other obstetric review of symptoms:  Contractions: None.    Leakage of fluid: None.    Bleeding: None.    Fetal movement: present.      Review of Systems   Constitutional:  Negative for fever.   HENT:  Negative for congestion, sinus pain and sore throat.    Respiratory:  Negative for cough.    Cardiovascular:  Negative for chest pain.   Gastrointestinal:  Negative for abdominal pain, diarrhea and vomiting.   Genitourinary:  Negative for vaginal bleeding.       Other history is as follows:    Historical Information   OB History    Para Term  AB Living   3 0 0 0 2 0   SAB IAB Ectopic Multiple Live Births   2 0 0 0 0      # Outcome Date GA Lbr Edinson/2nd Weight Sex Type Anes PTL Lv   3 Current            2 2016     Biochemical      1 2014     Biochemical        Gynecologic history: Patient's last menstrual period was 2024 (exact date).     Past Medical History:   Diagnosis Date    Female infertility     Kidney stone     Varicella     vaccinated     Past Surgical History:   Procedure Laterality Date    [place] CYSTO/URETERO W/LITHOTRIPSY &INDWELL STENT INSRT Right 10/24/2018    Procedure: CYSTOSCOPY URETEROSCOPY WITH RETROGRADE PYELOGRAM AND INSERTION STENT URETERAL;  Surgeon: Gregory Goodman MD;  Location: [facility];  Service: Urology    [place] LITHOTRIPSY XTRCORP SHOCK WAVE Right 2018    Procedure: LITHROTRIPSY EXTRACORPORAL SHOCKWAVE (ESWL);  Surgeon: Gregory Goodman MD;  Location: [facility];  Service: Urology     Social History   Social History     Substance and Sexual Activity   Alcohol Use Not Currently    Comment: occasional     Social History     Substance and Sexual Activity   Drug Use No     Social History     Tobacco Use   Smoking Status Former    Current packs/day: 0.25    Average packs/day: 0.3 packs/day for 4.0 years (1.0 ttl pk-yrs)    Types:  Cigarettes    Passive exposure: Never   Smokeless Tobacco Former    Quit date: 5/4/2016   Tobacco Comments    Weaning prior to pregnancy, quit with +UPT 6/2024     Family History:     Meds/Allergies   Prior to Admission Medications   Prescriptions Last Dose Informant Patient Reported? Taking?   BABY ASPIRIN PO   Yes No   Sig: Take 162 mg by mouth daily at bedtime   Patient not taking: Reported on 2/21/2025   Blood Glucose Monitoring Suppl (OneTouch Verio Reflect) w/Device KIT   No No   Sig: Dispense 1 kit per insurance company. GDM.   Continuous Glucose Sensor (Dexcom G7 Sensor) 2/21/2025  No Yes   Sig: Use 1 Device every 10 days Use for continuous blood glucose monitoring, replace every 10 days.   Cyanocobalamin (VITAMIN B-12 PO) 2/21/2025 at 10:00 AM  Yes Yes   Sig: Take by mouth   Famotidine (PEPCID PO) 2/21/2025 at 10:00 AM  Yes Yes   Sig: Take by mouth   Insulin Pen Needle (BD Pen Needle Kirsten U/F) 32G X 4 MM MISC 2/21/2025 at  9:30 AM  No Yes   Sig: Use 1 pen needle with each insulin pen injection. GDM.   Lancets (OneTouch Delica Plus [id number]) MISC 2/21/2025 at  9:30 AM  No Yes   Sig: Use 4 a day. GDM.   Prenatal MV-Min-Fe Fum-FA-DHA (PRENATAL 1 PO) 2/21/2025 Morning  Yes Yes   Sig: Take by mouth in the morning   Semrose, yfgn, 100 UNIT/ML SOPN 2/20/2025 at  9:00 PM  No Yes   Sig: INJECT 0.38 ML(38 UNITS TOTAL)UNDER THE SKIN DAILY AT BEDTIME 9-10 PM DAILY. TITRATE AS DIRECTED GDM   cyclobenzaprine (FLEXERIL) 10 mg tablet   No No   Sig: Take 1 tablet (10 mg total) by mouth every 8 (eight) hours as needed for muscle spasms   Patient not taking: Reported on 2/21/2025   glucose blood (OneTouch Verio) test strip   No No   Sig: Test 4 times a day. GDM.   insulin lispro (HumaLOG) 100 units/mL injection pen 2/21/2025 Morning  No Yes   Sig: INJECT SC 18 units before breakfast, 12 u before lunch and 38 u before dinner.TO BE TITRATED.   Patient taking differently: INJECT SC 18 units before breakfast, 10 u before lunch  "and 49 u before dinner.TO BE TITRATED.   ondansetron (ZOFRAN) 4 mg tablet Unknown  No No   Sig: Take 1 tablet (4 mg total) by mouth every 8 (eight) hours as needed for nausea or vomiting      Facility-Administered Medications: None     Medication Administration - last 24 hours from 02/20/2025 1821 to 02/21/2025 1821         Date/Time Order Dose Route Action Action by     02/21/2025 1435 EST lactated ringers bolus 1,000 mL 0 mL Intravenous Stopped Hafsa Smith RN     02/21/2025 1337 EST lactated ringers bolus 1,000 mL 1,000 mL Intravenous New Bag Hafsa Smith RN     02/21/2025 1545 EST insulin lispro (HumALOG/ADMELOG) 100 units/mL subcutaneous injection 10 Units 10 Units Subcutaneous Given Hafsa Smith RN            Current Facility-Administered Medications:     [START ON 2/22/2025] famotidine (PEPCID) tablet 20 mg, Daily    insulin glargine (LANTUS) subcutaneous injection 38 Units 0.38 mL, HS    [START ON 2/22/2025] insulin lispro (HumALOG/ADMELOG) 100 units/mL subcutaneous injection 10 Units, Daily With Lunch    [START ON 2/22/2025] insulin lispro (HumALOG/ADMELOG) 100 units/mL subcutaneous injection 18 Units, Daily With Breakfast    [START ON 2/22/2025] insulin lispro (HumALOG/ADMELOG) 100 units/mL subcutaneous injection 50 Units, Daily With Dinner    ondansetron (ZOFRAN) injection 4 mg, Q8H PRN  Allergies   Allergen Reactions    Pollen Extract Sneezing       Objective    Patient Vitals for the past 24 hrs:   BP Temp Temp src Pulse Resp SpO2 Height Weight   02/21/25 1658 128/78 (!) 97.4 °F (36.3 °C) Oral 92 18 98 % -- --   02/21/25 1246 122/72 98 °F (36.7 °C) Oral 91 16 96 % 5' 2\" (1.575 m) 120 kg (265 lb)   02/21/25 1244 -- -- -- 92 -- 95 % -- --   02/21/25 1241 122/72 -- -- 91 -- -- -- --     Vitals: Blood pressure 128/78, pulse 92, temperature (!) 97.4 °F (36.3 °C), temperature source Oral, resp. rate 18, height 5' 2\" (1.575 m), weight 120 kg (265 lb), last menstrual period 06/05/2024, SpO2 98%, not " currently breastfeeding.Body mass index is 48.47 kg/m².    Physical Exam  Gen: Well appearing, no distress  Head: Normocephalic atraumatic  Resp: Non-labored breathing    EFM: FHR 150s-160s, moderate variability, 15x15 accels, no decels  [place]: Irregular contractions    Prenatal Labs:   A+ Antibody negative  WBC 9.95, Hgb 13.2, Plt 291  Fibrinogen, coags normal   GBS in process  Results from last 7 days   Lab Units 02/21/25  1624   WBC Thousand/uL 9.95   TOTAL NEUT ABS Thousands/µL 6.14   HEMOGLOBIN g/dL 13.2   MCV fL 89   PLATELETS Thousands/uL 291         Results from last 7 days   Lab Units 02/21/25  1624   PLATELETS Thousands/uL 291   INR  0.89   PROTIME seconds 12.8   PTT seconds 23   FIBRINOGEN mg/dL 564*     Results from last 7 days   Lab Units 02/21/25  1742 02/21/25  1344   POC GLUCOSE mg/dl 169* 138                     MFM ultrasound report key findings: 2/18/25 date at 35w6d gestational age.  Vertex presentation  Possible macrosomia  Placenta Location = Anterior     MEASUREMENTS (* Included In Average GA)     AC             34.46 cm        38 weeks 3 days* (98%)  BPD             9.18 cm        37 weeks 2 days* (89%)  HC             32.96 cm        37 weeks 4 days* (60%)  Femur           7.05 cm        36 weeks 1 day * (52%)     EFW Hadlock 4   3263 grams - 7 lbs 3 oz                 (90%)     THE AVERAGE GESTATIONAL AGE is 37 weeks 2 days +/- 21 days.     AMNIOTIC FLUID     Q1: 5.2      Q2: 4.6      Q3: 6.0      Q4: 5.5  SKY Total = 21.3 cm  Amniotic Fluid: Normal    Marj Dietz MD  Attending Physician, Maternal-Fetal Medicine  I spent 55 minutes devoted to this consultation

## 2025-02-21 NOTE — ASSESSMENT & PLAN NOTE
Continue home regimen: 18u at breakfast, 10u at lunch, 49u with dinner and Lantus 38u qHS  Monitor blood glucose: fasting and 2h postprandial   Diabetic diet ordered  Continue to titrate as needed

## 2025-02-21 NOTE — ASSESSMENT & PLAN NOTE
@35w6d  AC             34.46 cm        38 weeks 3 days* (98%)  BPD             9.18 cm        37 weeks 2 days* (89%)  HC             32.96 cm        37 weeks 4 days* (60%)  Femur           7.05 cm        36 weeks 1 day * (52%)     EFW Hadlock 4   3263 grams - 7 lbs 3 oz                 (90%)

## 2025-02-21 NOTE — ASSESSMENT & PLAN NOTE
Fetal tachycardia noted on outpatient NST 2/18 with baseline 170 bpm; she was seen in triage for extended monitoring and baseline came down to 150-155 bpm with 1L fluid bolus. She presented for routine outpatient PNV 2/21 and fetal tachycardia was again noted with baseline 170 bpm.   In triage, baseline returned to 150-155 bpm with moderate variability and accelerations after a 1L IVF bolus. Etiology of fetal tachycardia unclear at this time.     Plan:  - Admit until delivery- now recommended at 37 weeks gestation unless persistently tachycardic at which point, we will proceed with delivery at that time.   - IOL scheduled for 2/26/25 @ 1500  - Admission labs WNL; PT/PTT, fibrinogen WNL, TSH WNL  - KB pending  - Will proceed now with intermittent monitoring (NST TID)

## 2025-02-22 PROBLEM — R03.0 ELEVATED BP WITHOUT DIAGNOSIS OF HYPERTENSION: Status: ACTIVE | Noted: 2025-02-22

## 2025-02-22 PROBLEM — R51.9 HEADACHE: Status: ACTIVE | Noted: 2025-02-22

## 2025-02-22 LAB
ALBUMIN SERPL BCG-MCNC: 3.2 G/DL (ref 3.5–5)
ALP SERPL-CCNC: 95 U/L (ref 34–104)
ALT SERPL W P-5'-P-CCNC: 7 U/L (ref 7–52)
ANION GAP SERPL CALCULATED.3IONS-SCNC: 10 MMOL/L (ref 4–13)
AST SERPL W P-5'-P-CCNC: 11 U/L (ref 13–39)
BILIRUB SERPL-MCNC: 0.31 MG/DL (ref 0.2–1)
BUN SERPL-MCNC: 11 MG/DL (ref 5–25)
CALCIUM ALBUM COR SERPL-MCNC: 9.1 MG/DL (ref 8.3–10.1)
CALCIUM SERPL-MCNC: 8.5 MG/DL (ref 8.4–10.2)
CHLORIDE SERPL-SCNC: 105 MMOL/L (ref 96–108)
CO2 SERPL-SCNC: 20 MMOL/L (ref 21–32)
CREAT SERPL-MCNC: 0.52 MG/DL (ref 0.6–1.3)
CREAT UR-MCNC: 151.6 MG/DL
ERYTHROCYTE [DISTWIDTH] IN BLOOD BY AUTOMATED COUNT: 13.2 % (ref 11.6–15.1)
FETAL RBC/1000 RBC BLD KLEIH BETKE-RTO: 0 % (ref 0–1)
GFR SERPL CREATININE-BSD FRML MDRD: 125 ML/MIN/1.73SQ M
GLUCOSE SERPL-MCNC: 100 MG/DL (ref 65–140)
GLUCOSE SERPL-MCNC: 109 MG/DL (ref 65–140)
GLUCOSE SERPL-MCNC: 113 MG/DL (ref 65–140)
GLUCOSE SERPL-MCNC: 126 MG/DL (ref 65–140)
GLUCOSE SERPL-MCNC: 78 MG/DL (ref 65–140)
GLUCOSE SERPL-MCNC: 82 MG/DL (ref 65–140)
GLUCOSE SERPL-MCNC: 85 MG/DL (ref 65–140)
HCT VFR BLD AUTO: 38.7 % (ref 34.8–46.1)
HGB BLD-MCNC: 12.7 G/DL (ref 11.5–15.4)
MCH RBC QN AUTO: 29.1 PG (ref 26.8–34.3)
MCHC RBC AUTO-ENTMCNC: 32.8 G/DL (ref 31.4–37.4)
MCV RBC AUTO: 89 FL (ref 82–98)
PLATELET # BLD AUTO: 290 THOUSANDS/UL (ref 149–390)
PMV BLD AUTO: 12.1 FL (ref 8.9–12.7)
POTASSIUM SERPL-SCNC: 3.3 MMOL/L (ref 3.5–5.3)
PROT SERPL-MCNC: 5.9 G/DL (ref 6.4–8.4)
PROT UR-MCNC: 54.1 MG/DL
PROT/CREAT UR: 0.4 MG/G{CREAT} (ref 0–0.1)
RBC # BLD AUTO: 4.36 MILLION/UL (ref 3.81–5.12)
SODIUM SERPL-SCNC: 135 MMOL/L (ref 135–147)
TREPONEMA PALLIDUM IGG+IGM AB [PRESENCE] IN SERUM OR PLASMA BY IMMUNOASSAY: NORMAL
WBC # BLD AUTO: 9.84 THOUSAND/UL (ref 4.31–10.16)

## 2025-02-22 PROCEDURE — 85027 COMPLETE CBC AUTOMATED: CPT

## 2025-02-22 PROCEDURE — 85460 HEMOGLOBIN FETAL: CPT

## 2025-02-22 PROCEDURE — 82570 ASSAY OF URINE CREATININE: CPT

## 2025-02-22 PROCEDURE — 80053 COMPREHEN METABOLIC PANEL: CPT

## 2025-02-22 PROCEDURE — 84156 ASSAY OF PROTEIN URINE: CPT

## 2025-02-22 PROCEDURE — 59025 FETAL NON-STRESS TEST: CPT | Performed by: OBSTETRICS & GYNECOLOGY

## 2025-02-22 PROCEDURE — 99232 SBSQ HOSP IP/OBS MODERATE 35: CPT | Performed by: OBSTETRICS & GYNECOLOGY

## 2025-02-22 PROCEDURE — 82948 REAGENT STRIP/BLOOD GLUCOSE: CPT

## 2025-02-22 PROCEDURE — NC001 PR NO CHARGE: Performed by: OBSTETRICS & GYNECOLOGY

## 2025-02-22 RX ORDER — POTASSIUM CHLORIDE 1500 MG/1
20 TABLET, EXTENDED RELEASE ORAL ONCE
Status: COMPLETED | OUTPATIENT
Start: 2025-02-22 | End: 2025-02-22

## 2025-02-22 RX ORDER — DIPHENHYDRAMINE HYDROCHLORIDE 50 MG/ML
25 INJECTION INTRAMUSCULAR; INTRAVENOUS ONCE
Status: COMPLETED | OUTPATIENT
Start: 2025-02-22 | End: 2025-02-22

## 2025-02-22 RX ORDER — MAGNESIUM SULFATE HEPTAHYDRATE 40 MG/ML
2 INJECTION, SOLUTION INTRAVENOUS ONCE
Status: COMPLETED | OUTPATIENT
Start: 2025-02-22 | End: 2025-02-22

## 2025-02-22 RX ORDER — METOCLOPRAMIDE HYDROCHLORIDE 5 MG/ML
10 INJECTION INTRAMUSCULAR; INTRAVENOUS ONCE
Status: COMPLETED | OUTPATIENT
Start: 2025-02-22 | End: 2025-02-22

## 2025-02-22 RX ORDER — POTASSIUM CHLORIDE 1500 MG/1
40 TABLET, EXTENDED RELEASE ORAL ONCE
Status: COMPLETED | OUTPATIENT
Start: 2025-02-22 | End: 2025-02-22

## 2025-02-22 RX ADMIN — INSULIN LISPRO 10 UNITS: 100 INJECTION, SOLUTION INTRAVENOUS; SUBCUTANEOUS at 11:37

## 2025-02-22 RX ADMIN — FAMOTIDINE 20 MG: 20 TABLET, FILM COATED ORAL at 22:12

## 2025-02-22 RX ADMIN — INSULIN GLARGINE 38 UNITS: 100 INJECTION, SOLUTION SUBCUTANEOUS at 22:45

## 2025-02-22 RX ADMIN — POTASSIUM CHLORIDE 20 MEQ: 1500 TABLET, EXTENDED RELEASE ORAL at 13:37

## 2025-02-22 RX ADMIN — POTASSIUM CHLORIDE 40 MEQ: 1500 TABLET, EXTENDED RELEASE ORAL at 13:37

## 2025-02-22 RX ADMIN — MAGNESIUM SULFATE IN WATER FOR 2 G: 40 INJECTION INTRAVENOUS at 05:39

## 2025-02-22 RX ADMIN — INSULIN LISPRO 18 UNITS: 100 INJECTION, SOLUTION INTRAVENOUS; SUBCUTANEOUS at 07:27

## 2025-02-22 RX ADMIN — INSULIN LISPRO 50 UNITS: 100 INJECTION, SOLUTION INTRAVENOUS; SUBCUTANEOUS at 17:55

## 2025-02-22 RX ADMIN — METOCLOPRAMIDE 10 MG: 5 INJECTION, SOLUTION INTRAMUSCULAR; INTRAVENOUS at 05:39

## 2025-02-22 RX ADMIN — DIPHENHYDRAMINE HYDROCHLORIDE 25 MG: 50 INJECTION, SOLUTION INTRAMUSCULAR; INTRAVENOUS at 05:39

## 2025-02-22 NOTE — ASSESSMENT & PLAN NOTE
"Patient reports history of migraines has only been taking Tylenol in pregnancy which \"does not touch them\"  On 2/22 reports worsening migraine unresolved by Tylenol  Will give Reglan/Benadryl/magnesium IV  "

## 2025-02-22 NOTE — ASSESSMENT & PLAN NOTE
Semglee 52 units nightly and Humalog 10/18/50 units with meals (which was increased as recently as 2/18/25).  Has a Dexcom CGM  Glycemic control improving, continue checking 3-4am glucose to rule out overnight hypoglycemia

## 2025-02-22 NOTE — PROGRESS NOTES
"Progress Note - Maternal-Fetal Medicine   Name: Karena Garcia 34 y.o. female I MRN: [id number]  Unit/Bed#: -01 I Date of Admission: 2/21/2025   Date of Service: 2/22/2025 I Hospital Day: 0     Assessment & Plan  Insulin controlled gestational diabetes mellitus (GDM) in third trimester  Suboptimal glycemic control on home regimen. Her current regimen is Semglee 52 units nightly and Humalog 10/18/48 units with meals (which was increased as recently as 2/18/25). She still is having post-dinner hyperglycemia, so dinner dose of short-acting insulin was increased to 50 units. She reports her CGM has been reporting lows overnight, has her sensor in place currently, which is calibrating. 3am glucose ordered to monitor for overnight hypoglycemia. We discussed that she will likely need an insulin drip in labor.     36 weeks gestation of pregnancy  PN labs wnl  A pos, GBS pending  Received Tdap 1/6/25  Fetal tachycardia affecting management of mother  Unclear etiology for fetal tachycardia, no evidence of dehydration, ruptured membranes, intrauterine infection.  Presence of moderate variability and accelerations are reassuring regarding fetal status currently  TSH wnl  K-B to be collected  IV fluid resuscitation stopped  Continuous fetal monitoring.   If persistent fetal tachycardia will move towards delivery.  Fetal macrosomia affecting management of mother, antepartum  On 2/18/25:   MEASUREMENTS (* Included In Average GA)     AC             34.46 cm        38 weeks 3 days* (98%)  BPD             9.18 cm        37 weeks 2 days* (89%)  HC             32.96 cm        37 weeks 4 days* (60%)  Femur           7.05 cm        36 weeks 1 day * (52%)     EFW Hadlock 4   3263 grams - 7 lbs 3 oz                 (90%)     THE AVERAGE GESTATIONAL AGE is 37 weeks 2 days +/- 21 days.  Headache  Patient reports history of migraines has only been taking Tylenol in pregnancy which \"does not touch them\"  On 2/22 reports worsening migraine " "unresolved by Tylenol  Will give Reglan/Benadryl/magnesium IV  Elevated BP without diagnosis of hypertension  Patient noted to have elevated blood pressure this admission, on review of blood pressures throughout pregnancy she also had 1 at 13 weeks gestation but has not had any others  Preeclampsia labs pending  Continue to monitor blood pressures closely    Subjective    Contractions: occasioanl  Loss of fluid: no  Vaginal bleeding: no  Fetal movement: yes    Patient does complain of a headache this morning.  Will give headache cocktail.        Objective      Patient Vitals for the past 24 hrs:   BP Temp Temp src Pulse Resp SpO2 Height Weight   02/22/25 0431 138/89 97.9 °F (36.6 °C) Oral 73 18 98 % -- --   02/22/25 0012 135/77 97.6 °F (36.4 °C) Oral 80 15 98 % -- --   02/21/25 2043 140/86 -- -- -- -- -- -- --   02/21/25 2014 -- 97.5 °F (36.4 °C) Oral 98 18 96 % -- --   02/21/25 1658 128/78 (!) 97.4 °F (36.3 °C) Oral 92 18 98 % -- --   02/21/25 1246 122/72 98 °F (36.7 °C) Oral 91 16 96 % 5' 2\" (1.575 m) 120 kg (265 lb)   02/21/25 1244 -- -- -- 92 -- 95 % -- --   02/21/25 1241 122/72 -- -- 91 -- -- -- --       Physical Exam  Constitutional:       General: She is not in acute distress.     Appearance: Normal appearance.   HENT:      Head: Normocephalic and atraumatic.   Cardiovascular:      Rate and Rhythm: Normal rate.      Pulses: Normal pulses.   Pulmonary:      Effort: Pulmonary effort is normal. No respiratory distress.      Breath sounds: Normal breath sounds.   Abdominal:      General: Abdomen is flat.      Palpations: Abdomen is soft.      Comments: Gravid   Skin:     General: Skin is warm and dry.   Neurological:      General: No focal deficit present.      Mental Status: She is alert.   Psychiatric:         Mood and Affect: Mood normal.         Behavior: Behavior normal.         I/O       None            Invasive Devices       Peripheral Intravenous Line  Duration             Peripheral IV 02/21/25 Left " "Antecubital <1 day    Peripheral IV 25 Proximal;Right;Ventral (anterior) Forearm <1 day                    Results from last 7 days   Lab Units 25  1624   WBC Thousand/uL 9.95   TOTAL NEUT ABS Thousands/µL 6.14   HEMOGLOBIN g/dL 13.2   MCV fL 89   PLATELETS Thousands/uL 291           Invalid input(s): \"GLU\", \"CA\"      Results from last 7 days   Lab Units 25  1624   PLATELETS Thousands/uL 291   INR  0.89   PROTIME seconds 12.8   PTT seconds 23   FIBRINOGEN mg/dL 564*     Results from last 7 days   Lab Units 25  0416 25  2214 25  1742 25  1344   POC GLUCOSE mg/dl 85 130 169* 138                       Brief review of pertinent history:  Past Medical History:   Diagnosis Date    Female infertility     Kidney stone     Varicella     vaccinated     Past Surgical History:   Procedure Laterality Date    [place] CYSTO/URETERO W/LITHOTRIPSY &INDWELL STENT INSRT Right 10/24/2018    Procedure: CYSTOSCOPY URETEROSCOPY WITH RETROGRADE PYELOGRAM AND INSERTION STENT URETERAL;  Surgeon: Gregory Goodman MD;  Location: [facility];  Service: Urology    [place] LITHOTRIPSY XTRCORP SHOCK WAVE Right 2018    Procedure: LITHROTRIPSY EXTRACORPORAL SHOCKWAVE (ESWL);  Surgeon: Gregory Goodman MD;  Location: [facility];  Service: Urology     OB History    Para Term  AB Living   3 0 0 0 2 0   SAB IAB Ectopic Multiple Live Births   2 0 0 0 0      # Outcome Date GA Lbr Edinson/2nd Weight Sex Type Anes PTL Lv   3 Current            2 2016     Biochemical      1 2014     Biochemical          Fetal data:  Nonstress test: date 25  Baseline:  155  Variability: moderate  Accelerations: present, 15x15  Decelerations: absent  Contractions: absent  Assessment: reactive  Plan:  remain on continuous       MEDS:   Medication Administration - last 24 hours from 2025 to 2025         Date/Time Order Dose Route Action Action by     2025 1435 EST lactated ringers bolus 1,000 " mL 0 mL Intravenous Stopped Hafsa Smith RN     02/21/2025 1337 EST lactated ringers bolus 1,000 mL 1,000 mL Intravenous New Bag Hafsa Smith RN     02/21/2025 1545 EST insulin lispro (HumALOG/ADMELOG) 100 units/mL subcutaneous injection 10 Units 10 Units Subcutaneous Given Hafsa Smith RN     02/21/2025 1946 EST insulin lispro (HumALOG/ADMELOG) 100 units/mL subcutaneous injection 49 Units 49 Units Subcutaneous Given Iveth Pina RN     02/21/2025 2220 EST insulin glargine (LANTUS) subcutaneous injection 38 Units 0.38 mL 38 Units Subcutaneous Given Iveth Pina RN            Current Facility-Administered Medications:     diphenhydrAMINE (BENADRYL) injection 25 mg, Once    famotidine (PEPCID) tablet 20 mg, Daily    insulin glargine (LANTUS) subcutaneous injection 38 Units 0.38 mL, HS    insulin lispro (HumALOG/ADMELOG) 100 units/mL subcutaneous injection 10 Units, Daily With Lunch    insulin lispro (HumALOG/ADMELOG) 100 units/mL subcutaneous injection 18 Units, Daily With Breakfast    insulin lispro (HumALOG/ADMELOG) 100 units/mL subcutaneous injection 50 Units, Daily With Dinner    magnesium sulfate 2 g/50 mL IVPB (premix) 2 g, Once    metoclopramide (REGLAN) injection 10 mg, Once    ondansetron (ZOFRAN) injection 4 mg, Q8H PRN         Promise Arnold MD  OBGYN, PGY-3  2/22/2025  4:45 AM

## 2025-02-22 NOTE — PLAN OF CARE
Problem: ANTEPARTUM  Goal: Maintain pregnancy as long as maternal and/or fetal condition is stable  Description: INTERVENTIONS:  - Maternal surveillance  - Fetal surveillance  - Monitor uterine activity  - Medications as ordered  - Bedrest  Outcome: Progressing     Problem: PAIN - ADULT  Goal: Verbalizes/displays adequate comfort level or baseline comfort level  Description: Interventions:  - Encourage patient to monitor pain and request assistance  - Assess pain using appropriate pain scale  - Administer analgesics based on type and severity of pain and evaluate response  - Implement non-pharmacological measures as appropriate and evaluate response  - Consider cultural and social influences on pain and pain management  - Notify physician/advanced practitioner if interventions unsuccessful or patient reports new pain  Outcome: Progressing     Problem: INFECTION - ADULT  Goal: Absence or prevention of progression during hospitalization  Description: INTERVENTIONS:  - Assess and monitor for signs and symptoms of infection  - Monitor lab/diagnostic results  - Monitor all insertion sites, i.e. indwelling lines, tubes, and drains  - Monitor endotracheal if appropriate and nasal secretions for changes in amount and color  - [place] appropriate cooling/warming therapies per order  - Administer medications as ordered  - Instruct and encourage patient and family to use good hand hygiene technique  - Identify and instruct in appropriate isolation precautions for identified infection/condition  Outcome: Progressing  Goal: Absence of fever/infection during neutropenic period  Description: INTERVENTIONS:  - Monitor WBC    Outcome: Progressing     Problem: SAFETY ADULT  Goal: Patient will remain free of falls  Description: INTERVENTIONS:  - Educate patient/family on patient safety including physical limitations  - Instruct patient to call for assistance with activity   - Consult OT/PT to assist with strengthening/mobility   -  Keep Call bell within reach  - Keep bed low and locked with side rails adjusted as appropriate  - Keep care items and personal belongings within reach  - Initiate and maintain comfort rounds  - Make Fall Risk Sign visible to staff  - Apply yellow socks and bracelet for high fall risk patients  - Consider moving patient to room near nurses station  Outcome: Progressing  Goal: Maintain or return to baseline ADL function  Description: INTERVENTIONS:  -  Assess patient's ability to carry out ADLs; assess patient's baseline for ADL function and identify physical deficits which impact ability to perform ADLs (bathing, care of mouth/teeth, toileting, grooming, dressing, etc.)  - Assess/evaluate cause of self-care deficits   - Assess range of motion  - Assess patient's mobility; develop plan if impaired  - Assess patient's need for assistive devices and provide as appropriate  - Encourage maximum independence but intervene and supervise when necessary  - Involve family in performance of ADLs  - Assess for home care needs following discharge   - Consider OT consult to assist with ADL evaluation and planning for discharge  - Provide patient education as appropriate  Outcome: Progressing  Goal: Maintains/Returns to pre admission functional level  Description: INTERVENTIONS:  - Perform AM-PAC 6 Click Basic Mobility/ Daily Activity assessment daily.  - Set and communicate daily mobility goal to care team and patient/family/caregiver.   - Collaborate with rehabilitation services on mobility goals if consulted  - Out of bed for toileting  - Record patient progress and toleration of activity level   Outcome: Progressing     Problem: Knowledge Deficit  Goal: Patient/family/caregiver demonstrates understanding of disease process, treatment plan, medications, and discharge instructions  Description: Complete learning assessment and assess knowledge base.  Interventions:  - Provide teaching at level of understanding  - Provide teaching  via preferred learning methods  Outcome: Progressing     Problem: DISCHARGE PLANNING  Goal: Discharge to home or other facility with appropriate resources  Description: INTERVENTIONS:  - Identify barriers to discharge w/patient and caregiver  - Arrange for needed discharge resources and transportation as appropriate  - Identify discharge learning needs (meds, wound care, etc.)  - Arrange for interpretive services to assist at discharge as needed  - Refer to Case Management Department for coordinating discharge planning if the patient needs post-hospital services based on physician/advanced practitioner order or complex needs related to functional status, cognitive ability, or social support system  Outcome: Progressing

## 2025-02-22 NOTE — PROGRESS NOTES
Progress Note - OB/GYN   Name: Karena Garcia 34 y.o. female I MRN: [id number]  Unit/Bed#: -01 I Date of Admission: 2025   Date of Service: 2025 I Hospital Day: 0     Assessment & Plan  Fetal tachycardia affecting management of mother  Fetal tachycardia noted on outpatient NST  with baseline 170 bpm; she was seen in triage for extended monitoring and baseline came down to 150-155 bpm with 1L fluid bolus. She presented for routine outpatient PNV  and fetal tachycardia was again noted with baseline 170 bpm.   In triage, baseline returned to 150-155 bpm with moderate variability and accelerations after a 1L IVF bolus. Etiology of fetal tachycardia unclear at this time.     Plan:  - Admit until delivery- now recommended at 37 weeks gestation unless persistently tachycardic at which point, we will proceed with delivery at that time.   - IOL scheduled for 25 @ 1500  - Admission labs WNL; PT/PTT, fibrinogen WNL, TSH WNL  - KB pending  - Will proceed now with intermittent monitoring (NST TID)  Insulin controlled gestational diabetes mellitus (GDM) in third trimester  Continue home regimen: 18u at breakfast, 10u at lunch, 49u with dinner and Lantus 38u qHS  Monitor blood glucose: fasting and 2h postprandial   Diabetic diet ordered  Continue to titrate as needed  36 weeks gestation of pregnancy  Labs UTD Pt w GDM  Pap smear: 09/10/2024   WNL + non 16/18 HPV   GC/CT:  negative   Prenatal Panel: normal   Blood Type: A+ RhoGam  indicated   GBS: collected 25  Genetic Testing: AFP +, NIPS normal   Tdap: done   Flu: 24   Birth Plan:  + epidural   Delivery consent - signed 25  Feeding Plan: breast  Breast pump: ordered previously  Pediatrician: will establish  Contraception: undecided  Fetal macrosomia affecting management of mother, antepartum  @35w6d  AC             34.46 cm        38 weeks 3 days* (98%)  BPD             9.18 cm        37 weeks 2 days* (89%)  HC             32.96  "cm        37 weeks 4 days* (60%)  Femur           7.05 cm        36 weeks 1 day * (52%)     EFW Hadlock 4   3263 grams - 7 lbs 3 oz                 (90%)      Subjective    Contractions: no  Loss of fluid: no  Vaginal bleeding: no  Fetal movement: yes    Pain: no  Headaches: no - resolved  Visual changes: no  RUQ pain: no         Objective      Patient Vitals for the past 24 hrs:   BP Temp Temp src Pulse Resp SpO2 Height Weight   02/22/25 0803 132/74 98.5 °F (36.9 °C) Oral 81 20 99 % -- --   02/22/25 0431 138/89 97.9 °F (36.6 °C) Oral 73 18 98 % -- --   02/22/25 0012 135/77 97.6 °F (36.4 °C) Oral 80 15 98 % -- --   02/21/25 2043 140/86 -- -- -- -- -- -- --   02/21/25 2014 -- 97.5 °F (36.4 °C) Oral 98 18 96 % -- --   02/21/25 1658 128/78 (!) 97.4 °F (36.3 °C) Oral 92 18 98 % -- --   02/21/25 1246 122/72 98 °F (36.7 °C) Oral 91 16 96 % 5' 2\" (1.575 m) 120 kg (265 lb)   02/21/25 1244 -- -- -- 92 -- 95 % -- --   02/21/25 1241 122/72 -- -- 91 -- -- -- --       Physical Exam  Vitals reviewed.   Constitutional:       General: She is not in acute distress.     Appearance: Normal appearance. She is well-developed. She is not ill-appearing, toxic-appearing or diaphoretic.   Cardiovascular:      Rate and Rhythm: Normal rate.   Pulmonary:      Effort: Pulmonary effort is normal. No respiratory distress.   Skin:     General: Skin is warm and dry.   Neurological:      Mental Status: She is alert and oriented to person, place, and time.   Psychiatric:         Mood and Affect: Mood normal.         Behavior: Behavior normal.         I/O       None            Invasive Devices       Peripheral Intravenous Line  Duration             Peripheral IV 02/21/25 Left Antecubital <1 day    Peripheral IV 02/21/25 Proximal;Right;Ventral (anterior) Forearm <1 day                    Results from last 7 days   Lab Units 02/21/25  1624   WBC Thousand/uL 9.95   TOTAL NEUT ABS Thousands/µL 6.14   HEMOGLOBIN g/dL 13.2   MCV fL 89   PLATELETS " Thousands/uL 291     Results from last 7 days   Lab Units 25  0811   POTASSIUM mmol/L 3.3*   CHLORIDE mmol/L 105   CO2 mmol/L 20*   BUN mg/dL 11   CREATININE mg/dL 0.52*   EGFR ml/min/1.73sq m 125     Results from last 7 days   Lab Units 25  0811   AST U/L 11*   ALT U/L 7     Results from last 7 days   Lab Units 25  1624   PLATELETS Thousands/uL 291   INR  0.89   PROTIME seconds 12.8   PTT seconds 23   FIBRINOGEN mg/dL 564*     Results from last 7 days   Lab Units 25  0939 25  0630 25  0416 25  2214 25  1742 25  1344   POC GLUCOSE mg/dl 109 82 85 130 169* 138     Results from last 7 days   Lab Units 25  0811   PROT/CREAT RATIO UR  0.4*                   Brief review of pertinent history:  Past Medical History:   Diagnosis Date    Female infertility     Kidney stone     Varicella     vaccinated     Past Surgical History:   Procedure Laterality Date    ME CYSTO/URETERO W/LITHOTRIPSY &INDWELL STENT INSRT Right 10/24/2018    Procedure: CYSTOSCOPY URETEROSCOPY WITH RETROGRADE PYELOGRAM AND INSERTION STENT URETERAL;  Surgeon: Gregory Goodman MD;  Location: [facility];  Service: Urology    ME LITHOTRIPSY XTRCORP SHOCK WAVE Right 2018    Procedure: LITHROTRIPSY EXTRACORPORAL SHOCKWAVE (ESWL);  Surgeon: Gregory Goodman MD;  Location: [facility] OR;  Service: Urology     OB History    Para Term  AB Living   3 0 0 0 2 0   SAB IAB Ectopic Multiple Live Births   2 0 0 0 0      # Outcome Date GA Lbr [place]/2nd Weight Sex Type Anes PTL Lv   3 Current            2 SAB 2016     Biochemical      1 2014     Biochemical          Fetal data:  Nonstress test: Reactive monitoring with FHTs baseline 150s + accelerations    MEDS:   Medication Administration - last 24 hours from 2025 1111 to 2025 1111         Date/Time Order Dose Route Action Action by     2025 1435 EST lactated ringers bolus 1,000 mL 0 mL Intravenous Stopped Hafsa Smith RN      02/21/2025 1337 EST lactated ringers bolus 1,000 mL 1,000 mL Intravenous New Bag Hafsa Smith, AYANNA     02/21/2025 1545 EST insulin lispro (HumALOG/ADMELOG) 100 units/mL subcutaneous injection 10 Units 10 Units Subcutaneous Given Hafsa Smith, AYANNA     02/22/2025 0727 EST insulin lispro (HumALOG/ADMELOG) 100 units/mL subcutaneous injection 18 Units 18 Units Subcutaneous Given Estelle Leopold, AYANNA     02/21/2025 1946 EST insulin lispro (HumALOG/ADMELOG) 100 units/mL subcutaneous injection 49 Units 49 Units Subcutaneous Given Iveth Pina RN     02/21/2025 2220 EST insulin glargine (LANTUS) subcutaneous injection 38 Units 0.38 mL 38 Units Subcutaneous Given Iveth Pina RN     02/22/2025 0539 EST metoclopramide (REGLAN) injection 10 mg 10 mg Intravenous Given Iveth Pina RN     02/22/2025 0539 EST diphenhydrAMINE (BENADRYL) injection 25 mg 25 mg Intravenous Given Iveth Pina RN     02/22/2025 0729 EST magnesium sulfate 2 g/50 mL IVPB (premix) 2 g 0 g Intravenous Stopped Estelle Leopold, RN     02/22/2025 0539 EST magnesium sulfate 2 g/50 mL IVPB (premix) 2 g 2 g Intravenous New Bag Iveth Pina RN            Current Facility-Administered Medications:     famotidine (PEPCID) tablet 20 mg, Daily    insulin glargine (LANTUS) subcutaneous injection 38 Units 0.38 mL, HS    insulin lispro (HumALOG/ADMELOG) 100 units/mL subcutaneous injection 10 Units, Daily With Lunch    insulin lispro (HumALOG/ADMELOG) 100 units/mL subcutaneous injection 18 Units, Daily With Breakfast    insulin lispro (HumALOG/ADMELOG) 100 units/mL subcutaneous injection 50 Units, Daily With Dinner    ondansetron (ZOFRAN) injection 4 mg, Q8H PRN         Leeanne Wong MD  OBGYRISA  2/22/2025  11:11 AM

## 2025-02-22 NOTE — PLAN OF CARE
Problem: ANTEPARTUM  Goal: Maintain pregnancy as long as maternal and/or fetal condition is stable  Description: INTERVENTIONS:  - Maternal surveillance  - Fetal surveillance  - Monitor uterine activity  - Medications as ordered  - Bedrest  Outcome: Progressing     Problem: PAIN - ADULT  Goal: Verbalizes/displays adequate comfort level or baseline comfort level  Description: Interventions:  - Encourage patient to monitor pain and request assistance  - Assess pain using appropriate pain scale  - Administer analgesics based on type and severity of pain and evaluate response  - Implement non-pharmacological measures as appropriate and evaluate response  - Consider cultural and social influences on pain and pain management  - Notify physician/advanced practitioner if interventions unsuccessful or patient reports new pain  Outcome: Progressing     Problem: INFECTION - ADULT  Goal: Absence or prevention of progression during hospitalization  Description: INTERVENTIONS:  - Assess and monitor for signs and symptoms of infection  - Monitor lab/diagnostic results  - Monitor all insertion sites, i.e. indwelling lines, tubes, and drains  - Monitor endotracheal if appropriate and nasal secretions for changes in amount and color  - [place] appropriate cooling/warming therapies per order  - Administer medications as ordered  - Instruct and encourage patient and family to use good hand hygiene technique  - Identify and instruct in appropriate isolation precautions for identified infection/condition  Outcome: Progressing  Goal: Absence of fever/infection during neutropenic period  Description: INTERVENTIONS:  - Monitor WBC    Outcome: Progressing     Problem: SAFETY ADULT  Goal: Patient will remain free of falls  Description: INTERVENTIONS:  - Educate patient/family on patient safety including physical limitations  - Instruct patient to call for assistance with activity   - Consult OT/PT to assist with strengthening/mobility   -  Keep Call bell within reach  - Keep bed low and locked with side rails adjusted as appropriate  - Keep care items and personal belongings within reach  - Initiate and maintain comfort rounds  - Make Fall Risk Sign visible to staff    Outcome: Progressing  Goal: Maintain or return to baseline ADL function  Description: INTERVENTIONS:  -  Assess patient's ability to carry out ADLs; assess patient's baseline for ADL function and identify physical deficits which impact ability to perform ADLs (bathing, care of mouth/teeth, toileting, grooming, dressing, etc.)  - Assess/evaluate cause of self-care deficits   - Assess range of motion  - Assess patient's mobility; develop plan if impaired  - Assess patient's need for assistive devices and provide as appropriate  - Encourage maximum independence but intervene and supervise when necessary  - Involve family in performance of ADLs  - Assess for home care needs following discharge   - Consider OT consult to assist with ADL evaluation and planning for discharge  - Provide patient education as appropriate  Outcome: Progressing  Goal: Maintains/Returns to pre admission functional level  Description: INTERVENTIONS:  - Perform AM-PAC 6 Click Basic Mobility/ Daily Activity assessment daily.  - Set and communicate daily mobility goal to care team and patient/family/caregiver.   - Collaborate with rehabilitation services on mobility goals if consulted    - Out of bed for toileting  - Record patient progress and toleration of activity level   Outcome: Progressing     Problem: Knowledge Deficit  Goal: Patient/family/caregiver demonstrates understanding of disease process, treatment plan, medications, and discharge instructions  Description: Complete learning assessment and assess knowledge base.  Interventions:  - Provide teaching at level of understanding  - Provide teaching via preferred learning methods  Outcome: Progressing     Problem: DISCHARGE PLANNING  Goal: Discharge to home or  other facility with appropriate resources  Description: INTERVENTIONS:  - Identify barriers to discharge w/patient and caregiver  - Arrange for needed discharge resources and transportation as appropriate  - Identify discharge learning needs (meds, wound care, etc.)  - Arrange for interpretive services to assist at discharge as needed  - Refer to Case Management Department for coordinating discharge planning if the patient needs post-hospital services based on physician/advanced practitioner order or complex needs related to functional status, cognitive ability, or social support system  Outcome: Progressing

## 2025-02-22 NOTE — ASSESSMENT & PLAN NOTE
Patient noted to have elevated blood pressure this admission, on review of blood pressures throughout pregnancy she also had 1 at 13 weeks gestation but has not had any others  Preeclampsia labs pending  Continue to monitor blood pressures closely

## 2025-02-22 NOTE — NURSING NOTE
Patient called states she has a headache and her dexcom reads her blood sugar is 84, checked blood sugar with glucometer 78, pt given crackers and peanut butter per pt request.

## 2025-02-22 NOTE — ASSESSMENT & PLAN NOTE
On 2/18/25:   MEASUREMENTS (* Included In Average GA)     AC             34.46 cm        38 weeks 3 days* (98%)  BPD             9.18 cm        37 weeks 2 days* (89%)  HC             32.96 cm        37 weeks 4 days* (60%)  Femur           7.05 cm        36 weeks 1 day * (52%)     EFW Hadlock 4   3263 grams - 7 lbs 3 oz                 (90%)     THE AVERAGE GESTATIONAL AGE is 37 weeks 2 days +/- 21 days.

## 2025-02-23 ENCOUNTER — ANESTHESIA (INPATIENT)
Dept: LABOR AND DELIVERY | Facility: HOSPITAL | Age: 35
End: 2025-02-23
Payer: COMMERCIAL

## 2025-02-23 ENCOUNTER — ANESTHESIA EVENT (INPATIENT)
Dept: LABOR AND DELIVERY | Facility: HOSPITAL | Age: 35
End: 2025-02-23
Payer: COMMERCIAL

## 2025-02-23 PROBLEM — O14.10 PREECLAMPSIA, SEVERE: Status: ACTIVE | Noted: 2025-02-22

## 2025-02-23 PROBLEM — O14.90 PRE-ECLAMPSIA: Status: ACTIVE | Noted: 2025-02-22

## 2025-02-23 PROBLEM — Z3A.36 36 WEEKS GESTATION OF PREGNANCY: Chronic | Status: ACTIVE | Noted: 2024-11-21

## 2025-02-23 LAB
ABO GROUP BLD: NORMAL
ALBUMIN SERPL BCG-MCNC: 3.3 G/DL (ref 3.5–5)
ALBUMIN SERPL BCG-MCNC: 3.5 G/DL (ref 3.5–5)
ALP SERPL-CCNC: 106 U/L (ref 34–104)
ALP SERPL-CCNC: 122 U/L (ref 34–104)
ALT SERPL W P-5'-P-CCNC: 9 U/L (ref 7–52)
ALT SERPL W P-5'-P-CCNC: 9 U/L (ref 7–52)
ANION GAP SERPL CALCULATED.3IONS-SCNC: 13 MMOL/L (ref 4–13)
ANION GAP SERPL CALCULATED.3IONS-SCNC: 9 MMOL/L (ref 4–13)
AST SERPL W P-5'-P-CCNC: 15 U/L (ref 13–39)
AST SERPL W P-5'-P-CCNC: 17 U/L (ref 13–39)
BILIRUB SERPL-MCNC: 0.25 MG/DL (ref 0.2–1)
BILIRUB SERPL-MCNC: 0.28 MG/DL (ref 0.2–1)
BLD GP AB SCN SERPL QL: NEGATIVE
BNP SERPL-MCNC: 28 PG/ML (ref 0–100)
BUN SERPL-MCNC: 10 MG/DL (ref 5–25)
BUN SERPL-MCNC: 9 MG/DL (ref 5–25)
CALCIUM ALBUM COR SERPL-MCNC: 9.8 MG/DL (ref 8.3–10.1)
CALCIUM SERPL-MCNC: 8.9 MG/DL (ref 8.4–10.2)
CALCIUM SERPL-MCNC: 9.2 MG/DL (ref 8.4–10.2)
CHLORIDE SERPL-SCNC: 102 MMOL/L (ref 96–108)
CHLORIDE SERPL-SCNC: 105 MMOL/L (ref 96–108)
CO2 SERPL-SCNC: 21 MMOL/L (ref 21–32)
CO2 SERPL-SCNC: 21 MMOL/L (ref 21–32)
CREAT SERPL-MCNC: 0.52 MG/DL (ref 0.6–1.3)
CREAT SERPL-MCNC: 0.72 MG/DL (ref 0.6–1.3)
ERYTHROCYTE [DISTWIDTH] IN BLOOD BY AUTOMATED COUNT: 13.3 % (ref 11.6–15.1)
ERYTHROCYTE [DISTWIDTH] IN BLOOD BY AUTOMATED COUNT: 13.3 % (ref 11.6–15.1)
GFR SERPL CREATININE-BSD FRML MDRD: 110 ML/MIN/1.73SQ M
GFR SERPL CREATININE-BSD FRML MDRD: 125 ML/MIN/1.73SQ M
GLUCOSE SERPL-MCNC: 102 MG/DL (ref 65–140)
GLUCOSE SERPL-MCNC: 107 MG/DL (ref 65–140)
GLUCOSE SERPL-MCNC: 109 MG/DL (ref 65–140)
GLUCOSE SERPL-MCNC: 116 MG/DL (ref 65–140)
GLUCOSE SERPL-MCNC: 121 MG/DL (ref 65–140)
GLUCOSE SERPL-MCNC: 128 MG/DL (ref 65–140)
GLUCOSE SERPL-MCNC: 70 MG/DL (ref 65–140)
GLUCOSE SERPL-MCNC: 72 MG/DL (ref 65–140)
GLUCOSE SERPL-MCNC: 76 MG/DL (ref 65–140)
GLUCOSE SERPL-MCNC: 78 MG/DL (ref 65–140)
GLUCOSE SERPL-MCNC: 86 MG/DL (ref 65–140)
GLUCOSE SERPL-MCNC: 87 MG/DL (ref 65–140)
GLUCOSE SERPL-MCNC: 88 MG/DL (ref 65–140)
GLUCOSE SERPL-MCNC: 93 MG/DL (ref 65–140)
GLUCOSE SERPL-MCNC: 97 MG/DL (ref 65–140)
GLUCOSE SERPL-MCNC: 97 MG/DL (ref 65–140)
HCT VFR BLD AUTO: 39 % (ref 34.8–46.1)
HCT VFR BLD AUTO: 39.9 % (ref 34.8–46.1)
HGB BLD-MCNC: 12.9 G/DL (ref 11.5–15.4)
HGB BLD-MCNC: 12.9 G/DL (ref 11.5–15.4)
MAGNESIUM SERPL-MCNC: 4.1 MG/DL (ref 1.9–2.7)
MCH RBC QN AUTO: 29.1 PG (ref 26.8–34.3)
MCH RBC QN AUTO: 29.5 PG (ref 26.8–34.3)
MCHC RBC AUTO-ENTMCNC: 32.3 G/DL (ref 31.4–37.4)
MCHC RBC AUTO-ENTMCNC: 33.1 G/DL (ref 31.4–37.4)
MCV RBC AUTO: 88 FL (ref 82–98)
MCV RBC AUTO: 91 FL (ref 82–98)
PLATELET # BLD AUTO: 279 THOUSANDS/UL (ref 149–390)
PLATELET # BLD AUTO: 316 THOUSANDS/UL (ref 149–390)
PMV BLD AUTO: 12.1 FL (ref 8.9–12.7)
PMV BLD AUTO: 12.1 FL (ref 8.9–12.7)
POTASSIUM SERPL-SCNC: 3.8 MMOL/L (ref 3.5–5.3)
POTASSIUM SERPL-SCNC: 3.9 MMOL/L (ref 3.5–5.3)
PROT SERPL-MCNC: 6.2 G/DL (ref 6.4–8.4)
PROT SERPL-MCNC: 6.7 G/DL (ref 6.4–8.4)
RBC # BLD AUTO: 4.37 MILLION/UL (ref 3.81–5.12)
RBC # BLD AUTO: 4.43 MILLION/UL (ref 3.81–5.12)
RH BLD: POSITIVE
SODIUM SERPL-SCNC: 135 MMOL/L (ref 135–147)
SODIUM SERPL-SCNC: 136 MMOL/L (ref 135–147)
SPECIMEN EXPIRATION DATE: NORMAL
WBC # BLD AUTO: 12.66 THOUSAND/UL (ref 4.31–10.16)
WBC # BLD AUTO: 8.9 THOUSAND/UL (ref 4.31–10.16)

## 2025-02-23 PROCEDURE — 85027 COMPLETE CBC AUTOMATED: CPT | Performed by: OBSTETRICS & GYNECOLOGY

## 2025-02-23 PROCEDURE — 85027 COMPLETE CBC AUTOMATED: CPT

## 2025-02-23 PROCEDURE — 86900 BLOOD TYPING SEROLOGIC ABO: CPT

## 2025-02-23 PROCEDURE — 99233 SBSQ HOSP IP/OBS HIGH 50: CPT | Performed by: OBSTETRICS & GYNECOLOGY

## 2025-02-23 PROCEDURE — 86901 BLOOD TYPING SEROLOGIC RH(D): CPT

## 2025-02-23 PROCEDURE — 83735 ASSAY OF MAGNESIUM: CPT

## 2025-02-23 PROCEDURE — 80053 COMPREHEN METABOLIC PANEL: CPT

## 2025-02-23 PROCEDURE — 83880 ASSAY OF NATRIURETIC PEPTIDE: CPT | Performed by: OBSTETRICS & GYNECOLOGY

## 2025-02-23 PROCEDURE — 80053 COMPREHEN METABOLIC PANEL: CPT | Performed by: OBSTETRICS & GYNECOLOGY

## 2025-02-23 PROCEDURE — 86850 RBC ANTIBODY SCREEN: CPT

## 2025-02-23 PROCEDURE — G0379 DIRECT REFER HOSPITAL OBSERV: HCPCS

## 2025-02-23 PROCEDURE — 82948 REAGENT STRIP/BLOOD GLUCOSE: CPT

## 2025-02-23 PROCEDURE — 59025 FETAL NON-STRESS TEST: CPT | Performed by: OBSTETRICS & GYNECOLOGY

## 2025-02-23 PROCEDURE — NC001 PR NO CHARGE: Performed by: OBSTETRICS & GYNECOLOGY

## 2025-02-23 RX ORDER — MAGNESIUM SULFATE HEPTAHYDRATE 40 MG/ML
2 INJECTION, SOLUTION INTRAVENOUS ONCE
Status: COMPLETED | OUTPATIENT
Start: 2025-02-23 | End: 2025-02-23

## 2025-02-23 RX ORDER — METOCLOPRAMIDE HYDROCHLORIDE 5 MG/ML
10 INJECTION INTRAMUSCULAR; INTRAVENOUS EVERY 6 HOURS PRN
Status: DISCONTINUED | OUTPATIENT
Start: 2025-02-23 | End: 2025-02-24

## 2025-02-23 RX ORDER — DEXTROSE MONOHYDRATE AND SODIUM CHLORIDE 5; .9 G/100ML; G/100ML
100 INJECTION, SOLUTION INTRAVENOUS CONTINUOUS
Status: DISCONTINUED | OUTPATIENT
Start: 2025-02-23 | End: 2025-02-24

## 2025-02-23 RX ORDER — BUPIVACAINE HYDROCHLORIDE 2.5 MG/ML
30 INJECTION, SOLUTION EPIDURAL; INFILTRATION; INTRACAUDAL ONCE AS NEEDED
Status: DISCONTINUED | OUTPATIENT
Start: 2025-02-23 | End: 2025-02-24

## 2025-02-23 RX ORDER — OXYTOCIN/RINGER'S LACTATE 30/500 ML
1-30 PLASTIC BAG, INJECTION (ML) INTRAVENOUS
Status: DISCONTINUED | OUTPATIENT
Start: 2025-02-23 | End: 2025-02-24

## 2025-02-23 RX ORDER — FAMOTIDINE 10 MG/ML
20 INJECTION, SOLUTION INTRAVENOUS DAILY
Status: DISCONTINUED | OUTPATIENT
Start: 2025-02-24 | End: 2025-02-23

## 2025-02-23 RX ORDER — SODIUM CHLORIDE, SODIUM LACTATE, POTASSIUM CHLORIDE, CALCIUM CHLORIDE 600; 310; 30; 20 MG/100ML; MG/100ML; MG/100ML; MG/100ML
50 INJECTION, SOLUTION INTRAVENOUS CONTINUOUS
Status: DISCONTINUED | OUTPATIENT
Start: 2025-02-23 | End: 2025-02-25

## 2025-02-23 RX ORDER — FAMOTIDINE 10 MG/ML
INJECTION, SOLUTION INTRAVENOUS
Status: COMPLETED
Start: 2025-02-23 | End: 2025-02-23

## 2025-02-23 RX ORDER — LABETALOL HYDROCHLORIDE 5 MG/ML
20 INJECTION, SOLUTION INTRAVENOUS ONCE
Status: DISCONTINUED | OUTPATIENT
Start: 2025-02-23 | End: 2025-02-24

## 2025-02-23 RX ORDER — CALCIUM GLUCONATE 94 MG/ML
1 INJECTION, SOLUTION INTRAVENOUS ONCE AS NEEDED
Status: DISCONTINUED | OUTPATIENT
Start: 2025-02-23 | End: 2025-02-25

## 2025-02-23 RX ORDER — MAGNESIUM SULFATE HEPTAHYDRATE 40 MG/ML
4 INJECTION, SOLUTION INTRAVENOUS ONCE
Status: COMPLETED | OUTPATIENT
Start: 2025-02-23 | End: 2025-02-23

## 2025-02-23 RX ORDER — LABETALOL 200 MG/1
200 TABLET, FILM COATED ORAL DAILY
Status: DISCONTINUED | OUTPATIENT
Start: 2025-02-23 | End: 2025-02-25

## 2025-02-23 RX ORDER — LIDOCAINE HYDROCHLORIDE AND EPINEPHRINE 15; 5 MG/ML; UG/ML
INJECTION, SOLUTION EPIDURAL
Status: COMPLETED | OUTPATIENT
Start: 2025-02-23 | End: 2025-02-23

## 2025-02-23 RX ORDER — MAGNESIUM SULFATE HEPTAHYDRATE 40 MG/ML
2 INJECTION, SOLUTION INTRAVENOUS CONTINUOUS
Status: DISCONTINUED | OUTPATIENT
Start: 2025-02-23 | End: 2025-02-25

## 2025-02-23 RX ORDER — METOCLOPRAMIDE 10 MG/1
10 TABLET ORAL 3 TIMES DAILY PRN
Status: DISCONTINUED | OUTPATIENT
Start: 2025-02-23 | End: 2025-02-23

## 2025-02-23 RX ORDER — ACETAMINOPHEN 325 MG/1
975 TABLET ORAL EVERY 8 HOURS PRN
Status: DISCONTINUED | OUTPATIENT
Start: 2025-02-23 | End: 2025-02-24

## 2025-02-23 RX ORDER — FAMOTIDINE 10 MG/ML
20 INJECTION, SOLUTION INTRAVENOUS DAILY
Status: DISCONTINUED | OUTPATIENT
Start: 2025-02-23 | End: 2025-02-24

## 2025-02-23 RX ADMIN — MAGNESIUM SULFATE HEPTAHYDRATE 4 G: 40 INJECTION, SOLUTION INTRAVENOUS at 12:55

## 2025-02-23 RX ADMIN — SODIUM CHLORIDE, SODIUM LACTATE, POTASSIUM CHLORIDE, AND CALCIUM CHLORIDE 50 ML/HR: .6; .31; .03; .02 INJECTION, SOLUTION INTRAVENOUS at 20:44

## 2025-02-23 RX ADMIN — LIDOCAINE HYDROCHLORIDE AND EPINEPHRINE 2 ML: 15; 5 INJECTION, SOLUTION EPIDURAL at 17:51

## 2025-02-23 RX ADMIN — ROPIVACAINE HYDROCHLORIDE: 2 INJECTION, SOLUTION EPIDURAL; INFILTRATION at 18:00

## 2025-02-23 RX ADMIN — OXYTOCIN 2 MILLI-UNITS/MIN: 10 INJECTION INTRAVENOUS at 21:30

## 2025-02-23 RX ADMIN — PENICILLIN G POTASSIUM 5 MILLION UNITS: 20000000 INJECTION, POWDER, FOR SOLUTION INTRAMUSCULAR; INTRAVENOUS at 13:17

## 2025-02-23 RX ADMIN — ACETAMINOPHEN 975 MG: 325 TABLET, FILM COATED ORAL at 09:22

## 2025-02-23 RX ADMIN — PENICILLIN G POTASSIUM 2.5 MILLION UNITS: 20000000 INJECTION, POWDER, FOR SOLUTION INTRAVENOUS at 18:29

## 2025-02-23 RX ADMIN — LIDOCAINE HYDROCHLORIDE AND EPINEPHRINE 3 ML: 15; 5 INJECTION, SOLUTION EPIDURAL at 17:50

## 2025-02-23 RX ADMIN — MAGNESIUM SULFATE HEPTAHYDRATE 2 G/HR: 40 INJECTION, SOLUTION INTRAVENOUS at 23:31

## 2025-02-23 RX ADMIN — SODIUM CHLORIDE, SODIUM LACTATE, POTASSIUM CHLORIDE, AND CALCIUM CHLORIDE 50 ML/HR: .6; .31; .03; .02 INJECTION, SOLUTION INTRAVENOUS at 13:01

## 2025-02-23 RX ADMIN — MAGNESIUM SULFATE HEPTAHYDRATE 2 G/HR: 40 INJECTION, SOLUTION INTRAVENOUS at 13:32

## 2025-02-23 RX ADMIN — SODIUM CHLORIDE 0.5 UNITS/HR: 9 INJECTION, SOLUTION INTRAVENOUS at 13:41

## 2025-02-23 RX ADMIN — METOCLOPRAMIDE 10 MG: 10 TABLET ORAL at 09:22

## 2025-02-23 RX ADMIN — Medication 25 MCG: at 14:24

## 2025-02-23 RX ADMIN — LABETALOL HYDROCHLORIDE 200 MG: 200 TABLET, FILM COATED ORAL at 18:35

## 2025-02-23 RX ADMIN — FAMOTIDINE 20 MG: 10 INJECTION INTRAVENOUS at 21:30

## 2025-02-23 RX ADMIN — DEXTROSE AND SODIUM CHLORIDE 100 ML/HR: 5; .9 INJECTION, SOLUTION INTRAVENOUS at 13:37

## 2025-02-23 RX ADMIN — MAGNESIUM SULFATE HEPTAHYDRATE 2 G: 40 INJECTION, SOLUTION INTRAVENOUS at 13:17

## 2025-02-23 RX ADMIN — DEXTROSE AND SODIUM CHLORIDE 50 ML/HR: 5; .9 INJECTION, SOLUTION INTRAVENOUS at 19:35

## 2025-02-23 RX ADMIN — ONDANSETRON 4 MG: 2 INJECTION INTRAMUSCULAR; INTRAVENOUS at 15:35

## 2025-02-23 RX ADMIN — PENICILLIN G POTASSIUM 2.5 MILLION UNITS: 20000000 INJECTION, POWDER, FOR SOLUTION INTRAVENOUS at 22:11

## 2025-02-23 RX ADMIN — INSULIN LISPRO 18 UNITS: 100 INJECTION, SOLUTION INTRAVENOUS; SUBCUTANEOUS at 07:30

## 2025-02-23 RX ADMIN — INSULIN LISPRO 10 UNITS: 100 INJECTION, SOLUTION INTRAVENOUS; SUBCUTANEOUS at 11:23

## 2025-02-23 NOTE — PROGRESS NOTES
Progress Note - Obstetrics   Kaerna Garcia 34 y.o. female MRN: [id number]  Unit/Bed#: -01 Encounter: [id number]    Assessment: 34 y.o.  at 36w4d transferred from antepartum service to L&D floor for induction of labor in the setting of new diagnosis of preeclampsia with severe features.  SVE: 0/30/-4  FHT: reactive  [place]: ctx q10min    Clinical EFW: 90%ile @35w6d  Presentation: cephalic, confirmed by US on 25  GBS status: unknown     Plan:   * 36 weeks gestation of pregnancy  Assessment & Plan  Patient transferred from antepartum service to L&D floor for induction of labor in the setting of new diagnosis of preeclampsia with severe features. SVE 0/30/-4, toco with contractions q10min, FHT reactive  - IV fluids, clear liquid diet  - labs: CBC, T/S, RPR  - analgesia at maternal request  - management: [place] balloon, Cytotec, pitocin titration, anticipate   - patient declines forceps    Preeclampsia, severe  Assessment & Plan  Unremitting headache. CBC/CMP wnl, P:C 0.4  Systolic (12hrs), Av , Min:111 , Max:136   Diastolic (12hrs), Av, Min:57, Max:88  - magnesium sulfate gtt initiated  - continue to monitor BP  - continue to monitor for signs/symptoms of evolving preeclampsia  - recommend induction of labor, see 36 Weeks GA Problem above    Fetal macrosomia affecting management of mother, antepartum  Assessment & Plan  - patient counseled on shoulder dystocia risk  - discussed possible maneuvers performed for shoulder dystocia  - patient expressed understanding; all questions answered; desires to proceed with vaginal delivery    Insulin controlled gestational diabetes mellitus (GDM) in third trimester  Assessment & Plan  Home regimen: 18u at breakfast, 10u at lunch, 49u with dinner and Lantus 38u qHS  - insulin gtt during induction  - 2hr GTT 6 weeks postpartum    Discussed case and plan w/ Dr. Man    Chief Concern: IOL iso preE w SF    HPI: Karena Garcia is a 34 y.o.  with an CABRERA  of 3/19/2025, by Ultrasound at 36w4d who is being transferred from the antepartum service to the L&D floor for induction of labor in the setting of new diagnosis of preeclampsia with severe features. History is significant for nephrolithiasis, BMI 48. Pregnancy is complicated by A2GDM, fetal tachycardia, suspected fetal macrosomia. She reports intermittent contractions, has no LOF, and reports no VB. She states she has felt good FM.    Patient Active Problem List   Diagnosis    Benign neoplasm of skin of trunk    Hepatic steatosis    Lumbar radiculopathy    Mastodynia    Tension type headache    Nephrolithiasis with right hydronephrosis    Infertility, female    Insulin controlled gestational diabetes mellitus (GDM) in third trimester    Class 3 severe obesity due to excess calories with serious comorbidity and body mass index (BMI) of 40.0 to 44.9 in adult (HCC)    BMI 40.0-44.9, adult ([facility])    Abnormal MSAFP (maternal serum alpha-fetoprotein), elevated    Abdominal pain complicating pregnancy, antepartum    36 weeks gestation of pregnancy    Prenatal care, second trimester    Vaginitis    Macrosomia    Fetal tachycardia affecting management of mother    Fetal macrosomia affecting management of mother, antepartum    Headache    Preeclampsia, severe     Objective:  Temp:  [97.5 °F (36.4 °C)-98.5 °F (36.9 °C)] 98.5 °F (36.9 °C)  HR:  [69-81] 81  BP: (104-138)/(55-88) 130/78  Resp:  [20] 20  SpO2:  [98 %-100 %] 98 %  O2 Device: None (Room air)  Body mass index is 48.47 kg/m².     Physical Exam:  Physical Exam  Vitals reviewed.   Constitutional:       General: She is not in acute distress.     Appearance: She is well-developed.   HENT:      Head: Normocephalic and atraumatic.   Cardiovascular:      Rate and Rhythm: Normal rate.   Pulmonary:      Effort: Pulmonary effort is normal. No respiratory distress.   Abdominal:      Palpations: Abdomen is soft.      Tenderness: There is no abdominal tenderness.      Comments:  Gravid   Genitourinary:     Comments: Normal appearing external female genitalia  Speculum exam:       Normal appearing vaginal epithelium       Physiologic vaginal discharge present       No bleeding or pooling       Grossly normal appearing cervix, not grossly dilated  SVE: 0/30/-4    Skin:     Findings: No rash (on exposed skin).   Neurological:      Mental Status: She is alert and oriented to person, place, and time.   Psychiatric:         Mood and Affect: Affect normal.         Speech: Speech normal.         Behavior: Behavior normal.        FHT:  Baseline Rate (FHR): 140 bpm  Variability: Moderate  Accelerations: 15 x 15 or greater, At variable times  Decelerations: None  FHR Category: Category I    TOCO:   Contraction Frequency (minutes): 6-8  Contraction Duration (seconds):   Contraction Intensity: Mild    Lab Results   Component Value Date    WBC 8.90 02/23/2025    HGB 12.9 02/23/2025    HCT 39.0 02/23/2025     02/23/2025     Lab Results   Component Value Date     10/13/2015    K 3.9 02/23/2025     02/23/2025    CO2 21 02/23/2025    BUN 9 02/23/2025    CREATININE 0.52 (L) 02/23/2025    GLUCOSE 95 10/13/2015    AST 15 02/23/2025    ALT 9 02/23/2025     Sebastian Mathew MD  OBGYN PGY-1  02/23/25  12:41 PM

## 2025-02-23 NOTE — UTILIZATION REVIEW
Initial Clinical Review    Admission: Date/Time/Statement:   Admission Orders (From admission, onward)       Ordered        25 1541  Place in Observation  Once                          Orders Placed This Encounter   Procedures    Place in Observation     Standing Status:   Standing     Number of Occurrences:   1     Level of Care:   Med Surg [16]       Chief Complaint   Patient presents with    tachy     Fetal tachycardia in OB office       Initial Presentation: 34 y.o. female  w/ CABRERA 3/19/25 - by US 36w2d gestation who presented from outpatient OB office to [facility] ED. Admitted as Observation for evaluation and treatment of fetal tachycardia. Presented w/ fetal tachycardia, second presentation for same within past week. Occasional contractions, fetal movement present. Outpatient NST baseline 170 bpm. Extended monitoring w/ decrease to 150-155 bpm w/ 1L IVF. Check CBC, T&S, RPR, fibrinogen, PT/PTT, continuous fetal monitoring. Insulin controlled gestational DM - Lispro: 18 untis breakfast, 10 units lunch, 50 units w/ dinner; Lantus 38 units qHS. BG checks fasting and 2h postprandial. Maternal Fetal Medicine consulted.    MFM: Unclear etiology for fetal tachycardia. admission for continuous fetal monitoring. If persistent fetal tachycardia, I would recommend delivery. Presence of moderate variability and accelerations are reassuring regarding fetal status currently. Check TSH and K-B. Notes CGM reporting lows overnight. Check 3am BG manually.     Date:    Admit until delivery - recommended at 37 weeks gestation unless persistently tachycardic then sooner. IOL  @ 1500. Intermittent monitoring. Continue insulin regimen. No contractions. Fetal movement present.     25 Changed to Inpatient Status: Pt w/ some intermittent contractions overnight. Now w/ headache. Continue insulin regimen, remain admitted until delivery. New diagnosis of preeclampsia without severe features, but  headache has returned this morning -- treat headache with tylenol and reglan. If headache persistent, would recommend delivery.     Scheduled Medications:  famotidine, 20 mg, Oral, Daily  insulin glargine, 38 Units, Subcutaneous, HS  insulin lispro, 10 Units, Subcutaneous, Daily With Lunch  insulin lispro, 18 Units, Subcutaneous, Daily With Breakfast  insulin lispro, 50 Units, Subcutaneous, Daily With Dinner    Continuous IV Infusions: none    PRN Meds:  acetaminophen, 975 mg, Oral, Q8H PRN; 2/23 x1  magnesium sulfate, 2 g, Intravenous; 2/22 x1  metoclopramide, 10 mg, Oral, TID PRN; 2/22 x1, 2/23 x1  ondansetron, 4 mg, Intravenous, Q8H PRN  potassium chloride, 20 mEq, Oral; 2/22 x1  potassium chloride, 40 mEq, Oral; 2/22 x1    diphenhydrAMINE (BENADRYL) injection 25 mg  Dose: 25 mg Freq: Once Route: IV  Start: 02/22/25 0445 End: 02/22/25 0539  insulin lispro (HumALOG/ADMELOG) 100 units/mL subcutaneous injection 10 Units  Dose: 10 Units Freq: Once Route: SC  Start: 02/21/25 1530 End: 02/21/25 1545  lactated ringers bolus 1,000 mL  Dose: 1,000 mLFreq: Once Route: IV  Start: 02/21/25 1245 End: 02/21/25 1435      ED Triage Vitals   Temperature Pulse Respirations Blood Pressure SpO2 Pain Score   02/21/25 1246 02/21/25 1241 02/21/25 1246 02/21/25 1241 02/21/25 1244 02/21/25 1658   98 °F (36.7 °C) 91 16 122/72 95 % 2     Weight (last 2 days)       Date/Time Weight    02/21/25 1800 --    Comment rows:    OBSERV: Dr. Dietz at bedside to discuss plan of care and insulin. at 02/21/25 1800    02/21/25 1730 --    Comment rows:    OBSERV: Pt received via WC and oriented to room 328. Pt reports positive FM, denies vaginal bleeding, or LOF. at 02/21/25 1730    02/21/25 1246 120 (265)            Vital Signs (last 3 days)       Date/Time Temp Pulse Resp BP MAP (mmHg) SpO2 O2 Device Cardiac (WDL) Patient Position - Orthostatic VS Pain    02/23/25 0922 -- -- -- -- -- -- -- -- -- 7    02/23/25 0808 97.8 °F (36.6 °C) 69 20 111/57 --  99 % -- -- -- No Pain    02/23/25 0503 97.5 °F (36.4 °C) 77 20 136/88 109 100 % None (Room air) -- Sitting 7    02/23/25 0008 98.3 °F (36.8 °C) 71 20 104/55 74 100 % None (Room air) -- Lying 5    02/22/25 2209 -- -- -- -- -- -- None (Room air) WDL -- --    02/22/25 1946 97.5 °F (36.4 °C) 72 20 138/87 -- 100 % None (Room air) -- Sitting 6    02/22/25 1536 97.5 °F (36.4 °C) 77 20 127/78 -- 98 % None (Room air) -- Sitting No Pain    02/22/25 1217 98.5 °F (36.9 °C) 85 20 149/85 -- 98 % -- -- -- 3    02/22/25 1009 -- -- -- -- -- -- None (Room air) WDL -- --    02/22/25 0803 98.5 °F (36.9 °C) 81 20 132/74 -- 99 % -- -- -- 5    02/22/25 0431 97.9 °F (36.6 °C) 73 18 138/89 103 98 % None (Room air) -- Lying 8    02/22/25 0012 97.6 °F (36.4 °C) 80 15 135/77 93 98 % None (Room air) -- Sitting 6    02/21/25 2220 -- -- -- -- -- -- -- WDL -- --    02/21/25 2043 -- -- -- 140/86 -- -- -- -- Lying --    02/21/25 2014 97.5 °F (36.4 °C) 98 18 -- -- 96 % None (Room air) -- Lying 3    02/21/25 1800 -- -- -- -- -- -- -- -- -- --    OBSERV: Dr. Dietz at bedside to discuss plan of care and insulin. at 02/21/25 1800    02/21/25 1730 -- -- -- -- -- -- -- WDL -- --    OBSERV: Pt received via WC and oriented to room 328. Pt reports positive FM, denies vaginal bleeding, or LOF. at 02/21/25 1730    02/21/25 1658 97.4 °F (36.3 °C) 92 18 128/78 98 98 % None (Room air) -- Lying 2    02/21/25 1246 98 °F (36.7 °C) 91 16 122/72 -- 96 % -- -- Lying --    02/21/25 1244 -- 92 -- -- -- 95 % -- -- -- --    02/21/25 1241 -- 91 -- 122/72 -- -- -- -- -- --              Pertinent Labs/Diagnostic Test Results:   Results from last 7 days   Lab Units 02/22/25  1058 02/21/25  1624   WBC Thousand/uL 9.84 9.95   HEMOGLOBIN g/dL 12.7 13.2   HEMATOCRIT % 38.7 39.8   PLATELETS Thousands/uL 290 291   TOTAL NEUT ABS Thousands/µL  --  6.14         Results from last 7 days   Lab Units 02/22/25  0811   SODIUM mmol/L 135   POTASSIUM mmol/L 3.3*   CHLORIDE mmol/L 105    CO2 mmol/L 20*   ANION GAP mmol/L 10   BUN mg/dL 11   CREATININE mg/dL 0.52*   EGFR ml/min/1.73sq m 125   CALCIUM mg/dL 8.5     Results from last 7 days   Lab Units 02/22/25  0811   AST U/L 11*   ALT U/L 7   ALK PHOS U/L 95   TOTAL PROTEIN g/dL 5.9*   ALBUMIN g/dL 3.2*   TOTAL BILIRUBIN mg/dL 0.31     Results from last 7 days   Lab Units 02/23/25  0951 02/23/25  0623 02/23/25  0415 02/23/25  0308 02/22/25  2046 02/22/25  1630 02/22/25  1341 02/22/25  0939 02/22/25  0630 02/22/25  0416 02/21/25  2214 02/21/25  1742   POC GLUCOSE mg/dl 88 72 76 70 100 78 113 109 82 85 130 169*     Results from last 7 days   Lab Units 02/22/25  0811   GLUCOSE RANDOM mg/dL 126         Results from last 7 days   Lab Units 02/21/25  1334   HEMOGLOBIN A1C % 7.6*   EAG mg/dl 171       Results from last 7 days   Lab Units 02/21/25  1624   PROTIME seconds 12.8   INR  0.89   PTT seconds 23     Results from last 7 days   Lab Units 02/21/25  1624   TSH 3RD GENERATON uIU/mL 1.517     Results from last 7 days   Lab Units 02/22/25  0811   CREATININE UR mg/dL 151.6   PROTEIN UR mg/dL 54.1   PROT/CREAT RATIO UR  0.4*         Past Medical History:   Diagnosis Date    Female infertility     Kidney stone     Varicella     vaccinated     Present on Admission:   Fetal tachycardia affecting management of mother   Insulin controlled gestational diabetes mellitus (GDM) in third trimester   Fetal macrosomia affecting management of mother, antepartum      Admitting Diagnosis: 36 weeks gestation of pregnancy [Z3A.36]  Fetal tachycardia affecting management of mother [O36.8390]  Age/Sex: 34 y.o. female    Network Utilization Review Department  ATTENTION: Please call with any questions or concerns to [phone] and carefully listen to the prompts so that you are directed to the right person. All voicemails are confidential.   For Discharge needs, contact Care Management DC Support Team at [phone] opt. 2  Send all requests for admission clinical reviews,  approved or denied determinations and any other requests to dedicated fax number below belonging to the campus where the patient is receiving treatment. List of dedicated fax numbers for the Facilities:  FACILITY NAME UR FAX NUMBER   ADMISSION DENIALS (Administrative/Medical Necessity) [phone]   DISCHARGE SUPPORT TEAM (NETWORK) [phone]   PARENT CHILD HEALTH (Maternity/NICU/Pediatrics) [phone]   [facility] [phone]   [facility] [phone]   [facility] [phone]   [facility] [phone]   [facility] [phone]   [facility] [phone]   [facility] [phone]   [facility] [phone]   [facility] [phone]   [facility] [phone]   [facility] [phone]   [facility] [phone]

## 2025-02-23 NOTE — ASSESSMENT & PLAN NOTE
Home regimen: 18u at breakfast, 10u at lunch, 49u with dinner and Lantus 38u qHS  - fingerstick protocol during induction  - 2hr GTT 6 weeks postpartum

## 2025-02-23 NOTE — PROGRESS NOTES
Progress Note - OB/GYN   Name: Karena Garcia 34 y.o. female I MRN: [id number]  Unit/Bed#: -01 I Date of Admission: 2025   Date of Service: 2025 I Hospital Day: 0     Assessment & Plan  Fetal tachycardia affecting management of mother  Fetal tachycardia noted on outpatient NST  with baseline 170 bpm; she was seen in triage for extended monitoring and baseline came down to 150-155 bpm with 1L fluid bolus. She presented for routine outpatient PNV  and fetal tachycardia was again noted with baseline 170 bpm.   In triage, baseline returned to 150-155 bpm with moderate variability and accelerations after a 1L IVF bolus. Etiology of fetal tachycardia unclear at this time.     Plan:  - Admit until delivery- now recommended at 37 weeks gestation unless persistently tachycardic at which point, we will proceed with delivery at that time.   - IOL scheduled for 25 @ 1500  - Admission labs WNL; PT/PTT, fibrinogen WNL, TSH WNL  - KB pending  - Will proceed now with intermittent monitoring (NST TID)  Insulin controlled gestational diabetes mellitus (GDM) in third trimester  Continue home regimen: 18u at breakfast, 10u at lunch, 49u with dinner and Lantus 38u qHS  Monitor blood glucose: fasting and 2h postprandial   Diabetic diet ordered  Continue to titrate as needed  36 weeks gestation of pregnancy  Labs UTD Pt w GDM  Pap smear: 09/10/2024   WNL + non 16/18 HPV   GC/CT:  negative   Prenatal Panel: normal   Blood Type: A+ RhoGam  indicated   GBS: collected 25  Genetic Testing: AFP +, NIPS normal   Tdap: done   Flu: 24   Birth Plan:  + epidural   Delivery consent - signed 25  Feeding Plan: breast  Breast pump: ordered previously  Pediatrician: will establish  Contraception: undecided  Fetal macrosomia affecting management of mother, antepartum  @35w6d  AC             34.46 cm        38 weeks 3 days* (98%)  BPD             9.18 cm        37 weeks 2 days* (89%)  HC             32.96  cm        37 weeks 4 days* (60%)  Femur           7.05 cm        36 weeks 1 day * (52%)     EFW [place] 4   3263 grams - 7 lbs 3 oz                 (90%)  Headache  Patient intially presented with a headache which resolved with benadryl, reglan, and mag. She has a history of migraines headaches.   However, she was diagnosed yesterday with preeclampsia without severe features and her headache has returned this morning  Will treat with Tylenol and reglan and if persistent, will diagnose with preeclampsia with severe features and recommend delivery. Patient is aware of the plan. Will reassess her headache in 2 hours (around 12pm).   Preeclampsia, mild  Unremitting headache. CBC/CMP wnl, P:C 0.4  Systolic (12hrs), Av , Min:111 , Max:136   Diastolic (12hrs), Av, Min:57, Max:88  - continue to monitor BP  - continue to monitor for signs/symptoms of evolving preeclampsia  - Reviewed recommendation of IOL/ delivery if patient's headache does not resolve with treatment due to concern for development of PreE w/ SF.          Subjective    Contractions: no, some overnight but intermittent  Loss of fluid: no  Vaginal bleeding: no  Fetal movement: yes    Pain: yes, headache  Headaches: yes  Visual changes: no; had some dizziness yesterday  Chest pain: no  Shortness of breath: no  RUQ pain: no    Objective      Patient Vitals for the past 24 hrs:   BP Temp Temp src Pulse Resp SpO2   25 1210 130/78 98.5 °F (36.9 °C) Oral 81 20 98 %   25 0808 111/57 97.8 °F (36.6 °C) Oral 69 20 99 %   25 0503 136/88 97.5 °F (36.4 °C) Oral 77 20 100 %   25 0008 104/55 98.3 °F (36.8 °C) Oral 71 20 100 %   25 1946 138/87 97.5 °F (36.4 °C) Oral 72 20 100 %   25 1536 127/78 97.5 °F (36.4 °C) Oral 77 20 98 %       Physical Exam  Vitals reviewed.   Constitutional:       General: She is not in acute distress.     Appearance: Normal appearance. She is well-developed. She is not ill-appearing, toxic-appearing or  diaphoretic.   Cardiovascular:      Rate and Rhythm: Normal rate.   Pulmonary:      Effort: Pulmonary effort is normal. No respiratory distress.   Skin:     General: Skin is warm and dry.   Neurological:      Mental Status: She is alert and oriented to person, place, and time.   Psychiatric:         Mood and Affect: Mood normal.         Behavior: Behavior normal.         I/O       None            Invasive Devices       Peripheral Intravenous Line  Duration             Peripheral IV 02/21/25 Left Antecubital 1 day    Peripheral IV 02/21/25 Proximal;Right;Ventral (anterior) Forearm 1 day                    Results from last 7 days   Lab Units 02/23/25  0953 02/22/25  1058 02/21/25  1624   WBC Thousand/uL 8.90 9.84 9.95   TOTAL NEUT ABS Thousands/µL  --   --  6.14   HEMOGLOBIN g/dL 12.9 12.7 13.2   MCV fL 88 89 89   PLATELETS Thousands/uL 279 290 291     Results from last 7 days   Lab Units 02/23/25  0953 02/22/25  0811   POTASSIUM mmol/L 3.9 3.3*   CHLORIDE mmol/L 105 105   CO2 mmol/L 21 20*   BUN mg/dL 9 11   CREATININE mg/dL 0.52* 0.52*   EGFR ml/min/1.73sq m 125 125     Results from last 7 days   Lab Units 02/23/25  0953 02/22/25  0811   AST U/L 15 11*   ALT U/L 9 7     Results from last 7 days   Lab Units 02/23/25  0953 02/22/25  1058 02/21/25  1624   PLATELETS Thousands/uL 279 290 291   INR   --   --  0.89   PROTIME seconds  --   --  12.8   PTT seconds  --   --  23   FIBRINOGEN mg/dL  --   --  564*     Results from last 7 days   Lab Units 02/23/25  0951 02/23/25  0623 02/23/25  0415 02/23/25  0308 02/22/25  2046 02/22/25  1630 02/22/25  1341 02/22/25  0939 02/22/25  0630 02/22/25  0416 02/21/25  2214 02/21/25  1742 02/21/25  1344   POC GLUCOSE mg/dl 88 72 76 70 100 78 113 109 82 85 130 169* 138     Results from last 7 days   Lab Units 02/22/25  0811   PROT/CREAT RATIO UR  0.4*                   Brief review of pertinent history:  Past Medical History:   Diagnosis Date    Female infertility     Kidney stone      Varicella     vaccinated     Past Surgical History:   Procedure Laterality Date    IL CYSTO/URETERO W/LITHOTRIPSY &INDWELL STENT INSRT Right 10/24/2018    Procedure: CYSTOSCOPY URETEROSCOPY WITH RETROGRADE PYELOGRAM AND INSERTION STENT URETERAL;  Surgeon: Gregory Goodman MD;  Location: [facility];  Service: Urology    IL LITHOTRIPSY XTRCORP SHOCK WAVE Right 2018    Procedure: LITHROTRIPSY EXTRACORPORAL SHOCKWAVE (ESWL);  Surgeon: Gregory Goodman MD;  Location: [facility];  Service: Urology     OB History    Para Term  AB Living   3 0 0 0 2 0   SAB IAB Ectopic Multiple Live Births   2 0 0 0 0      # Outcome Date GA Lbr Edinson/2nd Weight Sex Type Anes PTL Lv   3 Current            2 2016     Biochemical      1 2014     Biochemical          Fetal data:  Nonstress test: date 25 Reactive    MEDS:   Medication Administration - last 24 hours from 2025 1249 to 2025 1249         Date/Time Order Dose Route Action Action by     2025 0730 EST insulin lispro (HumALOG/ADMELOG) 100 units/mL subcutaneous injection 18 Units 18 Units Subcutaneous Given Lakshmi Duron RN     2025 1123 EST insulin lispro (HumALOG/ADMELOG) 100 units/mL subcutaneous injection 10 Units 10 Units Subcutaneous Given Lakshmi Duron RN     2025 2212 EST famotidine (PEPCID) tablet 20 mg 20 mg Oral Given Susan Pena RN     2025 2245 EST insulin glargine (LANTUS) subcutaneous injection 38 Units 0.38 mL 38 Units Subcutaneous Given Susan Pena RN     2025 1755 EST insulin lispro (HumALOG/ADMELOG) 100 units/mL subcutaneous injection 50 Units 50 Units Subcutaneous Given Estelle Leopold, AYANNA     2025 1337 EST potassium chloride (Klor-Con M20) CR tablet 40 mEq 40 mEq Oral Given Estelle Leopold, AYANNA     2025 1337 EST potassium chloride (Klor-Con M20) CR tablet 20 mEq 20 mEq Oral Given Estelle Leopold, RN     2025 0922 EST acetaminophen (TYLENOL) tablet 975 mg 975 mg Oral  Given Lakshmi Duron RN     02/23/2025 0922 EST metoclopramide (REGLAN) tablet 10 mg 10 mg Oral Given Lakshmi Duron RN            Current Facility-Administered Medications:     acetaminophen (TYLENOL) tablet 975 mg, Q8H PRN    bupivacaine (PF) (MARCAINE) 0.25 % injection 30 mL, Once PRN    calcium gluconate 10 % injection 1 g, Once PRN    dextrose 5 % and sodium chloride 0.9 % infusion, Continuous    famotidine (PEPCID) tablet 20 mg, Daily    insulin glargine (LANTUS) subcutaneous injection 38 Units 0.38 mL, HS    insulin lispro (HumALOG/ADMELOG) 100 units/mL subcutaneous injection 10 Units, Daily With Lunch    insulin lispro (HumALOG/ADMELOG) 100 units/mL subcutaneous injection 18 Units, Daily With Breakfast    insulin lispro (HumALOG/ADMELOG) 100 units/mL subcutaneous injection 50 Units, Daily With Dinner    insulin regular (HumuLIN R,NovoLIN R) 1 Units/mL in sodium chloride 0.9 % 100 mL infusion, Continuous    lactated ringers infusion, Continuous    magnesium sulfate 4 g/100 mL IVPB (premix) 4 g, Once **FOLLOWED BY** magnesium sulfate 2 g/50 mL IVPB (premix) 2 g, Once    magnesium sulfate 20 g/500 mL infusion (premix), Continuous    metoclopramide (REGLAN) tablet 10 mg, TID PRN    ondansetron (ZOFRAN) injection 4 mg, Q8H PRN    penicillin G (PFIZERPEN-G) in 0.9% sodium chloride 250 mL IVPB 5 Million Units, Once **FOLLOWED BY** penicillin G (PFIZERPEN-G) in 0.9% sodium chloride 100 mL IVPB 2.5 Million Units, Q4H         Leeanne Wong MD  OBGYN  2/23/2025  10:49AM

## 2025-02-23 NOTE — ASSESSMENT & PLAN NOTE
Patient intially presented with a headache which resolved with benadryl, reglan, and mag. She has a history of migraines headaches.   However, she was diagnosed yesterday with preeclampsia without severe features and her headache has returned this morning  Will treat with Tylenol and reglan and if persistent, will diagnose with preeclampsia with severe features and recommend delivery. Patient is aware of the plan. Will reassess her headache in 2 hours (around 12pm).

## 2025-02-23 NOTE — OB LABOR/OXYTOCIN SAFETY PROGRESS
Labor Progress Note - Karena Garcia 34 y.o. female MRN: [id number]    Unit/Bed#: -01 Encounter: [id number]       Contraction Frequency (minutes): 0  Contraction Intensity: Mild  Uterine Activity Characteristics: Occasional  Cervical Dilation: Closed        Cervical Effacement: 30  Fetal Station: Ballotable  Baseline Rate (FHR): 155 bpm  Fetal Heart Rate (FHT): 160 BPM  FHR Category: I             Vital Signs:   Vitals:    02/23/25 1436   BP: 147/86   Pulse: 88   Resp:    Temp:    SpO2:      Notes/comments:   Pt resting comfortably. FHT Category I. Nye with contractions q10min. SVE as above.     A 24 Tong[place] Zuleta with a 30 mL balloon was selected. SVE was performed and cervix was located. Under speculum guidance, Zuleta was introduced into external cervical os. Balloon was advanced through cervix to beyond the internal cervical os. A small amount amount of sterile normal saline solution was instilled into the balloon to confirm placement. Placement was confirmed to be beyond the internal cervical os. A total of 60 mL of sterile normal saline solution was instilled into the balloon. Patient tolerated the procedure well. Instructions left with RN to notify MD when Zuleta dislodged.    Sebastian Mathew MD 2/23/2025 3:21 PM

## 2025-02-23 NOTE — ASSESSMENT & PLAN NOTE
Elevated Bps noted >4h apart during this admission with P/Cr 0.4. CBC, CMP otherwise wnl  Continue to monitor blood pressures closely

## 2025-02-23 NOTE — ANESTHESIA PROCEDURE NOTES
Epidural Block    Patient location during procedure: OB/L&D  Start time: 2/23/2025 5:47 PM  Reason for block: procedure for pain  Staffing  Performed by: Malina Linton MD  Authorized by: Malina Linton MD    Preanesthetic Checklist  Completed: patient identified, IV checked, site marked, risks and benefits discussed, surgical consent, monitors and equipment checked, pre-op evaluation and timeout performed  Epidural  Patient position: sitting  Prep: ChloraPrep  Sedation Level: no sedation  Patient monitoring: frequent blood pressure checks, continuous pulse oximetry and heart rate  Approach: midline  Location: lumbar, L3-4  Injection technique: ANN saline  Needle  Needle type: Tuohy   Needle gauge: 18 G  Needle insertion depth: 9 cm  Catheter type: multi-orifice  Catheter size: 20 G  Catheter at skin depth: 15 cm  Catheter securement method: stabilization device and clear occlusive dressing  Test dose: negativelidocaine-epinephrine (XYLOCAINE-MPF/EPINEPHRINE) 1.5 %-1:200,000 injection 3 mL - Epidural   3 mL - 2/23/2025 5:50:00 PM  Assessment  Sensory level: T10  Number of attempts: 1negative aspiration for CSF, negative aspiration for heme and no paresthesia on injection  patient tolerated the procedure well with no immediate complications

## 2025-02-23 NOTE — PROGRESS NOTES
1600 labs tried to be obtained by nursing staff with no success, WAN Kingsley called to the bedside for assistance obtaining labs

## 2025-02-23 NOTE — PLAN OF CARE
Problem: ANTEPARTUM  Goal: Maintain pregnancy as long as maternal and/or fetal condition is stable  Description: INTERVENTIONS:  - Maternal surveillance  - Fetal surveillance  - Monitor uterine activity  - Medications as ordered  - Bedrest  Outcome: Progressing     Problem: PAIN - ADULT  Goal: Verbalizes/displays adequate comfort level or baseline comfort level  Description: Interventions:  - Encourage patient to monitor pain and request assistance  - Assess pain using appropriate pain scale  - Administer analgesics based on type and severity of pain and evaluate response  - Implement non-pharmacological measures as appropriate and evaluate response  - Consider cultural and social influences on pain and pain management  - Notify physician/advanced practitioner if interventions unsuccessful or patient reports new pain  Outcome: Progressing     Problem: INFECTION - ADULT  Goal: Absence or prevention of progression during hospitalization  Description: INTERVENTIONS:  - Assess and monitor for signs and symptoms of infection  - Monitor lab/diagnostic results  - Monitor all insertion sites, i.e. indwelling lines, tubes, and drains  - Monitor endotracheal if appropriate and nasal secretions for changes in amount and color  - [place] appropriate cooling/warming therapies per order  - Administer medications as ordered  - Instruct and encourage patient and family to use good hand hygiene technique  - Identify and instruct in appropriate isolation precautions for identified infection/condition  Outcome: Progressing  Goal: Absence of fever/infection during neutropenic period  Description: INTERVENTIONS:  - Monitor WBC    Outcome: Progressing     Problem: SAFETY ADULT  Goal: Patient will remain free of falls  Description: INTERVENTIONS:  - Educate patient/family on patient safety including physical limitations  - Instruct patient to call for assistance with activity   - Consult OT/PT to assist with strengthening/mobility   -  Keep Call bell within reach  - Keep bed low and locked with side rails adjusted as appropriate  - Keep care items and personal belongings within reach  - Initiate and maintain comfort rounds  - Apply yellow socks and bracelet for high fall risk patients  - Consider moving patient to room near nurses station  Outcome: Progressing  Goal: Maintain or return to baseline ADL function  Description: INTERVENTIONS:  -  Assess patient's ability to carry out ADLs; assess patient's baseline for ADL function and identify physical deficits which impact ability to perform ADLs (bathing, care of mouth/teeth, toileting, grooming, dressing, etc.)  - Assess/evaluate cause of self-care deficits   - Assess range of motion  - Assess patient's mobility; develop plan if impaired  - Assess patient's need for assistive devices and provide as appropriate  - Encourage maximum independence but intervene and supervise when necessary  - Involve family in performance of ADLs  - Assess for home care needs following discharge   - Consider OT consult to assist with ADL evaluation and planning for discharge  - Provide patient education as appropriate  Outcome: Progressing  Goal: Maintains/Returns to pre admission functional level  Description: INTERVENTIONS:  - Perform AM-PAC 6 Click Basic Mobility/ Daily Activity assessment daily.  - Set and communicate daily mobility goal to care team and patient/family/caregiver.   - Out of bed for toileting  - Record patient progress and toleration of activity level   Outcome: Progressing     Problem: Knowledge Deficit  Goal: Patient/family/caregiver demonstrates understanding of disease process, treatment plan, medications, and discharge instructions  Description: Complete learning assessment and assess knowledge base.  Interventions:  - Provide teaching at level of understanding  - Provide teaching via preferred learning methods  Outcome: Progressing     Problem: DISCHARGE PLANNING  Goal: Discharge to home or  other facility with appropriate resources  Description: INTERVENTIONS:  - Identify barriers to discharge w/patient and caregiver  - Arrange for needed discharge resources and transportation as appropriate  - Identify discharge learning needs (meds, wound care, etc.)  - Arrange for interpretive services to assist at discharge as needed  - Refer to Case Management Department for coordinating discharge planning if the patient needs post-hospital services based on physician/advanced practitioner order or complex needs related to functional status, cognitive ability, or social support system  Outcome: Progressing

## 2025-02-23 NOTE — OB LABOR/OXYTOCIN SAFETY PROGRESS
Labor Progress Note - Karena Garcia 34 y.o. female MRN: [id number]    Unit/Bed#: -01 Encounter: [id number]       Contraction Frequency (minutes): 0  Contraction Intensity: Mild  Uterine Activity Characteristics: Occasional  Cervical Dilation: 4        Cervical Effacement: 60  Fetal Station: -2  Baseline Rate (FHR): 150 bpm  Fetal Heart Rate (FHT): 165 BPM  FHR Category: Cat 1               Vital Signs:   Vitals:    02/23/25 1707   BP: (!) 177/91   Pulse: 90   Resp:    Temp:    SpO2:        Notes/comments:   Called to assess Karena due to acute pain and severe range blood pressure (I was notified at 1700 of SRBP from 1538). She reports intense pain and is requesting removal of FB and epidural. On examination, FB was noted to be in the cervix but it was unable to be removed due to patient discomfort. FB was deflated and balloon was removed. Cervical exam was changed! SROM on exam for clear fluid. We have had difficulties with obtaining her labs. Will call anesthesia for epidural as requested and ask for their assistance with labs as well. Will continue to monitor blood pressures now that acute pain has resolved. If patient continues to have SRBP, will treat with IV antihypertensives but given plans for epidural, will hold for right now with low threshold to treat if continued.       Leeanne Wong MD 2/23/2025 5:25 PM

## 2025-02-23 NOTE — ANESTHESIA PREPROCEDURE EVALUATION
Procedure:  LABOR ANALGESIA    Relevant Problems   CARDIO   (+) Fetal tachycardia affecting management of mother   (+) Mastodynia   (+) Preeclampsia, mild      GI/HEPATIC   (+) Hepatic steatosis      /RENAL   (+) Nephrolithiasis with right hydronephrosis      GYN   (+) 36 weeks gestation of pregnancy      NEURO/PSYCH   (+) Headache   (+) Tension type headache      Obstetrics/Gynecology   (+) Insulin controlled gestational diabetes mellitus (GDM) in third trimester      Other   (+) Class 3 severe obesity due to excess calories with serious comorbidity and body mass index (BMI) of 40.0 to 44.9 in adult (HCC)        Physical Exam    Airway    Mallampati score: II  TM Distance: >3 FB  Neck ROM: full     Dental   No notable dental hx     Cardiovascular  Cardiovascular exam normal    Pulmonary  Pulmonary exam normal     Other Findings  post-pubertal.      Anesthesia Plan  ASA Score- 3     Anesthesia Type- epidural with ASA Monitors.         Additional Monitors:     Airway Plan:            Plan Factors-Exercise tolerance (METS): >4 METS.    Chart reviewed.   Existing labs reviewed. Patient summary reviewed.    Patient is not a current smoker. Patient not instructed to abstain from smoking on day of procedure. Patient did not smoke on day of surgery.            Induction-     Postoperative Plan-     Perioperative Resuscitation Plan - Level 1 - Full Code.       Informed Consent- Anesthetic plan and risks discussed with patient.  I personally reviewed this patient with the CRNA. Discussed and agreed on the Anesthesia Plan with the CRNA..      NPO Status:  No vitals data found for the desired time range.      Lab Results   Component Value Date    HGBA1C 7.6 (H) 02/21/2025       Lab Results   Component Value Date     10/13/2015    K 3.9 02/23/2025     02/23/2025    CO2 21 02/23/2025    ANIONGAP 14.0 10/13/2015    BUN 9 02/23/2025    CREATININE 0.52 (L) 02/23/2025    GLUCOSE 95 10/13/2015    GLUF 95 09/17/2024     CALCIUM 9.2 02/23/2025    CORRECTEDCA 9.8 02/23/2025    AST 15 02/23/2025    ALT 9 02/23/2025    ALKPHOS 106 (H) 02/23/2025    PROT 7.7 10/13/2015    BILITOT 0.7 10/13/2015    EGFR 125 02/23/2025       Lab Results   Component Value Date    WBC 8.90 02/23/2025    HGB 12.9 02/23/2025    HCT 39.0 02/23/2025    MCV 88 02/23/2025     02/23/2025

## 2025-02-23 NOTE — ASSESSMENT & PLAN NOTE
Unclear etiology for fetal tachycardia, no evidence of dehydration, ruptured membranes, intrauterine infection.  Presence of moderate variability and accelerations are reassuring regarding fetal status currently  TSH wnl  K-B to be collected  IV fluid resuscitation stopped  NST TID  IOL scheduled for Wednesday

## 2025-02-23 NOTE — PROGRESS NOTES
"Progress Note - OB/GYN   Name: Karena Garcia 34 y.o. female I MRN: [id number]  Unit/Bed#: -01 I Date of Admission: 2/21/2025   Date of Service: 2/23/2025 I Hospital Day: 0     Assessment & Plan  Insulin controlled gestational diabetes mellitus (GDM) in third trimester  Semglee 52 units nightly and Humalog 10/18/50 units with meals (which was increased as recently as 2/18/25).  Has a Dexcom CGM  Glycemic control improving, continue checking 3-4am glucose to rule out overnight hypoglycemia  36 weeks gestation of pregnancy  PN labs wnl  A pos, GBS pending  Received Tdap 1/6/25  Fetal tachycardia affecting management of mother  Unclear etiology for fetal tachycardia, no evidence of dehydration, ruptured membranes, intrauterine infection.  Presence of moderate variability and accelerations are reassuring regarding fetal status currently  TSH wnl  K-B to be collected  IV fluid resuscitation stopped  NST TID  IOL scheduled for Wednesday  Fetal macrosomia affecting management of mother, antepartum  On 2/18/25:   MEASUREMENTS (* Included In Average GA)     AC             34.46 cm        38 weeks 3 days* (98%)  BPD             9.18 cm        37 weeks 2 days* (89%)  HC             32.96 cm        37 weeks 4 days* (60%)  Femur           7.05 cm        36 weeks 1 day * (52%)     EFW [place] 4   3263 grams - 7 lbs 3 oz                 (90%)     THE AVERAGE GESTATIONAL AGE is 37 weeks 2 days +/- 21 days.  Headache  Patient reports history of migraines has only been taking Tylenol in pregnancy which \"does not touch them\"  On 2/22 reports worsening migraine unresolved by Tylenol  Will give Reglan/Benadryl/magnesium IV  Pre-eclampsia w/o SF  Elevated Bps noted >4h apart during this admission with P/Cr 0.4. CBC, CMP otherwise wnl  Continue to monitor blood pressures closely    Subjective    Patient sleeping this morning. Per nurse, had some swelling of bilateral lower extremities after IVF. Also had one episode of hypoglycemia " at 50s overnight at which time she was given apple juice with increase in blood glucose.      Objective      Patient Vitals for the past 24 hrs:   BP Temp Temp src Pulse Resp SpO2   02/23/25 0503 136/88 97.5 °F (36.4 °C) Oral 77 20 100 %   02/23/25 0008 104/55 98.3 °F (36.8 °C) Oral 71 20 100 %   02/22/25 1946 138/87 97.5 °F (36.4 °C) Oral 72 20 100 %   02/22/25 1536 127/78 97.5 °F (36.4 °C) Oral 77 20 98 %   02/22/25 1217 149/85 98.5 °F (36.9 °C) Oral 85 20 98 %   02/22/25 0803 132/74 98.5 °F (36.9 °C) Oral 81 20 99 %       Physical Exam: could not be done as patient was sleeping    I/O       None            Invasive Devices       Peripheral Intravenous Line  Duration             Peripheral IV 02/21/25 Left Antecubital 1 day    Peripheral IV 02/21/25 Proximal;Right;Ventral (anterior) Forearm 1 day                    Results from last 7 days   Lab Units 02/22/25  1058 02/21/25  1624   WBC Thousand/uL 9.84 9.95   TOTAL NEUT ABS Thousands/µL  --  6.14   HEMOGLOBIN g/dL 12.7 13.2   MCV fL 89 89   PLATELETS Thousands/uL 290 291     Results from last 7 days   Lab Units 02/22/25  0811   POTASSIUM mmol/L 3.3*   CHLORIDE mmol/L 105   CO2 mmol/L 20*   BUN mg/dL 11   CREATININE mg/dL 0.52*   EGFR ml/min/1.73sq m 125     Results from last 7 days   Lab Units 02/22/25  0811   AST U/L 11*   ALT U/L 7     Results from last 7 days   Lab Units 02/22/25  1058 02/21/25  1624   PLATELETS Thousands/uL 290 291   INR   --  0.89   PROTIME seconds  --  12.8   PTT seconds  --  23   FIBRINOGEN mg/dL  --  564*     Results from last 7 days   Lab Units 02/23/25  0623 02/23/25  0415 02/23/25  0308 02/22/25  2046 02/22/25  1630 02/22/25  1341 02/22/25  0939 02/22/25  0630 02/22/25  0416 02/21/25  2214 02/21/25  1742 02/21/25  1344   POC GLUCOSE mg/dl 72 76 70 100 78 113 109 82 85 130 169* 138     Results from last 7 days   Lab Units 02/22/25  0811   PROT/CREAT RATIO UR  0.4*                   Brief review of pertinent history:  Past Medical  History:   Diagnosis Date    Female infertility     Kidney stone     Varicella     vaccinated     Past Surgical History:   Procedure Laterality Date    OR CYSTO/URETERO W/LITHOTRIPSY &INDWELL STENT INSRT Right 10/24/2018    Procedure: CYSTOSCOPY URETEROSCOPY WITH RETROGRADE PYELOGRAM AND INSERTION STENT URETERAL;  Surgeon: Gregory Goodman MD;  Location: [facility];  Service: Urology    OR LITHOTRIPSY XTRCORP SHOCK WAVE Right 2018    Procedure: LITHROTRIPSY EXTRACORPORAL SHOCKWAVE (ESWL);  Surgeon: Gregory Goodman MD;  Location: [facility];  Service: Urology     OB History    Para Term  AB Living   3 0 0 0 2 0   SAB IAB Ectopic Multiple Live Births   2 0 0 0 0      # Outcome Date GA Lbr Edinson/2nd Weight Sex Type Anes PTL Lv   3 Current            2 2016     Biochemical      1 2014     Biochemical          Fetal data:  Nonstress test: date 25  Baseline:  140  Variability: moderate  Accelerations: present, 15x15  Decelerations: absent  Contractions: absent  Assessment: reactive  Plan: continue TID and PRN      MEDS:   Medication Administration - last 24 hours from 2025 0719 to 2025 0719         Date/Time Order Dose Route Action Action by     2025 0727 EST insulin lispro (HumALOG/ADMELOG) 100 units/mL subcutaneous injection 18 Units 18 Units Subcutaneous Given Estelle Leopold, RN     2025 1137 EST insulin lispro (HumALOG/ADMELOG) 100 units/mL subcutaneous injection 10 Units 10 Units Subcutaneous Given Estelle Leopold, RN     2025 2212 EST famotidine (PEPCID) tablet 20 mg 20 mg Oral Given Susan Pena RN     2025 2245 EST insulin glargine (LANTUS) subcutaneous injection 38 Units 0.38 mL 38 Units Subcutaneous Given Susan Pena RN     2025 1755 EST insulin lispro (HumALOG/ADMELOG) 100 units/mL subcutaneous injection 50 Units 50 Units Subcutaneous Given Estelle Leopold, RN     2025 0760 EST magnesium sulfate 2 g/50 mL IVPB (premix) 2 g 0 g  Intravenous Stopped Estelle Leopold, RN     02/22/2025 1337 EST potassium chloride (Klor-Con M20) CR tablet 40 mEq 40 mEq Oral Given Estelle Leopold, RN     02/22/2025 1337 EST potassium chloride (Klor-Con M20) CR tablet 20 mEq 20 mEq Oral Given Estelle Leopold, RN            Current Facility-Administered Medications:     famotidine (PEPCID) tablet 20 mg, Daily    insulin glargine (LANTUS) subcutaneous injection 38 Units 0.38 mL, HS    insulin lispro (HumALOG/ADMELOG) 100 units/mL subcutaneous injection 10 Units, Daily With Lunch    insulin lispro (HumALOG/ADMELOG) 100 units/mL subcutaneous injection 18 Units, Daily With Breakfast    insulin lispro (HumALOG/ADMELOG) 100 units/mL subcutaneous injection 50 Units, Daily With Dinner    ondansetron (ZOFRAN) injection 4 mg, Q8H PRN         Arron Berry MD  OBGYN, PGY-4  2/23/2025  7:19 AM

## 2025-02-23 NOTE — ASSESSMENT & PLAN NOTE
Unremitting headache. CBC/CMP wnl, P:C 0.4  Systolic (12hrs), Av , Min:111 , Max:136   Diastolic (12hrs), Av, Min:57, Max:88  - continue to monitor BP  - continue to monitor for signs/symptoms of evolving preeclampsia  - Reviewed recommendation of IOL/ delivery if patient's headache does not resolve with treatment due to concern for development of PreE w/ SF.

## 2025-02-23 NOTE — ASSESSMENT & PLAN NOTE
Patient transferred from antepartum service to L&D floor for induction of labor in the setting of new diagnosis of preeclampsia with severe features. SVE **, toco with contractions q**min, FHT **  - IV fluids, clear liquid diet  - labs: CBC, T/S, RPR  - analgesia at maternal request  - management: **Zuleta balloon, Cytotec, pitocin titration, anticipate

## 2025-02-23 NOTE — PROGRESS NOTES
Anesthesia attempted to obtain bloodwork and IV placement, bloodwork only obtained. Recommendation for a mid-line to be placed on the patient. Charge RN notified and nursing supervisor notified to assist with getting a provider for the midline placement.

## 2025-02-24 ENCOUNTER — RESULTS FOLLOW-UP (OUTPATIENT)
Dept: OBGYN CLINIC | Facility: CLINIC | Age: 35
End: 2025-02-24

## 2025-02-24 PROBLEM — Z98.891 S/P CESAREAN SECTION: Status: ACTIVE | Noted: 2024-11-21

## 2025-02-24 LAB
ALBUMIN SERPL BCG-MCNC: 3.2 G/DL (ref 3.5–5)
ALBUMIN SERPL BCG-MCNC: 3.2 G/DL (ref 3.5–5)
ALP SERPL-CCNC: 107 U/L (ref 34–104)
ALP SERPL-CCNC: 110 U/L (ref 34–104)
ALT SERPL W P-5'-P-CCNC: 8 U/L (ref 7–52)
ALT SERPL W P-5'-P-CCNC: 8 U/L (ref 7–52)
ANION GAP SERPL CALCULATED.3IONS-SCNC: 10 MMOL/L (ref 4–13)
ANION GAP SERPL CALCULATED.3IONS-SCNC: 13 MMOL/L (ref 4–13)
AST SERPL W P-5'-P-CCNC: 14 U/L (ref 13–39)
AST SERPL W P-5'-P-CCNC: 15 U/L (ref 13–39)
BASE EXCESS BLDCOA CALC-SCNC: -7.4 MMOL/L (ref 3–11)
BILIRUB SERPL-MCNC: 0.41 MG/DL (ref 0.2–1)
BILIRUB SERPL-MCNC: 0.45 MG/DL (ref 0.2–1)
BUN SERPL-MCNC: 11 MG/DL (ref 5–25)
BUN SERPL-MCNC: 12 MG/DL (ref 5–25)
CALCIUM ALBUM COR SERPL-MCNC: 8.1 MG/DL (ref 8.3–10.1)
CALCIUM ALBUM COR SERPL-MCNC: 8.7 MG/DL (ref 8.3–10.1)
CALCIUM SERPL-MCNC: 7.5 MG/DL (ref 8.4–10.2)
CALCIUM SERPL-MCNC: 8.1 MG/DL (ref 8.4–10.2)
CHLORIDE SERPL-SCNC: 101 MMOL/L (ref 96–108)
CHLORIDE SERPL-SCNC: 103 MMOL/L (ref 96–108)
CO2 SERPL-SCNC: 17 MMOL/L (ref 21–32)
CO2 SERPL-SCNC: 19 MMOL/L (ref 21–32)
CREAT SERPL-MCNC: 0.83 MG/DL (ref 0.6–1.3)
CREAT SERPL-MCNC: 0.88 MG/DL (ref 0.6–1.3)
ERYTHROCYTE [DISTWIDTH] IN BLOOD BY AUTOMATED COUNT: 13.5 % (ref 11.6–15.1)
ERYTHROCYTE [DISTWIDTH] IN BLOOD BY AUTOMATED COUNT: 13.5 % (ref 11.6–15.1)
GFR SERPL CREATININE-BSD FRML MDRD: 86 ML/MIN/1.73SQ M
GFR SERPL CREATININE-BSD FRML MDRD: 92 ML/MIN/1.73SQ M
GLUCOSE SERPL-MCNC: 103 MG/DL (ref 65–140)
GLUCOSE SERPL-MCNC: 109 MG/DL (ref 65–140)
GLUCOSE SERPL-MCNC: 110 MG/DL (ref 65–140)
GLUCOSE SERPL-MCNC: 115 MG/DL (ref 65–140)
GLUCOSE SERPL-MCNC: 116 MG/DL (ref 65–140)
GLUCOSE SERPL-MCNC: 117 MG/DL (ref 65–140)
GLUCOSE SERPL-MCNC: 117 MG/DL (ref 65–140)
GLUCOSE SERPL-MCNC: 119 MG/DL (ref 65–140)
GLUCOSE SERPL-MCNC: 120 MG/DL (ref 65–140)
GLUCOSE SERPL-MCNC: 121 MG/DL (ref 65–140)
GLUCOSE SERPL-MCNC: 125 MG/DL (ref 65–140)
GLUCOSE SERPL-MCNC: 126 MG/DL (ref 65–140)
GLUCOSE SERPL-MCNC: 128 MG/DL (ref 65–140)
GLUCOSE SERPL-MCNC: 134 MG/DL (ref 65–140)
GLUCOSE SERPL-MCNC: 266 MG/DL (ref 65–140)
GLUCOSE SERPL-MCNC: 97 MG/DL (ref 65–140)
GLUCOSE SERPL-MCNC: 98 MG/DL (ref 65–140)
GLUCOSE SERPL-MCNC: 99 MG/DL (ref 65–140)
GP B STREP DNA SPEC QL NAA+PROBE: POSITIVE
HCO3 BLDCOA-SCNC: 20.8 MMOL/L (ref 17.3–27.3)
HCT VFR BLD AUTO: 34.3 % (ref 34.8–46.1)
HCT VFR BLD AUTO: 37.9 % (ref 34.8–46.1)
HGB BLD-MCNC: 11.2 G/DL (ref 11.5–15.4)
HGB BLD-MCNC: 12.4 G/DL (ref 11.5–15.4)
MAGNESIUM SERPL-MCNC: 5.5 MG/DL (ref 1.9–2.7)
MAGNESIUM SERPL-MCNC: 5.9 MG/DL (ref 1.9–2.7)
MCH RBC QN AUTO: 29.6 PG (ref 26.8–34.3)
MCH RBC QN AUTO: 29.7 PG (ref 26.8–34.3)
MCHC RBC AUTO-ENTMCNC: 32.7 G/DL (ref 31.4–37.4)
MCHC RBC AUTO-ENTMCNC: 32.7 G/DL (ref 31.4–37.4)
MCV RBC AUTO: 91 FL (ref 82–98)
MCV RBC AUTO: 91 FL (ref 82–98)
O2 CT VFR BLDCOA CALC: 8.6 ML/DL
OXYHGB MFR BLDCOA: 37.9 %
PCO2 BLDCOA: 51.7 MM[HG] (ref 30–60)
PH BLDCOA: 7.22 [PH] (ref 7.23–7.43)
PLATELET # BLD AUTO: 260 THOUSANDS/UL (ref 149–390)
PLATELET # BLD AUTO: 281 THOUSANDS/UL (ref 149–390)
PMV BLD AUTO: 12.1 FL (ref 8.9–12.7)
PMV BLD AUTO: 12.3 FL (ref 8.9–12.7)
PO2 BLDCOA: 31.9 MM HG (ref 5–25)
POTASSIUM SERPL-SCNC: 4 MMOL/L (ref 3.5–5.3)
POTASSIUM SERPL-SCNC: 4.2 MMOL/L (ref 3.5–5.3)
PROT SERPL-MCNC: 5.9 G/DL (ref 6.4–8.4)
PROT SERPL-MCNC: 6 G/DL (ref 6.4–8.4)
RBC # BLD AUTO: 3.78 MILLION/UL (ref 3.81–5.12)
RBC # BLD AUTO: 4.17 MILLION/UL (ref 3.81–5.12)
SODIUM SERPL-SCNC: 131 MMOL/L (ref 135–147)
SODIUM SERPL-SCNC: 132 MMOL/L (ref 135–147)
WBC # BLD AUTO: 13.57 THOUSAND/UL (ref 4.31–10.16)
WBC # BLD AUTO: 19.75 THOUSAND/UL (ref 4.31–10.16)

## 2025-02-24 PROCEDURE — 80053 COMPREHEN METABOLIC PANEL: CPT | Performed by: OBSTETRICS & GYNECOLOGY

## 2025-02-24 PROCEDURE — 83735 ASSAY OF MAGNESIUM: CPT | Performed by: OBSTETRICS & GYNECOLOGY

## 2025-02-24 PROCEDURE — 94762 N-INVAS EAR/PLS OXIMTRY CONT: CPT

## 2025-02-24 PROCEDURE — 83735 ASSAY OF MAGNESIUM: CPT

## 2025-02-24 PROCEDURE — A6250 SKIN SEAL PROTECT MOISTURIZR: HCPCS | Performed by: STUDENT IN AN ORGANIZED HEALTH CARE EDUCATION/TRAINING PROGRAM

## 2025-02-24 PROCEDURE — 59510 CESAREAN DELIVERY: CPT | Performed by: STUDENT IN AN ORGANIZED HEALTH CARE EDUCATION/TRAINING PROGRAM

## 2025-02-24 PROCEDURE — 82805 BLOOD GASES W/O2 SATURATION: CPT | Performed by: STUDENT IN AN ORGANIZED HEALTH CARE EDUCATION/TRAINING PROGRAM

## 2025-02-24 PROCEDURE — 85027 COMPLETE CBC AUTOMATED: CPT | Performed by: OBSTETRICS & GYNECOLOGY

## 2025-02-24 PROCEDURE — 80053 COMPREHEN METABOLIC PANEL: CPT

## 2025-02-24 PROCEDURE — 88307 TISSUE EXAM BY PATHOLOGIST: CPT | Performed by: PATHOLOGY

## 2025-02-24 PROCEDURE — 82948 REAGENT STRIP/BLOOD GLUCOSE: CPT

## 2025-02-24 PROCEDURE — 85027 COMPLETE CBC AUTOMATED: CPT

## 2025-02-24 RX ORDER — KETOROLAC TROMETHAMINE 30 MG/ML
15 INJECTION, SOLUTION INTRAMUSCULAR; INTRAVENOUS EVERY 6 HOURS SCHEDULED
Status: DISCONTINUED | OUTPATIENT
Start: 2025-02-24 | End: 2025-02-25

## 2025-02-24 RX ORDER — ENOXAPARIN SODIUM 100 MG/ML
40 INJECTION SUBCUTANEOUS EVERY 12 HOURS
Status: DISCONTINUED | OUTPATIENT
Start: 2025-02-25 | End: 2025-02-25

## 2025-02-24 RX ORDER — OXYCODONE HYDROCHLORIDE 5 MG/1
5 TABLET ORAL EVERY 4 HOURS PRN
Refills: 0 | Status: DISCONTINUED | OUTPATIENT
Start: 2025-02-24 | End: 2025-02-25

## 2025-02-24 RX ORDER — ACETAMINOPHEN 325 MG/1
650 TABLET ORAL EVERY 6 HOURS SCHEDULED
Status: DISCONTINUED | OUTPATIENT
Start: 2025-02-24 | End: 2025-02-25

## 2025-02-24 RX ORDER — DIPHENHYDRAMINE HYDROCHLORIDE 50 MG/ML
25 INJECTION INTRAMUSCULAR; INTRAVENOUS EVERY 6 HOURS PRN
Status: DISCONTINUED | OUTPATIENT
Start: 2025-02-24 | End: 2025-02-24

## 2025-02-24 RX ORDER — OXYCODONE HYDROCHLORIDE 10 MG/1
10 TABLET ORAL EVERY 4 HOURS PRN
Status: DISCONTINUED | OUTPATIENT
Start: 2025-02-25 | End: 2025-02-25

## 2025-02-24 RX ORDER — NALOXONE HYDROCHLORIDE 0.4 MG/ML
0.1 INJECTION, SOLUTION INTRAMUSCULAR; INTRAVENOUS; SUBCUTANEOUS
Status: DISCONTINUED | OUTPATIENT
Start: 2025-02-24 | End: 2025-02-25

## 2025-02-24 RX ORDER — ONDANSETRON 2 MG/ML
4 INJECTION INTRAMUSCULAR; INTRAVENOUS EVERY 8 HOURS PRN
Status: DISCONTINUED | OUTPATIENT
Start: 2025-02-24 | End: 2025-02-25

## 2025-02-24 RX ORDER — METOCLOPRAMIDE HYDROCHLORIDE 5 MG/ML
10 INJECTION INTRAMUSCULAR; INTRAVENOUS EVERY 6 HOURS PRN
Status: DISCONTINUED | OUTPATIENT
Start: 2025-02-24 | End: 2025-02-24

## 2025-02-24 RX ORDER — LIDOCAINE HYDROCHLORIDE 20 MG/ML
INJECTION, SOLUTION EPIDURAL; INFILTRATION; INTRACAUDAL; PERINEURAL AS NEEDED
Status: DISCONTINUED | OUTPATIENT
Start: 2025-02-24 | End: 2025-02-24

## 2025-02-24 RX ORDER — KETOROLAC TROMETHAMINE 30 MG/ML
INJECTION, SOLUTION INTRAMUSCULAR; INTRAVENOUS AS NEEDED
Status: DISCONTINUED | OUTPATIENT
Start: 2025-02-24 | End: 2025-02-24

## 2025-02-24 RX ORDER — DIPHENHYDRAMINE HYDROCHLORIDE 50 MG/ML
25 INJECTION INTRAMUSCULAR; INTRAVENOUS ONCE
Status: COMPLETED | OUTPATIENT
Start: 2025-02-24 | End: 2025-02-24

## 2025-02-24 RX ORDER — SIMETHICONE 80 MG
80 TABLET,CHEWABLE ORAL 4 TIMES DAILY PRN
Status: DISCONTINUED | OUTPATIENT
Start: 2025-02-24 | End: 2025-02-25

## 2025-02-24 RX ORDER — HYDROXYZINE HYDROCHLORIDE 25 MG/1
50 TABLET, FILM COATED ORAL EVERY 6 HOURS PRN
Status: DISCONTINUED | OUTPATIENT
Start: 2025-02-24 | End: 2025-02-25

## 2025-02-24 RX ORDER — IBUPROFEN 600 MG/1
600 TABLET, FILM COATED ORAL EVERY 6 HOURS
Status: DISCONTINUED | OUTPATIENT
Start: 2025-02-25 | End: 2025-02-25

## 2025-02-24 RX ORDER — ACETAMINOPHEN 325 MG/1
975 TABLET ORAL EVERY 6 HOURS PRN
Status: DISCONTINUED | OUTPATIENT
Start: 2025-02-24 | End: 2025-02-24

## 2025-02-24 RX ORDER — SODIUM CHLORIDE, SODIUM LACTATE, POTASSIUM CHLORIDE, CALCIUM CHLORIDE 600; 310; 30; 20 MG/100ML; MG/100ML; MG/100ML; MG/100ML
INJECTION, SOLUTION INTRAVENOUS CONTINUOUS PRN
Status: DISCONTINUED | OUTPATIENT
Start: 2025-02-24 | End: 2025-02-24

## 2025-02-24 RX ORDER — ONDANSETRON 2 MG/ML
4 INJECTION INTRAMUSCULAR; INTRAVENOUS ONCE AS NEEDED
Status: DISCONTINUED | OUTPATIENT
Start: 2025-02-24 | End: 2025-02-24

## 2025-02-24 RX ORDER — SODIUM CHLORIDE, SODIUM LACTATE, POTASSIUM CHLORIDE, CALCIUM CHLORIDE 600; 310; 30; 20 MG/100ML; MG/100ML; MG/100ML; MG/100ML
125 INJECTION, SOLUTION INTRAVENOUS CONTINUOUS
Status: DISCONTINUED | OUTPATIENT
Start: 2025-02-24 | End: 2025-02-25

## 2025-02-24 RX ORDER — PROMETHAZINE HYDROCHLORIDE 25 MG/ML
25 INJECTION, SOLUTION INTRAMUSCULAR; INTRAVENOUS ONCE
Status: CANCELLED | OUTPATIENT
Start: 2025-02-24

## 2025-02-24 RX ORDER — DIPHENHYDRAMINE HYDROCHLORIDE 50 MG/ML
25 INJECTION INTRAMUSCULAR; INTRAVENOUS EVERY 6 HOURS PRN
Status: DISCONTINUED | OUTPATIENT
Start: 2025-02-24 | End: 2025-02-25

## 2025-02-24 RX ORDER — OXYTOCIN/RINGER'S LACTATE 30/500 ML
62.5 PLASTIC BAG, INJECTION (ML) INTRAVENOUS ONCE
Status: COMPLETED | OUTPATIENT
Start: 2025-02-24 | End: 2025-02-25

## 2025-02-24 RX ORDER — ONDANSETRON 2 MG/ML
INJECTION INTRAMUSCULAR; INTRAVENOUS AS NEEDED
Status: DISCONTINUED | OUTPATIENT
Start: 2025-02-24 | End: 2025-02-24

## 2025-02-24 RX ORDER — CEPHALEXIN 500 MG/1
500 CAPSULE ORAL EVERY 6 HOURS SCHEDULED
Status: DISCONTINUED | OUTPATIENT
Start: 2025-02-24 | End: 2025-02-25

## 2025-02-24 RX ORDER — BENZOCAINE/MENTHOL 6 MG-10 MG
1 LOZENGE MUCOUS MEMBRANE DAILY PRN
Status: DISCONTINUED | OUTPATIENT
Start: 2025-02-24 | End: 2025-02-25

## 2025-02-24 RX ORDER — CALCIUM CARBONATE 500 MG/1
1000 TABLET, CHEWABLE ORAL 3 TIMES DAILY PRN
Status: DISCONTINUED | OUTPATIENT
Start: 2025-02-24 | End: 2025-02-25

## 2025-02-24 RX ORDER — METRONIDAZOLE 500 MG/1
500 TABLET ORAL EVERY 8 HOURS SCHEDULED
Status: DISCONTINUED | OUTPATIENT
Start: 2025-02-24 | End: 2025-02-25

## 2025-02-24 RX ORDER — CEFAZOLIN SODIUM 1 G/3ML
INJECTION, POWDER, FOR SOLUTION INTRAMUSCULAR; INTRAVENOUS AS NEEDED
Status: DISCONTINUED | OUTPATIENT
Start: 2025-02-24 | End: 2025-02-24

## 2025-02-24 RX ORDER — CEFAZOLIN SODIUM 2 G/50ML
2000 SOLUTION INTRAVENOUS ONCE
Status: DISCONTINUED | OUTPATIENT
Start: 2025-02-24 | End: 2025-02-24

## 2025-02-24 RX ORDER — HYDROMORPHONE HCL/PF 1 MG/ML
0.5 SYRINGE (ML) INJECTION
Status: DISCONTINUED | OUTPATIENT
Start: 2025-02-24 | End: 2025-02-24

## 2025-02-24 RX ORDER — FENTANYL CITRATE/PF 50 MCG/ML
25 SYRINGE (ML) INJECTION
Status: DISCONTINUED | OUTPATIENT
Start: 2025-02-24 | End: 2025-02-24

## 2025-02-24 RX ORDER — MORPHINE SULFATE 0.5 MG/ML
INJECTION, SOLUTION EPIDURAL; INTRATHECAL; INTRAVENOUS AS NEEDED
Status: DISCONTINUED | OUTPATIENT
Start: 2025-02-24 | End: 2025-02-24

## 2025-02-24 RX ORDER — OXYTOCIN/RINGER'S LACTATE 30/500 ML
PLASTIC BAG, INJECTION (ML) INTRAVENOUS CONTINUOUS PRN
Status: DISCONTINUED | OUTPATIENT
Start: 2025-02-24 | End: 2025-02-24

## 2025-02-24 RX ORDER — OXYCODONE HYDROCHLORIDE 5 MG/1
5 TABLET ORAL EVERY 4 HOURS PRN
Status: DISCONTINUED | OUTPATIENT
Start: 2025-02-25 | End: 2025-02-25

## 2025-02-24 RX ADMIN — CEFAZOLIN 2000 MG: 1 INJECTION, POWDER, FOR SOLUTION INTRAMUSCULAR; INTRAVENOUS at 15:23

## 2025-02-24 RX ADMIN — DIPHENHYDRAMINE HYDROCHLORIDE 25 MG: 50 INJECTION, SOLUTION INTRAMUSCULAR; INTRAVENOUS at 09:26

## 2025-02-24 RX ADMIN — KETOROLAC TROMETHAMINE 30 MG: 30 INJECTION, SOLUTION INTRAMUSCULAR; INTRAVENOUS at 16:01

## 2025-02-24 RX ADMIN — Medication 62.5 MILLI-UNITS/MIN: at 16:46

## 2025-02-24 RX ADMIN — LABETALOL HYDROCHLORIDE 200 MG: 200 TABLET, FILM COATED ORAL at 09:17

## 2025-02-24 RX ADMIN — MAGNESIUM SULFATE HEPTAHYDRATE 2 G/HR: 40 INJECTION, SOLUTION INTRAVENOUS at 10:12

## 2025-02-24 RX ADMIN — PENICILLIN G POTASSIUM 2.5 MILLION UNITS: 20000000 INJECTION, POWDER, FOR SOLUTION INTRAVENOUS at 10:06

## 2025-02-24 RX ADMIN — FAMOTIDINE 20 MG: 10 INJECTION INTRAVENOUS at 09:21

## 2025-02-24 RX ADMIN — METOCLOPRAMIDE 10 MG: 5 INJECTION, SOLUTION INTRAMUSCULAR; INTRAVENOUS at 09:25

## 2025-02-24 RX ADMIN — MORPHINE SULFATE 3 MG: 0.5 INJECTION, SOLUTION EPIDURAL; INTRATHECAL; INTRAVENOUS at 15:52

## 2025-02-24 RX ADMIN — LIDOCAINE HYDROCHLORIDE 5 MG: 20 INJECTION, SOLUTION EPIDURAL; INFILTRATION; INTRACAUDAL at 15:18

## 2025-02-24 RX ADMIN — PENICILLIN G POTASSIUM 2.5 MILLION UNITS: 20000000 INJECTION, POWDER, FOR SOLUTION INTRAVENOUS at 02:04

## 2025-02-24 RX ADMIN — SODIUM CHLORIDE, SODIUM LACTATE, POTASSIUM CHLORIDE, AND CALCIUM CHLORIDE 50 ML/HR: .6; .31; .03; .02 INJECTION, SOLUTION INTRAVENOUS at 21:10

## 2025-02-24 RX ADMIN — ONDANSETRON 4 MG: 2 INJECTION INTRAMUSCULAR; INTRAVENOUS at 22:44

## 2025-02-24 RX ADMIN — LIDOCAINE HYDROCHLORIDE 5 MG: 20 INJECTION, SOLUTION EPIDURAL; INFILTRATION; INTRACAUDAL at 15:23

## 2025-02-24 RX ADMIN — SODIUM CHLORIDE, SODIUM LACTATE, POTASSIUM CHLORIDE, AND CALCIUM CHLORIDE: .6; .31; .03; .02 INJECTION, SOLUTION INTRAVENOUS at 15:18

## 2025-02-24 RX ADMIN — LIDOCAINE HYDROCHLORIDE 3 MG: 20 INJECTION, SOLUTION EPIDURAL; INFILTRATION; INTRACAUDAL at 15:29

## 2025-02-24 RX ADMIN — MAGNESIUM SULFATE HEPTAHYDRATE 2 G/HR: 40 INJECTION, SOLUTION INTRAVENOUS at 21:10

## 2025-02-24 RX ADMIN — ONDANSETRON 6 MG: 2 INJECTION INTRAMUSCULAR; INTRAVENOUS at 15:29

## 2025-02-24 RX ADMIN — ROPIVACAINE HYDROCHLORIDE: 2 INJECTION, SOLUTION EPIDURAL; INFILTRATION at 10:16

## 2025-02-24 RX ADMIN — Medication 250 MILLI-UNITS/MIN: at 15:50

## 2025-02-24 RX ADMIN — PHENYLEPHRINE HYDROCHLORIDE 50 MCG/MIN: 50 INJECTION INTRAVENOUS at 15:25

## 2025-02-24 RX ADMIN — PENICILLIN G POTASSIUM 2.5 MILLION UNITS: 20000000 INJECTION, POWDER, FOR SOLUTION INTRAVENOUS at 06:05

## 2025-02-24 RX ADMIN — ACETAMINOPHEN 975 MG: 325 TABLET, FILM COATED ORAL at 09:17

## 2025-02-24 RX ADMIN — MORPHINE SULFATE 2 MG: 0.5 INJECTION, SOLUTION EPIDURAL; INTRATHECAL; INTRAVENOUS at 16:21

## 2025-02-24 RX ADMIN — PENICILLIN G POTASSIUM 2.5 MILLION UNITS: 20000000 INJECTION, POWDER, FOR SOLUTION INTRAVENOUS at 13:56

## 2025-02-24 RX ADMIN — ROPIVACAINE HYDROCHLORIDE: 2 INJECTION, SOLUTION EPIDURAL; INFILTRATION at 03:02

## 2025-02-24 RX ADMIN — ACETAMINOPHEN 975 MG: 325 TABLET, FILM COATED ORAL at 02:08

## 2025-02-24 RX ADMIN — METOCLOPRAMIDE 10 MG: 5 INJECTION, SOLUTION INTRAMUSCULAR; INTRAVENOUS at 02:11

## 2025-02-24 NOTE — OB LABOR/OXYTOCIN SAFETY PROGRESS
Labor Progress Note - Karena Garcia 34 y.o. female MRN: [id number]    Unit/Bed#: -01 Encounter: [id number]       Contraction Frequency (minutes): irritability  Contraction Intensity: Mild/Moderate  Uterine Activity Characteristics: Irregular  Cervical Dilation: 4        Cervical Effacement: 60  Fetal Station: -2  Baseline Rate (FHR): 145 bpm  Fetal Heart Rate (FHT): 145 BPM  FHR Category: Cat II               Vital Signs:   Vitals:    02/23/25 1855   BP: 157/75   Pulse: (!) 117   Resp:    Temp:    SpO2:        Notes/comments:   Karena is now comfortable with her epidural. Her BP cuff has been readjusted and her BP is now WNL. Started on Labetalol 200mg BID for further BP support. She did have a variable deceleration after being laid flat for her ward catheter placement. FHTs now returned to baseline. Will start pitocin at this time.  Will continue to monitor and reassess as indicated.     Leeanne Wong MD 2/23/2025 7:45 PM

## 2025-02-24 NOTE — OB LABOR/OXYTOCIN SAFETY PROGRESS
Oxytocin Safety Progress Check Note - Karena Garcia 34 y.o. female MRN: [id number]    Unit/Bed#: -01 Encounter: [id number]    Dose (bradley-units/min) Oxytocin: 10 bradley-units/min  Contraction Frequency (minutes): 2-3.5  Contraction Intensity: Moderate  Uterine Activity Characteristics: Regular  Cervical Dilation: 5        Cervical Effacement: 90  Fetal Station: -2  Baseline Rate (FHR): 140 bpm  Fetal Heart Rate (FHT): 133 BPM  FHR Category: I               Vital Signs:   Vitals:    02/24/25 0630   BP: 126/78   Pulse: 102   Resp:    Temp:    SpO2:        Notes/comments:   Patient in pitocin titration, Fetus now is tolerationg the increase of pitocin. SVE 5/90/-2.   Pitocin titration.     Jory Isabel MD 2/24/2025 6:38 AM

## 2025-02-24 NOTE — ASSESSMENT & PLAN NOTE
QBL: 557cc; admission Hb 12.4g/dL. Patient progressing toward postoperative milestones  - F/u AM CBC   - Continue routine postoperative care  - Pain management with oral analgesics  - DVT Ppx: Lovenox 40mg BID POD1; encourage ambulation  - F/u void trial  - Encourage breastfeeding, familial bonding

## 2025-02-24 NOTE — OB LABOR/OXYTOCIN SAFETY PROGRESS
Oxytocin Safety Progress Check Note - Karena Garcia 34 y.o. female MRN: [id number]    Unit/Bed#: -01 Encounter: [id number]    Dose (bradley-units/min) Oxytocin: 2 bradley-units/min  Contraction Frequency (minutes): difficult to trace due maternal position and body habitus  Contraction Intensity: Mild  Uterine Activity Characteristics: Irregular  Cervical Dilation: 4        Cervical Effacement: 70  Fetal Station: -2  Baseline Rate (FHR): 140 bpm  Fetal Heart Rate (FHT): 144 BPM  FHR Category: CAT I                Vital Signs:   Vitals:    02/23/25 2130   BP: 111/61   Pulse: 80   Resp:    Temp:    SpO2:        Notes/comments:   Patient is comfortable on epidural. FHT is currently CAT I. SVE as above, unchanged from previous check. Pitocin titration has been initiated and currently at a rate of 2. She is also on Magnesium sulfate for Pre-Eclampsia w/ SF. She reports headache she rates 3/10. She declines Tylenol. Given unchanged cervical dilation and difficulty monitoring contractions due to body habitus, an IUPC was inserted without complications.      Micheline Beth MD 2/23/2025 10:01 PM

## 2025-02-24 NOTE — OB LABOR/OXYTOCIN SAFETY PROGRESS
Oxytocin Safety Progress Check Note - Karena Garcia 34 y.o. female MRN: [id number]    Unit/Bed#: -01 Encounter: [id number]    Dose (bradley-units/min) Oxytocin: 6 bradley-units/min  Contraction Frequency (minutes): 3  Contraction Intensity: Moderate  Uterine Activity Characteristics: Regular  Cervical Dilation: 5        Cervical Effacement: 80  Fetal Station: -2  Baseline Rate (FHR): 145 bpm  Fetal Heart Rate (FHT): 153 BPM  FHR Category: Cat 2               Vital Signs:   Vitals:    02/24/25 0015   BP: 117/63   Pulse: 93   Resp:    Temp:    SpO2:        Notes/comments:   Karena is comfortable with her epidural. She was noted to have some late decelerations with her most recent contractions. SVE was changed! She was repositioned from her left to her right side with improvement in FHTs and resolution of late decelerations. She was up to 6 on her pitocin and will decrease to 4mU/min if needed to improve FHTs. Contractions adequate. There is some caput for baby. She reports a headache behind her right eye - will treat with Reglan at this time. Will continue position changes and pitocin. Will continue to monitor and reassess as indicated.       Leeanne Wong MD 2/24/2025 12:32 AM

## 2025-02-24 NOTE — OB LABOR/OXYTOCIN SAFETY PROGRESS
Oxytocin Safety Progress Check Note - Karena Garcia 34 y.o. female MRN: [id number]    Unit/Bed#: -01 Encounter: [id number]    Dose (bradley-units/min) Oxytocin: 4 bradley-units/min  Contraction Frequency (minutes): 2-3.5  Contraction Intensity: Moderate  Uterine Activity Characteristics: Regular  Cervical Dilation: 5        Cervical Effacement: 80  Fetal Station: -2  Baseline Rate (FHR): 135 bpm  Fetal Heart Rate (FHT): 147 BPM  FHR Category: I               Vital Signs:   Vitals:    02/24/25 0230   BP: 117/72   Pulse: (!) 106   Resp:    Temp:    SpO2:        Notes/comments:   Patient comfortable, SVE unchanged, IUPC in place 190 MVUs.   Plan: increase pitocin to have adequate contraction. As tolerated by the the fetus.     Jory Isabel MD 2/24/2025 2:45 AM

## 2025-02-24 NOTE — OB LABOR/OXYTOCIN SAFETY PROGRESS
Labor Progress Note - Karena Garcia 34 y.o. female MRN: [id number]    Unit/Bed#: -01 Encounter: [id number]    Dose (bradley-units/min) Oxytocin: 0 bradley-units/min  Contraction Frequency (minutes): 2.5-3  Contraction Intensity: Moderate  Uterine Activity Characteristics: Irregular  Cervical Dilation: 5        Cervical Effacement: 90  Fetal Station: -1  Baseline Rate (FHR): 135 bpm  Fetal Heart Rate (FHT): 135 BPM  FHR Category: i               Vital Signs:   Vitals:    25 1410   BP:    Pulse: 92   Resp: 16   Temp: 97.9 °F (36.6 °C)   SpO2: 98%       Notes/comments:   SAFETY HUDDLE:  TRIGGER: Patient meets criteria for Failed Induction of Labor    PARTICIPANTS: Diane Jha Marie, [place], Peck, [place]      REVIEW OF CURRENT PLAN OF CARE AND MANAGEMENT:   Karena is in the latent phase of labor. The cervical exam has remained unchanged and membranes have been ruptured and Oxytocin has been running for approximately more than 18 hours. During this time, Oxytocin was briefly on hold for bloodwork, but otherwise never discontinued. Currently, the contraction pattern shows contractions every 3 minutes. The cervical exam has remained unchanged at 5cm since approximately +12 hours ago.     Maternal status is stable.   Fetal status shows a Category 1 FHR tracing.    I discussed delivery by  section for Failed Induction of Labor. I reviewed alternative of waiting for full 24 hours, however, she indicates feeling ready for  at this time.    We have discussed the risks and benefits of  delivery with Karena, including risk of pain, bleeding, infection, injury to surrounding structure including bowel, bladder, blood vessels or nerves that may require additional surgeries. Explained that hospital stay often longer after  than vaginal delivery. Explained implication of  on subsequent vaginal deliveries including risks with TOLAC and risks of increased scar tissue making  future pregnancy more dangerous and each future  potentially more complicated and dangerous. Patient and partner indicated understanding risks and reiterated desire to proceed with . All questions answered to the best of my ability.     TIMELINE FOR NEXT ASSESSMENT: NA    ALL PARTICIPANTS IN AGREEMENT WITH PLAN OF CARE: Yes    IS PERRT REQUIRED: No     Liset Jha MD 2025 2:17 PM

## 2025-02-24 NOTE — UTILIZATION REVIEW
Continued Stay Review    SEE INITIAL REVIEW AT BOTTOM    Date: 2/23-2/24/25                          Current Patient Class: Inpatient  Current Level of Care: OB    HPI:34 y.o. female initially admitted on 2/23 2/23 1425:  Contraction Frequency (minutes): 0  Contraction Intensity: Mild  Uterine Activity Characteristics: Occasional  Cervical Dilation: Closed  Cervical Effacement: 30  Fetal Station: Ballotable  Baseline Rate (FHR): 155 bpm  Fetal Heart Rate (FHT): 160 BPM  FHR Category: I  Zuleta balloon placed    2/23 1725:  Contraction Frequency (minutes): 0  Contraction Intensity: Mild  Uterine Activity Characteristics: Occasional  Cervical Dilation: 4  Cervical Effacement: 60  Fetal Station: -2  Baseline Rate (FHR): 150 bpm  Fetal Heart Rate (FHT): 165 BPM  FHR Category: Cat 1  Zuleta balloon removed due to patient discomfort. SROM for clear fluid.    2/23 1748:  Epidural block    2/23 1945:  Contraction Frequency (minutes): irritability  Contraction Intensity: Mild/Moderate  Uterine Activity Characteristics: Irregular  Cervical Dilation: 4  Cervical Effacement: 60  Fetal Station: -2  Baseline Rate (FHR): 145 bpm  Fetal Heart Rate (FHT): 145 BPM  FHR Category: Cat II  Started on Labetalol 200mg BID for further BP support. Will start pitocin.    2/23 2201:  Dose (bradley-units/min) Oxytocin: 2 bradley-units/min  Contraction Frequency (minutes): difficult to trace due maternal position and body habitus  Contraction Intensity: Mild  Uterine Activity Characteristics: Irregular  Cervical Dilation: 4  Cervical Effacement: 70  Fetal Station: -2  Baseline Rate (FHR): 140 bpm  Fetal Heart Rate (FHT): 144 BPM  FHR Category: CAT I     2/24 0032:  Dose (bradley-units/min) Oxytocin: 6 bradley-units/min  Contraction Frequency (minutes): 3  Contraction Intensity: Moderate  Uterine Activity Characteristics: Regular  Cervical Dilation: 5  Cervical Effacement: 80  Fetal Station: -2  Baseline Rate (FHR): 145 bpm  Fetal Heart Rate (FHT): 153  "BPM  FHR Category: Cat 2    2/24 0638:  Dose (bradley-units/min) Oxytocin: 10 bradley-units/min  Contraction Frequency (minutes): 2-3.5  Contraction Intensity: Moderate  Uterine Activity Characteristics: Regular  Cervical Dilation: 5  Cervical Effacement: 90  Fetal Station: -2  Baseline Rate (FHR): 140 bpm  Fetal Heart Rate (FHT): 133 BPM  FHR Category: I    2/24 1103:  Dose (bradley-units/min) Oxytocin: 16 bradley-units/min  Contraction Frequency (minutes): 2-4  Contraction Intensity: Mild/Moderate  Uterine Activity Characteristics: Irregular  Cervical Dilation: 5  Cervical Effacement: 90  Fetal Station: -1  Baseline Rate (FHR): 135 bpm  Fetal Heart Rate (FHT): 142 BPM  FHR Category: I    2/24 1411:  Dose (bradley-units/min) Oxytocin: 0 bradley-units/min  Contraction Frequency (minutes): 2.5-3  Contraction Intensity: Moderate  Uterine Activity Characteristics: Irregular  Cervical Dilation: 5  Cervical Effacement: 90  Fetal Station: -1  Baseline Rate (FHR): 135 bpm  Fetal Heart Rate (FHT): 135 BPM  FHR Category: I    2/24 3:49 pm C/S for viable female infant 3415 g(7 lbs 8.5 oz) 19\" with APGARs of 8 and 9 at 1 and 5 minutes, respectively.     Medications:   Scheduled Medications:  Famotidine (PF), 20 mg, Intravenous, Daily  labetalol, 20 mg, Intravenous, Once  labetalol, 200 mg, Oral, Daily  penicillin G, 2.5 Million Units, Intravenous, Q4H      Continuous IV Infusions:  dextrose 5 % and sodium chloride 0.9 %, 100 mL/hr, Intravenous, Continuous  insulin regular (HumuLIN R,NovoLIN R) 1 Units/mL in sodium chloride 0.9 % 100 mL infusion, 0.5-3 Units/hr, Intravenous, Continuous  lactated ringers, 50 mL/hr, Intravenous, Continuous  magnesium sulfate, 2 g/hr, Intravenous, Continuous  oxytocin, 1-30 bradley-units/min, Intravenous, Titrated  ropivacaine 0.2%, , Epidural, Continuous      PRN Meds:  acetaminophen, 975 mg, Oral, Q8H PRN  bupivacaine (PF), 30 mL, Infiltration, Once PRN  calcium gluconate, 1 g, Intravenous, Once " PRN  metoclopramide, 10 mg, Intravenous, Q6H PRN  ondansetron, 4 mg, Intravenous, Q8H PRN      Discharge Plan: TBD    Vital Signs (last 3 days)       Date/Time Temp Pulse Resp BP MAP (mmHg) SpO2 O2 Device Cardiac ([facility]) Patient Position - Orthostatic VS Pain    02/24/25 0815 -- 102 -- 135/80 -- -- -- -- -- --    02/24/25 0800 -- -- -- 127/77 -- -- -- -- -- --    02/24/25 0757 -- 97 -- -- -- 100 % -- -- -- --    02/24/25 0756 -- 99 -- -- -- -- -- -- -- --    02/24/25 0754 -- 102 -- -- -- -- -- -- -- --    02/24/25 0745 -- 98 -- 128/75 -- -- -- -- -- --    02/24/25 0731 -- 97 -- 125/69 -- -- -- -- -- --    02/24/25 0715 98.1 °F (36.7 °C) 100 18 128/76 -- -- -- -- -- 7    02/24/25 0700 -- 98 -- 119/72 -- -- -- -- -- --    02/24/25 0645 -- 99 -- 130/78 -- -- -- -- -- --    02/24/25 0630 -- 102 -- 126/78 -- -- -- -- -- --    02/24/25 0617 -- 98 -- 129/86 -- -- -- -- -- --    02/24/25 0600 97.9 °F (36.6 °C) 104 18 121/70 -- 99 % None (Room air) -- Lying --    02/24/25 0545 -- 96 -- 130/75 -- -- -- -- -- --    02/24/25 0515 -- 113 -- 117/69 -- -- -- -- -- --    02/24/25 0500 -- 103 -- 120/70 -- -- -- -- -- --    02/24/25 0445 -- 109 -- 117/69 -- -- -- -- -- --    02/24/25 0430 -- 107 -- 114/71 -- -- -- -- -- --    02/24/25 0415 -- 97 -- 114/61 -- -- -- -- -- --    02/24/25 0400 98 °F (36.7 °C) 101 18 110/60 -- 99 % None (Room air) -- Lying --    02/24/25 0345 -- 105 -- 113/59 -- -- -- -- -- --    02/24/25 0330 -- 104 -- 116/63 -- -- -- -- -- --    02/24/25 0315 -- 101 -- 114/63 -- -- -- -- -- --    02/24/25 0300 -- 101 -- 115/69 -- -- -- -- -- --    02/24/25 0245 -- 104 -- 129/74 -- -- -- -- -- --    02/24/25 0230 -- 106 -- 117/72 -- -- -- -- -- --    02/24/25 0216 -- 113 -- 125/81 -- -- -- -- -- --    02/24/25 0215 -- 113 -- 125/81 -- -- -- -- -- --    02/24/25 0208 -- -- -- -- -- -- -- -- -- 8    02/24/25 0200 97.9 °F (36.6 °C) 101 18 113/64 -- 99 % None (Room air) -- Lying 8    02/24/25 0145 -- 108 -- 114/65 -- -- -- --  -- --    02/24/25 0130 -- 96 -- 109/59 -- -- -- -- -- --    02/24/25 0115 -- 94 -- 111/66 -- -- -- -- -- --    02/24/25 0100 -- 100 -- 113/68 -- -- -- -- -- --    02/24/25 0045 -- 93 -- 117/69 -- -- -- -- -- --    02/24/25 0030 -- 94 -- 113/66 -- -- -- -- -- --    02/24/25 0015 -- 93 -- 117/63 -- -- -- -- -- --    02/24/25 0000 97.9 °F (36.6 °C) 93 18 127/76 -- 99 % None (Room air) -- Lying --    02/23/25 2345 -- 95 -- 129/80 -- -- -- -- -- --    02/23/25 2330 -- 93 -- 116/72 -- -- -- -- -- --    02/23/25 2315 -- 93 -- 131/80 -- -- -- -- -- --    02/23/25 2245 -- 83 -- 127/74 -- -- -- -- -- --    02/23/25 2230 -- 85 -- 129/75 -- -- -- -- -- --    02/23/25 2217 -- 90 -- 129/81 -- -- -- -- -- --    02/23/25 2200 98 °F (36.7 °C) 98 18 110/64 -- 99 % None (Room air) -- Lying --    02/23/25 2130 -- 80 -- 111/61 -- -- -- -- -- --    02/23/25 2045 -- 84 -- 117/74 -- -- -- -- -- --    02/23/25 2030 -- 92 -- 119/76 -- -- -- -- -- --    02/23/25 2015 -- 88 -- 118/77 -- -- -- -- -- --    02/23/25 2000 97.6 °F (36.4 °C) 90 18 116/74 -- 99 % None (Room air) -- Lying --    02/23/25 1945 -- 86 -- 119/80 -- -- -- -- -- --    02/23/25 1930 -- 96 -- 113/74 -- -- -- -- -- --    02/23/25 1855 -- 117 -- 157/75 -- -- -- -- -- --    02/23/25 1852 -- 115 -- 152/68 -- -- -- -- -- --    02/23/25 1849 -- 100 -- 159/74 -- -- -- -- -- --    02/23/25 1846 -- 96 -- 140/68 -- -- -- -- -- --    02/23/25 1843 -- 105 -- 154/80 -- -- -- -- -- --    02/23/25 1840 -- 106 -- 158/84 -- -- -- -- -- --    02/23/25 1837 -- 107 -- 162/86 -- -- -- -- -- --    02/23/25 1835 -- 108 -- 155/77 -- -- -- -- -- No Pain    02/23/25 1834 -- 108 -- 155/77 -- -- -- -- -- --    02/23/25 1831 -- 108 -- 152/81 -- -- -- -- -- --    02/23/25 1828 -- 114 -- 146/74 -- 99 % -- -- -- --    02/23/25 1825 -- 122 -- 135/74 -- -- -- -- -- --    02/23/25 1823 -- 113 -- -- -- -- -- -- -- --    02/23/25 1822 -- 111 -- 135/71 -- -- -- -- -- --    02/23/25 1821 -- 109 -- -- -- 100 % -- --  -- --    02/23/25 1819 -- 114 -- 132/62 -- -- -- -- -- --    02/23/25 1816 -- 121 -- 133/59 -- 100 % -- -- -- --    02/23/25 1815 -- 112 -- -- -- -- -- -- -- --    02/23/25 1813 -- 112 -- 141/67 -- -- -- -- -- --    02/23/25 1811 -- 120 -- -- -- 99 % -- -- -- --    02/23/25 1810 -- 122 -- 140/65 -- -- -- -- -- --    02/23/25 1807 -- 120 -- 136/63 -- -- -- -- -- --    02/23/25 1806 -- 119 -- -- -- 99 % -- -- -- --    02/23/25 1804 -- 109 -- 142/63 -- -- -- -- -- --    02/23/25 1803 -- 123 -- -- -- -- -- -- -- --    02/23/25 1801 -- 112 -- -- -- 100 % -- -- -- --    02/23/25 1759 -- 104 -- -- -- -- -- -- -- --    02/23/25 1757 -- 101 -- 157/90 -- -- -- -- -- --    02/23/25 1756 -- 107 -- -- -- 100 % -- -- -- --    02/23/25 1755 -- 100 -- -- -- -- -- -- -- --    02/23/25 1751 -- 85 -- -- -- 100 % -- -- -- --    02/23/25 1747 -- 86 -- -- -- -- -- -- -- --    02/23/25 1746 -- 82 -- -- -- 99 % -- -- -- --    02/23/25 1743 -- 82 -- -- -- -- -- -- -- --    02/23/25 1741 -- 85 -- -- -- 99 % -- -- -- --    02/23/25 1739 -- 84 -- -- -- -- -- -- -- --    02/23/25 1736 -- 101 -- -- -- 99 % -- -- -- --    02/23/25 1735 -- 106 -- -- -- -- -- -- -- --    02/23/25 1731 -- 81 -- 143/85 -- 98 % -- -- -- --    02/23/25 1707 -- 90 -- 177/91 -- -- -- -- -- --    02/23/25 1700 -- -- -- -- -- -- -- -- -- 8    02/23/25 1539 -- 96 -- 187/98 -- -- -- -- -- --    02/23/25 1538 -- 86 -- 179/100 -- -- -- -- -- --    02/23/25 1436 -- 88 -- 147/86 -- -- -- -- -- --    02/23/25 1345 -- 85 -- 140/73 -- -- -- -- -- --    02/23/25 1210 98.5 °F (36.9 °C) 81 20 130/78 -- 98 % -- -- -- 6    02/23/25 1100 -- -- -- -- -- -- -- -- -- 6 02/23/25 0922 -- -- -- -- -- -- -- WDL -- 7    02/23/25 0808 97.8 °F (36.6 °C) 69 20 111/57 -- 99 % -- -- -- No Pain    02/23/25 0503 97.5 °F (36.4 °C) 77 20 136/88 109 100 % None (Room air) -- Sitting 7    02/23/25 0008 98.3 °F (36.8 °C) 71 20 104/55 74 100 % None (Room air) -- Lying 5    02/22/25 2209 -- -- -- -- -- --  None (Room air) WDL -- --    02/22/25 1946 97.5 °F (36.4 °C) 72 20 138/87 -- 100 % None (Room air) -- Sitting 6    02/22/25 1536 97.5 °F (36.4 °C) 77 20 127/78 -- 98 % None (Room air) -- Sitting No Pain    02/22/25 1217 98.5 °F (36.9 °C) 85 20 149/85 -- 98 % -- -- -- 3    02/22/25 1009 -- -- -- -- -- -- None (Room air) WDL -- --    02/22/25 0803 98.5 °F (36.9 °C) 81 20 132/74 -- 99 % -- -- -- 5    02/22/25 0431 97.9 °F (36.6 °C) 73 18 138/89 103 98 % None (Room air) -- Lying 8    02/22/25 0012 97.6 °F (36.4 °C) 80 15 135/77 93 98 % None (Room air) -- Sitting 6    02/21/25 2220 -- -- -- -- -- -- -- WDL -- --    02/21/25 2043 -- -- -- 140/86 -- -- -- -- Lying --    02/21/25 2014 97.5 °F (36.4 °C) 98 18 -- -- 96 % None (Room air) -- Lying 3    02/21/25 1800 -- -- -- -- -- -- -- -- -- --    OBSERV: Dr. Dietz at bedside to discuss plan of care and insulin. at 02/21/25 1800    02/21/25 1730 -- -- -- -- -- -- -- WDL -- --    OBSERV: Pt received via WC and oriented to room 328. Pt reports positive FM, denies vaginal bleeding, or LOF. at 02/21/25 1730    02/21/25 1658 97.4 °F (36.3 °C) 92 18 128/78 98 98 % None (Room air) -- Lying 2    02/21/25 1246 98 °F (36.7 °C) 91 16 122/72 -- 96 % -- -- Lying --    02/21/25 1244 -- 92 -- -- -- 95 % -- -- -- --    02/21/25 1241 -- 91 -- 122/72 -- -- -- -- -- --          Weight (last 2 days)       None            Pertinent Labs/Diagnostic Results:   Radiology:  No orders to display     Cardiology:  No orders to display     GI:  No orders to display           Results from last 7 days   Lab Units 02/24/25  0537 02/23/25  1917 02/23/25  0953 02/22/25  1058 02/21/25  1624   WBC Thousand/uL 13.57* 12.66* 8.90 9.84 9.95   HEMOGLOBIN g/dL 12.4 12.9 12.9 12.7 13.2   HEMATOCRIT % 37.9 39.9 39.0 38.7 39.8   PLATELETS Thousands/uL 281 316 279 290 291   TOTAL NEUT ABS Thousands/µL  --   --   --   --  6.14         Results from last 7 days   Lab Units 02/24/25  0537 02/23/25  1817 02/23/25  0953  02/22/25  0811   SODIUM mmol/L 132* 136 135 135   POTASSIUM mmol/L 4.0 3.8 3.9 3.3*   CHLORIDE mmol/L 103 102 105 105   CO2 mmol/L 19* 21 21 20*   ANION GAP mmol/L 10 13 9 10   BUN mg/dL 11 10 9 11   CREATININE mg/dL 0.83 0.72 0.52* 0.52*   EGFR ml/min/1.73sq m 92 110 125 125   CALCIUM mg/dL 8.1* 8.9 9.2 8.5   MAGNESIUM mg/dL 5.9* 4.1*  --   --      Results from last 7 days   Lab Units 02/24/25  0537 02/23/25  1817 02/23/25  0953 02/22/25  0811   AST U/L 14 17 15 11*   ALT U/L 8 9 9 7   ALK PHOS U/L 107* 122* 106* 95   TOTAL PROTEIN g/dL 6.0* 6.7 6.2* 5.9*   ALBUMIN g/dL 3.2* 3.5 3.3* 3.2*   TOTAL BILIRUBIN mg/dL 0.41 0.25 0.28 0.31     Results from last 7 days   Lab Units 02/24/25  0802 02/24/25  0715 02/24/25  0608 02/24/25  0504 02/24/25  0404 02/24/25  0305 02/24/25  0216 02/24/25  0106 02/24/25  0005 02/23/25  2308 02/23/25  2206 02/23/25  2100   POC GLUCOSE mg/dl 97 120 119 117 125 126 115 103 99 116 107 128     Results from last 7 days   Lab Units 02/24/25  0537 02/23/25  1817 02/23/25  0953 02/22/25  0811   GLUCOSE RANDOM mg/dL 109 87 93 126         Results from last 7 days   Lab Units 02/21/25  1334   HEMOGLOBIN A1C % 7.6*   EAG mg/dl 171           Results from last 7 days   Lab Units 02/21/25  1624   PROTIME seconds 12.8   INR  0.89   PTT seconds 23     Results from last 7 days   Lab Units 02/21/25  1624   TSH 3RD GENERATON uIU/mL 1.517           Results from last 7 days   Lab Units 02/23/25  0953   BNP pg/mL 28           Results from last 7 days   Lab Units 02/22/25  0811   CREATININE UR mg/dL 151.6   PROTEIN UR mg/dL 54.1   PROT/CREAT RATIO UR  0.4*         Network Utilization Review Department  ATTENTION: Please call with any questions or concerns to [phone] and carefully listen to the prompts so that you are directed to the right person. All voicemails are confidential.   For Discharge needs, contact Care Management DC Support Team at [phone] opt. 2  Send all requests for admission clinical  reviews, approved or denied determinations and any other requests to dedicated fax number below belonging to the campus where the patient is receiving treatment. List of dedicated fax numbers for the Facilities:  FACILITY NAME UR FAX NUMBER   ADMISSION DENIALS (Administrative/Medical Necessity) [phone]   DISCHARGE SUPPORT TEAM (NETWORK) [phone]   PARENT CHILD HEALTH (Maternity/NICU/Pediatrics) [phone]   [facility] [phone]   [facility] [phone]   [facility] [phone]   [facility] [phone]   [facility] [phone]   [facility] [phone]   [facility] [phone]   [facility] [phone]   [facility] [phone]   [facility] [phone]   [facility] [phone]   [facility] [phone]

## 2025-02-24 NOTE — OB LABOR/OXYTOCIN SAFETY PROGRESS
Oxytocin Safety Progress Check Note - Karena Garcia 34 y.o. female MRN: [id number]    Unit/Bed#: -01 Encounter: [id number]    Dose (bradley-units/min) Oxytocin: 8 bradley-units/min  Contraction Frequency (minutes): 2-3.5  Contraction Intensity: Moderate  Uterine Activity Characteristics: Regular  Cervical Dilation: 5        Cervical Effacement: 80  Fetal Station: -2  Baseline Rate (FHR): 135 bpm  Fetal Heart Rate (FHT): 147 BPM  FHR Category: I               Vital Signs:   Vitals:    02/24/25 0445   BP: 117/69   Pulse: (!) 109   Resp:    Temp:    SpO2:        Notes/comments:   Patient on pitocin titration, now is on 8 with 200 MVUs just now. Sve deferred  due to pitocin titration.     Jory Isabel MD 2/24/2025 5:08 AM

## 2025-02-24 NOTE — OB LABOR/OXYTOCIN SAFETY PROGRESS
Oxytocin Safety Progress Check Note - Karena Garcia 34 y.o. female MRN: [id number]    Unit/Bed#: LD  Encounter: [id number]    Dose (bradley-units/min) Oxytocin: 12 bradley-units/min  Contraction Frequency (minutes): 2-3.5  Contraction Intensity: Moderate  Uterine Activity Characteristics: Irregular  Cervical Dilation: 5        Cervical Effacement: 90  Fetal Station: -2  Baseline Rate (FHR): 145 bpm  Fetal Heart Rate (FHT): 141 BPM  FHR Category: I             Vital Signs:   Vitals:    25 0815   BP: 135/80   Pulse: 102   Resp:    Temp:    SpO2:      Notes/comments:   Pt reports worsening headache. Will give Tylenol. FHT Category I. [place] with contractions q2min. SVE as above, unchanged from prior. Pit running at 12, will continue to titrate to contractions. Will recheck in 2 hours or sooner if clinically indicated. Anticipate . Attending physician Dr. Jha aware.     Sebastian Mathew MD 2025 8:49 AM

## 2025-02-24 NOTE — OB LABOR/OXYTOCIN SAFETY PROGRESS
Oxytocin Safety Progress Check Note - Karena Garcia 34 y.o. female MRN: [id number]    Unit/Bed#: LD  Encounter: [id number]    Dose (bradley-units/min) Oxytocin: 16 bradley-units/min  Contraction Frequency (minutes): 2-4  Contraction Intensity: Mild/Moderate  Uterine Activity Characteristics: Irregular  Cervical Dilation: 5        Cervical Effacement: 90  Fetal Station: -1  Baseline Rate (FHR): 135 bpm  Fetal Heart Rate (FHT): 142 BPM  FHR Category: I             Vital Signs:   Vitals:    25 1047   BP: 141/59   Pulse: 92   Resp:    Temp:    SpO2:      Notes/comments:   Pt resting comfortably. Reports improvement in headache. FHT Category I. [place] with contractions q2-4min. SVE as above. Pit running at 16, will continue to titrate to contractions. Will recheck in 2 hours or sooner if clinically indicated. Anticipate . Attending physician Dr. Jha aware.     Sebastian Mathew MD 2025 11:03 AM

## 2025-02-24 NOTE — ASSESSMENT & PLAN NOTE
Diagnosed due to unremitting headache. CBC/CMP wnl, P:C 0.4  Systolic (12hrs), Av , Min:132 , Max:171   Diastolic (12hrs), Av, Min:83, Max:107  - continue magnesium sulfate gtt x24hrs postpartum  - continue to monitor BP  - continue to monitor for signs/symptoms of evolving preeclampsia

## 2025-02-24 NOTE — PLAN OF CARE
Problem: PAIN - ADULT  Goal: Verbalizes/displays adequate comfort level or baseline comfort level  Description: Interventions:  - Encourage patient to monitor pain and request assistance  - Assess pain using appropriate pain scale  - Administer analgesics based on type and severity of pain and evaluate response  - Implement non-pharmacological measures as appropriate and evaluate response  - Consider cultural and social influences on pain and pain management  - Notify physician/advanced practitioner if interventions unsuccessful or patient reports new pain  2/24/2025 0238 by Promise Araujo RN  Outcome: Progressing  2/24/2025 0237 by Promise Araujo RN  Outcome: Progressing     Problem: INFECTION - ADULT  Goal: Absence or prevention of progression during hospitalization  Description: INTERVENTIONS:  - Assess and monitor for signs and symptoms of infection  - Monitor lab/diagnostic results  - Monitor all insertion sites, i.e. indwelling lines, tubes, and drains  - Monitor endotracheal if appropriate and nasal secretions for changes in amount and color  - [place] appropriate cooling/warming therapies per order  - Administer medications as ordered  - Instruct and encourage patient and family to use good hand hygiene technique  - Identify and instruct in appropriate isolation precautions for identified infection/condition  2/24/2025 0238 by Promise Araujo RN  Outcome: Progressing  2/24/2025 0237 by Promise Araujo RN  Outcome: Progressing  Goal: Absence of fever/infection during neutropenic period  Description: INTERVENTIONS:  - Monitor WBC    2/24/2025 0238 by Promise Araujo RN  Outcome: Progressing  2/24/2025 0237 by Promise Araujo RN  Outcome: Progressing     Problem: SAFETY ADULT  Goal: Patient will remain free of falls  Description: INTERVENTIONS:  - Educate patient/family on patient safety including physical limitations  - Instruct patient to call for assistance with activity   - Consult OT/PT to assist with  strengthening/mobility   - Keep Call bell within reach  - Keep bed low and locked with side rails adjusted as appropriate  - Keep care items and personal belongings within reach  - Initiate and maintain comfort rounds  - Make Fall Risk Sign visible to staff  -  - Apply yellow socks and bracelet for high fall risk patients  - Consider moving patient to room near nurses station  2/24/2025 0238 by Promise Araujo RN  Outcome: Progressing  2/24/2025 0237 by Promise Araujo RN  Outcome: Progressing  Goal: Maintain or return to baseline ADL function  Description: INTERVENTIONS:  -  Assess patient's ability to carry out ADLs; assess patient's baseline for ADL function and identify physical deficits which impact ability to perform ADLs (bathing, care of mouth/teeth, toileting, grooming, dressing, etc.)  - Assess/evaluate cause of self-care deficits   - Assess range of motion  - Assess patient's mobility; develop plan if impaired  - Assess patient's need for assistive devices and provide as appropriate  - Encourage maximum independence but intervene and supervise when necessary  - Involve family in performance of ADLs  - Assess for home care needs following discharge   - Consider OT consult to assist with ADL evaluation and planning for discharge  - Provide patient education as appropriate  2/24/2025 0238 by Promise Araujo RN  Outcome: Progressing  2/24/2025 0237 by Promise Araujo RN  Outcome: Progressing  Goal: Maintains/Returns to pre admission functional level  Description: INTERVENTIONS:  - Perform AM-PAC 6 Click Basic Mobility/ Daily Activity assessment daily.  - Set and communicate daily mobility goal to care team and patient/family/caregiver.   - Collaborate with rehabilitation services on mobility goals if consulted  -- Out of bed for toileting  - Record patient progress and toleration of activity level   2/24/2025 0238 by Promise Araujo RN  Outcome: Progressing  2/24/2025 0237 by Promise Araujo RN  Outcome:  Progressing     Problem: Knowledge Deficit  Goal: Patient/family/caregiver demonstrates understanding of disease process, treatment plan, medications, and discharge instructions  Description: Complete learning assessment and assess knowledge base.  Interventions:  - Provide teaching at level of understanding  - Provide teaching via preferred learning methods  2/24/2025 0238 by Promise Araujo RN  Outcome: Progressing  2/24/2025 0237 by Promise Araujo RN  Outcome: Progressing  Goal: Verbalizes understanding of labor plan  Description: Assess patient/family/caregiver's baseline knowledge level and ability to understand information.  Provide education via patient/family/caregiver's preferred learning method at appropriate level of understanding.     1. Provide teaching at level of understanding.  2. Provide teaching via preferred learning method(s).  Outcome: Progressing     Problem: DISCHARGE PLANNING  Goal: Discharge to home or other facility with appropriate resources  Description: INTERVENTIONS:  - Identify barriers to discharge w/patient and caregiver  - Arrange for needed discharge resources and transportation as appropriate  - Identify discharge learning needs (meds, wound care, etc.)  - Arrange for interpretive services to assist at discharge as needed  - Refer to Case Management Department for coordinating discharge planning if the patient needs post-hospital services based on physician/advanced practitioner order or complex needs related to functional status, cognitive ability, or social support system  2/24/2025 0238 by Promise Araujo RN  Outcome: Progressing  2/24/2025 0237 by Promise Araujo RN  Outcome: Progressing     Problem: Labor & Delivery  Goal: Manages discomfort  Description: Assess and monitor for signs and symptoms of discomfort.  Assess patient's pain level regularly and per hospital policy.  Administer medications as ordered. Support use of nonpharmacological methods to help control pain such as  distraction, imagery, relaxation, and application of heat and cold.  Collaborate with interdisciplinary team and patient to determine appropriate pain management plan.    1. Include patient in decisions related to comfort.  2. Offer non-pharmacological pain management interventions.  3. Report ineffective pain management to physician.  Outcome: Progressing  Goal: Patient vital signs are stable  Description: 1. Assess vital signs - vaginal delivery.  Outcome: Progressing

## 2025-02-24 NOTE — PROGRESS NOTES
Blood glucose of 102 obtained. Patient no longer has a second IV line awaiting for an attempt by anesthesia and/or IV team. Dr. Vyas made aware, is agreeable to holding the insulin for now. Blood glucose will continue to be monitored closely

## 2025-02-24 NOTE — DISCHARGE SUMMARY
Obstetrics Discharge Summary  Karena Garcia 34 y.o. female MRN: [id number]  Unit/Bed#: LD OVR Encounter: [id number]    Admission Date: 2025   Admitting Attending: No att. providers found    Delivery Method: , Low Transverse   Delivery Date and Time: 2025 3:49 PM  Delivery Attending: Vicky Jha MD    Anesthesia: epidural    Discharge Date: 25  Discharge Attending: Debbie Sharma MD    Principal Diagnosis: Pregnancy at 36w2d    Secondary Diagnosis, Admission:  Suspected fetal macrosomia  A2GDM    Discharge Diagnosis:  Same, delivered  Preeclampsia with severe features    Procedures: primary  section, low transverse incision    Hospital course:   Karena Garcia was initially admitted to the antepartum service for monitoring in the setting of fetal tachycardia. On , she was transferred to the L&D floor for induction of labor in the setting of new diagnosis of preeclampsia with severe features. On initial exam she was noted to be 0/30/-4. A Zuleta balloon and vaginal Cytotec were placed. After Zuleta balloon dislodgement, she was 4/60/-2. Spontaneous rupture of membranes for clear fluid occurred. Pitocin titration was initiated. She progressed to 5/80/-2. She remained relatively unchanged for >12 hours. Decision was made to proceed to the OR for primary  section in the setting of failed induction of labor. Patient was in agreement. Risks and benefits were discussed.     On 25 @1549 she delivered a viable female  @36w5d via primary  section, low transverse incision. There were no apparent complications. 's birth weight was 7lbs 8oz; Apgars were 8 (1 min) and 9 (5 min).     Her post-delivery course was complicated by patient request to discontinue magnesium sulfate gtt prior to completion of standard 24-hours postpartum infusion. Her blood pressures remained relatively well-controlled on a regimen of Labetalol 200mg BID and Lasix 20mg qd x5days.    She  also had elevated blood sugars in the postpartum period requiring endocrine consult. She was discharged on a regimen of Lantus 24U qhs, mealtime insulin 6U, Metformin 1000mg with breakfast and dinner.    Her preoperative hemoglobin was 12.4g/dL, postoperative was 9.4; she received Venofer. Mom's blood type is A positive. RhoGAM was not indicated.    Her postpartum pain was well controlled with oral analgesics. On day of discharge, she was ambulating and able to reasonably perform all ADLs. She was voiding and had appropriate bowel function. She was discharged home on postpartum day #3 without complications. Patient was instructed to follow up with her OBGYN as an outpatient and was given appropriate warnings to call provider if she develops signs of infection or uncontrolled pain.    Complications: none apparent    Condition at discharge: good     Provisions for Follow-Up Care:  Discharge Medications:   Discharge Instructions:   Please see after visit summary for information related to follow-up care, medications, and any other discharge instructions.    Disposition: Home    Planned Readmission: No    Sebastian Mathew MD  OBGYN PGY-1  02/27/25  6:18 PM

## 2025-02-24 NOTE — PLAN OF CARE
Problem: PAIN - ADULT  Goal: Verbalizes/displays adequate comfort level or baseline comfort level  Description: Interventions:  - Encourage patient to monitor pain and request assistance  - Assess pain using appropriate pain scale  - Administer analgesics based on type and severity of pain and evaluate response  - Implement non-pharmacological measures as appropriate and evaluate response  - Consider cultural and social influences on pain and pain management  - Notify physician/advanced practitioner if interventions unsuccessful or patient reports new pain  2/24/2025 1700 by Veronica Parker  Outcome: Progressing  2/24/2025 1700 by Veronica Parker  Outcome: Progressing  2/24/2025 1658 by Veronica Parker  Outcome: Progressing     Problem: INFECTION - ADULT  Goal: Absence or prevention of progression during hospitalization  Description: INTERVENTIONS:  - Assess and monitor for signs and symptoms of infection  - Monitor lab/diagnostic results  - Monitor all insertion sites, i.e. indwelling lines, tubes, and drains  - Monitor endotracheal if appropriate and nasal secretions for changes in amount and color  - [place] appropriate cooling/warming therapies per order  - Administer medications as ordered  - Instruct and encourage patient and family to use good hand hygiene technique  - Identify and instruct in appropriate isolation precautions for identified infection/condition  2/24/2025 1700 by Veronica Parker  Outcome: Progressing  2/24/2025 1700 by Veronica Parker  Outcome: Progressing  2/24/2025 1658 by Veronica Parker  Outcome: Progressing  Goal: Absence of fever/infection during neutropenic period  Description: INTERVENTIONS:  - Monitor WBC    2/24/2025 1700 by Veronica Parker  Outcome: Progressing  2/24/2025 1700 by Veronica Parker  Outcome: Progressing  2/24/2025 1658 by Veronica Parker  Outcome: Progressing     Problem: SAFETY ADULT  Goal: Patient  will remain free of falls  Description: INTERVENTIONS:  - Educate patient/family on patient safety including physical limitations  - Instruct patient to call for assistance with activity   - Consult OT/PT to assist with strengthening/mobility   - Keep Call bell within reach  - Keep bed low and locked with side rails adjusted as appropriate  - Keep care items and personal belongings within reach  - Initiate and maintain comfort rounds  - Make Fall Risk Sign visible to staff  - Offer Toileting every 2 Hours, in advance of need  - Apply yellow socks and bracelet for high fall risk patients  - Consider moving patient to room near nurses station  2/24/2025 1700 by Veronica Parker  Outcome: Progressing  2/24/2025 1700 by Veronica Parker  Outcome: Progressing  2/24/2025 1658 by Veronica Parker  Outcome: Progressing  Goal: Maintain or return to baseline ADL function  Description: INTERVENTIONS:  -  Assess patient's ability to carry out ADLs; assess patient's baseline for ADL function and identify physical deficits which impact ability to perform ADLs (bathing, care of mouth/teeth, toileting, grooming, dressing, etc.)  - Assess/evaluate cause of self-care deficits   - Assess range of motion  - Assess patient's mobility; develop plan if impaired  - Assess patient's need for assistive devices and provide as appropriate  - Encourage maximum independence but intervene and supervise when necessary  - Involve family in performance of ADLs  - Assess for home care needs following discharge   - Consider OT consult to assist with ADL evaluation and planning for discharge  - Provide patient education as appropriate  2/24/2025 1700 by Veronica Parker  Outcome: Progressing  2/24/2025 1700 by Veronica Parker  Outcome: Progressing  2/24/2025 1658 by Veronica Parker  Outcome: Progressing  Goal: Maintains/Returns to pre admission functional level  Description: INTERVENTIONS:  - Perform AM-PAC 6 Click  Basic Mobility/ Daily Activity assessment daily.  - Set and communicate daily mobility goal to care team and patient/family/caregiver.   - Collaborate with rehabilitation services on mobility goals if consulted  - Out of bed for toileting  - Record patient progress and toleration of activity level   2/24/2025 1700 by Veronica Parker  Outcome: Progressing  2/24/2025 1700 by Veronica Parker  Outcome: Progressing  2/24/2025 1658 by Veronica Parker  Outcome: Progressing     Problem: DISCHARGE PLANNING  Goal: Discharge to home or other facility with appropriate resources  Description: INTERVENTIONS:  - Identify barriers to discharge w/patient and caregiver  - Arrange for needed discharge resources and transportation as appropriate  - Identify discharge learning needs (meds, wound care, etc.)  - Arrange for interpretive services to assist at discharge as needed  - Refer to Case Management Department for coordinating discharge planning if the patient needs post-hospital services based on physician/advanced practitioner order or complex needs related to functional status, cognitive ability, or social support system  2/24/2025 1700 by Veronica Parker  Outcome: Progressing  2/24/2025 1700 by Veronica Parker  Outcome: Progressing  2/24/2025 1658 by Veronica Parker  Outcome: Progressing     Problem: ALTERATION IN THE BREAST  Goal: Optimize infant feeding at the breast  Description: INTERVENTIONS:  - Latch, breast and nipple assessment  - Assess prior breast feeding history  - Hand expression of breast milk  - For cracked, bleeding and or sore nipples reassess latch, treat damaged nipple  -Educate mother on feeding cues  -Positioning/latch techniques  2/24/2025 1700 by Veronica Parker  Outcome: Progressing  2/24/2025 1700 by Veronica Parker  Outcome: Progressing     Problem: INADEQUATE LATCH, SUCK OR SWALLOW  Goal: Demonstrate ability to latch and sustain latch, audible  swallowing and satiety  Description: INTERVENTIONS:  - Assess oral anatomy, notify Physician/AP for abnormal findings  - Establish milk expression  - Maximize feeding opportunity (skin to skin, behavioral state)  - Position/latch techniques  - Discourage use of pacifier-artificial nipple  - Mechanical pumping  - Nipple Shield  - Supplemental formula feeding (Physician/AP order)  - Alternative feeding method  2025 by Veronica Parker  Outcome: Progressing  2025 by Veronica Parker  Outcome: Progressing     Problem: NORMAL   Goal: Experiences normal transition  Description: INTERVENTIONS:  - Monitor vital signs  - Maintain thermoregulation  - Assess for hypoglycemia risk factors or signs and symptoms  - Assess for sepsis risk factors or signs and symptoms  - Assess for jaundice risk and/or signs and symptoms  2025 by Veronica Parker  Outcome: Progressing  2025 by Veronica Parker  Outcome: Progressing  Goal: Total weight loss less than 10% of birth weight  Description: INTERVENTIONS:  - Assess feeding patterns  - Weigh daily  2025 by Veronica Parker  Outcome: Progressing  2025 by Veronica Parker  Outcome: Progressing     Problem: PAIN -   Goal: Displays adequate comfort level or baseline comfort level  Description: INTERVENTIONS:  - Perform pain scoring using age-appropriate tool with hands-on care as needed.  Notify physician/AP of high pain scores not responsive to comfort measures  - Administer analgesics based on type and severity of pain and evaluate response  - Sucrose analgesia per protocol for brief minor painful procedures  - Teach parents interventions for comforting infant  2025 by Veronica Parker  Outcome: Progressing  2025 by Veronica Parker  Outcome: Progressing     Problem: THERMOREGULATION - PEDIATRICS  Goal: Maintains normal body temperature  Description:  Interventions:  - Monitor temperature (axillary for Newborns) as ordered  - Monitor for signs of hypothermia or hyperthermia  - Provide thermal support measures  - Wean to open crib when appropriate  2025 by Veronica Parker  Outcome: Progressing  2025 by Veronica Parker  Outcome: Progressing     Problem: INFECTION -   Goal: No evidence of infection  Description: INTERVENTIONS:  - Instruct family/visitors to use good hand hygiene technique  - Identify and instruct in appropriate isolation precautions for identified infection/condition  - Change incubator every 2 weeks or as needed.  - Monitor for symptoms of infection  - Monitor surgical sites and insertion sites for all indwelling lines, tubes, and drains for drainage, redness, or edema.  - Monitor endotracheal and nasal secretions for changes in amount and color  - Monitor culture and CBC results  - Administer antibiotics as ordered.  Monitor drug levels  2025 by Veronica Parker  Outcome: Progressing  2025 by Veronica Parker  Outcome: Progressing     Problem: RISK FOR INFECTION (RISK FACTORS FOR MATERNAL CHORIOAMNIOITIS - )  Goal: No evidence of infection  Description: INTERVENTIONS:  - Instruct family/visitors to use good hand hygiene technique  - Monitor for symptoms of infection  - Monitor culture and CBC results  - Administer antibiotics as ordered.  Monitor drug levels  2025 by Veronica Parker  Outcome: Progressing  2025 by Veronica Parker  Outcome: Progressing     Problem: SAFETY -   Goal: Patient will remain free from falls  Description: INTERVENTIONS:  - Instruct family/caregiver on patient safety  - Keep incubator doors and portholes closed when unattended  - Keep radiant warmer side rails and crib rails up when unattended  - Based on caregiver fall risk screen, instruct family/caregiver to ask for assistance with transferring infant  if caregiver noted to have fall risk factors  2025 by Veronica Parker  Outcome: Progressing  2025 by Veronica Parker  Outcome: Progressing     Problem: Knowledge Deficit  Goal: Patient/family/caregiver demonstrates understanding of disease process, treatment plan, medications, and discharge instructions  Description: Complete learning assessment and assess knowledge base.  Interventions:  - Provide teaching at level of understanding  - Provide teaching via preferred learning methods  2025 by Veronica Parker  Outcome: Progressing  2025 by Veronica Parker  Outcome: Progressing  Goal: Infant caregiver verbalizes understanding of benefits of skin-to-skin with healthy   Description: Prior to delivery, educate patient regarding skin-to-skin practice and its benefits  Initiate immediate and uninterrupted skin-to-skin contact after birth until breastfeeding is initiated or a minimum of one hour  Encourage continued skin-to-skin contact throughout the post partum stay    2025 by Veronica Parker  Outcome: Progressing  2025 by Veronica Parkre  Outcome: Progressing  Goal: Infant caregiver verbalizes understanding of benefits and management of breastfeeding their healthy   Description: Help initiate breastfeeding within one hour of birth  Educate/assist with breastfeeding positioning and latch  Educate on safe positioning and to monitor their  for safety  Educate on how to maintain lactation even if they are  from their   Educate/initiate pumping for a mom with a baby in the NICU within 6 hours after birth  Give infants no food or drink other than breast milk unless medically indicated  Educate on feeding cues and encourage breastfeeding on demand    2025 by Veronica Parker  Outcome: Progressing  2025 by Veronica Parker  Outcome: Progressing  Goal: Infant  caregiver verbalizes understanding of benefits to rooming-in with their healthy   Description: Promote rooming in 23 out of 24 hours per day  Educate on benefits to rooming-in  Provide  care in room with parents as long as infant and mother condition allow    2025 by Veronica Parker  Outcome: Progressing  2025 by Veronica Parker  Outcome: Progressing  Goal: Provide formula feeding instructions and preparation information to caregivers who do not wish to breastfeed their   Description: Provide one on one information on frequency, amount, and burping for formula feeding caregivers throughout their stay and at discharge.  Provide written information/video on formula preparation.    2025 by Veronica Parker  Outcome: Progressing  2025 by Veronica Parker  Outcome: Progressing  Goal: Infant caregiver verbalizes understanding of support and resources for follow up after discharge  Description: Provide individual discharge education on when to call the doctor.  Provide resources and contact information for post-discharge support.    2025 by Veronica Parker  Outcome: Progressing  2025 by Veronica Parker  Outcome: Progressing

## 2025-02-24 NOTE — OP NOTE
OPERATIVE REPORT  PATIENT NAME: Karena Garcia    :  1990  MRN: [id number]  Pt Location: [facility] L&D OR ROOM 02    SURGERY DATE: 2025    Surgeons and Role:     * Liset Jha MD - Primary     * Sebastian Mathew MD - Assisting    Preop Diagnosis:  Failed induction of labor [O61.9]  Pre-eclampsia with severe features [O14.93]    Post-Op Diagnosis Codes:     * Failed induction of labor [O61.9]     * Pre-eclampsia with severe features [O14.93]    Procedure(s) (LRB):   SECTION () (N/A), Primary    Specimen(s):  ID Type Source Tests Collected by Time Destination   1 :  Tissue (Placenta on Hold) OB Only Placenta TISSUE EXAM OB (PLACENTA) ONLY Liset Jha MD 2025 1553    B :  Cord Blood Cord BLOOD GAS, VENOUS, CORD (Canceled), BLOOD GAS, ARTERIAL, CORD Liset Jha MD 2025 1551      Surgical QBL:  Surgical QBL (mL): 557 mL    Drains:  Urethral Catheter Double-lumen;Non-latex;Straight-tip 16 Fr. (Active)   Goal for Removal Voiding trial when ambulation improves 25 1526   Site Assessment Clean;Skin intact 25 1526   Collection Container Standard drainage bag 25 1526   Securement Method Securing device (Describe) 25 1500   Output (mL) 500 mL 25 1509   Number of days: 1     Anesthesia Type:   Epidural    Operative Indications:  Failed induction of labor [O61.9]  Pre-eclampsia with severe features [O14.93]     [facility]ry [employer] Classification System:  No Multiple pregnancy, No Transverse or oblique lie, No Breech lie, Gestational age is < 37 weeks +  is LARRY GROUP 10    Operative Findings:  Viable female  at 1549 with APGARs of 8 and 9 at 1 and 5 minutes, respectively. Fetus weight: 7lb 8oz.  Normal intact placenta with centrally inserted 3 vessel cord expressed at 1552.  Normal uterus, bilateral tubes and ovaries.  Fetal cord gases:  Recent Results (from the past 2 hours)   CORD, Blood gas, arterial    Collection Time:  25  3:51 PM   Result Value Ref Range    pH, Cord Art 7.222 (L) 7.230 - 7.430    pCO2, Cord Art 51.7 30.0 - 60.0    pO2, Cord Art 31.9 (H) 5.0 - 25.0 mm HG    HCO3, Cord Art 20.8 17.3 - 27.3 mmol/L    Base Exc, Cord Art -7.4 (L) 3.0 - 11.0 mmol/L    O2 Content, Cord Art 8.6 ml/dl    O2 Hgb, Arterial Cord 37.9 %     Procedure and technique:  Karena Garcia was initially admitted to the antepartum service for monitoring in the setting of fetal tachycardia. On , she was transferred to the L&D floor for induction of labor in the setting of new diagnosis of preeclampsia with severe features. On initial exam she was noted to be 0/30/-4. A Zuleta balloon and vaginal Cytotec were placed. After Zuleta balloon dislodgement, she was 4/60/-2. Spontaneous rupture of membranes for clear fluid occurred. Pitocin titration was initiated. She progressed to 5/80/-2. She remained relatively unchanged for >12 hours. Decision was made to proceed to the OR for primary  section in the setting of failed induction of labor. Patient was in agreement. Risks and benefits were discussed.    The patient was taken to the operating room. Epidural anesthesia was noted to be adequate. Ancef and azithromycin were given for preoperative prophylaxis. The patient was then placed in the dorsal supine position with a left tilt of the hips. The patient was prepped with chlorhexidine for vaginal prep and chloraprep for abdominal prep and draped in the usual sterile fashion.    A time out was performed to confirm correct patient and correct procedure. An incision was made in the skin with a surgical scalpel, and sharp dissection was carried out over subsequent layers of tissue including the fascia, followed by the Bovie electrocautery for hemostasis. The fascia was incised at the midline, and the fascial incision was extended bilaterally using the curved Barboza scissors.    The inferior edge of the fascial incision was grasped with Kocher clamps,  tented up, and the underlying rectus muscles were dissected off bluntly. The superior edge of the fascial incision was grasped with Kocher clamps, tented up, and the underlying rectus muscles were dissected off bluntly and sharply with the curved Barboza scissors. The rectus muscles were then divided at midline, and the peritoneum was identified and entered and extended superiorly and inferiorly in blunt fashion. The Grayson O retractor was inserted. A transverse incision was made in the lower uterine segment using a new surgical blade. The uterine incision was extended cephalad and caudal using blunt dissection. The amniotic sac was entered and the amniotic fluid was noted to be clear.    The surgeon's hand was placed into the uterine cavity. The fetal head was identified. Due to difficulty elevating the head to the hysterotomy, bandage scissors were used to divide each of the rectus muscles approximately 2cm on either side. The fetal head was then elevated through the uterine incision with the assistance of fundal pressure. With gentle traction, the shoulder was delivered, followed by the rest of the fetal body. There was no nuchal cord noted. On delivery, the cord was doubly clamped and cut after delayed cord clamping. The infant was then passed off the table to awaiting  staff. The  was noted to cry spontaneously and moved all extremities. Venous and arterial blood gas, cord blood, and portion of cord were obtained for analysis and routine blood testing. The placenta was delivered and sent to pathology. The placenta was noted to be intact with a centrally inserted three-vessel cord. Oxytocin was administered by IV infusion to enhance uterine contraction.     The uterine incision was re-approximated using 0 Monocryl in a running locked fashion. A second horizontal imbricating stitch with 0 Monocryl was applied. The uterine incision was examined and noted to be hemostatic. The paracolic gutters were  cleared of all clots. The uterine incision was once again re-examined and noted to be hemostatic. The Graysno O retractor was removed. The rectus muscles were examined and noted to be hemostatic. The fascia was re-approximated using 0 Vicryl in a running nonlocked fashion. The subcutaneous tissue was irrigated and cleared of all clots and debris. Good hemostasis was noted with Bovie electrocautery. The subcutaneous tissue was re-approximated with 2-0 Vicryl in a running non-locked fashion. The skin incision was closed with 4-0 Stratafix in a running subcuticular fashion. Good hemostasis was noted. A Mepilex dressing was applied.    Patient tolerated the procedure well. All needle, sponge, and instrument counts were noted to be correct x2 at the end of the procedure. Patient was transferred to the recovery room in stable condition. Attending physician Dr. Jha was present for the duration of the procedure.    Complications:   None apparent    Patient Disposition:  PACU     SIGNATURE: Sebastian Mathew MD  DATE: February 24, 2025  TIME: 4:49 PM

## 2025-02-25 LAB
2HR DELTA HS TROPONIN: -1 NG/L
ALBUMIN SERPL BCG-MCNC: 3.1 G/DL (ref 3.5–5)
ALBUMIN SERPL BCG-MCNC: 3.3 G/DL (ref 3.5–5)
ALP SERPL-CCNC: 112 U/L (ref 34–104)
ALP SERPL-CCNC: 92 U/L (ref 34–104)
ALT SERPL W P-5'-P-CCNC: 7 U/L (ref 7–52)
ALT SERPL W P-5'-P-CCNC: 9 U/L (ref 7–52)
ANION GAP SERPL CALCULATED.3IONS-SCNC: 11 MMOL/L (ref 4–13)
ANION GAP SERPL CALCULATED.3IONS-SCNC: 9 MMOL/L (ref 4–13)
AST SERPL W P-5'-P-CCNC: 14 U/L (ref 13–39)
AST SERPL W P-5'-P-CCNC: 14 U/L (ref 13–39)
BILIRUB SERPL-MCNC: 0.22 MG/DL (ref 0.2–1)
BILIRUB SERPL-MCNC: 0.26 MG/DL (ref 0.2–1)
BUN SERPL-MCNC: 16 MG/DL (ref 5–25)
BUN SERPL-MCNC: 17 MG/DL (ref 5–25)
CALCIUM ALBUM COR SERPL-MCNC: 8.4 MG/DL (ref 8.3–10.1)
CALCIUM ALBUM COR SERPL-MCNC: 9.2 MG/DL (ref 8.3–10.1)
CALCIUM SERPL-MCNC: 7.7 MG/DL (ref 8.4–10.2)
CALCIUM SERPL-MCNC: 8.6 MG/DL (ref 8.4–10.2)
CARDIAC TROPONIN I PNL SERPL HS: 8 NG/L (ref ?–50)
CARDIAC TROPONIN I PNL SERPL HS: 9 NG/L (ref ?–50)
CHLORIDE SERPL-SCNC: 103 MMOL/L (ref 96–108)
CHLORIDE SERPL-SCNC: 104 MMOL/L (ref 96–108)
CO2 SERPL-SCNC: 20 MMOL/L (ref 21–32)
CO2 SERPL-SCNC: 22 MMOL/L (ref 21–32)
CREAT SERPL-MCNC: 0.84 MG/DL (ref 0.6–1.3)
CREAT SERPL-MCNC: 0.93 MG/DL (ref 0.6–1.3)
ERYTHROCYTE [DISTWIDTH] IN BLOOD BY AUTOMATED COUNT: 13.7 % (ref 11.6–15.1)
ERYTHROCYTE [DISTWIDTH] IN BLOOD BY AUTOMATED COUNT: 14 % (ref 11.6–15.1)
GFR SERPL CREATININE-BSD FRML MDRD: 81 ML/MIN/1.73SQ M
GFR SERPL CREATININE-BSD FRML MDRD: 91 ML/MIN/1.73SQ M
GLUCOSE SERPL-MCNC: 139 MG/DL (ref 65–140)
GLUCOSE SERPL-MCNC: 141 MG/DL (ref 65–140)
GLUCOSE SERPL-MCNC: 156 MG/DL (ref 65–140)
GLUCOSE SERPL-MCNC: 160 MG/DL (ref 65–140)
GLUCOSE SERPL-MCNC: 189 MG/DL (ref 65–140)
GLUCOSE SERPL-MCNC: 212 MG/DL (ref 65–140)
GLUCOSE SERPL-MCNC: 279 MG/DL (ref 65–140)
GLUCOSE SERPL-MCNC: 285 MG/DL (ref 65–140)
GLUCOSE SERPL-MCNC: 91 MG/DL (ref 65–140)
HCT VFR BLD AUTO: 29.7 % (ref 34.8–46.1)
HCT VFR BLD AUTO: 32.1 % (ref 34.8–46.1)
HGB BLD-MCNC: 10.4 G/DL (ref 11.5–15.4)
HGB BLD-MCNC: 9.7 G/DL (ref 11.5–15.4)
MAGNESIUM SERPL-MCNC: 2.8 MG/DL (ref 1.9–2.7)
MCH RBC QN AUTO: 29.6 PG (ref 26.8–34.3)
MCH RBC QN AUTO: 29.8 PG (ref 26.8–34.3)
MCHC RBC AUTO-ENTMCNC: 32.4 G/DL (ref 31.4–37.4)
MCHC RBC AUTO-ENTMCNC: 32.7 G/DL (ref 31.4–37.4)
MCV RBC AUTO: 91 FL (ref 82–98)
MCV RBC AUTO: 92 FL (ref 82–98)
PLATELET # BLD AUTO: 237 THOUSANDS/UL (ref 149–390)
PLATELET # BLD AUTO: 253 THOUSANDS/UL (ref 149–390)
PMV BLD AUTO: 11.3 FL (ref 8.9–12.7)
PMV BLD AUTO: 11.8 FL (ref 8.9–12.7)
POTASSIUM SERPL-SCNC: 3.5 MMOL/L (ref 3.5–5.3)
POTASSIUM SERPL-SCNC: 4 MMOL/L (ref 3.5–5.3)
PROT SERPL-MCNC: 5.6 G/DL (ref 6.4–8.4)
PROT SERPL-MCNC: 6.1 G/DL (ref 6.4–8.4)
RBC # BLD AUTO: 3.26 MILLION/UL (ref 3.81–5.12)
RBC # BLD AUTO: 3.51 MILLION/UL (ref 3.81–5.12)
SODIUM SERPL-SCNC: 133 MMOL/L (ref 135–147)
SODIUM SERPL-SCNC: 136 MMOL/L (ref 135–147)
TSH SERPL DL<=0.05 MIU/L-ACNC: 1.45 UIU/ML (ref 0.45–4.5)
WBC # BLD AUTO: 15.48 THOUSAND/UL (ref 4.31–10.16)
WBC # BLD AUTO: 8.8 THOUSAND/UL (ref 4.31–10.16)

## 2025-02-25 PROCEDURE — 93005 ELECTROCARDIOGRAM TRACING: CPT

## 2025-02-25 PROCEDURE — 85027 COMPLETE CBC AUTOMATED: CPT

## 2025-02-25 PROCEDURE — 84443 ASSAY THYROID STIM HORMONE: CPT

## 2025-02-25 PROCEDURE — 82948 REAGENT STRIP/BLOOD GLUCOSE: CPT

## 2025-02-25 PROCEDURE — 84484 ASSAY OF TROPONIN QUANT: CPT

## 2025-02-25 PROCEDURE — 80053 COMPREHEN METABOLIC PANEL: CPT

## 2025-02-25 PROCEDURE — 83735 ASSAY OF MAGNESIUM: CPT

## 2025-02-25 PROCEDURE — 99255 IP/OBS CONSLTJ NEW/EST HI 80: CPT | Performed by: INTERNAL MEDICINE

## 2025-02-25 PROCEDURE — 99024 POSTOP FOLLOW-UP VISIT: CPT | Performed by: STUDENT IN AN ORGANIZED HEALTH CARE EDUCATION/TRAINING PROGRAM

## 2025-02-25 RX ORDER — SODIUM CHLORIDE, SODIUM LACTATE, POTASSIUM CHLORIDE, CALCIUM CHLORIDE 600; 310; 30; 20 MG/100ML; MG/100ML; MG/100ML; MG/100ML
125 INJECTION, SOLUTION INTRAVENOUS CONTINUOUS
Status: DISCONTINUED | OUTPATIENT
Start: 2025-02-25 | End: 2025-02-25

## 2025-02-25 RX ORDER — CEPHALEXIN 500 MG/1
500 CAPSULE ORAL EVERY 6 HOURS SCHEDULED
Status: DISPENSED | OUTPATIENT
Start: 2025-02-25 | End: 2025-02-27

## 2025-02-25 RX ORDER — METOCLOPRAMIDE HYDROCHLORIDE 5 MG/ML
10 INJECTION INTRAMUSCULAR; INTRAVENOUS ONCE
Status: COMPLETED | OUTPATIENT
Start: 2025-02-25 | End: 2025-02-25

## 2025-02-25 RX ORDER — HYDROXYZINE HYDROCHLORIDE 25 MG/1
50 TABLET, FILM COATED ORAL EVERY 6 HOURS PRN
Status: DISCONTINUED | OUTPATIENT
Start: 2025-02-25 | End: 2025-02-27 | Stop reason: HOSPADM

## 2025-02-25 RX ORDER — INSULIN GLARGINE 100 [IU]/ML
30 INJECTION, SOLUTION SUBCUTANEOUS
Status: DISCONTINUED | OUTPATIENT
Start: 2025-02-25 | End: 2025-02-26

## 2025-02-25 RX ORDER — BENZOCAINE/MENTHOL 6 MG-10 MG
1 LOZENGE MUCOUS MEMBRANE DAILY PRN
Status: DISCONTINUED | OUTPATIENT
Start: 2025-02-25 | End: 2025-02-27 | Stop reason: HOSPADM

## 2025-02-25 RX ORDER — MAGNESIUM SULFATE HEPTAHYDRATE 40 MG/ML
2 INJECTION, SOLUTION INTRAVENOUS CONTINUOUS
Status: DISCONTINUED | OUTPATIENT
Start: 2025-02-25 | End: 2025-02-25

## 2025-02-25 RX ORDER — INSULIN LISPRO 100 [IU]/ML
2-12 INJECTION, SOLUTION INTRAVENOUS; SUBCUTANEOUS
Status: DISCONTINUED | OUTPATIENT
Start: 2025-02-25 | End: 2025-02-25

## 2025-02-25 RX ORDER — LABETALOL 200 MG/1
200 TABLET, FILM COATED ORAL DAILY
Status: DISCONTINUED | OUTPATIENT
Start: 2025-02-25 | End: 2025-02-25

## 2025-02-25 RX ORDER — INSULIN LISPRO 100 [IU]/ML
8 INJECTION, SOLUTION INTRAVENOUS; SUBCUTANEOUS
Status: DISCONTINUED | OUTPATIENT
Start: 2025-02-25 | End: 2025-02-25

## 2025-02-25 RX ORDER — SODIUM CHLORIDE, SODIUM LACTATE, POTASSIUM CHLORIDE, CALCIUM CHLORIDE 600; 310; 30; 20 MG/100ML; MG/100ML; MG/100ML; MG/100ML
50 INJECTION, SOLUTION INTRAVENOUS CONTINUOUS
Status: DISCONTINUED | OUTPATIENT
Start: 2025-02-25 | End: 2025-02-25

## 2025-02-25 RX ORDER — KETOROLAC TROMETHAMINE 30 MG/ML
15 INJECTION, SOLUTION INTRAMUSCULAR; INTRAVENOUS EVERY 6 HOURS SCHEDULED
Status: DISCONTINUED | OUTPATIENT
Start: 2025-02-25 | End: 2025-02-26

## 2025-02-25 RX ORDER — LABETALOL 200 MG/1
200 TABLET, FILM COATED ORAL EVERY 12 HOURS SCHEDULED
Status: DISCONTINUED | OUTPATIENT
Start: 2025-02-25 | End: 2025-02-27 | Stop reason: HOSPADM

## 2025-02-25 RX ORDER — INSULIN LISPRO 100 [IU]/ML
10 INJECTION, SOLUTION INTRAVENOUS; SUBCUTANEOUS
Status: DISCONTINUED | OUTPATIENT
Start: 2025-02-25 | End: 2025-02-26

## 2025-02-25 RX ORDER — INSULIN LISPRO 100 [IU]/ML
10 INJECTION, SOLUTION INTRAVENOUS; SUBCUTANEOUS
Status: DISCONTINUED | OUTPATIENT
Start: 2025-02-26 | End: 2025-02-25

## 2025-02-25 RX ORDER — INSULIN LISPRO 100 [IU]/ML
2-12 INJECTION, SOLUTION INTRAVENOUS; SUBCUTANEOUS
Status: DISCONTINUED | OUTPATIENT
Start: 2025-02-25 | End: 2025-02-27 | Stop reason: HOSPADM

## 2025-02-25 RX ORDER — METRONIDAZOLE 500 MG/1
500 TABLET ORAL EVERY 8 HOURS SCHEDULED
Status: COMPLETED | OUTPATIENT
Start: 2025-02-25 | End: 2025-02-26

## 2025-02-25 RX ORDER — NALOXONE HYDROCHLORIDE 0.4 MG/ML
0.1 INJECTION, SOLUTION INTRAMUSCULAR; INTRAVENOUS; SUBCUTANEOUS
Status: ACTIVE | OUTPATIENT
Start: 2025-02-25 | End: 2025-02-26

## 2025-02-25 RX ORDER — CALCIUM GLUCONATE 94 MG/ML
1 INJECTION, SOLUTION INTRAVENOUS ONCE AS NEEDED
Status: DISCONTINUED | OUTPATIENT
Start: 2025-02-25 | End: 2025-02-27 | Stop reason: HOSPADM

## 2025-02-25 RX ORDER — ACETAMINOPHEN 325 MG/1
650 TABLET ORAL EVERY 6 HOURS SCHEDULED
Status: DISCONTINUED | OUTPATIENT
Start: 2025-02-25 | End: 2025-02-27 | Stop reason: HOSPADM

## 2025-02-25 RX ORDER — INSULIN LISPRO 100 [IU]/ML
1-6 INJECTION, SOLUTION INTRAVENOUS; SUBCUTANEOUS
Status: DISCONTINUED | OUTPATIENT
Start: 2025-02-25 | End: 2025-02-27 | Stop reason: HOSPADM

## 2025-02-25 RX ORDER — ENOXAPARIN SODIUM 100 MG/ML
40 INJECTION SUBCUTANEOUS EVERY 12 HOURS
Status: DISCONTINUED | OUTPATIENT
Start: 2025-02-25 | End: 2025-02-27 | Stop reason: HOSPADM

## 2025-02-25 RX ORDER — METFORMIN HYDROCHLORIDE 500 MG/1
500 TABLET, EXTENDED RELEASE ORAL 2 TIMES DAILY WITH MEALS
Status: DISCONTINUED | OUTPATIENT
Start: 2025-02-25 | End: 2025-02-26

## 2025-02-25 RX ORDER — DIPHENHYDRAMINE HYDROCHLORIDE 50 MG/ML
25 INJECTION INTRAMUSCULAR; INTRAVENOUS ONCE
Status: COMPLETED | OUTPATIENT
Start: 2025-02-25 | End: 2025-02-25

## 2025-02-25 RX ORDER — FUROSEMIDE 20 MG/1
20 TABLET ORAL DAILY
Status: DISCONTINUED | OUTPATIENT
Start: 2025-02-25 | End: 2025-02-26

## 2025-02-25 RX ORDER — CALCIUM CARBONATE 500 MG/1
1000 TABLET, CHEWABLE ORAL 3 TIMES DAILY PRN
Status: DISCONTINUED | OUTPATIENT
Start: 2025-02-25 | End: 2025-02-27 | Stop reason: HOSPADM

## 2025-02-25 RX ORDER — ONDANSETRON 2 MG/ML
4 INJECTION INTRAMUSCULAR; INTRAVENOUS EVERY 8 HOURS PRN
Status: DISCONTINUED | OUTPATIENT
Start: 2025-02-25 | End: 2025-02-25

## 2025-02-25 RX ORDER — IBUPROFEN 600 MG/1
600 TABLET, FILM COATED ORAL EVERY 6 HOURS
Status: DISCONTINUED | OUTPATIENT
Start: 2025-02-26 | End: 2025-02-26

## 2025-02-25 RX ORDER — METOCLOPRAMIDE HYDROCHLORIDE 5 MG/ML
10 INJECTION INTRAMUSCULAR; INTRAVENOUS EVERY 6 HOURS PRN
Status: DISCONTINUED | OUTPATIENT
Start: 2025-02-25 | End: 2025-02-27 | Stop reason: HOSPADM

## 2025-02-25 RX ORDER — DIPHENHYDRAMINE HYDROCHLORIDE 50 MG/ML
25 INJECTION INTRAMUSCULAR; INTRAVENOUS EVERY 6 HOURS PRN
Status: DISCONTINUED | OUTPATIENT
Start: 2025-02-25 | End: 2025-02-27 | Stop reason: HOSPADM

## 2025-02-25 RX ORDER — SIMETHICONE 80 MG
80 TABLET,CHEWABLE ORAL 4 TIMES DAILY PRN
Status: DISCONTINUED | OUTPATIENT
Start: 2025-02-25 | End: 2025-02-27 | Stop reason: HOSPADM

## 2025-02-25 RX ADMIN — ACETAMINOPHEN 650 MG: 325 TABLET, FILM COATED ORAL at 17:35

## 2025-02-25 RX ADMIN — ACETAMINOPHEN 650 MG: 325 TABLET, FILM COATED ORAL at 11:01

## 2025-02-25 RX ADMIN — KETOROLAC TROMETHAMINE 15 MG: 30 INJECTION, SOLUTION INTRAMUSCULAR; INTRAVENOUS at 10:12

## 2025-02-25 RX ADMIN — LABETALOL HYDROCHLORIDE 200 MG: 200 TABLET, FILM COATED ORAL at 10:12

## 2025-02-25 RX ADMIN — DIPHENHYDRAMINE HYDROCHLORIDE 25 MG: 50 INJECTION, SOLUTION INTRAMUSCULAR; INTRAVENOUS at 19:29

## 2025-02-25 RX ADMIN — METRONIDAZOLE 500 MG: 500 TABLET ORAL at 10:15

## 2025-02-25 RX ADMIN — DIPHENHYDRAMINE HYDROCHLORIDE 25 MG: 50 INJECTION, SOLUTION INTRAMUSCULAR; INTRAVENOUS at 11:00

## 2025-02-25 RX ADMIN — INSULIN GLARGINE 30 UNITS: 100 INJECTION, SOLUTION SUBCUTANEOUS at 22:14

## 2025-02-25 RX ADMIN — ACETAMINOPHEN 650 MG: 325 TABLET, FILM COATED ORAL at 23:31

## 2025-02-25 RX ADMIN — KETOROLAC TROMETHAMINE 15 MG: 30 INJECTION, SOLUTION INTRAMUSCULAR; INTRAVENOUS at 02:19

## 2025-02-25 RX ADMIN — FUROSEMIDE 20 MG: 20 TABLET ORAL at 10:13

## 2025-02-25 RX ADMIN — INSULIN LISPRO 6 UNITS: 100 INJECTION, SOLUTION INTRAVENOUS; SUBCUTANEOUS at 02:36

## 2025-02-25 RX ADMIN — INSULIN LISPRO 2 UNITS: 100 INJECTION, SOLUTION INTRAVENOUS; SUBCUTANEOUS at 07:34

## 2025-02-25 RX ADMIN — HYDROXYZINE HYDROCHLORIDE 50 MG: 25 TABLET, FILM COATED ORAL at 00:55

## 2025-02-25 RX ADMIN — INSULIN LISPRO 6 UNITS: 100 INJECTION, SOLUTION INTRAVENOUS; SUBCUTANEOUS at 13:42

## 2025-02-25 RX ADMIN — CEPHALEXIN 500 MG: 500 CAPSULE ORAL at 17:32

## 2025-02-25 RX ADMIN — METFORMIN HYDROCHLORIDE 500 MG: 500 TABLET, EXTENDED RELEASE ORAL at 17:54

## 2025-02-25 RX ADMIN — KETOROLAC TROMETHAMINE 15 MG: 30 INJECTION, SOLUTION INTRAMUSCULAR; INTRAVENOUS at 17:35

## 2025-02-25 RX ADMIN — LABETALOL HYDROCHLORIDE 200 MG: 200 TABLET, FILM COATED ORAL at 20:55

## 2025-02-25 RX ADMIN — INSULIN LISPRO 4 UNITS: 100 INJECTION, SOLUTION INTRAVENOUS; SUBCUTANEOUS at 10:21

## 2025-02-25 RX ADMIN — ENOXAPARIN SODIUM 40 MG: 40 INJECTION SUBCUTANEOUS at 10:22

## 2025-02-25 RX ADMIN — INSULIN LISPRO 10 UNITS: 100 INJECTION, SOLUTION INTRAVENOUS; SUBCUTANEOUS at 17:48

## 2025-02-25 RX ADMIN — METRONIDAZOLE 500 MG: 500 TABLET ORAL at 02:07

## 2025-02-25 RX ADMIN — CEPHALEXIN 500 MG: 500 CAPSULE ORAL at 02:07

## 2025-02-25 RX ADMIN — METRONIDAZOLE 500 MG: 500 TABLET ORAL at 17:32

## 2025-02-25 RX ADMIN — ENOXAPARIN SODIUM 40 MG: 40 INJECTION SUBCUTANEOUS at 20:55

## 2025-02-25 RX ADMIN — CEPHALEXIN 500 MG: 500 CAPSULE ORAL at 23:31

## 2025-02-25 RX ADMIN — METOCLOPRAMIDE 10 MG: 5 INJECTION, SOLUTION INTRAMUSCULAR; INTRAVENOUS at 11:00

## 2025-02-25 RX ADMIN — METOCLOPRAMIDE 10 MG: 5 INJECTION, SOLUTION INTRAMUSCULAR; INTRAVENOUS at 19:34

## 2025-02-25 RX ADMIN — INSULIN LISPRO 8 UNITS: 100 INJECTION, SOLUTION INTRAVENOUS; SUBCUTANEOUS at 11:24

## 2025-02-25 NOTE — PROGRESS NOTES
"Progress Note - OB/GYN  Karena Garcia 34 y.o. female MRN: [id number]  Unit/Bed#: -01 Encounter: [id number]    Assessment and Plan   Karena Garcia is a patient of: [facility] . She is POD#1 s/p primary  section, low transverse incision. Recovering well and stable.    * S/P  section  Assessment & Plan  QBL: 557cc; admission Hb 12.4g/dL. Patient progressing toward postoperative milestones  - F/u AM CBC   - Continue routine postoperative care  - Pain management with oral analgesics  - DVT Ppx: Lovenox 40mg BID POD1; encourage ambulation  - F/u void trial  - Encourage breastfeeding, familial bonding    Preeclampsia with severe features  Assessment & Plan  Diagnosed due to unremitting headache. CBC/CMP wnl, P:C 0.4  Systolic (12hrs), Av , Min:132 , Max:171   Diastolic (12hrs), Av, Min:83, Max:107  - continue magnesium sulfate gtt x24hrs postpartum  - continue to monitor BP  - continue to monitor for signs/symptoms of evolving preeclampsia    Insulin controlled gestational diabetes mellitus (GDM) in third trimester  Assessment & Plan   overnight  - insulin drip was restarted  - endocrinology consult placed          Disposition   - Anticipate discharge home on POD# 2-4    Subjective/Objective   Chief Concern: POD#1 s/p primary  section, low transverse incision  Subjective:    Karena Garcia has no current concerns. Pain is well controlled. She is currently voiding. She is currently passing flatus. She is ambulating. She is tolerating PO and denies nausea or vomitting. She denies chest pain, shortness of breath, lightheadedness, headaches, visual disturbance, or RUQ pain. Lochia is normal. She is breastfeeding.    Vitals:   /90   Pulse 87   Temp 98.1 °F (36.7 °C) (Oral)   Resp 16   Ht 5' 2\" (1.575 m)   Wt 120 kg (265 lb)   LMP 2024 (Exact Date)   SpO2 98%   Breastfeeding Yes   BMI 48.47 kg/m²       Intake/Output Summary (Last 24 hours) at 2025 " 0615  Last data filed at 2/25/2025 0401  Gross per 24 hour   Intake 183.75 ml   Output 3143 ml   Net -2959.25 ml     Invasive Devices       Peripheral Intravenous Line  Duration             Peripheral IV 02/21/25 Proximal;Right;Ventral (anterior) Forearm 3 days    Peripheral IV 02/23/25 Left;Proximal;Ventral (anterior) Forearm 1 day                  Physical Exam:   GEN: well-appearing, alert and oriented x3  CARDIO: regular rate  RESP: nonlabored respirations on room air  ABDOMEN: soft, nontender, nodistended, fundus firm below the umbilicus, incision clean/dry/intact    Labs:   Hemoglobin   Date Value Ref Range Status   02/24/2025 11.2 (L) 11.5 - 15.4 g/dL Final   02/24/2025 12.4 11.5 - 15.4 g/dL Final   10/13/2015 15.5 12.0 - 16.0 g/dL      WBC   Date Value Ref Range Status   02/24/2025 19.75 (H) 4.31 - 10.16 Thousand/uL Final   02/24/2025 13.57 (H) 4.31 - 10.16 Thousand/uL Final   10/13/2015 8.1 4.8 - 10.8 X 1000/u      Platelets   Date Value Ref Range Status   02/24/2025 260 149 - 390 Thousands/uL Final   02/24/2025 281 149 - 390 Thousands/uL Final   10/13/2015 278 130 - 400 X 1000/u      Creatinine   Date Value Ref Range Status   02/24/2025 0.88 0.60 - 1.30 mg/dL Final     Comment:     Standardized to IDMS reference method   02/24/2025 0.83 0.60 - 1.30 mg/dL Final     Comment:     Standardized to IDMS reference method   10/13/2015 0.8 0.6 - 1.3 mg/dL      AST   Date Value Ref Range Status   02/24/2025 15 13 - 39 U/L Final   02/24/2025 14 13 - 39 U/L Final   10/13/2015 52 (H) 14 - 38 IU/L      ALT   Date Value Ref Range Status   02/24/2025 8 7 - 52 U/L Final     Comment:     Specimen collection should occur prior to Sulfasalazine administration due to the potential for falsely depressed results.    02/24/2025 8 7 - 52 U/L Final     Comment:     Specimen collection should occur prior to Sulfasalazine administration due to the potential for falsely depressed results.    10/13/2015 107 (H) 12 - 78 IU/L            Sebastian Mathew MD  OBGYN PGY-1  02/25/25  6:15 AM

## 2025-02-25 NOTE — QUICK NOTE
"I was called to the patient bedside. Patient was upset and sad about the fact she could not shower and with the fact that she is receiving magnesium. Because that she feels \"off\" with it on top of that she is sleep deprived and feels tired.  Patient is oriented to place and time, no signs of magnesium toxicity. Last Mag level 5.5 at 2240pm. Patient denies any thoughts of self harm or harm the baby.   The patient stated that don't want the Magnesium infusion anymore. It was presented to the patient the benefits and the indication of the magnesium infusion and due to there diagnosis of preeclampsia with severe features the infusion should be continuous for 24 hours after delivery to prevent seizures, stroke and brain damage.  I also stated that as a medical provider I would  her about the medication she has been receiving, presenting the benefits and alternatives but the ultimate decision will be hers.     Plan:  Magnesium paused for 1h    Patient can take a shower   Ordered Atarax   Baby can go to the nursery under the nurses care  if patient decides   Patient to be reassessed.     Jory Corbett MD  OB GYN PGY1  02/25/25  2:19 AM        "

## 2025-02-25 NOTE — QUICK NOTE
Patient evaluated at bedside due to nursing concern for altered mental status.  Upon arrival to the room patient reported that she is feeling disoriented due to the magnesium infusion does not want to be back on that.  She appeared fatigued but was sitting up on her bed without assistance.  She was able to carry full conversation and answer questions without issue. she was oriented to person and place but appeared to be confused regarding timing and how long she has been hospitalized.  She reports that she has not had much sleep since her induction was started on 02/21/2025.  She received the Atarax shortly before team arrived for evaluation.  We discussed sleep hygiene and the importance of her getting rest.  Patient is emotional and tearful while stating that she is concerned if she received multiple medications to help her sleep that she would not be able to care for her child.    We had a lengthy discussion regarding the indication for magnesium sulfate for seizure prophylaxis during preeclampsia with severe features management.  She expressed understanding of the risks and states that she is willing to accept those risks and declines magnesium sulfate infusion indefinitely during this admission.  She states that currently her priority is getting a brief shower so she can then try to get some sleep.    Her Dexcom reading was greater than 300 and POCT blood sugar was 285.  Patient states that her blood glucose is typically no greater than 150s at home and her evening dose was 50 units      Vitals:    02/25/25 0200   BP: 143/87   Pulse: 18   Resp:    Temp:    SpO2:        Plan  Current symptoms likely secondary to sleep deprivation,   continue to monitor blood pressures and consider acute treatment with labetalol IV or hydralazine for BP greater than 160/110  Continue to monitor for other signs of preeclampsia severe features  Continue to monitor blood pressures and consider up titration of current dose of  labetalol 200 daily  Plan to start insulin sliding scale , endocrinology consult in the a.m.        Discussed case with Dr. Yudelka Beth MD  OBGYN PGY3

## 2025-02-25 NOTE — CONSULTS
Endocrinology Consultation  Karena Garcia 34 y.o. female MRN: [id number]  Unit/Bed#: -01 Encounter: [id number]      Assessment & Plan     Assessment:  This is a 34 y.o.-year-old female who was diagnosed with gestational diabetes during this pregnancy, now s/p  2025 with continued high insulin requirements.    Plan:  Insulin controlled gestational diabetes mellitus (GDM) in third trimester  Assessment & Plan  Lab Results   Component Value Date    HGBA1C 7.6 (H) 2025       Recent Labs     25  0110 25  0729 25  0949 25  1326   POCGLU 285* 160* 212* 279*     At this time, unclear if patient had solely gestational diabetes or if there was an underlying component of prediabetes/diabetes as we do not have recent lab work prior to failed glucose tolerance test 2024, which was early in pregnancy at 10 weeks  Due to significant postdelivery hyperglycemia, as well as first trimester GDM diagnois, do suspect there was an underlying diabetes diagnosis  In either case, based off A1c 7.6%, gestational diabetes was suboptimally controlled on most recent regimen of Semglee 52 units nightly, lispro 18/10/50 w meals    At this time, will resume basal insulin at 30 units nightly  Will increase mealtime dosing to 10 units  Will adjust correctional scale to Algorithm 4/3 before meals and at bedtime  Will add metformin xr 500mg w breakfast and dinner    Patient will benefit from outpatient endocrine follow-up; [facility] is her closest office  As she is hopeful for another pregnancy in the next couple of years, would not be a good candidate for a GLP-1 agonist outpatient in the near future  Would also benefit from antibody screening for DM1 given family history, relatively early presumed DM2 versus GDM onset    * S/P  section  Assessment & Plan  2025 for preeclampsia, fetal tachycardia  Management per primary           CC: Diabetes Consult    History of Present  Illness     HPI: Karena Garcia is a 34 y.o. year old female with gestational diabetes, on insulin therapy, with complication of none. Recent A1c 7.6% 25. Also with history of class III obesity, nephrolithiasis.     Patient presented to L&D on recommendation of outpatient OB/GYN on , for fetal tachycardia.  Underwent labor induction, ultimately followed by  on  for preeclampsia, fetal tachycardia at 36w5d.     Patient diagnosed after glucose tolerance test 2024 (around 10 weeks gestation).  We do not have historic A1c's. Patient suspects she may have been prediabetic before pregnancy.   Tried for 9 years for this pregnancy. Would like another one in next few years.   Dad with DM1, mom with DM2.  Grandparents also with diabetes.    Most recently was taking Semglee 52u nightly, Humalog 18u breakfast /10u lunch /50u dinner and waking up in 80s.  She was given reduced basal bolus insulin dosing followed by insulin infusion during induction and leading up to .  Increasingly hyperglycemic postoperatively yesterday, and was started on mealtime insulin plus pre and postmeal correctional scale.          Inpatient consult to Endocrinology     Date/Time  2025 2:24 PM     Performed by  Sharla Nolan DO   Authorized by  Kati Martinez DO     Universal Protocol   Timeout called at: 2025 2:24 PM.           Historical Information   Past Medical History:   Diagnosis Date    Female infertility     Kidney stone     Varicella     vaccinated     Past Surgical History:   Procedure Laterality Date    [place] CYSTO/URETERO W/LITHOTRIPSY &INDWELL STENT INSRT Right 10/24/2018    Procedure: CYSTOSCOPY URETEROSCOPY WITH RETROGRADE PYELOGRAM AND INSERTION STENT URETERAL;  Surgeon: Gregory Goodman MD;  Location: [facility];  Service: Urology    [place] LITHOTRIPSY XTRCORP SHOCK WAVE Right 2018    Procedure: LITHROTRIPSY EXTRACORPORAL SHOCKWAVE (ESWL);  Surgeon: Gregory Goodman MD;  Location: [facility];  Service: Urology     Social History   Social History     Substance and Sexual Activity   Alcohol Use Not Currently    Comment: occasional     Social History     Substance and Sexual Activity   Drug Use No     Social History     Tobacco Use   Smoking Status Former    Current packs/day: 0.25    Average packs/day: 0.3 packs/day for 4.0 years (1.0 ttl pk-yrs)    Types: Cigarettes    Passive exposure: Never   Smokeless Tobacco Former    Quit date: 5/4/2016   Tobacco Comments    Weaning prior to pregnancy, quit with +UPT 6/2024     Family History:   Family History   Problem Relation Age of Onset    Cancer Mother     Diabetes type II Mother     Hypertension Mother     Thyroid disease Mother     Other Mother         CADISAL    Diabetes type I Father     Hyperlipidemia Father         High Triglycerides    Kidney disease Sister         chronic UTIs/pylenophritis    Heart disease Maternal Grandmother     Diabetes Maternal Grandmother     No Known Problems Maternal Grandfather     Hypertension Paternal Grandmother     Hypertension Paternal Grandfather     Heart disease Paternal Grandfather     Diabetes Paternal Grandfather        Meds/Allergies   Current Facility-Administered Medications   Medication Dose Route Frequency Provider Last Rate Last Admin    acetaminophen (TYLENOL) tablet 650 mg  650 mg Oral Q6H GWEN Gigi Bah, DO   650 mg at 02/25/25 1101    benzocaine-menthol-lanolin-aloe (DERMOPLAST) 20-0.5 % topical spray 1 Application  1 Application Topical Q6H PRN Gigi Garciaera, DO        calcium carbonate (TUMS) chewable tablet 1,000 mg  1,000 mg Oral TID PRN Gigi Bah, DO        calcium gluconate 10 % injection 1 g  1 g Intravenous Once PRN Gigi Bah, DO        cephalexin (KEFLEX) capsule 500 mg  500 mg Oral Q6H [facility] Gigi Bah, DO   500 mg at 02/25/25 0207    diphenhydrAMINE (BENADRYL) injection 25 mg  25 mg Intravenous Q6H PRN Gigi Garciaera, DO   25 mg at 02/25/25 1100    enoxaparin (LOVENOX)  subcutaneous injection 40 mg  40 mg Subcutaneous Q12H Gigi Bah, DO   40 mg at 02/25/25 1022    furosemide (LASIX) tablet 20 mg  20 mg Oral Daily Yelena Neumann MD   20 mg at 02/25/25 1013    hydrocortisone 1 % cream 1 Application  1 Application Topical Daily PRN Gigi Bah, DO        hydrOXYzine HCL (ATARAX) tablet 50 mg  50 mg Oral Q6H PRN Gigi Bah DO        ketorolac (TORADOL) injection 15 mg  15 mg Intravenous Q6H [facility] Gigi Bah DO   15 mg at 02/25/25 1012    Followed by    [START ON 2/26/2025] ibuprofen (MOTRIN) tablet 600 mg  600 mg Oral Q6H Gigi Bah DO        insulin glargine (LANTUS) subcutaneous injection 30 Units 0.3 mL  30 Units Subcutaneous HS Sharla Nolan DO        insulin lispro (HumALOG/ADMELOG) 100 units/mL subcutaneous injection 1-6 Units  1-6 Units Subcutaneous HS Sharla Nolan DO        insulin lispro (HumALOG/ADMELOG) 100 units/mL subcutaneous injection 10 Units  10 Units Subcutaneous TID With Meals Sharla Nolan DO        insulin lispro (HumALOG/ADMELOG) 100 units/mL subcutaneous injection 2-12 Units  2-12 Units Subcutaneous TID AC Sharla Nolan DO        iron sucrose (VENOFER) 200 mg in sodium chloride 0.9 % 50 mL IVPB  200 mg Intravenous Once Sebastian Mathew MD        labetalol (NORMODYNE) tablet 200 mg  200 mg Oral Q12H [facility] Yelena Neumann MD   200 mg at 02/25/25 1012    lactated ringers infusion  125 mL/hr Intravenous Continuous Gigi Bah DO        lactated ringers infusion  50 mL/hr Intravenous Continuous Gigi Bah DO        magnesium sulfate 20 g/500 mL infusion (premix)  2 g/hr Intravenous Continuous Gigi Bah DO        metFORMIN (GLUCOPHAGE-XR) 24 hr tablet 500 mg  500 mg Oral BID With Meals Sharla Nolan DO        metoclopramide (REGLAN) injection 10 mg  10 mg Intravenous Q6H PRN Liset Jha MD   10 mg at 02/25/25 1100    metroNIDAZOLE (FLAGYL) tablet 500 mg  500 mg Oral Q8H [facility] Gigi  "DO Niurka   500 mg at 02/25/25 1015    naloxone (NARCAN) injection 0.1 mg  0.1 mg Intravenous Q3 min PRN Gigi Bah DO        simethicone (MYLICON) chewable tablet 80 mg  80 mg Oral 4x Daily PRN Gigi Bah DO        witch hazel-glycerin (TUCKS) topical pad 1 Pad  1 Pad Topical Q4H PRN Gigi Bah DO         Allergies   Allergen Reactions    Pollen Extract Sneezing       Objective   Vitals: Blood pressure 104/64, pulse 92, temperature 98 °F (36.7 °C), temperature source Oral, resp. rate 18, height 5' 2\" (1.575 m), weight 120 kg (265 lb), last menstrual period 06/05/2024, SpO2 98%, currently breastfeeding.    Intake/Output Summary (Last 24 hours) at 2/25/2025 1545  Last data filed at 2/25/2025 1300  Gross per 24 hour   Intake --   Output 2743 ml   Net -2743 ml     Invasive Devices       Peripheral Intravenous Line  Duration             Peripheral IV 02/21/25 Proximal;Right;Ventral (anterior) Forearm 4 days    Peripheral IV 02/23/25 Left;Proximal;Ventral (anterior) Forearm 1 day                    Physical Exam  Constitutional:       General: She is not in acute distress.     Appearance: Normal appearance. She is obese. She is not ill-appearing, toxic-appearing or diaphoretic.      Comments: Walking comfortably around room   HENT:      Head: Normocephalic and atraumatic.      Nose: Nose normal.   Eyes:      Extraocular Movements: Extraocular movements intact.      Conjunctiva/sclera: Conjunctivae normal.      Pupils: Pupils are equal, round, and reactive to light.   Pulmonary:      Effort: Pulmonary effort is normal. No respiratory distress.   Abdominal:      General: There is no distension.   Musculoskeletal:         General: No deformity.   Skin:     General: Skin is warm and dry.   Neurological:      General: No focal deficit present.      Mental Status: She is alert. Mental status is at baseline.   Psychiatric:         Mood and Affect: Mood normal.         Behavior: Behavior normal.         Thought " "Content: Thought content normal.         The history was obtained from the review of the chart, patient and family.    Lab Results:   Results from last 7 days   Lab Units 02/21/25  1334   HEMOGLOBIN A1C % 7.6*     Lab Results   Component Value Date    WBC 15.48 (H) 02/25/2025    HGB 9.7 (L) 02/25/2025    HCT 29.7 (L) 02/25/2025    MCV 91 02/25/2025     02/25/2025     Lab Results   Component Value Date/Time    BUN 17 02/25/2025 11:20 AM    BUN 12 10/13/2015 09:47 PM     10/13/2015 09:47 PM    K 4.0 02/25/2025 11:20 AM    K 3.8 10/13/2015 09:47 PM     02/25/2025 11:20 AM     10/13/2015 09:47 PM    CO2 20 (L) 02/25/2025 11:20 AM    CO2 27 10/13/2015 09:47 PM    CREATININE 0.84 02/25/2025 11:20 AM    CREATININE 0.8 10/13/2015 09:47 PM    AST 14 02/25/2025 11:20 AM    AST 52 (H) 10/13/2015 09:47 PM    ALT 7 02/25/2025 11:20 AM     (H) 10/13/2015 09:47 PM    TP 5.6 (L) 02/25/2025 11:20 AM    ALB 3.1 (L) 02/25/2025 11:20 AM    ALB 3.8 10/13/2015 09:47 PM     No results for input(s): \"CHOL\", \"HDL\", \"LDL\", \"TRIG\", \"VLDL\" in the last 72 hours.  No results found for: \"MICROALBUR\", \"MPGE37MTD\"  POC Glucose (mg/dl)   Date Value   02/25/2025 279 (H)   02/25/2025 212 (H)   02/25/2025 160 (H)   02/25/2025 285 (H)   02/24/2025 128   02/24/2025 117   02/24/2025 121   02/24/2025 134   02/24/2025 116   02/24/2025 98       Imaging Studies: Results Review Statement: No pertinent imaging studies reviewed.    Sharla Nolan DO  Endocrinology PGY-5  ~A morning cortisol is only interpretable if drawn at 8am.~    Please EpicSecureChat questions to the physician covering the \"AN Endocrinology Call\" Role. Thank you.   "

## 2025-02-25 NOTE — ANESTHESIA POSTPROCEDURE EVALUATION
Post-Op Assessment Note    CV Status:  Stable    Pain management: adequate       Mental Status:  Awake   Hydration Status:  Euvolemic   PONV Controlled:  Controlled   Airway Patency:  Patent     Post Op Vitals Reviewed: Yes    No anethesia notable event occurred.    Staff: Anesthesiologist           Last Filed PACU Vitals:  Vitals Value Taken Time   Temp 97.9 °F (36.6 °C) 02/24/25 1645   Pulse 92 02/24/25 1652   /66 02/24/25 1645   Resp 18 02/24/25 1645   SpO2 98 % 02/24/25 1652   Vitals shown include unfiled device data.    Modified Norberto:     Vitals Value Taken Time   Activity 1 02/24/25 1645   Respiration 2 02/24/25 1645   Circulation 2 02/24/25 1645   Consciousness 2 02/24/25 1645   Oxygen Saturation 2 02/24/25 1645     Modified Norberto Score: 9

## 2025-02-25 NOTE — LACTATION NOTE
This note was copied from a baby's chart.  CONSULT - LACTATION  Baby Girl (Lisa Garcia 1 days female MRN: [id number]    [facility] Room / Bed: (N)/(N) Encounter: [id number]    Maternal Information     MOTHER:  Karena Garcia  Maternal Age: 34 y.o.  OB History: # 1 - Date: , Sex: None, Weight: None, GA: None, Type: Biochemical, Apgar1: None, Apgar5: None, Living: None, Birth Comments: None    # 2 - Date: , Sex: None, Weight: None, GA: None, Type: Biochemical, Apgar1: None, Apgar5: None, Living: None, Birth Comments: None    # 3 - Date: 25, Sex: Female, Weight: 3415 g (7 lb 8.5 oz), GA: 36w5d, Type: , Low Transverse, Apgar1: 8, Apgar5: 9, Living: Living, Birth Comments: None   Previous breast reduction surgery? No    Lactation history:   Has patient previously breast fed: No   How long had patient previously breast fed:     Previous breast feeding complications:       Past Surgical History:   Procedure Laterality Date    [place] CYSTO/URETERO W/LITHOTRIPSY &INDWELL STENT INSRT Right 10/24/2018    Procedure: CYSTOSCOPY URETEROSCOPY WITH RETROGRADE PYELOGRAM AND INSERTION STENT URETERAL;  Surgeon: Gregory Goodman MD;  Location: [facility];  Service: Urology    [place] LITHOTRIPSY XTRCORP SHOCK WAVE Right 2018    Procedure: LITHROTRIPSY EXTRACORPORAL SHOCKWAVE (ESWL);  Surgeon: Gregory Goodman MD;  Location: [facility];  Service: Urology       Birth information:  YOB: 2024   Time of birth: 3:49 PM   Sex: female   Delivery type: , Low Transverse   Birth Weight: 3415 g (7 lb 8.5 oz)   Percent of Weight Change: 0%     Gestational Age: 36w5d   [unfilled]    Reason for Consult:    Reason for Consult: Initial assessment (routine) - 10 min, Discharge (routine) - 10 min    Risk Factors:    Risk Factors: Off unit consultation (glucose protocols)    Breast and nipple assessment:   Breasts/Nipples  Left Breast: Soft  Right Breast:  Soft  Intervention: Hand expression  Breastfeeding Status: Yes  Breastfeeding Progress: Not yet established (mixed feeds; lg. volume feeds)  Reasons for not Breastfeeding: Infant medical condition (glucose protocols)    OB Lactation Tools:    Other OB Lactation Tools  Feeding Devices: Bottle, Lanolin    Breast Pump:    Breast Pump  Pump: Electric (lansinoh hands free)      Bryant Assessment:  none - sleeping in family members arms    Feeding assessment:  large volume bottle feeding      Feeding recommendations:   baby is on glucose protocols and not meeting minimum requirement. Large volume of formula for feeds. Mom states she desires to breast feed or feed expressed milk. Mom is also fine with fed is best. Mom has a mom lansinoh duo from ins.     Ed. On mixed feedings.     Ed. On attempt at the breast when early feeding cues occur. If baby is fussy or no latch achieved with active movement, unlatch the baby and offer expressed milk or formula via an alt. Feeding method. Enc. Volume feeders instead of placing a nipple on the formula bottle. Ed. On liquid formula fr the first 3 months. Ed. On how to create milk supply. Enc. S2s with baby.     Mom has a lot of family members in the room and does not want to demonstrate hand expression, skin to skin or latch attempt. Family is meeting baby.     Enc. To call lactation for the next feeding.       Enc. To attempt the breast with every feeding.     Mix Feeds:   Start every feeding at the breast. Offer both breasts or one breast and use breast compressions to achieve active suckling. Once baby is not actively suckling, bring baby in upright position and offer expressed milk and/or artificial supplementation via alternative feeding method (syringe, finger, paced bottle feeding). Burp frequently between breasts and during paced bottle feeding. Once feed is complete, place baby back on breast for on-nutritive suck. Pump after the feeding session to supplement with expressed  milk at next feed.      Early Feeding Cues:     First feeding cues include:  - a change in the baby's breathing pattern,   - sighing,   - rapid eye movement without opening his/her eyelids, or   - any early feeding cue signs located on the back of the Feeding log,  Begin hand expression and set yourself up to bring the baby up to the breast.     If your baby is getting close to the time that a feeding should begin:   - undress your baby and bring your baby skin to skin,   - have your baby's head in between your breasts, head turned to the side, so your baby's ear is listening to your heart.     Once your baby begins to stir, place your baby in a breastfeeding position that is comfortable and support your baby up to the breast. Always bring your baby to the breast, never the breast to your baby.    Timing of feeds  - Start feedings on breast that last feeding ended   - allow no more than 3 hours between breast feeding sessions   - time between feedings is counted from the beginning of the first feed to the beginning of the next feeding session    Signs  Reviewed early signs of hunger, including tensing of hands and shoulders - no need to wait for open eyes.  Crying is a late hunger sign.  If baby is crying, soothe baby first and then attempt to latch.  Reviewed normal sucking patterns: transition from stimulation to nutritive to release or non-nutritive. The goal is to see and hear lots of swallowing.  Reviewed normal nursing pattern: infant could latch on one breast up to 30 minutes or until releases on own. Signs of satiation is open hand with fingers that do not grab your finger.  Discussed difference in sensation of non-nutritive v nutritive sucking    Feed expressed milk or formula as needed/desired. Paced bottle feeding technique is less stressful for your baby, prevents overfeeding and protects the breastfeeding relationship.  You can find a video about paced bottle feeding at www.lacted.org or MilkMob on  YouTube.    Provided handout on How to Prepare Formula & How to Dry Up Milk Supply. Discussed paced bottle feeding method. Discussed types of pacifiers.    Encouraged to feed liquid formula for the first 3 months. Handout on how to prepare powder formula if desired. Feed formula via bottle by paced bottle feeding method.. Paced bottle feeding technique is less stressful for your baby, prevents overfeeding and allows you to bond with your baby.  You can find a video about paced bottle feeding at www.lacted.org or MilkMob on YouTube.    Reviewed education on how to dry up milk supply. Discussed OTC remedies. Encouraged to speak to your OBGYN to discuss other options for drying up milk supply.    Education on pacifier use. If baby spits out the pacifier, attempt to feed. Use a single molded pacifier to reduce breakage of pieces. Sanitize daily as you would with any other artificial nipple or bottle.        (Scan QR code for [employer] - positions)   Review Milkmob on youtube or scan QR code for MilkMob video      Milk Mob        [employer] - positions    Christy Hume, MA 2/25/2025 3:48 PM

## 2025-02-25 NOTE — UTILIZATION REVIEW
"NOTIFICATION OF INPATIENT ADMISSION   MATERNITY/DELIVERY AUTHORIZATION REQUEST   SERVICING FACILITY:   [facility]  Parent Child Health - L&D, [place], NICU  [address] [place] [address]  Tax ID: [id number]  NPI: [id number]   ATTENDING PROVIDER:  Attending Name and NPI#: Liset Jha Md [[id number]]  Address: [address] [place] [address]  Phone: [phone]   ADMISSION INFORMATION:  Place of Service: Inpatient [place]  Place of Service Code: 21  Inpatient Admission Date/Time: 25 12:24 PM  Discharge Date/Time: No discharge date for patient encounter.  Admitting Diagnosis Code/Description:  36 weeks gestation of pregnancy [Z3A.36]  Fetal tachycardia affecting management of mother [O36.8390]  Encounter for  delivery without indication [O82]     Mother: Karena Garcia 1990 Estimated Date of Delivery: 3/19/25  Delivering clinician: Liset Jha   OB History          3    Para   1    Term   0       1    AB   2    Living   1         SAB   2    IAB   0    Ectopic   0    Multiple   0    Live Births   1               [place] Name & MRN:   Information for the patient's :  Jose, Baby Girl (Karena) [[id number]]    Delivery Information:  Sex: female  Delivered 2025 3:49 PM by , Low Transverse; Gestational Age: 36w5d     Measurements:  Weight: 7 lb 8.5 oz (3415 g);  Height: 19\"    APGAR 1 minute 5 minutes 10 minutes   Totals: 8 9       UTILIZATION REVIEW CONTACT:  Litzy Miller Utilization   Network Utilization Review Department  Phone: [phone]  Fax [phone]  Email: [email or web address]  Contact for approvals/pending authorizations, clinical reviews, and discharge.     PHYSICIAN ADVISORY SERVICES:  Medical Necessity Denial & Wbmw-xr-Vksm Review  Phone: [phone]  Fax: [phone]  Email: [email or web address]     DISCHARGE SUPPORT TEAM:  For " Patients Discharge Needs & Updates  Phone: [phone] opt. 2 Fax: [phone]  Email: [email or web address]

## 2025-02-25 NOTE — NURSING NOTE
"Primary RN notified of concerns with patient's behaviors. Upon entering room, patient had just vomited and was sitting on the edge of the bed. Patient wanted to get up to shower. Patient reported \"seeing shadows and people in the television,\" which was turned off. Patient's significant other reported that patient is hallucinating. RN promptly notified Jr. Resident to evaluate. Jr. Resident instructed primary RN to turn off magnesium infusion. Patient was given Zofran for nausea. Jr. Resident evaluated patient at bedside. Patient expressed her frustration with the magnesium infusion and believed \"it was making her crazy.\" Jr. Resident offered medication to ease the patient's anxiety and recommended that the patient gets rest. Patient refused all medications at this time including Atarax, Toradol, Flagyl and Keflex. Patient was approved to take a shower per MD and educated on the importance of restarting magnesium infusion in an hour. Patient declined magnesium infusion indefinitely. Sr. Resident came up to evaluate and further discuss plan of care with patient. Approximately at 0100, the patient called out stating that her Dexcom was reading her blood sugar to be 300. Patient's blood sugar was taken and it was 285. Patient refused insulin treatment and interventions until after her shower. Patient was supervised for the entirety of her shower for safety concerns. Patient felt lightheaded following shower, vitals were obtained. Blood pressure was elevated, however, her 15 minute repeat was within normal limits. Insulin ordered for blood sugar management, patient accepting at this time.   "

## 2025-02-25 NOTE — QUICK NOTE
I went to the pt bedside due to tachycardia. Patient was well in appearance, engaged in a conversation with her visitor, she complaint about headache 8/10 level which that she feels pain behind her eyes associated with mild visual disturbance and heart palpitation, no abdominal pain, no nausea or vomiting. I presented to the patient that the symptoms could be related to the preeclampsia. Patient states that she doesn't want magnesium, which was declined and she wants medication for anxiety and headache. I discussed about the labs that I was placing the orders which was agreed by the patient.      Plan: Labs: EKG, Troponin's, CBC, CMP   - Reglan, Benadryl IV   - Strict I/O

## 2025-02-25 NOTE — ASSESSMENT & PLAN NOTE
Lab Results   Component Value Date    HGBA1C 7.6 (H) 02/21/2025       Recent Labs     02/25/25  0110 02/25/25  0729 02/25/25  0949 02/25/25  1326   POCGLU 285* 160* 212* 279*     At this time, unclear if patient had solely gestational diabetes or if there was an underlying component of prediabetes/diabetes as we do not have recent lab work prior to failed glucose tolerance test August 2024, which was early in pregnancy at 10 weeks  Due to significant postdelivery hyperglycemia, as well as first trimester GDM diagnois, do suspect there was an underlying diabetes diagnosis  In either case, based off A1c 7.6%, gestational diabetes was suboptimally controlled on most recent regimen of Semglee 52 units nightly, lispro 18/10/50 w meals    Will decrease basal insulin to Lantus 24 units nightly  Will decrease mealtime dosing to 6 units  Will continue correctional scale to Algorithm 4/3 before meals and at bedtime  Will increase metformin xr to 1000mg w breakfast and dinner    Patient likely for discharge tomorrow  Final recommendations pending clinical course but do anticipate discharge on basal insulin plus metformin, unlikely to require mealtime insulin    Patient will benefit from outpatient endocrine follow-up; [facility] closest office  As she is hopeful for another pregnancy in the next couple of years, would not be a good candidate for a GLP-1 agonist outpatient in the near future  Would also benefit from antibody screening for DM1 given family history, relatively early presumed DM2 versus GDM onset

## 2025-02-26 LAB
4HR DELTA HS TROPONIN: -3 NG/L
ALBUMIN SERPL BCG-MCNC: 3.1 G/DL (ref 3.5–5)
ALP SERPL-CCNC: 86 U/L (ref 34–104)
ALT SERPL W P-5'-P-CCNC: 15 U/L (ref 7–52)
ANION GAP SERPL CALCULATED.3IONS-SCNC: 8 MMOL/L (ref 4–13)
AST SERPL W P-5'-P-CCNC: 22 U/L (ref 13–39)
BACTERIA UR QL AUTO: ABNORMAL /HPF
BILIRUB SERPL-MCNC: 0.21 MG/DL (ref 0.2–1)
BILIRUB UR QL STRIP: NEGATIVE
BUN SERPL-MCNC: 13 MG/DL (ref 5–25)
CALCIUM ALBUM COR SERPL-MCNC: 9.9 MG/DL (ref 8.3–10.1)
CALCIUM SERPL-MCNC: 9.2 MG/DL (ref 8.4–10.2)
CARDIAC TROPONIN I PNL SERPL HS: 6 NG/L (ref ?–50)
CHLORIDE SERPL-SCNC: 104 MMOL/L (ref 96–108)
CLARITY UR: CLEAR
CO2 SERPL-SCNC: 26 MMOL/L (ref 21–32)
COLOR UR: ABNORMAL
CREAT SERPL-MCNC: 0.73 MG/DL (ref 0.6–1.3)
ERYTHROCYTE [DISTWIDTH] IN BLOOD BY AUTOMATED COUNT: 14.2 % (ref 11.6–15.1)
GFR SERPL CREATININE-BSD FRML MDRD: 108 ML/MIN/1.73SQ M
GLUCOSE SERPL-MCNC: 100 MG/DL (ref 65–140)
GLUCOSE SERPL-MCNC: 110 MG/DL (ref 65–140)
GLUCOSE SERPL-MCNC: 165 MG/DL (ref 65–140)
GLUCOSE SERPL-MCNC: 71 MG/DL (ref 65–140)
GLUCOSE SERPL-MCNC: 77 MG/DL (ref 65–140)
GLUCOSE SERPL-MCNC: 79 MG/DL (ref 65–140)
GLUCOSE SERPL-MCNC: 81 MG/DL (ref 65–140)
GLUCOSE SERPL-MCNC: 90 MG/DL (ref 65–140)
GLUCOSE UR STRIP-MCNC: NEGATIVE MG/DL
HCT VFR BLD AUTO: 28.8 % (ref 34.8–46.1)
HGB BLD-MCNC: 9.4 G/DL (ref 11.5–15.4)
HGB UR QL STRIP.AUTO: ABNORMAL
KETONES UR STRIP-MCNC: NEGATIVE MG/DL
LEUKOCYTE ESTERASE UR QL STRIP: NEGATIVE
MCH RBC QN AUTO: 30.2 PG (ref 26.8–34.3)
MCHC RBC AUTO-ENTMCNC: 32.6 G/DL (ref 31.4–37.4)
MCV RBC AUTO: 93 FL (ref 82–98)
NITRITE UR QL STRIP: NEGATIVE
NON-SQ EPI CELLS URNS QL MICRO: ABNORMAL /HPF
PH UR STRIP.AUTO: 6.5 [PH]
PLATELET # BLD AUTO: 257 THOUSANDS/UL (ref 149–390)
PMV BLD AUTO: 11.2 FL (ref 8.9–12.7)
POTASSIUM SERPL-SCNC: 3.8 MMOL/L (ref 3.5–5.3)
PROT SERPL-MCNC: 5.9 G/DL (ref 6.4–8.4)
PROT UR STRIP-MCNC: NEGATIVE MG/DL
RBC # BLD AUTO: 3.11 MILLION/UL (ref 3.81–5.12)
RBC #/AREA URNS AUTO: ABNORMAL /HPF
SODIUM SERPL-SCNC: 138 MMOL/L (ref 135–147)
SP GR UR STRIP.AUTO: 1.01 (ref 1–1.03)
UROBILINOGEN UR STRIP-ACNC: <2 MG/DL
WBC # BLD AUTO: 13.28 THOUSAND/UL (ref 4.31–10.16)
WBC #/AREA URNS AUTO: ABNORMAL /HPF

## 2025-02-26 PROCEDURE — 81001 URINALYSIS AUTO W/SCOPE: CPT | Performed by: STUDENT IN AN ORGANIZED HEALTH CARE EDUCATION/TRAINING PROGRAM

## 2025-02-26 PROCEDURE — 85027 COMPLETE CBC AUTOMATED: CPT | Performed by: PHYSICIAN ASSISTANT

## 2025-02-26 PROCEDURE — 99024 POSTOP FOLLOW-UP VISIT: CPT | Performed by: STUDENT IN AN ORGANIZED HEALTH CARE EDUCATION/TRAINING PROGRAM

## 2025-02-26 PROCEDURE — 99232 SBSQ HOSP IP/OBS MODERATE 35: CPT | Performed by: INTERNAL MEDICINE

## 2025-02-26 PROCEDURE — 80053 COMPREHEN METABOLIC PANEL: CPT | Performed by: PHYSICIAN ASSISTANT

## 2025-02-26 PROCEDURE — 84484 ASSAY OF TROPONIN QUANT: CPT

## 2025-02-26 PROCEDURE — 82948 REAGENT STRIP/BLOOD GLUCOSE: CPT

## 2025-02-26 RX ORDER — INSULIN GLARGINE 100 [IU]/ML
24 INJECTION, SOLUTION SUBCUTANEOUS
Status: DISCONTINUED | OUTPATIENT
Start: 2025-02-26 | End: 2025-02-27 | Stop reason: HOSPADM

## 2025-02-26 RX ORDER — HYDROMORPHONE HYDROCHLORIDE 2 MG/1
2 TABLET ORAL EVERY 4 HOURS PRN
Refills: 0 | Status: DISCONTINUED | OUTPATIENT
Start: 2025-02-26 | End: 2025-02-27 | Stop reason: HOSPADM

## 2025-02-26 RX ORDER — INSULIN LISPRO 100 [IU]/ML
6 INJECTION, SOLUTION INTRAVENOUS; SUBCUTANEOUS
Status: DISCONTINUED | OUTPATIENT
Start: 2025-02-26 | End: 2025-02-27 | Stop reason: HOSPADM

## 2025-02-26 RX ORDER — METFORMIN HYDROCHLORIDE 500 MG/1
1000 TABLET, EXTENDED RELEASE ORAL 2 TIMES DAILY WITH MEALS
Status: DISCONTINUED | OUTPATIENT
Start: 2025-02-26 | End: 2025-02-27 | Stop reason: HOSPADM

## 2025-02-26 RX ORDER — HYDROMORPHONE HYDROCHLORIDE 2 MG/1
4 TABLET ORAL EVERY 6 HOURS PRN
Refills: 0 | Status: DISCONTINUED | OUTPATIENT
Start: 2025-02-26 | End: 2025-02-27 | Stop reason: HOSPADM

## 2025-02-26 RX ADMIN — ENOXAPARIN SODIUM 40 MG: 40 INJECTION SUBCUTANEOUS at 22:24

## 2025-02-26 RX ADMIN — ACETAMINOPHEN 650 MG: 325 TABLET, FILM COATED ORAL at 12:01

## 2025-02-26 RX ADMIN — METRONIDAZOLE 500 MG: 500 TABLET ORAL at 03:20

## 2025-02-26 RX ADMIN — HYDROMORPHONE HYDROCHLORIDE 2 MG: 2 TABLET ORAL at 15:29

## 2025-02-26 RX ADMIN — METFORMIN HYDROCHLORIDE 500 MG: 500 TABLET, EXTENDED RELEASE ORAL at 08:17

## 2025-02-26 RX ADMIN — FUROSEMIDE 20 MG: 20 TABLET ORAL at 08:17

## 2025-02-26 RX ADMIN — CEPHALEXIN 500 MG: 500 CAPSULE ORAL at 12:01

## 2025-02-26 RX ADMIN — INSULIN LISPRO 6 UNITS: 100 INJECTION, SOLUTION INTRAVENOUS; SUBCUTANEOUS at 18:07

## 2025-02-26 RX ADMIN — HYDROMORPHONE HYDROCHLORIDE 2 MG: 2 TABLET ORAL at 23:52

## 2025-02-26 RX ADMIN — METFORMIN HYDROCHLORIDE 1000 MG: 500 TABLET, EXTENDED RELEASE ORAL at 18:07

## 2025-02-26 RX ADMIN — HYDROMORPHONE HYDROCHLORIDE 4 MG: 2 TABLET ORAL at 18:23

## 2025-02-26 RX ADMIN — METRONIDAZOLE 500 MG: 500 TABLET ORAL at 18:07

## 2025-02-26 RX ADMIN — ACETAMINOPHEN 650 MG: 325 TABLET, FILM COATED ORAL at 18:07

## 2025-02-26 RX ADMIN — INSULIN GLARGINE 24 UNITS: 100 INJECTION, SOLUTION SUBCUTANEOUS at 22:24

## 2025-02-26 RX ADMIN — METRONIDAZOLE 500 MG: 500 TABLET ORAL at 12:01

## 2025-02-26 RX ADMIN — ACETAMINOPHEN 650 MG: 325 TABLET, FILM COATED ORAL at 23:52

## 2025-02-26 RX ADMIN — ENOXAPARIN SODIUM 40 MG: 40 INJECTION SUBCUTANEOUS at 08:17

## 2025-02-26 RX ADMIN — INSULIN LISPRO 1 UNITS: 100 INJECTION, SOLUTION INTRAVENOUS; SUBCUTANEOUS at 22:24

## 2025-02-26 RX ADMIN — CEPHALEXIN 500 MG: 500 CAPSULE ORAL at 18:07

## 2025-02-26 RX ADMIN — IBUPROFEN 600 MG: 600 TABLET, FILM COATED ORAL at 03:20

## 2025-02-26 RX ADMIN — LABETALOL HYDROCHLORIDE 200 MG: 200 TABLET, FILM COATED ORAL at 22:25

## 2025-02-26 RX ADMIN — IBUPROFEN 600 MG: 600 TABLET, FILM COATED ORAL at 09:24

## 2025-02-26 RX ADMIN — INSULIN LISPRO 10 UNITS: 100 INJECTION, SOLUTION INTRAVENOUS; SUBCUTANEOUS at 12:02

## 2025-02-26 RX ADMIN — CEPHALEXIN 500 MG: 500 CAPSULE ORAL at 23:52

## 2025-02-26 RX ADMIN — INSULIN LISPRO 10 UNITS: 100 INJECTION, SOLUTION INTRAVENOUS; SUBCUTANEOUS at 08:17

## 2025-02-26 RX ADMIN — LABETALOL HYDROCHLORIDE 200 MG: 200 TABLET, FILM COATED ORAL at 08:17

## 2025-02-26 NOTE — ASSESSMENT & PLAN NOTE
QBL: 557cc; admission Hb 12.4g/dL -> 9.7g/dL; Venofer ordered. Patient progressing toward postoperative milestones  - Continue routine postoperative care  - Pain management with oral analgesics  - DVT Ppx: Lovenox 40mg BID; encourage ambulation  - Void trial passed  - Encourage breastfeeding, familial bonding

## 2025-02-26 NOTE — ASSESSMENT & PLAN NOTE
Diagnosed due to unremitting headache. CBC/CMP wnl, P:C 0.4  Systolic (12hrs), Av , Min:113 , Max:157   Diastolic (12hrs), Av, Min:76, Max:103  - Magnesium infusion discontinued at patient request  - Creatinine 0.52 on admission --> 0.88 --> 0.73, Motrin and Lasix held, PO Dilaudid ordered  - continue Labetalol 200mg BID, Lasix 20mg qd x5days  - continue to monitor BP  - continue to monitor for signs/symptoms of evolving preeclampsia

## 2025-02-26 NOTE — PLAN OF CARE
Problem: PAIN - ADULT  Goal: Verbalizes/displays adequate comfort level or baseline comfort level  Description: Interventions:  - Encourage patient to monitor pain and request assistance  - Assess pain using appropriate pain scale  - Administer analgesics based on type and severity of pain and evaluate response  - Implement non-pharmacological measures as appropriate and evaluate response  - Consider cultural and social influences on pain and pain management  - Notify physician/advanced practitioner if interventions unsuccessful or patient reports new pain  Outcome: Progressing     Problem: INFECTION - ADULT  Goal: Absence or prevention of progression during hospitalization  Description: INTERVENTIONS:  - Assess and monitor for signs and symptoms of infection  - Monitor lab/diagnostic results  - Monitor all insertion sites, i.e. indwelling lines, tubes, and drains  - Monitor endotracheal if appropriate and nasal secretions for changes in amount and color  - [place] appropriate cooling/warming therapies per order  - Administer medications as ordered  - Instruct and encourage patient and family to use good hand hygiene technique  - Identify and instruct in appropriate isolation precautions for identified infection/condition  Outcome: Progressing  Goal: Absence of fever/infection during neutropenic period  Description: INTERVENTIONS:  - Monitor WBC    Outcome: Progressing     Problem: SAFETY ADULT  Goal: Patient will remain free of falls  Description: INTERVENTIONS:  - Educate patient/family on patient safety including physical limitations  - Instruct patient to call for assistance with activity   - Consult OT/PT to assist with strengthening/mobility   - Keep Call bell within reach  - Keep bed low and locked with side rails adjusted as appropriate  - Keep care items and personal belongings within reach  - Initiate and maintain comfort rounds  - Make Fall Risk Sign visible to staff  - Offer Toileting every  Hours,  in advance of need  - Initiate/Maintain alarm  - Obtain necessary fall risk management equipment:   - Apply yellow socks and bracelet for high fall risk patients  - Consider moving patient to room near nurses station  Outcome: Progressing  Goal: Maintain or return to baseline ADL function  Description: INTERVENTIONS:  -  Assess patient's ability to carry out ADLs; assess patient's baseline for ADL function and identify physical deficits which impact ability to perform ADLs (bathing, care of mouth/teeth, toileting, grooming, dressing, etc.)  - Assess/evaluate cause of self-care deficits   - Assess range of motion  - Assess patient's mobility; develop plan if impaired  - Assess patient's need for assistive devices and provide as appropriate  - Encourage maximum independence but intervene and supervise when necessary  - Involve family in performance of ADLs  - Assess for home care needs following discharge   - Consider OT consult to assist with ADL evaluation and planning for discharge  - Provide patient education as appropriate  Outcome: Progressing  Goal: Maintains/Returns to pre admission functional level  Description: INTERVENTIONS:  - Perform AM-PAC 6 Click Basic Mobility/ Daily Activity assessment daily.  - Set and communicate daily mobility goal to care team and patient/family/caregiver.   - Collaborate with rehabilitation services on mobility goals if consulted  - Perform Range of Motion  times a day.  - Reposition patient every  hours.  - Dangle patient  times a day  - Stand patient  times a day  - Ambulate patient  times a day  - Out of bed to chair  times a day   - Out of bed for meals times a day  - Out of bed for toileting  - Record patient progress and toleration of activity level   Outcome: Progressing     Problem: DISCHARGE PLANNING  Goal: Discharge to home or other facility with appropriate resources  Description: INTERVENTIONS:  - Identify barriers to discharge w/patient and caregiver  - Arrange for  needed discharge resources and transportation as appropriate  - Identify discharge learning needs (meds, wound care, etc.)  - Arrange for interpretive services to assist at discharge as needed  - Refer to Case Management Department for coordinating discharge planning if the patient needs post-hospital services based on physician/advanced practitioner order or complex needs related to functional status, cognitive ability, or social support system  Outcome: Progressing     Problem: ALTERATION IN THE BREAST  Goal: Optimize infant feeding at the breast  Description: INTERVENTIONS:  - Latch, breast and nipple assessment  - Assess prior breast feeding history  - Hand expression of breast milk  - For cracked, bleeding and or sore nipples reassess latch, treat damaged nipple  -Educate mother on feeding cues  -Positioning/latch techniques  Outcome: Progressing     Problem: INADEQUATE LATCH, SUCK OR SWALLOW  Goal: Demonstrate ability to latch and sustain latch, audible swallowing and satiety  Description: INTERVENTIONS:  - Assess oral anatomy, notify Physician/AP for abnormal findings  - Establish milk expression  - Maximize feeding opportunity (skin to skin, behavioral state)  - Position/latch techniques  - Discourage use of pacifier-artificial nipple  - Mechanical pumping  - Nipple Shield  - Supplemental formula feeding (Physician/AP order)  - Alternative feeding method  Outcome: Progressing     Problem: NORMAL   Goal: Experiences normal transition  Description: INTERVENTIONS:  - Monitor vital signs  - Maintain thermoregulation  - Assess for hypoglycemia risk factors or signs and symptoms  - Assess for sepsis risk factors or signs and symptoms  - Assess for jaundice risk and/or signs and symptoms  Outcome: Progressing  Goal: Total weight loss less than 10% of birth weight  Description: INTERVENTIONS:  - Assess feeding patterns  - Weigh daily  Outcome: Progressing     Problem: PAIN -   Goal: Displays adequate  comfort level or baseline comfort level  Description: INTERVENTIONS:  - Perform pain scoring using age-appropriate tool with hands-on care as needed.  Notify physician/AP of high pain scores not responsive to comfort measures  - Administer analgesics based on type and severity of pain and evaluate response  - Sucrose analgesia per protocol for brief minor painful procedures  - Teach parents interventions for comforting infant  Outcome: Progressing     Problem: THERMOREGULATION - PEDIATRICS  Goal: Maintains normal body temperature  Description: Interventions:  - Monitor temperature (axillary for Newborns) as ordered  - Monitor for signs of hypothermia or hyperthermia  - Provide thermal support measures  - Wean to open crib when appropriate  Outcome: Progressing     Problem: INFECTION -   Goal: No evidence of infection  Description: INTERVENTIONS:  - Instruct family/visitors to use good hand hygiene technique  - Identify and instruct in appropriate isolation precautions for identified infection/condition  - Change incubator every 2 weeks or as needed.  - Monitor for symptoms of infection  - Monitor surgical sites and insertion sites for all indwelling lines, tubes, and drains for drainage, redness, or edema.  - Monitor endotracheal and nasal secretions for changes in amount and color  - Monitor culture and CBC results  - Administer antibiotics as ordered.  Monitor drug levels  Outcome: Progressing     Problem: RISK FOR INFECTION (RISK FACTORS FOR MATERNAL CHORIOAMNIOITIS - )  Goal: No evidence of infection  Description: INTERVENTIONS:  - Instruct family/visitors to use good hand hygiene technique  - Monitor for symptoms of infection  - Monitor culture and CBC results  - Administer antibiotics as ordered.  Monitor drug levels  Outcome: Progressing     Problem: SAFETY -   Goal: Patient will remain free from falls  Description: INTERVENTIONS:  - Instruct family/caregiver on patient safety  - Keep  incubator doors and portholes closed when unattended  - Keep radiant warmer side rails and crib rails up when unattended  - Based on caregiver fall risk screen, instruct family/caregiver to ask for assistance with transferring infant if caregiver noted to have fall risk factors  Outcome: Progressing     Problem: Knowledge Deficit  Goal: Patient/family/caregiver demonstrates understanding of disease process, treatment plan, medications, and discharge instructions  Description: Complete learning assessment and assess knowledge base.  Interventions:  - Provide teaching at level of understanding  - Provide teaching via preferred learning methods  Outcome: Progressing  Goal: Infant caregiver verbalizes understanding of benefits of skin-to-skin with healthy   Description: Prior to delivery, educate patient regarding skin-to-skin practice and its benefits  Initiate immediate and uninterrupted skin-to-skin contact after birth until breastfeeding is initiated or a minimum of one hour  Encourage continued skin-to-skin contact throughout the post partum stay    Outcome: Progressing  Goal: Infant caregiver verbalizes understanding of benefits and management of breastfeeding their healthy   Description: Help initiate breastfeeding within one hour of birth  Educate/assist with breastfeeding positioning and latch  Educate on safe positioning and to monitor their  for safety  Educate on how to maintain lactation even if they are  from their   Educate/initiate pumping for a mom with a baby in the NICU within 6 hours after birth  Give infants no food or drink other than breast milk unless medically indicated  Educate on feeding cues and encourage breastfeeding on demand    Outcome: Progressing  Goal: Infant caregiver verbalizes understanding of benefits to rooming-in with their healthy   Description: Promote rooming in 23 out of 24 hours per day  Educate on benefits to rooming-in  Provide   care in room with parents as long as infant and mother condition allow    Outcome: Progressing  Goal: Provide formula feeding instructions and preparation information to caregivers who do not wish to breastfeed their   Description: Provide one on one information on frequency, amount, and burping for formula feeding caregivers throughout their stay and at discharge.  Provide written information/video on formula preparation.    Outcome: Progressing  Goal: Infant caregiver verbalizes understanding of support and resources for follow up after discharge  Description: Provide individual discharge education on when to call the doctor.  Provide resources and contact information for post-discharge support.    Outcome: Progressing

## 2025-02-26 NOTE — PLAN OF CARE
Problem: PAIN - ADULT  Goal: Verbalizes/displays adequate comfort level or baseline comfort level  Description: Interventions:  - Encourage patient to monitor pain and request assistance  - Assess pain using appropriate pain scale  - Administer analgesics based on type and severity of pain and evaluate response  - Implement non-pharmacological measures as appropriate and evaluate response  - Consider cultural and social influences on pain and pain management  - Notify physician/advanced practitioner if interventions unsuccessful or patient reports new pain  Outcome: Progressing     Problem: INFECTION - ADULT  Goal: Absence or prevention of progression during hospitalization  Description: INTERVENTIONS:  - Assess and monitor for signs and symptoms of infection  - Monitor lab/diagnostic results  - Monitor all insertion sites, i.e. indwelling lines, tubes, and drains  - Monitor endotracheal if appropriate and nasal secretions for changes in amount and color  - [place] appropriate cooling/warming therapies per order  - Administer medications as ordered  - Instruct and encourage patient and family to use good hand hygiene technique  - Identify and instruct in appropriate isolation precautions for identified infection/condition  Outcome: Progressing  Goal: Absence of fever/infection during neutropenic period  Description: INTERVENTIONS:  - Monitor WBC    Outcome: Progressing     Problem: SAFETY ADULT  Goal: Patient will remain free of falls  Description: INTERVENTIONS:  - Educate patient/family on patient safety including physical limitations  - Instruct patient to call for assistance with activity   - Consult OT/PT to assist with strengthening/mobility   - Keep Call bell within reach  - Keep bed low and locked with side rails adjusted as appropriate  - Keep care items and personal belongings within reach  - Initiate and maintain comfort rounds  - Make Fall Risk Sign visible to staff  - Apply yellow socks and bracelet  for high fall risk patients  - Consider moving patient to room near nurses station  Outcome: Progressing  Goal: Maintain or return to baseline ADL function  Description: INTERVENTIONS:  -  Assess patient's ability to carry out ADLs; assess patient's baseline for ADL function and identify physical deficits which impact ability to perform ADLs (bathing, care of mouth/teeth, toileting, grooming, dressing, etc.)  - Assess/evaluate cause of self-care deficits   - Assess range of motion  - Assess patient's mobility; develop plan if impaired  - Assess patient's need for assistive devices and provide as appropriate  - Encourage maximum independence but intervene and supervise when necessary  - Involve family in performance of ADLs  - Assess for home care needs following discharge   - Consider OT consult to assist with ADL evaluation and planning for discharge  - Provide patient education as appropriate  Outcome: Progressing  Goal: Maintains/Returns to pre admission functional level  Description: INTERVENTIONS:  - Perform AM-PAC 6 Click Basic Mobility/ Daily Activity assessment daily.  - Set and communicate daily mobility goal to care team and patient/family/caregiver.   - Collaborate with rehabilitation services on mobility goals if consulted  - Out of bed for toileting  - Record patient progress and toleration of activity level   Outcome: Progressing     Problem: DISCHARGE PLANNING  Goal: Discharge to home or other facility with appropriate resources  Description: INTERVENTIONS:  - Identify barriers to discharge w/patient and caregiver  - Arrange for needed discharge resources and transportation as appropriate  - Identify discharge learning needs (meds, wound care, etc.)  - Arrange for interpretive services to assist at discharge as needed  - Refer to Case Management Department for coordinating discharge planning if the patient needs post-hospital services based on physician/advanced practitioner order or complex needs  related to functional status, cognitive ability, or social support system  Outcome: Progressing     Problem: ALTERATION IN THE BREAST  Goal: Optimize infant feeding at the breast  Description: INTERVENTIONS:  - Latch, breast and nipple assessment  - Assess prior breast feeding history  - Hand expression of breast milk  - For cracked, bleeding and or sore nipples reassess latch, treat damaged nipple  -Educate mother on feeding cues  -Positioning/latch techniques  Outcome: Progressing     Problem: INADEQUATE LATCH, SUCK OR SWALLOW  Goal: Demonstrate ability to latch and sustain latch, audible swallowing and satiety  Description: INTERVENTIONS:  - Assess oral anatomy, notify Physician/AP for abnormal findings  - Establish milk expression  - Maximize feeding opportunity (skin to skin, behavioral state)  - Position/latch techniques  - Discourage use of pacifier-artificial nipple  - Mechanical pumping  - Nipple Shield  - Supplemental formula feeding (Physician/AP order)  - Alternative feeding method  Outcome: Progressing     Problem: NORMAL   Goal: Experiences normal transition  Description: INTERVENTIONS:  - Monitor vital signs  - Maintain thermoregulation  - Assess for hypoglycemia risk factors or signs and symptoms  - Assess for sepsis risk factors or signs and symptoms  - Assess for jaundice risk and/or signs and symptoms  Outcome: Progressing  Goal: Total weight loss less than 10% of birth weight  Description: INTERVENTIONS:  - Assess feeding patterns  - Weigh daily  Outcome: Progressing     Problem: PAIN -   Goal: Displays adequate comfort level or baseline comfort level  Description: INTERVENTIONS:  - Perform pain scoring using age-appropriate tool with hands-on care as needed.  Notify physician/AP of high pain scores not responsive to comfort measures  - Administer analgesics based on type and severity of pain and evaluate response  - Sucrose analgesia per protocol for brief minor painful  procedures  - Teach parents interventions for comforting infant  Outcome: Progressing     Problem: THERMOREGULATION - PEDIATRICS  Goal: Maintains normal body temperature  Description: Interventions:  - Monitor temperature (axillary for Newborns) as ordered  - Monitor for signs of hypothermia or hyperthermia  - Provide thermal support measures  - Wean to open crib when appropriate  Outcome: Progressing     Problem: INFECTION -   Goal: No evidence of infection  Description: INTERVENTIONS:  - Instruct family/visitors to use good hand hygiene technique  - Identify and instruct in appropriate isolation precautions for identified infection/condition  - Change incubator every 2 weeks or as needed.  - Monitor for symptoms of infection  - Monitor surgical sites and insertion sites for all indwelling lines, tubes, and drains for drainage, redness, or edema.  - Monitor endotracheal and nasal secretions for changes in amount and color  - Monitor culture and CBC results  - Administer antibiotics as ordered.  Monitor drug levels  Outcome: Progressing     Problem: RISK FOR INFECTION (RISK FACTORS FOR MATERNAL CHORIOAMNIOITIS - )  Goal: No evidence of infection  Description: INTERVENTIONS:  - Instruct family/visitors to use good hand hygiene technique  - Monitor for symptoms of infection  - Monitor culture and CBC results  - Administer antibiotics as ordered.  Monitor drug levels  Outcome: Progressing     Problem: SAFETY -   Goal: Patient will remain free from falls  Description: INTERVENTIONS:  - Instruct family/caregiver on patient safety  - Keep incubator doors and portholes closed when unattended  - Keep radiant warmer side rails and crib rails up when unattended  - Based on caregiver fall risk screen, instruct family/caregiver to ask for assistance with transferring infant if caregiver noted to have fall risk factors  Outcome: Progressing     Problem: Knowledge Deficit  Goal: Patient/family/caregiver  demonstrates understanding of disease process, treatment plan, medications, and discharge instructions  Description: Complete learning assessment and assess knowledge base.  Interventions:  - Provide teaching at level of understanding  - Provide teaching via preferred learning methods  Outcome: Progressing  Goal: Infant caregiver verbalizes understanding of benefits of skin-to-skin with healthy   Description: Prior to delivery, educate patient regarding skin-to-skin practice and its benefits  Initiate immediate and uninterrupted skin-to-skin contact after birth until breastfeeding is initiated or a minimum of one hour  Encourage continued skin-to-skin contact throughout the post partum stay    Outcome: Progressing  Goal: Infant caregiver verbalizes understanding of benefits and management of breastfeeding their healthy   Description: Help initiate breastfeeding within one hour of birth  Educate/assist with breastfeeding positioning and latch  Educate on safe positioning and to monitor their  for safety  Educate on how to maintain lactation even if they are  from their   Educate/initiate pumping for a mom with a baby in the NICU within 6 hours after birth  Give infants no food or drink other than breast milk unless medically indicated  Educate on feeding cues and encourage breastfeeding on demand    Outcome: Progressing  Goal: Infant caregiver verbalizes understanding of benefits to rooming-in with their healthy   Description: Promote rooming in 23 out of 24 hours per day  Educate on benefits to rooming-in  Provide  care in room with parents as long as infant and mother condition allow    Outcome: Progressing  Goal: Provide formula feeding instructions and preparation information to caregivers who do not wish to breastfeed their   Description: Provide one on one information on frequency, amount, and burping for formula feeding caregivers throughout their stay  and at discharge.  Provide written information/video on formula preparation.    Outcome: Progressing  Goal: Infant caregiver verbalizes understanding of support and resources for follow up after discharge  Description: Provide individual discharge education on when to call the doctor.  Provide resources and contact information for post-discharge support.    Outcome: Progressing

## 2025-02-26 NOTE — PROGRESS NOTES
"Endocrinoloy Progress Note   Karena Garcia 34 y.o. female MRN: [id number]  Unit/Bed#: -01 Encounter: [id number]      CC: Diabetes f/u    Subjective:   Karena Garcia is a 34 y.o. year old female who was diagnosed with gestational diabetes during this pregnancy, now s/p  2025.     Resumed on basal bolus yesterday, running below goal.     Objective:     Vitals: Blood pressure 139/79, pulse 93, temperature 98.3 °F (36.8 °C), temperature source Oral, resp. rate 20, height 5' 2\" (1.575 m), weight 120 kg (265 lb), last menstrual period 2024, SpO2 100%, currently breastfeeding.,Body mass index is 48.47 kg/m².      Intake/Output Summary (Last 24 hours) at 2025 1705  Last data filed at 2025 1400  Gross per 24 hour   Intake 480 ml   Output 500 ml   Net -20 ml       Physical Exam:  General Appearance: awake, appears stated age and cooperative  Head: Normocephalic, without obvious abnormality, atraumatic  Extremities: moves all extremities  Skin: Skin color and temperature normal.   Pulm: no labored breathing    Lab, Imaging and other studies: Results Review Statement: No pertinent imaging studies reviewed.    POC Glucose (mg/dl)   Date Value   2025 90   2025 100   2025 110   2025 81   2025 79   2025 141 (H)   2025 156 (H)   2025 91   2025 279 (H)   2025 212 (H)       Assessment and plan:  Insulin controlled gestational diabetes mellitus (GDM) in third trimester  Assessment & Plan  Lab Results   Component Value Date    HGBA1C 7.6 (H) 2025       Recent Labs     25  0110 25  0729 25  0949 25  1326   POCGLU 285* 160* 212* 279*     At this time, unclear if patient had solely gestational diabetes or if there was an underlying component of prediabetes/diabetes as we do not have recent lab work prior to failed glucose tolerance test 2024, which was early in pregnancy at 10 weeks  Due to significant " "postdelivery hyperglycemia, as well as first trimester GDM diagnois, do suspect there was an underlying diabetes diagnosis  In either case, based off A1c 7.6%, gestational diabetes was suboptimally controlled on most recent regimen of Semglee 52 units nightly, lispro 18/10/50 w meals    Will decrease basal insulin to Lantus 24 units nightly  Will decrease mealtime dosing to 6 units  Will continue correctional scale to Algorithm 4/3 before meals and at bedtime  Will increase metformin xr to 1000mg w breakfast and dinner    Patient likely for discharge tomorrow  Final recommendations pending clinical course but do anticipate discharge on basal insulin plus metformin, unlikely to require mealtime insulin    Patient will benefit from outpatient endocrine follow-up; [facility] is her closest office  As she is hopeful for another pregnancy in the next couple of years, would not be a good candidate for a GLP-1 agonist outpatient in the near future  Would also benefit from antibody screening for DM1 given family history, relatively early presumed DM2 versus GDM onset    * S/P  section  Assessment & Plan  2025 for preeclampsia, fetal tachycardia  Management per primary           Sharla Nolan DO  Endocrinology PGY-5  ~A morning cortisol is only interpretable if drawn at 8am.~    Please EpicSecureChat questions to the physician covering the \"AN Endocrinology Call\" Role. Thank you.   "

## 2025-02-26 NOTE — PROGRESS NOTES
Progress Note - OB/GYN  Karena Garcia 34 y.o. female MRN: [id number]  Unit/Bed#: -01 Encounter: [id number]    Assessment and Plan     Karena Garcia is a patient of: [facility]. She is PPD# 2 s/p  primary  section, low transverse incision who continues to progress- concern for ETHAN with medication changes made today. Will continue close inpatient monitoring.      #1 Status post  delivery   -Pain management changed to discontinue NSAIDs given concern for ETHAN and p.o. Dilaudid initiated, Tylenol.    - Continue routine postop care   - VTE Ppx: SCDs, encourage ambulation and Lovenox 40 mg twice daily   - Encourage breast-feeding and bonding    #2.  Preeclampsia with severe features   -Headaches improved   - Magnesium discontinued per patient request given patient concerns of altered mentation   -Blood pressures in the last 24 hours were fluctuant between 100s to 160s/60s to 90s.  Mostly in the 130s/70s to 80s.   -Concern for ETHAN and increasing creatinine with last creatinine of 0.9-follow-up repeat labs today.  Unable to be collected this morning secondary to difficult stick on patient   -Continue labetalol 200 mg twice daily   -Continue to monitor closely    #3.  Gestational diabetes with likely type 2 diabetes   -Appreciate endocrinology evaluation and recommendations   -Plan to continue basal insulin at 30 units nightly, mealtime dose is increased to 10 units and correction scale.  Metformin 500 mg twice daily was also reviewed.   -Improved fingersticks today 81 fasting and 110 post meal   -Follow-up outpatient with endocrinology.    #4.  Tachycardia   -Much improved now in the 90s, currently asymptomatic   -EKG normal sinus rhythm,   -Possibly secondary to anxiety given increased stressors and recent family events.  Feeling much better today.   -Did review trial of antianxiety lytic or starting an SSRI given increased risk of postpartum mood disorder and patient declines at this  "time.       Disposition    - Anticipate discharge home on PPD# 3-4      Subjective/Objective     Chief Complaint: Postpartum State     Subjective:    Karena Garcia is PPD/POD#2 s/p  primary  section, low transverse incision. She reports she is feeling better today does note incisional discomfort and tenderness.  She reports the tachycardia, anxiousness and headaches she has been experiencing are improved today.  She does note increased lower extremity edema.  Denies any preeclamptic symptoms.  Has been having some increasing anxiety but overall has been managing with good support from her .      Tolerating PO: yes  Nausea or Vomiting: no  Voiding: yes  Ambulating: yes  Chest pain: no  Shortness of breath: no  Leg pain: no  Lochia: appropriate     Vitals:   /93   Pulse 90   Temp 98.1 °F (36.7 °C) (Oral)   Resp 20   Ht 5' 2\" (1.575 m)   Wt 120 kg (265 lb)   LMP 2024 (Exact Date)   SpO2 96%   Breastfeeding Yes   BMI 48.47 kg/m²       Intake/Output Summary (Last 24 hours) at 2025 1323  Last data filed at 2025 1735  Gross per 24 hour   Intake --   Output 500 ml   Net -500 ml       Invasive Devices       Peripheral Intravenous Line  Duration             Peripheral IV 25 Proximal;Right;Ventral (anterior) Forearm 4 days    Peripheral IV 25 Left;Proximal;Ventral (anterior) Forearm 2 days                    Physical Exam:   GEN: Karena Garcia appears well, alert and oriented x 3, pleasant and cooperative   CARDIO: RRR, no murmurs or rubs  RESP:  CTAB, no wheezes or rales  ABDOMEN: Incision C/D/I  EXTREMITIES: LE edema noted with 2+ edema      Labs:     Hemoglobin   Date Value Ref Range Status   2025 10.4 (L) 11.5 - 15.4 g/dL Final   2025 9.7 (L) 11.5 - 15.4 g/dL Final   10/13/2015 15.5 12.0 - 16.0 g/dL      WBC   Date Value Ref Range Status   2025 8.80 4.31 - 10.16 Thousand/uL Final   2025 15.48 (H) 4.31 - 10.16 Thousand/uL Final   10/13/2015 " 8.1 4.8 - 10.8 X 1000/u      Platelets   Date Value Ref Range Status   02/25/2025 237 149 - 390 Thousands/uL Final   02/25/2025 253 149 - 390 Thousands/uL Final   10/13/2015 278 130 - 400 X 1000/u      Creatinine   Date Value Ref Range Status   02/25/2025 0.93 0.60 - 1.30 mg/dL Final     Comment:     Standardized to IDMS reference method   02/25/2025 0.84 0.60 - 1.30 mg/dL Final     Comment:     Standardized to IDMS reference method   10/13/2015 0.8 0.6 - 1.3 mg/dL      AST   Date Value Ref Range Status   02/25/2025 14 13 - 39 U/L Final   02/25/2025 14 13 - 39 U/L Final   10/13/2015 52 (H) 14 - 38 IU/L      ALT   Date Value Ref Range Status   02/25/2025 9 7 - 52 U/L Final     Comment:     Specimen collection should occur prior to Sulfasalazine administration due to the potential for falsely depressed results.    02/25/2025 7 7 - 52 U/L Final     Comment:     Specimen collection should occur prior to Sulfasalazine administration due to the potential for falsely depressed results.    10/13/2015 107 (H) 12 - 78 IU/L           Taniya Medel MD  2/26/2025  1:23 PM

## 2025-02-27 ENCOUNTER — RESULTS FOLLOW-UP (OUTPATIENT)
Dept: OBGYN CLINIC | Facility: CLINIC | Age: 35
End: 2025-02-27

## 2025-02-27 VITALS
HEIGHT: 62 IN | OXYGEN SATURATION: 98 % | DIASTOLIC BLOOD PRESSURE: 83 MMHG | WEIGHT: 265 LBS | SYSTOLIC BLOOD PRESSURE: 121 MMHG | BODY MASS INDEX: 48.76 KG/M2 | TEMPERATURE: 98 F | HEART RATE: 110 BPM | RESPIRATION RATE: 18 BRPM

## 2025-02-27 PROBLEM — O36.8390 FETAL TACHYCARDIA AFFECTING MANAGEMENT OF MOTHER: Status: RESOLVED | Noted: 2025-02-18 | Resolved: 2025-02-27

## 2025-02-27 PROBLEM — R51.9 HEADACHE: Status: RESOLVED | Noted: 2025-02-22 | Resolved: 2025-02-27

## 2025-02-27 LAB
ATRIAL RATE: 122 BPM
ATRIAL RATE: 126 BPM
GLUCOSE SERPL-MCNC: 206 MG/DL (ref 65–140)
GLUCOSE SERPL-MCNC: 96 MG/DL (ref 65–140)
P AXIS: 45 DEGREES
P AXIS: 45 DEGREES
PR INTERVAL: 130 MS
PR INTERVAL: 144 MS
QRS AXIS: 24 DEGREES
QRS AXIS: 6 DEGREES
QRSD INTERVAL: 74 MS
QRSD INTERVAL: 74 MS
QT INTERVAL: 316 MS
QT INTERVAL: 324 MS
QTC INTERVAL: 451 MS
QTC INTERVAL: 470 MS
T WAVE AXIS: 33 DEGREES
T WAVE AXIS: 48 DEGREES
VENTRICULAR RATE: 122 BPM
VENTRICULAR RATE: 126 BPM

## 2025-02-27 PROCEDURE — 93010 ELECTROCARDIOGRAM REPORT: CPT | Performed by: INTERNAL MEDICINE

## 2025-02-27 PROCEDURE — 82948 REAGENT STRIP/BLOOD GLUCOSE: CPT

## 2025-02-27 PROCEDURE — 99024 POSTOP FOLLOW-UP VISIT: CPT | Performed by: STUDENT IN AN ORGANIZED HEALTH CARE EDUCATION/TRAINING PROGRAM

## 2025-02-27 PROCEDURE — NC001 PR NO CHARGE: Performed by: STUDENT IN AN ORGANIZED HEALTH CARE EDUCATION/TRAINING PROGRAM

## 2025-02-27 PROCEDURE — 88307 TISSUE EXAM BY PATHOLOGIST: CPT | Performed by: PATHOLOGY

## 2025-02-27 RX ORDER — BENZOCAINE/MENTHOL 6 MG-10 MG
1 LOZENGE MUCOUS MEMBRANE DAILY PRN
Start: 2025-02-27

## 2025-02-27 RX ORDER — CALCIUM CARBONATE 500 MG/1
1000 TABLET, CHEWABLE ORAL 3 TIMES DAILY PRN
Qty: 30 TABLET | Refills: 0 | Status: SHIPPED | OUTPATIENT
Start: 2025-02-27

## 2025-02-27 RX ORDER — FUROSEMIDE 20 MG/1
20 TABLET ORAL DAILY
Status: DISCONTINUED | OUTPATIENT
Start: 2025-02-27 | End: 2025-02-27 | Stop reason: HOSPADM

## 2025-02-27 RX ORDER — ACETAMINOPHEN 325 MG/1
650 TABLET ORAL EVERY 6 HOURS PRN
Qty: 30 TABLET | Refills: 0 | Status: SHIPPED | OUTPATIENT
Start: 2025-02-27 | End: 2025-03-04

## 2025-02-27 RX ORDER — FUROSEMIDE 20 MG/1
20 TABLET ORAL DAILY
Qty: 3 TABLET | Refills: 0 | Status: SHIPPED | OUTPATIENT
Start: 2025-02-27 | End: 2025-03-02

## 2025-02-27 RX ORDER — LABETALOL 200 MG/1
200 TABLET, FILM COATED ORAL EVERY 12 HOURS SCHEDULED
Qty: 30 TABLET | Refills: 0 | Status: SHIPPED | OUTPATIENT
Start: 2025-02-27

## 2025-02-27 RX ORDER — METFORMIN HYDROCHLORIDE 500 MG/1
1000 TABLET, EXTENDED RELEASE ORAL 2 TIMES DAILY WITH MEALS
Qty: 120 TABLET | Refills: 0 | Status: SHIPPED | OUTPATIENT
Start: 2025-02-27 | End: 2025-03-29

## 2025-02-27 RX ORDER — INSULIN LISPRO 100 [IU]/ML
6 INJECTION, SOLUTION INTRAVENOUS; SUBCUTANEOUS
Qty: 5.4 ML | Refills: 0 | Status: SHIPPED | OUTPATIENT
Start: 2025-02-27 | End: 2025-03-29

## 2025-02-27 RX ORDER — INSULIN GLARGINE-YFGN 100 [IU]/ML
24 INJECTION, SOLUTION SUBCUTANEOUS
Qty: 7.2 ML | Refills: 0 | Status: SHIPPED | OUTPATIENT
Start: 2025-02-27 | End: 2025-03-29

## 2025-02-27 RX ADMIN — ENOXAPARIN SODIUM 40 MG: 40 INJECTION SUBCUTANEOUS at 08:46

## 2025-02-27 RX ADMIN — IRON SUCROSE 200 MG: 20 INJECTION, SOLUTION INTRAVENOUS at 09:36

## 2025-02-27 RX ADMIN — METFORMIN HYDROCHLORIDE 1000 MG: 500 TABLET, EXTENDED RELEASE ORAL at 08:45

## 2025-02-27 RX ADMIN — LABETALOL HYDROCHLORIDE 200 MG: 200 TABLET, FILM COATED ORAL at 08:45

## 2025-02-27 RX ADMIN — INSULIN LISPRO 6 UNITS: 100 INJECTION, SOLUTION INTRAVENOUS; SUBCUTANEOUS at 08:44

## 2025-02-27 RX ADMIN — ACETAMINOPHEN 650 MG: 325 TABLET, FILM COATED ORAL at 05:51

## 2025-02-27 RX ADMIN — INSULIN LISPRO 4 UNITS: 100 INJECTION, SOLUTION INTRAVENOUS; SUBCUTANEOUS at 08:44

## 2025-02-27 RX ADMIN — HYDROMORPHONE HYDROCHLORIDE 4 MG: 2 TABLET ORAL at 05:51

## 2025-02-27 RX ADMIN — HYDROMORPHONE HYDROCHLORIDE 2 MG: 2 TABLET ORAL at 11:33

## 2025-02-27 RX ADMIN — ACETAMINOPHEN 650 MG: 325 TABLET, FILM COATED ORAL at 11:33

## 2025-02-27 RX ADMIN — METOCLOPRAMIDE 10 MG: 5 INJECTION, SOLUTION INTRAMUSCULAR; INTRAVENOUS at 06:07

## 2025-02-27 NOTE — UTILIZATION REVIEW
NOTIFICATION OF ADMISSION DISCHARGE   This is a Notification of Discharge from [facility]. Please be advised that this patient has been discharge from our facility. Below you will find the admission and discharge date and time including the patient’s disposition.   UTILIZATION REVIEW CONTACT:  Litzy Miller  Utilization   Network Utilization Review Department  Phone: [phone] x carefully listen to the prompts. All voicemails are confidential.  Email: [email or web address]     ADMISSION INFORMATION  PRESENTATION DATE: 2/21/2025 12:07 PM  OBERVATION ADMISSION DATE: 02/21/2025 1541  INPATIENT ADMISSION DATE: 2/23/25 12:24 PM   DISCHARGE DATE: 2/27/2025  1:52 PM   DISPOSITION:Home/Self Care    Network Utilization Review Department  ATTENTION: Please call with any questions or concerns to [phone] and carefully listen to the prompts so that you are directed to the right person. All voicemails are confidential.   For Discharge needs, contact Care Management DC Support Team at [phone] opt. 2  Send all requests for admission clinical reviews, approved or denied determinations and any other requests to dedicated fax number below belonging to the campus where the patient is receiving treatment. List of dedicated fax numbers for the Facilities:  FACILITY NAME UR FAX NUMBER   ADMISSION DENIALS (Administrative/Medical Necessity) [phone]   DISCHARGE SUPPORT TEAM ([facility]) [phone]   PARENT CHILD HEALTH (Maternity/NICU/Pediatrics) [phone]   [facility] [phone]   [facility] [phone]   [facility] [phone]   [facility] [phone]   Critical access hospital [phone]   [facility] [phone]   [facility] [phone]   [facility]  [phone]   [facility] [phone]   [facility] [phone]   [facility] [phone]   [facility] [phone]

## 2025-02-27 NOTE — PLAN OF CARE
Problem: PAIN - ADULT  Goal: Verbalizes/displays adequate comfort level or baseline comfort level  Description: Interventions:  - Encourage patient to monitor pain and request assistance  - Assess pain using appropriate pain scale  - Administer analgesics based on type and severity of pain and evaluate response  - Implement non-pharmacological measures as appropriate and evaluate response  - Consider cultural and social influences on pain and pain management  - Notify physician/advanced practitioner if interventions unsuccessful or patient reports new pain  Outcome: Progressing     Problem: INFECTION - ADULT  Goal: Absence or prevention of progression during hospitalization  Description: INTERVENTIONS:  - Assess and monitor for signs and symptoms of infection  - Monitor lab/diagnostic results  - Monitor all insertion sites, i.e. indwelling lines, tubes, and drains  - Monitor endotracheal if appropriate and nasal secretions for changes in amount and color  - [place] appropriate cooling/warming therapies per order  - Administer medications as ordered  - Instruct and encourage patient and family to use good hand hygiene technique  - Identify and instruct in appropriate isolation precautions for identified infection/condition  Outcome: Progressing  Goal: Absence of fever/infection during neutropenic period  Description: INTERVENTIONS:  - Monitor WBC    Outcome: Progressing     Problem: SAFETY ADULT  Goal: Patient will remain free of falls  Description: INTERVENTIONS:  - Educate patient/family on patient safety including physical limitations  - Instruct patient to call for assistance with activity   - Consult OT/PT to assist with strengthening/mobility   - Keep Call bell within reach  - Keep bed low and locked with side rails adjusted as appropriate  - Keep care items and personal belongings within reach  - Initiate and maintain comfort rounds  - Apply yellow socks and bracelet for high fall risk patients  - Consider  moving patient to room near nurses station  Outcome: Progressing  Goal: Maintain or return to baseline ADL function  Description: INTERVENTIONS:  -  Assess patient's ability to carry out ADLs; assess patient's baseline for ADL function and identify physical deficits which impact ability to perform ADLs (bathing, care of mouth/teeth, toileting, grooming, dressing, etc.)  - Assess/evaluate cause of self-care deficits   - Assess range of motion  - Assess patient's mobility; develop plan if impaired  - Assess patient's need for assistive devices and provide as appropriate  - Encourage maximum independence but intervene and supervise when necessary  - Involve family in performance of ADLs  - Assess for home care needs following discharge   - Consider OT consult to assist with ADL evaluation and planning for discharge  - Provide patient education as appropriate  Outcome: Progressing  Goal: Maintains/Returns to pre admission functional level  Description: INTERVENTIONS:  - Perform AM-PAC 6 Click Basic Mobility/ Daily Activity assessment daily.  - Set and communicate daily mobility goal to care team and patient/family/caregiver.   - Collaborate with rehabilitation services on mobility goals if consulted  - Out of bed for toileting  - Record patient progress and toleration of activity level   Outcome: Progressing     Problem: DISCHARGE PLANNING  Goal: Discharge to home or other facility with appropriate resources  Description: INTERVENTIONS:  - Identify barriers to discharge w/patient and caregiver  - Arrange for needed discharge resources and transportation as appropriate  - Identify discharge learning needs (meds, wound care, etc.)  - Arrange for interpretive services to assist at discharge as needed  - Refer to Case Management Department for coordinating discharge planning if the patient needs post-hospital services based on physician/advanced practitioner order or complex needs related to functional status, cognitive  ability, or social support system  Outcome: Progressing     Problem: ALTERATION IN THE BREAST  Goal: Optimize infant feeding at the breast  Description: INTERVENTIONS:  - Latch, breast and nipple assessment  - Assess prior breast feeding history  - Hand expression of breast milk  - For cracked, bleeding and or sore nipples reassess latch, treat damaged nipple  -Educate mother on feeding cues  -Positioning/latch techniques  Outcome: Progressing     Problem: INADEQUATE LATCH, SUCK OR SWALLOW  Goal: Demonstrate ability to latch and sustain latch, audible swallowing and satiety  Description: INTERVENTIONS:  - Assess oral anatomy, notify Physician/AP for abnormal findings  - Establish milk expression  - Maximize feeding opportunity (skin to skin, behavioral state)  - Position/latch techniques  - Discourage use of pacifier-artificial nipple  - Mechanical pumping  - Nipple Shield  - Supplemental formula feeding (Physician/AP order)  - Alternative feeding method  Outcome: Progressing     Problem: NORMAL   Goal: Experiences normal transition  Description: INTERVENTIONS:  - Monitor vital signs  - Maintain thermoregulation  - Assess for hypoglycemia risk factors or signs and symptoms  - Assess for sepsis risk factors or signs and symptoms  - Assess for jaundice risk and/or signs and symptoms  Outcome: Progressing  Goal: Total weight loss less than 10% of birth weight  Description: INTERVENTIONS:  - Assess feeding patterns  - Weigh daily  Outcome: Progressing     Problem: PAIN -   Goal: Displays adequate comfort level or baseline comfort level  Description: INTERVENTIONS:  - Perform pain scoring using age-appropriate tool with hands-on care as needed.  Notify physician/AP of high pain scores not responsive to comfort measures  - Administer analgesics based on type and severity of pain and evaluate response  - Sucrose analgesia per protocol for brief minor painful procedures  - Teach parents interventions for  comforting infant  Outcome: Progressing     Problem: THERMOREGULATION - PEDIATRICS  Goal: Maintains normal body temperature  Description: Interventions:  - Monitor temperature (axillary for Newborns) as ordered  - Monitor for signs of hypothermia or hyperthermia  - Provide thermal support measures  - Wean to open crib when appropriate  Outcome: Progressing     Problem: INFECTION -   Goal: No evidence of infection  Description: INTERVENTIONS:  - Instruct family/visitors to use good hand hygiene technique  - Identify and instruct in appropriate isolation precautions for identified infection/condition  - Change incubator every 2 weeks or as needed.  - Monitor for symptoms of infection  - Monitor surgical sites and insertion sites for all indwelling lines, tubes, and drains for drainage, redness, or edema.  - Monitor endotracheal and nasal secretions for changes in amount and color  - Monitor culture and CBC results  - Administer antibiotics as ordered.  Monitor drug levels  Outcome: Progressing     Problem: RISK FOR INFECTION (RISK FACTORS FOR MATERNAL CHORIOAMNIOITIS - )  Goal: No evidence of infection  Description: INTERVENTIONS:  - Instruct family/visitors to use good hand hygiene technique  - Monitor for symptoms of infection  - Monitor culture and CBC results  - Administer antibiotics as ordered.  Monitor drug levels  Outcome: Progressing     Problem: SAFETY -   Goal: Patient will remain free from falls  Description: INTERVENTIONS:  - Instruct family/caregiver on patient safety  - Keep incubator doors and portholes closed when unattended  - Keep radiant warmer side rails and crib rails up when unattended  - Based on caregiver fall risk screen, instruct family/caregiver to ask for assistance with transferring infant if caregiver noted to have fall risk factors  Outcome: Progressing     Problem: Knowledge Deficit  Goal: Patient/family/caregiver demonstrates understanding of disease process,  treatment plan, medications, and discharge instructions  Description: Complete learning assessment and assess knowledge base.  Interventions:  - Provide teaching at level of understanding  - Provide teaching via preferred learning methods  Outcome: Progressing  Goal: Infant caregiver verbalizes understanding of benefits of skin-to-skin with healthy   Description: Prior to delivery, educate patient regarding skin-to-skin practice and its benefits  Initiate immediate and uninterrupted skin-to-skin contact after birth until breastfeeding is initiated or a minimum of one hour  Encourage continued skin-to-skin contact throughout the post partum stay    Outcome: Progressing  Goal: Infant caregiver verbalizes understanding of benefits and management of breastfeeding their healthy   Description: Help initiate breastfeeding within one hour of birth  Educate/assist with breastfeeding positioning and latch  Educate on safe positioning and to monitor their  for safety  Educate on how to maintain lactation even if they are  from their   Educate/initiate pumping for a mom with a baby in the NICU within 6 hours after birth  Give infants no food or drink other than breast milk unless medically indicated  Educate on feeding cues and encourage breastfeeding on demand    Outcome: Progressing  Goal: Infant caregiver verbalizes understanding of benefits to rooming-in with their healthy   Description: Promote rooming in 23 out of 24 hours per day  Educate on benefits to rooming-in  Provide  care in room with parents as long as infant and mother condition allow    Outcome: Progressing  Goal: Provide formula feeding instructions and preparation information to caregivers who do not wish to breastfeed their   Description: Provide one on one information on frequency, amount, and burping for formula feeding caregivers throughout their stay and at discharge.  Provide written  information/video on formula preparation.    Outcome: Progressing  Goal: Infant caregiver verbalizes understanding of support and resources for follow up after discharge  Description: Provide individual discharge education on when to call the doctor.  Provide resources and contact information for post-discharge support.    Outcome: Progressing

## 2025-02-27 NOTE — LACTATION NOTE
This note was copied from a baby's chart.  CONSULT - LACTATION  Baby Girl Garcia (Olivia) 3 days female MRN: [id number]    [facility] AN NURSERY Room / Bed: (N)/(N) Encounter: [id number]    Maternal Information     MOTHER:  Karena Garcia  Maternal Age: 34 y.o.  OB History: # 1 - Date: , Sex: None, Weight: None, GA: None, Type: Biochemical, Apgar1: None, Apgar5: None, Living: None, Birth Comments: None    # 2 - Date: , Sex: None, Weight: None, GA: None, Type: Biochemical, Apgar1: None, Apgar5: None, Living: None, Birth Comments: None    # 3 - Date: 25, Sex: Female, Weight: 3415 g (7 lb 8.5 oz), GA: 36w5d, Type: , Low Transverse, Apgar1: 8, Apgar5: 9, Living: Living, Birth Comments: None   Previous breast reduction surgery? No    Lactation history:   Has patient previously breast fed: No   How long had patient previously breast fed:     Previous breast feeding complications:       Past Surgical History:   Procedure Laterality Date    [place]  DELIVERY ONLY N/A 2025    Procedure:  SECTION ();  Surgeon: Liset Jha MD;  Location: [facility];  Service: Obstetrics    [place] CYSTO/URETERO W/LITHOTRIPSY &INDWELL STENT INSRT Right 10/24/2018    Procedure: CYSTOSCOPY URETEROSCOPY WITH RETROGRADE PYELOGRAM AND INSERTION STENT URETERAL;  Surgeon: Gregory Goodman MD;  Location: [facility];  Service: Urology    [place] LITHOTRIPSY XTRCORP SHOCK WAVE Right 2018    Procedure: LITHROTRIPSY EXTRACORPORAL SHOCKWAVE (ESWL);  Surgeon: Gregory Goodman MD;  Location: [facility];  Service: Urology       Birth information:  YOB: 2024   Time of birth: 3:49 PM   Sex: female   Delivery type: , Low Transverse   Birth Weight: 3415 g (7 lb 8.5 oz)   Percent of Weight Change: -6%     Gestational Age: 36w5d   [unfilled]    Reason for Consult:    Reason for Consult: Discharge (ext) - 20 min    Risk Factors:    Risk Factors: Off  unit consultation (glucose protocols)    Breast and nipple assessment:   Breasts/Nipples  Left Breast: Filling, Soft  Right Breast: Filling, Soft  Intervention: Hand expression  Breastfeeding Status: Yes  Breastfeeding Progress: Not yet established  Reasons for not Breastfeeding: Infant medical condition (glucose protocols)    OB Lactation Tools:    Other OB Lactation Tools  Feeding Devices: Bottle    Breast Pump:    Breast Pump  Pump: Personal (has pump through insurance)       Assessment: normal assessment    Feeding assessment:  no feeding observed this visit.       Feeding recommendations:   mixed feeding plan     DC Consult: Met with mom, she is doing a mixed feeding plan for baby.     Mom is giving formula and finishing baby on breast for about 20 minutes total between both breasts. Enc to put baby to breast first and then give formula after. Mom reports sometimes baby wants instant gratification and will not latch to breast. Encouraged mom to watch for early feeding cues.  Encouraged mom to give 5-10 mLs of formula and then put to breast. Encouraged mom to pump after feedings when necessary. Mom can replace formula with pumped milk if she has from previous feedings.     Mom reports breasts are feeling heavier and weaver. Education given on initial engorgement. Mom denies any questions or concerns at this time. Encouraged to call for further assistance.     Feeding plan:   Encouraged mom to put baby to breast every feeding, if baby will not latch, mom can give 5 mLs of pumped milk or formula and then try to latch baby.   Encouraged mom to pump after feedings if baby did not latch well or mom still feels engorged, can pump for comfort.     Education:  Mix Feeds:   Start every feeding at the breast. Offer both breasts or one breast and use breast compressions to achieve active suckling. Once baby is not actively suckling, bring baby in upright position and offer expressed milk and/or artificial  supplementation via alternative feeding method (syringe, finger, paced bottle feeding). Burp frequently between breasts and during paced bottle feeding. Once feed is complete, place baby back on breast for on-nutritive suck. Pump after the feeding session to supplement with expressed milk at next feed.    Pumping:   - When pumping, begin in stimulation mode (high cycle, low vacuum) until milk begins to express. Change pump to expression mode (low cycle, high vacuum). Use hands on pumping techniques to assist with milk transfer. When milk stops expressing, change back to stimulation mode. When milk begins to flow, change to expression mode. You may cycle pump up to three times in a pumping session.  Instructions given on pumping.  Discussed when to start, frequency, different pumps available versus manual expression.    Discussed s/s engorgement, blocked milk ducts, and mastitis. Discussed how to remedy at home and when to contact physician. Reviewed engorgement and ways to reduce symptoms. Use a warmth on breasts with massage prior to feeding / pumping. Use massage during pumping over full ducts. Used cold, compression on breasts after feeding/pumping session. Ideas are rice in a sock. Place one in the freezer for cool and one in the microwave for warmth. Referred to discharge book / engorgement page.    Encouraged parents to call for assistance, questions, and concerns about breastfeeding.  Extension provided    Nikia Pathak MA, IBCLC  2/27/2025 8:49 AM

## 2025-02-27 NOTE — TELEPHONE ENCOUNTER
Left voicemail informing patient we had to move her nst/roberth up to 10am in the [facility] office due to a schedule change for our Nurse. Requested she give our office a call back at [phone].    Patient states cannot afford copay for Mounjaro 5 mg. Requests a sample of 2.5 mg. He states is \"fighting with insurance company\" to get help with his medications.  Verbal OK from  obtained to give 1 sample of Mounjaro 2.5 mg.

## 2025-02-27 NOTE — PROGRESS NOTES
"Progress Note - OB/GYN  Karena Garcia 34 y.o. female MRN: [id number]  Unit/Bed#: -01 Encounter: [id number]    Assessment and Plan   Karena Garcia is a patient of: [facility] . She is POD#3 s/p primary  section, low transverse incision. Recovering well and stable.    * S/P  section  Assessment & Plan  QBL: 557cc; admission Hb 12.4g/dL -> 9.7g/dL; Venofer ordered. Patient progressing toward postoperative milestones  - Continue routine postoperative care  - Pain management with oral analgesics  - DVT Ppx: Lovenox 40mg BID; encourage ambulation  - Void trial passed  - Encourage breastfeeding, familial bonding    Preeclampsia with severe features  Assessment & Plan  Diagnosed due to unremitting headache. CBC/CMP wnl, P:C 0.4  Systolic (12hrs), Av , Min:113 , Max:157   Diastolic (12hrs), Av, Min:76, Max:103  - Magnesium infusion discontinued at patient request  - Creatinine 0.52 on admission --> 0.88 --> 0.73, Motrin and Lasix held, PO Dilaudid ordered  - continue Labetalol 200mg BID, Lasix 20mg qd x5days  - continue to monitor BP  - continue to monitor for signs/symptoms of evolving preeclampsia    Insulin controlled gestational diabetes mellitus (GDM) in third trimester  Assessment & Plan  - s/p endocrinology consult          Disposition   - Anticipate discharge home on POD# 3-4    Subjective/Objective   Chief Concern: POD#3 s/p primary  section, low transverse incision  Subjective:    Karena Garcia has no current concerns. Pain is well controlled. She is currently voiding. She is currently passing flatus. She is ambulating. She is tolerating PO and denies nausea or vomitting. She denies chest pain, shortness of breath, lightheadedness, headaches, visual disturbance, or RUQ pain. Lochia is normal. She is breastfeeding.    Vitals:   /76 (BP Location: Left arm)   Pulse 98   Temp 98.2 °F (36.8 °C) (Oral)   Resp 18   Ht 5' 2\" (1.575 m)   Wt 120 kg (265 lb)   LMP " 06/05/2024 (Exact Date)   SpO2 97%   Breastfeeding Yes   BMI 48.47 kg/m²       Intake/Output Summary (Last 24 hours) at 2/27/2025 0622  Last data filed at 2/26/2025 1950  Gross per 24 hour   Intake 600 ml   Output 1050 ml   Net -450 ml     Invasive Devices       Peripheral Intravenous Line  Duration             Peripheral IV 02/23/25 Left;Proximal;Ventral (anterior) Forearm 3 days                  Physical Exam:   GEN: well-appearing, alert and oriented x3  CARDIO: regular rate  RESP: nonlabored respirations on room air  ABDOMEN: soft, nontender, nodistended, fundus firm below the umbilicus, Mepilex dressing covering incision, no strikethrough    Labs:   Hemoglobin   Date Value Ref Range Status   02/26/2025 9.4 (L) 11.5 - 15.4 g/dL Final   02/25/2025 10.4 (L) 11.5 - 15.4 g/dL Final   10/13/2015 15.5 12.0 - 16.0 g/dL      WBC   Date Value Ref Range Status   02/26/2025 13.28 (H) 4.31 - 10.16 Thousand/uL Final   02/25/2025 8.80 4.31 - 10.16 Thousand/uL Final   10/13/2015 8.1 4.8 - 10.8 X 1000/u      Platelets   Date Value Ref Range Status   02/26/2025 257 149 - 390 Thousands/uL Final   02/25/2025 237 149 - 390 Thousands/uL Final   10/13/2015 278 130 - 400 X 1000/u      Creatinine   Date Value Ref Range Status   02/26/2025 0.73 0.60 - 1.30 mg/dL Final     Comment:     Standardized to IDMS reference method   02/25/2025 0.93 0.60 - 1.30 mg/dL Final     Comment:     Standardized to IDMS reference method   10/13/2015 0.8 0.6 - 1.3 mg/dL      AST   Date Value Ref Range Status   02/26/2025 22 13 - 39 U/L Final   02/25/2025 14 13 - 39 U/L Final   10/13/2015 52 (H) 14 - 38 IU/L      ALT   Date Value Ref Range Status   02/26/2025 15 7 - 52 U/L Final     Comment:     Specimen collection should occur prior to Sulfasalazine administration due to the potential for falsely depressed results.    02/25/2025 9 7 - 52 U/L Final     Comment:     Specimen collection should occur prior to Sulfasalazine administration due to the potential  for falsely depressed results.    10/13/2015 107 (H) 12 - 78 IU/L          Sebastian Mathew MD  OBGYN PGY-1  02/27/25  6:22 AM

## 2025-03-02 DIAGNOSIS — Z86.32 HISTORY OF GESTATIONAL DIABETES MELLITUS (GDM), NOT CURRENTLY PREGNANT: ICD-10-CM

## 2025-03-02 DIAGNOSIS — Z13.1 DIABETES MELLITUS SCREENING: Primary | ICD-10-CM

## 2025-03-03 NOTE — PROGRESS NOTES
Post-Partum Checkup Visit    Karena Garcia is a 34 y.o.  female     Assessment:  Delivered a female  (7lb 9oz) via LTCS at 36w5d with epidural on 25 after admission for IOL d/t fetal tachycardia, doing well today.      She is here today for BP and incision check. She did not take labetalol this morning.    Plan:  Infant feeding: continue bonding and feeding on demand.  Activity: restrict until 6 weeks  Headaches: advised tylenol and motrin. Script sent. Advised to take BP with headaches. If BP is elevated >/= 140/90 or headache is not relieved with medication, to call office or ED for evaluation.   RTO for scheduled postpartum appt and PRN    Problem List Items Addressed This Visit       S/P  section - Primary     Other Visit Diagnoses         Pre-eclampsia in third trimester          Acute intractable headache, unspecified headache type        Relevant Medications    acetaminophen (TYLENOL) 500 mg tablet    ibuprofen (MOTRIN) 600 mg tablet            Subjective/Objective     Subjective:   Pain: occasional, with activity. She reports burning of the incision.   Tolerating Oral Intake: yes  Voiding: yes  Flatus: yes  Bowel Movement: yes  Ambulating: yes  Breastfeeding: Breastfeeding and Bottle feeding  Chest Pain: no  Shortness of Breath: no  Leg Pain/Discomfort: no  Lochia: normal   She reports headaches (behind her eye). She admits history of migraine. She reports her headaches are frequent. She does not take her BP when she gets a headache. She denies visual changes but reports blurry vision (happens with migraines).     Objective:     Postpartum Depression: Medium Risk (2025)    [place]  Depression Scale     Last EPDS Total Score: 8     Last EPDS Self Harm Result: Never       Vitals:  Vitals:    25 1016   BP: 124/86       Physical Exam:  Physical Exam  Vitals and nursing note reviewed.   Constitutional:       General: She is not in acute distress.     Appearance:  Normal appearance.   HENT:      Head: Normocephalic.   Eyes:      Conjunctiva/sclera: Conjunctivae normal.   Cardiovascular:      Rate and Rhythm: Normal rate and regular rhythm.      Heart sounds: Normal heart sounds.   Pulmonary:      Effort: Pulmonary effort is normal. No respiratory distress.      Breath sounds: Normal breath sounds.   Abdominal:      General: Abdomen is flat.      Palpations: Abdomen is soft.          Comments: Meplex removed. Incision is clean, dry, intact, healed. No sxs of infection.   Musculoskeletal:         General: Normal range of motion.      Cervical back: Normal range of motion.      Right lower leg: No edema.      Left lower leg: No edema.   Lymphadenopathy:      Cervical: No cervical adenopathy.   Skin:     General: Skin is warm and dry.   Neurological:      Mental Status: She is alert and oriented to person, place, and time.   Psychiatric:         Mood and Affect: Mood normal.         Behavior: Behavior normal.         Thought Content: Thought content normal.         Judgment: Judgment normal.           OB Hx:  OB History    Para Term  AB Living   3 1 0 1 2 1   SAB IAB Ectopic Multiple Live Births   2 0 0 0 1      # Outcome Date GA Lbr Edinson/2nd Weight Sex Type Anes PTL Lv   3  25 36w5d  3415 g (7 lb 8.5 oz) F CS-LTranv EPI Y OSMAR      Complications: Failed Induction      Name: Ama Garcia      Apgar1: 8  Apgar5: 9   2 2016     Biochemical      1 2014     Biochemical        Medical Hx  Past Medical History:   Diagnosis Date    Female infertility     Kidney stone     Varicella     vaccinated     Surgical Hx  Past Surgical History:   Procedure Laterality Date    [place]  DELIVERY ONLY N/A 2025    Procedure:  SECTION ();  Surgeon: Liset Jha MD;  Location: [facility] ;  Service: Obstetrics    [place] CYSTO/URETERO W/LITHOTRIPSY &INDWELL STENT INSRT Right 10/24/2018    Procedure: CYSTOSCOPY URETEROSCOPY WITH RETROGRADE  PYELOGRAM AND INSERTION STENT URETERAL;  Surgeon: Gregory Goodman MD;  Location: [facility];  Service: Urology    [place] LITHOTRIPSY XTRCORP SHOCK WAVE Right 11/14/2018    Procedure: LITHROTRIPSY EXTRACORPORAL SHOCKWAVE (ESWL);  Surgeon: Gregory Goodman MD;  Location: [facility];  Service: Urology     Family Hx  Family History   Problem Relation Age of Onset    Cancer Mother     Diabetes type II Mother     Hypertension Mother     Thyroid disease Mother     Other Mother         CADISAL    Diabetes type I Father     Hyperlipidemia Father         High Triglycerides    Kidney disease Sister         chronic UTIs/pylenophritis    Heart disease Maternal Grandmother     Diabetes Maternal Grandmother     No Known Problems Maternal Grandfather     Hypertension Paternal Grandmother     Hypertension Paternal Grandfather     Heart disease Paternal Grandfather     Diabetes Paternal Grandfather      Social Hx  Social History     Socioeconomic History    Marital status: /Civil Union     Spouse name: Not on file    Number of children: Not on file    Years of education: Not on file    Highest education level: Not on file   Occupational History    Not on file   Tobacco Use    Smoking status: Former     Current packs/day: 0.25     Average packs/day: 0.3 packs/day for 4.0 years (1.0 ttl pk-yrs)     Types: Cigarettes     Passive exposure: Never    Smokeless tobacco: Former     Quit date: 5/4/2016    Tobacco comments:     Weaning prior to pregnancy, quit with +UPT 6/2024   Vaping Use    Vaping status: Never Used   Substance and Sexual Activity    Alcohol use: Not Currently     Comment: occasional    Drug use: No    Sexual activity: Not Currently     Partners: Male     Birth control/protection: None   Other Topics Concern    Not on file   Social History Narrative    Not on file     Social Drivers of Health     Financial Resource Strain: Not on file   Food Insecurity: No Food Insecurity (2/21/2025)    Nursing - Inadequate Food Risk  Classification     Worried About Running Out of Food in the Last Year: Not on file     Ran Out of Food in the Last Year: Not on file     Ran Out of Food in the Last Year: Never true   Transportation Needs: No Transportation Needs (2025)    Nursing - Transportation Risk Classification     Lack of Transportation: Not on file     Lack of Transportation: No   Physical Activity: Not on file   Stress: Not on file   Social Connections: Not on file   Intimate Partner Violence: Unknown (2025)    Nursing IPS     Feels Physically and Emotionally Safe: Not on file     Physically Hurt by Someone: Not on file     Humiliated or Emotionally Abused by Someone: Not on file     Physically Hurt by Someone: No     Hurt or Threatened by Someone: No   Housing Stability: Unknown (2025)    Nursing: Inadequate Housing Risk Classification     Has Housing: Not on file     Worried About Losing Housing: Not on file     Unable to Get Utilities: Not on file     Unable to Pay for Housing in the Last Year: No     Has Housin     Allergies  Allergies   Allergen Reactions    Pollen Extract Sneezing         OB/GYN  3/4/2025  10:38 AM

## 2025-03-04 ENCOUNTER — OFFICE VISIT (OUTPATIENT)
Dept: OBGYN CLINIC | Facility: CLINIC | Age: 35
End: 2025-03-04

## 2025-03-04 VITALS
DIASTOLIC BLOOD PRESSURE: 86 MMHG | WEIGHT: 235 LBS | HEIGHT: 62 IN | BODY MASS INDEX: 43.24 KG/M2 | SYSTOLIC BLOOD PRESSURE: 124 MMHG

## 2025-03-04 DIAGNOSIS — Z98.891 S/P CESAREAN SECTION: Primary | ICD-10-CM

## 2025-03-04 DIAGNOSIS — R51.9 ACUTE INTRACTABLE HEADACHE, UNSPECIFIED HEADACHE TYPE: ICD-10-CM

## 2025-03-04 DIAGNOSIS — O14.93 PRE-ECLAMPSIA IN THIRD TRIMESTER: ICD-10-CM

## 2025-03-04 PROCEDURE — 99024 POSTOP FOLLOW-UP VISIT: CPT

## 2025-03-04 RX ORDER — IBUPROFEN 600 MG/1
600 TABLET, FILM COATED ORAL EVERY 6 HOURS PRN
Qty: 30 TABLET | Refills: 0 | Status: SHIPPED | OUTPATIENT
Start: 2025-03-04

## 2025-03-04 RX ORDER — ACETAMINOPHEN 500 MG
1000 TABLET ORAL EVERY 6 HOURS PRN
Qty: 90 TABLET | Refills: 0 | Status: SHIPPED | OUTPATIENT
Start: 2025-03-04

## 2025-03-19 NOTE — PROGRESS NOTES
"Post-Partum Checkup Visit    Chief Complaint   Patient presents with    Postpartum Care     Pt here for pp visit today.  She states she is still experiencing headaches.  She is mainly breastfeeding, but supplementing.      Karena Garcia is a 34 y.o.  female     Assessment:  Delivered a female  (7lb 8.5oz) via 1LTCS on 25 due to failed induction at 36w5d after admission for fetal tachycardia and subsequent diagnosis of preeclampsia with severe features, doing well today.   Assessment & Plan  S/P  section  Infant feeding: Breast and pumping  PreE w/ SF: s/p Lasix; Continue Labetalol 200mg BID; Continue until APE   T2DM: Diagnosed during pregnancy (given PP elevated BG values, Hgb A1c of 7.6 2025, and early diagnosis of \"GDM\"); Encouraged to follow up with endocrinology; Continue Metformin; BG has been fine with the Meformin!  Contraception: Declined; reviewed condoms and recommended pregnancy spacing  Activity: Restricted until 6 weeks postpartum  RTO 3 months for annual exam and PRN       Nonintractable episodic headache, unspecified headache type  Recommended Reglan and Tylenol  F/U with PCP  Orders:    metoclopramide (REGLAN) 5 mg tablet; Take 1 tablet (5 mg total) by mouth 4 (four) times a day        Subjective/Objective     Subjective:   Pain: none  Tolerating Oral Intake: She drinks plenty of water; lack of appetite   Voiding: yes  Flatus: yes  Bowel Movement: yes  Ambulating: yes  Breastfeeding: Breastfeeding and Using breast pump  Chest Pain: no  Shortness of Breath: no  Leg Pain/Discomfort: no  Lochia: minimal   Headaches: Still getting sapping pain headaches daily. She is unsure if it is hormonal related. Headache cocktail worked in L&D but she does not want to have to take magnesium ever again.     Objective:     Postpartum Depression: Medium Risk (2025)    [place]  Depression Scale     Last EPDS Total Score: 8     Last EPDS Self Harm Result: Never "       Vitals:  Vitals:    25 1338   BP: 120/80       Physical Exam:  Physical Exam  Vitals reviewed.   Constitutional:       General: She is not in acute distress.     Appearance: She is not ill-appearing, toxic-appearing or diaphoretic.   Cardiovascular:      Rate and Rhythm: Normal rate.   Pulmonary:      Effort: Pulmonary effort is normal. No respiratory distress.   Abdominal:      General: There is no distension.      Palpations: Abdomen is soft. There is no mass.      Tenderness: There is no abdominal tenderness. There is no guarding or rebound.      Comments: Incision healing beautifully! - incision clean, dry, and intact without evidence of infection, erythema, or bleeding   Skin:     General: Skin is warm and dry.   Neurological:      Mental Status: She is alert and oriented to person, place, and time. Mental status is at baseline.   Psychiatric:         Mood and Affect: Mood normal.         Behavior: Behavior normal.         Thought Content: Thought content normal.         Judgment: Judgment normal.           OB Hx:  OB History    Para Term  AB Living   3 1 0 1 2 1   SAB IAB Ectopic Multiple Live Births   2 0 0 0 1      # Outcome Date GA Lbr Edinson/2nd Weight Sex Type Anes PTL Lv   3  25 36w5d  3415 g (7 lb 8.5 oz) F CS-LTranv EPI Y OSMAR      Complications: Failed Induction      Name: Ama Garcia      Apgar1: 8  Apgar5: 9   2 2016     Biochemical      1 2014     Biochemical        Medical Hx  Past Medical History:   Diagnosis Date    Female infertility     Kidney stone     Varicella     vaccinated     Surgical Hx  Past Surgical History:   Procedure Laterality Date    MD  DELIVERY ONLY N/A 2025    Procedure:  SECTION ();  Surgeon: Liset Jha MD;  Location: [facility] ;  Service: Obstetrics    [place] CYSTO/URETERO W/LITHOTRIPSY &INDWELL STENT INSRT Right 10/24/2018    Procedure: CYSTOSCOPY URETEROSCOPY WITH RETROGRADE PYELOGRAM AND  INSERTION STENT URETERAL;  Surgeon: Gregory Goodman MD;  [facility];  Service: Urology    [place] LITHOTRIPSY XTRCORP SHOCK WAVE Right 11/14/2018    Procedure: LITHROTRIPSY EXTRACORPORAL SHOCKWAVE (ESWL);  Surgeon: Gregory Goodman MD;  Location: [facility];  Service: Urology     Family Hx  Family History   Problem Relation Age of Onset    Cancer Mother     Diabetes type II Mother     Hypertension Mother     Thyroid disease Mother     Other Mother         CADISAL    Diabetes type I Father     Hyperlipidemia Father         High Triglycerides    Kidney disease Sister         chronic UTIs/pylenophritis    Heart disease Maternal Grandmother     Diabetes Maternal Grandmother     No Known Problems Maternal Grandfather     Hypertension Paternal Grandmother     Hypertension Paternal Grandfather     Heart disease Paternal Grandfather     Diabetes Paternal Grandfather      Social Hx  Social History     Socioeconomic History    Marital status: /Civil Union     Spouse name: Not on file    Number of children: Not on file    Years of education: Not on file    Highest education level: Not on file   Occupational History    Not on file   Tobacco Use    Smoking status: Former     Current packs/day: 0.25     Average packs/day: 0.3 packs/day for 4.0 years (1.0 ttl pk-yrs)     Types: Cigarettes     Passive exposure: Never    Smokeless tobacco: Former     Quit date: 5/4/2016    Tobacco comments:     Weaning prior to pregnancy, quit with +UPT 6/2024   Vaping Use    Vaping status: Never Used   Substance and Sexual Activity    Alcohol use: Not Currently     Comment: occasional    Drug use: No    Sexual activity: Not Currently     Partners: Male     Birth control/protection: None   Other Topics Concern    Not on file   Social History Narrative    Not on file     Social Drivers of Health     Financial Resource Strain: Not on file   Food Insecurity: No Food Insecurity (2/21/2025)    Nursing - Inadequate Food Risk Classification      Worried About Running Out of Food in the Last Year: Not on file     Ran Out of Food in the Last Year: Not on file     Ran Out of Food in the Last Year: Never true   Transportation Needs: No Transportation Needs (2025)    Nursing - Transportation Risk Classification     Lack of Transportation: Not on file     Lack of Transportation: No   Physical Activity: Not on file   Stress: Not on file   Social Connections: Not on file   Intimate Partner Violence: Unknown (2025)    Nursing IPS     Feels Physically and Emotionally Safe: Not on file     Physically Hurt by Someone: Not on file     Humiliated or Emotionally Abused by Someone: Not on file     Physically Hurt by Someone: No     Hurt or Threatened by Someone: No   Housing Stability: Unknown (2025)    Nursing: Inadequate Housing Risk Classification     Has Housing: Not on file     Worried About Losing Housing: Not on file     Unable to Get Utilities: Not on file     Unable to Pay for Housing in the Last Year: No     Has Housin     Allergies  Allergies   Allergen Reactions    Pollen Extract Sneezing       Leeanne Man MD  OB/GYN  3/20/2025  2:08 PM

## 2025-03-20 ENCOUNTER — POSTPARTUM VISIT (OUTPATIENT)
Dept: OBGYN CLINIC | Facility: CLINIC | Age: 35
End: 2025-03-20

## 2025-03-20 VITALS
DIASTOLIC BLOOD PRESSURE: 80 MMHG | HEIGHT: 62 IN | SYSTOLIC BLOOD PRESSURE: 120 MMHG | WEIGHT: 230.8 LBS | BODY MASS INDEX: 42.47 KG/M2

## 2025-03-20 DIAGNOSIS — Z98.891 S/P CESAREAN SECTION: Primary | ICD-10-CM

## 2025-03-20 DIAGNOSIS — R51.9 NONINTRACTABLE EPISODIC HEADACHE, UNSPECIFIED HEADACHE TYPE: ICD-10-CM

## 2025-03-20 PROCEDURE — 99024 POSTOP FOLLOW-UP VISIT: CPT | Performed by: OBSTETRICS & GYNECOLOGY

## 2025-03-20 RX ORDER — METOCLOPRAMIDE 5 MG/1
5 TABLET ORAL 4 TIMES DAILY
Qty: 60 TABLET | Refills: 1 | Status: SHIPPED | OUTPATIENT
Start: 2025-03-20

## 2025-03-20 NOTE — ASSESSMENT & PLAN NOTE
"Infant feeding: Breast and pumping  PreE w/ SF: s/p Lasix; Continue Labetalol 200mg BID; Continue until APE   T2DM: Diagnosed during pregnancy (given PP elevated BG values, Hgb A1c of 7.6 2/2025, and early diagnosis of \"GDM\"); Encouraged to follow up with endocrinology; Continue Metformin; BG has been fine with the Meformin!  Contraception: Declined; reviewed condoms and recommended pregnancy spacing  Activity: Restricted until 6 weeks postpartum  RTO 3 months for annual exam and PRN       "

## 2025-08-20 ENCOUNTER — HOSPITAL ENCOUNTER (EMERGENCY)
Facility: HOSPITAL | Age: 35
Discharge: HOME | End: 2025-08-20
Attending: EMERGENCY MEDICINE | Admitting: EMERGENCY MEDICINE
Payer: COMMERCIAL

## 2025-08-20 ENCOUNTER — APPOINTMENT (EMERGENCY)
Dept: RADIOLOGY | Facility: HOSPITAL | Age: 35
End: 2025-08-20
Payer: COMMERCIAL

## 2025-08-20 VITALS
BODY MASS INDEX: 42.34 KG/M2 | TEMPERATURE: 98.4 F | HEART RATE: 74 BPM | WEIGHT: 231.48 LBS | SYSTOLIC BLOOD PRESSURE: 148 MMHG | OXYGEN SATURATION: 100 % | RESPIRATION RATE: 20 BRPM | DIASTOLIC BLOOD PRESSURE: 70 MMHG

## 2025-08-20 DIAGNOSIS — O20.9 BLEEDING IN EARLY PREGNANCY: Primary | ICD-10-CM

## 2025-08-20 DIAGNOSIS — R10.9 ABDOMINAL PAIN: ICD-10-CM

## 2025-08-20 LAB
2HR DELTA HS TROPONIN: <-1 NG/L
ALBUMIN SERPL BCG-MCNC: 4.5 G/DL (ref 3.5–5)
ALP SERPL-CCNC: 68 U/L (ref 34–104)
ALT SERPL W P-5'-P-CCNC: 28 U/L (ref 7–52)
ANION GAP SERPL CALCULATED.3IONS-SCNC: 15 MMOL/L (ref 4–13)
AST SERPL W P-5'-P-CCNC: 23 U/L (ref 13–39)
B-HCG SERPL-ACNC: 46.1 MIU/ML (ref 0–5)
BASOPHILS # BLD MANUAL: 0.28 THOUSAND/UL (ref 0–0.1)
BASOPHILS NFR MAR MANUAL: 2 % (ref 0–1)
BILIRUB SERPL-MCNC: 0.43 MG/DL (ref 0.2–1)
BUN SERPL-MCNC: 8 MG/DL (ref 5–25)
CALCIUM SERPL-MCNC: 8.9 MG/DL (ref 8.4–10.2)
CARDIAC TROPONIN I PNL SERPL HS: 3 NG/L (ref ?–50)
CARDIAC TROPONIN I PNL SERPL HS: <2 NG/L (ref ?–50)
CHLORIDE SERPL-SCNC: 104 MMOL/L (ref 96–108)
CO2 SERPL-SCNC: 20 MMOL/L (ref 21–32)
CREAT SERPL-MCNC: 0.71 MG/DL (ref 0.6–1.3)
EOSINOPHIL # BLD MANUAL: 0 THOUSAND/UL (ref 0–0.4)
EOSINOPHIL NFR BLD MANUAL: 0 % (ref 0–6)
ERYTHROCYTE [DISTWIDTH] IN BLOOD BY AUTOMATED COUNT: 12.8 % (ref 11.6–15.1)
GFR SERPL CREATININE-BSD FRML MDRD: 112 ML/MIN/1.73SQ M
GLUCOSE SERPL-MCNC: 174 MG/DL (ref 65–140)
HCG SERPL QL: POSITIVE
HCT VFR BLD AUTO: 41.4 % (ref 34.8–46.1)
HGB BLD-MCNC: 14.2 G/DL (ref 11.5–15.4)
LIPASE SERPL-CCNC: 56 U/L (ref 11–82)
LYMPHOCYTES # BLD AUTO: 37 % (ref 14–44)
LYMPHOCYTES # BLD AUTO: 5.61 THOUSAND/UL (ref 0.6–4.47)
MAGNESIUM SERPL-MCNC: 1.7 MG/DL (ref 1.9–2.7)
MCH RBC QN AUTO: 29.8 PG (ref 26.8–34.3)
MCHC RBC AUTO-ENTMCNC: 34.3 G/DL (ref 31.4–37.4)
MCV RBC AUTO: 87 FL (ref 82–98)
MONOCYTES # BLD AUTO: 0.56 THOUSAND/UL (ref 0–1.22)
MONOCYTES NFR BLD: 4 % (ref 4–12)
NEUTROPHILS # BLD MANUAL: 7.57 THOUSAND/UL (ref 1.85–7.62)
NEUTS SEG NFR BLD AUTO: 54 % (ref 43–75)
PLATELET # BLD AUTO: 447 THOUSANDS/UL (ref 149–390)
PLATELET BLD QL SMEAR: ABNORMAL
PLATELET CLUMP BLD QL SMEAR: PRESENT
PMV BLD AUTO: 10.5 FL (ref 8.9–12.7)
POTASSIUM SERPL-SCNC: 3.3 MMOL/L (ref 3.5–5.3)
PROT SERPL-MCNC: 7.7 G/DL (ref 6.4–8.4)
RBC # BLD AUTO: 4.76 MILLION/UL (ref 3.81–5.12)
RBC MORPH BLD: NORMAL
SMUDGE CELLS BLD QL SMEAR: PRESENT
SODIUM SERPL-SCNC: 139 MMOL/L (ref 135–147)
VARIANT LYMPHS # BLD AUTO: 3 %
WBC # BLD AUTO: 14.02 THOUSAND/UL (ref 4.31–10.16)

## 2025-08-20 PROCEDURE — 83735 ASSAY OF MAGNESIUM: CPT | Performed by: EMERGENCY MEDICINE

## 2025-08-20 PROCEDURE — 96361 HYDRATE IV INFUSION ADD-ON: CPT

## 2025-08-20 PROCEDURE — 36415 COLL VENOUS BLD VENIPUNCTURE: CPT | Performed by: EMERGENCY MEDICINE

## 2025-08-20 PROCEDURE — 99284 EMERGENCY DEPT VISIT MOD MDM: CPT

## 2025-08-20 PROCEDURE — 80053 COMPREHEN METABOLIC PANEL: CPT | Performed by: EMERGENCY MEDICINE

## 2025-08-20 PROCEDURE — 99285 EMERGENCY DEPT VISIT HI MDM: CPT | Performed by: EMERGENCY MEDICINE

## 2025-08-20 PROCEDURE — 84702 CHORIONIC GONADOTROPIN TEST: CPT | Performed by: EMERGENCY MEDICINE

## 2025-08-20 PROCEDURE — 83690 ASSAY OF LIPASE: CPT | Performed by: EMERGENCY MEDICINE

## 2025-08-20 PROCEDURE — 85007 BL SMEAR W/DIFF WBC COUNT: CPT | Performed by: EMERGENCY MEDICINE

## 2025-08-20 PROCEDURE — 96374 THER/PROPH/DIAG INJ IV PUSH: CPT

## 2025-08-20 PROCEDURE — 84703 CHORIONIC GONADOTROPIN ASSAY: CPT | Performed by: EMERGENCY MEDICINE

## 2025-08-20 PROCEDURE — 84484 ASSAY OF TROPONIN QUANT: CPT | Performed by: EMERGENCY MEDICINE

## 2025-08-20 PROCEDURE — 96375 TX/PRO/DX INJ NEW DRUG ADDON: CPT

## 2025-08-20 PROCEDURE — 93005 ELECTROCARDIOGRAM TRACING: CPT

## 2025-08-20 PROCEDURE — 76815 OB US LIMITED FETUS(S): CPT

## 2025-08-20 PROCEDURE — 85027 COMPLETE CBC AUTOMATED: CPT | Performed by: EMERGENCY MEDICINE

## 2025-08-20 RX ORDER — ACETAMINOPHEN 10 MG/ML
1000 INJECTION, SOLUTION INTRAVENOUS ONCE
Status: COMPLETED | OUTPATIENT
Start: 2025-08-20 | End: 2025-08-20

## 2025-08-20 RX ORDER — MAGNESIUM SULFATE HEPTAHYDRATE 40 MG/ML
2 INJECTION, SOLUTION INTRAVENOUS ONCE
Status: DISCONTINUED | OUTPATIENT
Start: 2025-08-20 | End: 2025-08-20 | Stop reason: HOSPADM

## 2025-08-20 RX ORDER — FAMOTIDINE 10 MG/ML
20 INJECTION, SOLUTION INTRAVENOUS ONCE
Status: COMPLETED | OUTPATIENT
Start: 2025-08-20 | End: 2025-08-20

## 2025-08-20 RX ORDER — HYDROMORPHONE HCL/PF 1 MG/ML
0.5 SYRINGE (ML) INJECTION ONCE
Status: COMPLETED | OUTPATIENT
Start: 2025-08-20 | End: 2025-08-20

## 2025-08-20 RX ORDER — ONDANSETRON 2 MG/ML
INJECTION INTRAMUSCULAR; INTRAVENOUS
Status: DISCONTINUED
Start: 2025-08-20 | End: 2025-08-20 | Stop reason: HOSPADM

## 2025-08-20 RX ORDER — FAMOTIDINE 20 MG/1
20 TABLET, FILM COATED ORAL 2 TIMES DAILY
Qty: 30 TABLET | Refills: 0 | Status: SHIPPED | OUTPATIENT
Start: 2025-08-20 | End: 2025-08-22

## 2025-08-20 RX ORDER — ONDANSETRON 4 MG/1
4 TABLET, ORALLY DISINTEGRATING ORAL EVERY 6 HOURS PRN
Qty: 15 TABLET | Refills: 0 | Status: SHIPPED | OUTPATIENT
Start: 2025-08-20

## 2025-08-20 RX ORDER — ONDANSETRON 2 MG/ML
4 INJECTION INTRAMUSCULAR; INTRAVENOUS ONCE
Status: COMPLETED | OUTPATIENT
Start: 2025-08-20 | End: 2025-08-20

## 2025-08-20 RX ADMIN — ONDANSETRON 4 MG: 2 INJECTION INTRAMUSCULAR; INTRAVENOUS at 16:51

## 2025-08-20 RX ADMIN — ACETAMINOPHEN 1000 MG: 10 INJECTION INTRAVENOUS at 18:12

## 2025-08-20 RX ADMIN — FAMOTIDINE 20 MG: 10 INJECTION, SOLUTION INTRAVENOUS at 17:08

## 2025-08-20 RX ADMIN — SODIUM CHLORIDE 1000 ML: 0.9 INJECTION, SOLUTION INTRAVENOUS at 16:50

## 2025-08-20 RX ADMIN — HYDROMORPHONE HYDROCHLORIDE 0.5 MG: 1 INJECTION, SOLUTION INTRAMUSCULAR; INTRAVENOUS; SUBCUTANEOUS at 16:49

## 2025-08-21 PROBLEM — O36.80X0 PREGNANCY OF UNKNOWN ANATOMIC LOCATION: Status: ACTIVE | Noted: 2025-08-21

## 2025-08-21 PROBLEM — R73.9 HYPERGLYCEMIA: Status: ACTIVE | Noted: 2025-08-21

## 2025-08-21 PROBLEM — R79.89 ELEVATED LFTS: Status: ACTIVE | Noted: 2025-08-21

## 2025-08-22 ENCOUNTER — TRANSITIONAL CARE MANAGEMENT (OUTPATIENT)
Dept: FAMILY MEDICINE CLINIC | Facility: CLINIC | Age: 35
End: 2025-08-22

## 2025-08-25 LAB
ATRIAL RATE: 81 BPM
P AXIS: 31 DEGREES
PR INTERVAL: 162 MS
QRS AXIS: 22 DEGREES
QRSD INTERVAL: 86 MS
QT INTERVAL: 416 MS
QTC INTERVAL: 483 MS
T WAVE AXIS: 40 DEGREES
VENTRICULAR RATE: 81 BPM

## 2025-08-25 PROCEDURE — 93010 ELECTROCARDIOGRAM REPORT: CPT | Performed by: INTERNAL MEDICINE

## (undated) DEVICE — PACK C-SECTION PBDS

## (undated) DEVICE — INLAY URETERAL STENT W/O GUIDEWIRE
Type: IMPLANTABLE DEVICE | Status: NON-FUNCTIONAL
Brand: BARD® INLAY® URETERAL STENT

## (undated) DEVICE — SEAL BIOPSY PORT ADJUST F/ACCESSORIES UP TO 3FR

## (undated) DEVICE — LUBRICANT SURGILUBE TUBE 4 OZ  FLIP TOP

## (undated) DEVICE — POUCH UR CATCHER STERILE

## (undated) DEVICE — SUT VICRYL 0 CT-1 36 IN J946H

## (undated) DEVICE — DRESSING MEPILEX AG BORDER POST-OP 4 X 12 IN

## (undated) DEVICE — Device

## (undated) DEVICE — GAUZE SPONGES,16 PLY: Brand: CURITY

## (undated) DEVICE — FABRIC REINFORCED, SURGICAL GOWN, XL: Brand: CONVERTORS

## (undated) DEVICE — CYSTOSCOPY PACK: Brand: CONVERTORS

## (undated) DEVICE — SUT MONOCRYL 0 CTX 36 IN Y398H

## (undated) DEVICE — GLOVE SRG BIOGEL 8

## (undated) DEVICE — GLOVE INDICATOR PI UNDERGLOVE SZ 7 BLUE

## (undated) DEVICE — GLOVE PI ULTRA TOUCH SZ.6.5

## (undated) DEVICE — ABDOMINAL PAD: Brand: DERMACEA

## (undated) DEVICE — SUT VICRYL 2-0 CT-1 27 IN J259H

## (undated) DEVICE — CHLORAPREP HI-LITE 26ML ORANGE

## (undated) DEVICE — RADIOLOGY STERILE LABELS: Brand: CENTURION

## (undated) DEVICE — SKIN MARKER DUAL TIP WITH RULER CAP, FLEXIBLE RULER AND LABELS: Brand: DEVON

## (undated) DEVICE — POV-IOD SOLUTION 4OZ BT

## (undated) DEVICE — CYSTO TUBING TUR Y IRRIGATION

## (undated) DEVICE — GUIDEWIRE STRGHT TIP 0.038 IN SOLO PLUS

## (undated) DEVICE — URETERAL CATH 5 FR

## (undated) DEVICE — TELFA NON-ADHERENT ABSORBENT DRESSING: Brand: TELFA